# Patient Record
Sex: FEMALE | Race: WHITE | NOT HISPANIC OR LATINO | Employment: OTHER | URBAN - METROPOLITAN AREA
[De-identification: names, ages, dates, MRNs, and addresses within clinical notes are randomized per-mention and may not be internally consistent; named-entity substitution may affect disease eponyms.]

---

## 2017-01-18 ENCOUNTER — OFFICE VISIT (OUTPATIENT)
Dept: SURGERY | Facility: CLINIC | Age: 77
End: 2017-01-18

## 2017-01-18 VITALS
HEIGHT: 62 IN | SYSTOLIC BLOOD PRESSURE: 124 MMHG | DIASTOLIC BLOOD PRESSURE: 78 MMHG | WEIGHT: 226 LBS | BODY MASS INDEX: 41.59 KG/M2

## 2017-01-18 DIAGNOSIS — R10.12 LEFT UPPER QUADRANT PAIN: Primary | ICD-10-CM

## 2017-01-18 PROCEDURE — 99212 OFFICE O/P EST SF 10 MIN: CPT | Performed by: SURGERY

## 2017-01-18 NOTE — MR AVS SNAPSHOT
Katarzyna Ku   1/18/2017 3:45 PM   Office Visit    Dept Phone:  [phone]   Encounter #:  [id number]    Provider:  Afshin Schultz MD   Department:  [facility] GENERAL SURGERY                Your Full Care Plan              Today's Medication Changes          These changes are accurate as of: 1/18/17  4:05 PM.  If you have any questions, ask your nurse or doctor.               Medication(s)that have changed:     albuterol (2.5 MG/3ML) 0.083% nebulizer solution   Commonly known as:  PROVENTIL   What changed:  Another medication with the same name was removed. Continue taking this medication, and follow the directions you see here.   Changed by:  Afshin Schultz MD                  Your Updated Medication List          This list is accurate as of: 1/18/17  4:05 PM.  Always use your most recent med list.                albuterol (2.5 MG/3ML) 0.083% nebulizer solution   Commonly known as:  PROVENTIL       benazepril 40 MG tablet   Commonly known as:  LOTENSIN       benzonatate 200 MG capsule   Commonly known as:  TESSALON       celecoxib 100 MG capsule   Commonly known as:  CeleBREX       citalopram 20 MG tablet   Commonly known as:  CeleXA       fish oil 1000 MG capsule capsule       hydrALAZINE 50 MG tablet   Commonly known as:  APRESOLINE       levoFLOXacin 750 MG tablet   Commonly known as:  LEVAQUIN       losartan-hydrochlorothiazide 50-12.5 MG per tablet   Commonly known as:  HYZAAR       MULTIVITAMIN ADULT PO       omeprazole 20 MG capsule   Commonly known as:  priLOSEC       PredniSONE 5 MG (21) tablet therapy pack dosepak       SPIRIVA HANDIHALER 18 MCG per inhalation capsule   Generic drug:  tiotropium       SYMBICORT 160-4.5 MCG/ACT inhaler   Generic drug:  budesonide-formoterol       venlafaxine 50 MG tablet   Commonly known as:  EFFEXOR       VITAMIN B COMPLEX PO               Instructions     None    Patient Instructions History      Upcoming Appointments   "   Visit Type Date Time Department    OFFICE VISIT 1/18/2017  3:45 PM [facility]    CT LAG CHEST WO CONTRAST 2/1/2017 11:30 AM [facility] CT    FULL PFT W BRONCHODILATORS 4/19/2017  1:15 PM [facility] PULMONARY LAB      MyChart Signup     Our records indicate that your [facility] account has been deactivated. If you would like to reactivate your account, please email [email or web address] or call [phone] to talk to our [facility] staff.             Other Info from Your Visit           Your Appointments     Feb 01, 2017 11:30 AM EST   CT lag chest wo contrast with LAG CT 1   [facility] ([place])    [address] [place] [address] [phone]           No prep.  Arrive 15 minutes prior to exam time.  Bring prior films if applicable.            Apr 19, 2017  1:15 PM EDT   Full PFT W Bronchodilators with [facility] PFT/EEG ROOM   [facility]UL[facility] ([place])    [address] [place] [address] [phone]              Allergies     Azithromycin  Nausea And Vomiting    SEVERE GI UPSET    Codeine      Sulfa Antibiotics  Itching      Reason for Visit     Hernia           Vital Signs     Blood Pressure Height Weight Body Mass Index Smoking Status       124/78 62\" (157.5 cm) 226 lb (103 kg) 41.34 kg/m2 Former Smoker       Problems and Diagnoses Noted     Degenerative arthritis of knee        "

## 2017-01-18 NOTE — LETTER
2017     Michelle Beal MD  [address]  [address]  [place] [address]    Patient: Katarzyna Ku   YOB: 1940   Date of Visit: 2017       Dear Dr. Lian MD:    Thank you for referring Katarzyna Ku to me for evaluation. Below are the relevant portions of my assessment and plan of care.    If you have questions, please do not hesitate to call me. I look forward to following [place] along with you.         Sincerely,        Afshin Schultz MD        CC: MD Weston Patel MD Thomas K. Hart, MD  2017  4:08 PM  Sign at close encounter      PATIENT INFORMATION  Katarzyna Ku  Possible hernia, has seen Dr. Schultz in the past, pt states she has a lot of abd and back pain, no recent imaging     - 1940    CHIEF COMPLAINT  Chief Complaint   Patient presents with   • Hernia       HISTORY OF PRESENT ILLNESS  HPI she has a prior breast cancer and has COPD and sees Dr. Sharma for that.  She had a recent PET scan that showed some indeterminate nodules in her lung.  She states she's also had a recent urinary tract infection and has some left flank pain and some left upper quadrant pain.  She denies any specific GI complaints.  She does have a prior history of diverticulitis but says the pain is not similar to that.  She is to see Dr. Bhatt tomorrow.  She had a CT scan of her abdomen in March of last year for the diverticular disease.  Dr. Sue performs her colonoscopies and lasted a colonoscopy approximately 7 years ago according to the patient.        REVIEW OF SYSTEMS  Review of Systems   Constitutional: Negative.    HENT: Negative.    Eyes: Negative.    Respiratory: Negative.    Cardiovascular: Negative.    Gastrointestinal: Positive for abdominal pain.   Endocrine: Negative.    Genitourinary: Negative.    Musculoskeletal: Positive for back pain.   Skin: Negative.    Allergic/Immunologic: Negative.    Neurological: Negative.       Hematological: Negative.    Psychiatric/Behavioral: The patient is nervous/anxious.          ACTIVE PROBLEMS  Patient Active Problem List    Diagnosis   • Abdominal pain, right upper quadrant [R10.11]   • Osteoarthritis of knee [M17.9]   • Chronic obstructive pulmonary disease [J44.9]   • Hypertension [I10]   • Primary localized osteoarthrosis [M19.91]   • Midline cystocele [N81.11]   • Mixed incontinence [N39.46]   • Muscular atrophy [M62.50]   • Atrophic vaginitis [N95.2]   • Prolapse of vaginal vault after hysterectomy [N99.3]   • Enterocele [K46.9]         PAST MEDICAL HISTORY  Past Medical History   Diagnosis Date   • Anxiety    • Breast cancer 2009     left   • COPD (chronic obstructive pulmonary disease)    • Hypertension    • Skin cancer          SURGICAL HISTORY  Past Surgical History   Procedure Laterality Date   • Breast lumpectomy       LEFT   • Knee surgery        X 2   • Hysterectomy     • Bladder surgery     • Carpal tunnel release       BOTH HANDS         FAMILY HISTORY  Family History   Problem Relation Age of Onset   • Family history unknown: Yes         SOCIAL HISTORY  Social History     Occupational History   • Not on file.     Social History Main Topics   • Smoking status: Former Smoker   • Smokeless tobacco: Not on file   • Alcohol use No   • Drug use: Not on file   • Sexual activity: Not on file         CURRENT MEDICATIONS    Current Outpatient Prescriptions:   •  albuterol (PROVENTIL HFA) 108 (90 BASE) MCG/ACT inhaler, Proventil  (90 Base) MCG/ACT Inhalation Aerosol Solution; Patient Sig: Proventil  (90 Base) MCG/ACT Inhalation Aerosol Solution ; 0; 21-Oct-2014; Active, Disp: , Rfl:   •  albuterol (PROVENTIL) (2.5 MG/3ML) 0.083% nebulizer solution, , Disp: , Rfl:   •  B Complex Vitamins (VITAMIN B COMPLEX PO), Take  by mouth., Disp: , Rfl:   •  benazepril (LOTENSIN) 40 MG tablet, , Disp: , Rfl:   •  benzonatate (TESSALON) 200 MG capsule, , Disp: , Rfl:   •   budesonide-formoterol (SYMBICORT) 160-4.5 MCG/ACT inhaler, Symbicort 160-4.5 MCG/ACT Inhalation Aerosol; Patient Sig: Symbicort 160-4.5 MCG/ACT Inhalation Aerosol ; 0; 21-Oct-2014; Active, Disp: , Rfl:   •  celecoxib (CeleBREX) 100 MG capsule, , Disp: , Rfl:   •  citalopram (CeleXA) 20 MG tablet, , Disp: , Rfl:   •  hydrALAZINE (APRESOLINE) 50 MG tablet, , Disp: , Rfl:   •  levoFLOXacin (LEVAQUIN) 750 MG tablet, , Disp: , Rfl:   •  losartan-hydrochlorothiazide (HYZAAR) 50-12.5 MG per tablet, , Disp: , Rfl:   •  Multiple Vitamins-Minerals (MULTIVITAMIN ADULT PO), Take  by mouth., Disp: , Rfl:   •  Omega-3 Fatty Acids (FISH OIL) 1000 MG capsule capsule, Take  by mouth Daily With Breakfast., Disp: , Rfl:   •  omeprazole (PriLOSEC) 20 MG capsule, , Disp: , Rfl:   •  PredniSONE 5 MG (21) tablet therapy pack dosepak, , Disp: , Rfl:   •  SPIRIVA HANDIHALER 18 MCG per inhalation capsule, , Disp: , Rfl:   •  venlafaxine (EFFEXOR) 50 MG tablet, Take 50 mg by mouth 2 (Two) Times a Day., Disp: , Rfl:     ALLERGIES  Azithromycin; Codeine; and Sulfa antibiotics    VITALS  There were no vitals filed for this visit.    LAST RESULTS   No results found for any previous visit.  No results found.    PHYSICAL EXAM  Physical Exam's alert overweight white female in no active distress.  She is some subjective tenderness in her left upper quadrant and left flank.  There is no evidence of mass in this area and there is no evidence of an impulse in this area.  She does have a small reducible umbilical hernia.  I reviewed her CT scan from March of last year and it did show the diverticulitis and a small umbilical hernia.    ASSESSMENT  Abdominal pain      PLAN  The options were discussed with the patient in detail.  I recommended a CT scan of the abdomen and pelvis and I will see her back after that study is complete.  Since she is seeing Dr. Bhatt tomorrow we will defer on ordering a CAT scan until he sees her in case he wants to go on a  different direction.  She will call to schedule the CT if he agrees.

## 2017-01-18 NOTE — PROGRESS NOTES
PATIENT INFORMATION  Katarzyna Ku  Possible hernia, has seen Dr. Schultz in the past, pt states she has a lot of abd and back pain, no recent imaging     - 1940    CHIEF COMPLAINT  Chief Complaint   Patient presents with   • Hernia       HISTORY OF PRESENT ILLNESS  HPI she has a prior breast cancer and has COPD and sees Dr. Sharma for that.  She had a recent PET scan that showed some indeterminate nodules in her lung.  She states she's also had a recent urinary tract infection and has some left flank pain and some left upper quadrant pain.  She denies any specific GI complaints.  She does have a prior history of diverticulitis but says the pain is not similar to that.  She is to see Dr. Bhatt tomorrow.  She had a CT scan of her abdomen in March of last year for the diverticular disease.  Dr. Sue performs her colonoscopies and lasted a colonoscopy approximately 7 years ago according to the patient.        REVIEW OF SYSTEMS  Review of Systems   Constitutional: Negative.    HENT: Negative.    Eyes: Negative.    Respiratory: Negative.    Cardiovascular: Negative.    Gastrointestinal: Positive for abdominal pain.   Endocrine: Negative.    Genitourinary: Negative.    Musculoskeletal: Positive for back pain.   Skin: Negative.    Allergic/Immunologic: Negative.    Neurological: Negative.    Hematological: Negative.    Psychiatric/Behavioral: The patient is nervous/anxious.          ACTIVE PROBLEMS  Patient Active Problem List    Diagnosis   • Abdominal pain, right upper quadrant [R10.11]   • Osteoarthritis of knee [M17.9]   • Chronic obstructive pulmonary disease [J44.9]   • Hypertension [I10]   • Primary localized osteoarthrosis [M19.91]   • Midline cystocele [N81.11]   • Mixed incontinence [N39.46]   • Muscular atrophy [M62.50]   • Atrophic vaginitis [N95.2]   • Prolapse of vaginal vault after hysterectomy [N99.3]   • Enterocele [K46.9]         PAST MEDICAL HISTORY  Past Medical History   Diagnosis  Date   • Anxiety    • Breast cancer 2009     left   • COPD (chronic obstructive pulmonary disease)    • Hypertension    • Skin cancer          SURGICAL HISTORY  Past Surgical History   Procedure Laterality Date   • Breast lumpectomy       LEFT   • Knee surgery        X 2   • Hysterectomy     • Bladder surgery     • Carpal tunnel release       BOTH HANDS         FAMILY HISTORY  Family History   Problem Relation Age of Onset   • Family history unknown: Yes         SOCIAL HISTORY  Social History     Occupational History   • Not on file.     Social History Main Topics   • Smoking status: Former Smoker   • Smokeless tobacco: Not on file   • Alcohol use No   • Drug use: Not on file   • Sexual activity: Not on file         CURRENT MEDICATIONS    Current Outpatient Prescriptions:   •  albuterol (PROVENTIL HFA) 108 (90 BASE) MCG/ACT inhaler, Proventil  (90 Base) MCG/ACT Inhalation Aerosol Solution; Patient Sig: Proventil  (90 Base) MCG/ACT Inhalation Aerosol Solution ; 0; 21-Oct-2014; Active, Disp: , Rfl:   •  albuterol (PROVENTIL) (2.5 MG/3ML) 0.083% nebulizer solution, , Disp: , Rfl:   •  B Complex Vitamins (VITAMIN B COMPLEX PO), Take  by mouth., Disp: , Rfl:   •  benazepril (LOTENSIN) 40 MG tablet, , Disp: , Rfl:   •  benzonatate (TESSALON) 200 MG capsule, , Disp: , Rfl:   •  budesonide-formoterol (SYMBICORT) 160-4.5 MCG/ACT inhaler, Symbicort 160-4.5 MCG/ACT Inhalation Aerosol; Patient Sig: Symbicort 160-4.5 MCG/ACT Inhalation Aerosol ; 0; 21-Oct-2014; Active, Disp: , Rfl:   •  celecoxib (CeleBREX) 100 MG capsule, , Disp: , Rfl:   •  citalopram (CeleXA) 20 MG tablet, , Disp: , Rfl:   •  hydrALAZINE (APRESOLINE) 50 MG tablet, , Disp: , Rfl:   •  levoFLOXacin (LEVAQUIN) 750 MG tablet, , Disp: , Rfl:   •  losartan-hydrochlorothiazide (HYZAAR) 50-12.5 MG per tablet, , Disp: , Rfl:   •  Multiple Vitamins-Minerals (MULTIVITAMIN ADULT PO), Take  by mouth., Disp: , Rfl:   •  Omega-3 Fatty Acids (FISH OIL) 1000 MG  capsule capsule, Take  by mouth Daily With Breakfast., Disp: , Rfl:   •  omeprazole (PriLOSEC) 20 MG capsule, , Disp: , Rfl:   •  PredniSONE 5 MG (21) tablet therapy pack dosepak, , Disp: , Rfl:   •  SPIRIVA HANDIHALER 18 MCG per inhalation capsule, , Disp: , Rfl:   •  venlafaxine (EFFEXOR) 50 MG tablet, Take 50 mg by mouth 2 (Two) Times a Day., Disp: , Rfl:     ALLERGIES  Azithromycin; Codeine; and Sulfa antibiotics    VITALS  There were no vitals filed for this visit.    LAST RESULTS   No results found for any previous visit.  No results found.    PHYSICAL EXAM  Physical Exam's alert overweight white female in no active distress.  She is some subjective tenderness in her left upper quadrant and left flank.  There is no evidence of mass in this area and there is no evidence of an impulse in this area.  She does have a small reducible umbilical hernia.  I reviewed her CT scan from March of last year and it did show the diverticulitis and a small umbilical hernia.    ASSESSMENT  Abdominal pain      PLAN  The options were discussed with the patient in detail.  I recommended a CT scan of the abdomen and pelvis and I will see her back after that study is complete.  Since she is seeing Dr. Bhatt tomorrow we will defer on ordering a CAT scan until he sees her in case he wants to go on a different direction.  She will call to schedule the CT if he agrees.

## 2017-01-19 ENCOUNTER — TELEPHONE (OUTPATIENT)
Dept: SURGERY | Facility: CLINIC | Age: 77
End: 2017-01-19

## 2017-01-19 DIAGNOSIS — R10.12 LEFT UPPER QUADRANT PAIN: Primary | ICD-10-CM

## 2017-01-24 ENCOUNTER — HOSPITAL ENCOUNTER (OUTPATIENT)
Dept: CT IMAGING | Facility: HOSPITAL | Age: 77
Discharge: HOME | End: 2017-01-24
Attending: SURGERY | Admitting: SURGERY

## 2017-01-24 DIAGNOSIS — R10.12 LEFT UPPER QUADRANT PAIN: ICD-10-CM

## 2017-01-24 PROCEDURE — 0 IOPAMIDOL PER 1 ML: Performed by: SURGERY

## 2017-01-24 PROCEDURE — 74177 CT ABD & PELVIS W/CONTRAST: CPT

## 2017-01-24 RX ADMIN — IOPAMIDOL 100 ML: 755 INJECTION, SOLUTION INTRAVENOUS at 09:28

## 2017-01-30 ENCOUNTER — HOSPITAL ENCOUNTER (EMERGENCY)
Facility: HOSPITAL | Age: 77
Discharge: HOME | End: 2017-01-30
Attending: EMERGENCY MEDICINE | Admitting: EMERGENCY MEDICINE

## 2017-01-30 VITALS
HEART RATE: 73 BPM | BODY MASS INDEX: 41.59 KG/M2 | WEIGHT: 226 LBS | DIASTOLIC BLOOD PRESSURE: 71 MMHG | TEMPERATURE: 97.9 F | RESPIRATION RATE: 16 BRPM | OXYGEN SATURATION: 91 % | HEIGHT: 62 IN | SYSTOLIC BLOOD PRESSURE: 130 MMHG

## 2017-01-30 DIAGNOSIS — R55 SYNCOPE, VASOVAGAL: ICD-10-CM

## 2017-01-30 DIAGNOSIS — R10.32 LEFT LOWER QUADRANT PAIN: Primary | ICD-10-CM

## 2017-01-30 LAB
ALBUMIN SERPL-MCNC: 3.7 G/DL (ref 3.5–5.2)
ALBUMIN/GLOB SERPL: 1.8 G/DL
ALP SERPL-CCNC: 74 U/L (ref 40–129)
ALT SERPL W P-5'-P-CCNC: 12 U/L (ref 5–33)
AMYLASE SERPL-CCNC: 68 U/L (ref 28–100)
ANION GAP SERPL CALCULATED.3IONS-SCNC: 12.4 MMOL/L
AST SERPL-CCNC: 12 U/L (ref 5–32)
BASOPHILS # BLD AUTO: 0.03 10*3/MM3 (ref 0–0.2)
BASOPHILS NFR BLD AUTO: 0.3 % (ref 0–2)
BILIRUB SERPL-MCNC: 0.2 MG/DL (ref 0.2–1.2)
BUN BLD-MCNC: 22 MG/DL (ref 8–23)
BUN/CREAT SERPL: 22 (ref 7–25)
CALCIUM SPEC-SCNC: 9.3 MG/DL (ref 8.8–10.5)
CHLORIDE SERPL-SCNC: 100 MMOL/L (ref 98–107)
CO2 SERPL-SCNC: 28.6 MMOL/L (ref 22–29)
CREAT BLD-MCNC: 1 MG/DL (ref 0.57–1)
DEPRECATED RDW RBC AUTO: 49.7 FL (ref 37–54)
EOSINOPHIL # BLD AUTO: 0.21 10*3/MM3 (ref 0.1–0.3)
EOSINOPHIL NFR BLD AUTO: 2.2 % (ref 0–4)
ERYTHROCYTE [DISTWIDTH] IN BLOOD BY AUTOMATED COUNT: 14.3 % (ref 11.5–14.5)
GFR SERPL CREATININE-BSD FRML MDRD: 54 ML/MIN/1.73
GLOBULIN UR ELPH-MCNC: 2.1 GM/DL
GLUCOSE BLD-MCNC: 140 MG/DL (ref 65–99)
HCT VFR BLD AUTO: 35.4 % (ref 37–47)
HGB BLD-MCNC: 10.9 G/DL (ref 12–16)
IMM GRANULOCYTES # BLD: 0.03 10*3/MM3 (ref 0–0.03)
IMM GRANULOCYTES NFR BLD: 0.3 % (ref 0–0.5)
LIPASE SERPL-CCNC: 26 U/L (ref 13–60)
LYMPHOCYTES # BLD AUTO: 2.61 10*3/MM3 (ref 0.6–4.8)
LYMPHOCYTES NFR BLD AUTO: 27.9 % (ref 20–45)
MCH RBC QN AUTO: 28.8 PG (ref 27–31)
MCHC RBC AUTO-ENTMCNC: 30.8 G/DL (ref 31–37)
MCV RBC AUTO: 93.7 FL (ref 81–99)
MONOCYTES # BLD AUTO: 0.51 10*3/MM3 (ref 0–1)
MONOCYTES NFR BLD AUTO: 5.4 % (ref 3–8)
NEUTROPHILS # BLD AUTO: 5.98 10*3/MM3 (ref 1.5–8.3)
NEUTROPHILS NFR BLD AUTO: 63.9 % (ref 45–70)
NRBC BLD MANUAL-RTO: 0 /100 WBC (ref 0–0)
PLATELET # BLD AUTO: 244 10*3/MM3 (ref 140–500)
PMV BLD AUTO: 11.3 FL (ref 7.4–10.4)
POTASSIUM BLD-SCNC: 4.7 MMOL/L (ref 3.5–5.2)
PROT SERPL-MCNC: 5.8 G/DL (ref 6–8.5)
RBC # BLD AUTO: 3.78 10*6/MM3 (ref 4.2–5.4)
SODIUM BLD-SCNC: 141 MMOL/L (ref 136–145)
TROPONIN T SERPL-MCNC: <0.01 NG/ML (ref 0–0.03)
WBC NRBC COR # BLD: 9.37 10*3/MM3 (ref 4.8–10.8)

## 2017-01-30 PROCEDURE — 80053 COMPREHEN METABOLIC PANEL: CPT | Performed by: EMERGENCY MEDICINE

## 2017-01-30 PROCEDURE — 99284 EMERGENCY DEPT VISIT MOD MDM: CPT

## 2017-01-30 PROCEDURE — 93005 ELECTROCARDIOGRAM TRACING: CPT | Performed by: EMERGENCY MEDICINE

## 2017-01-30 PROCEDURE — 96374 THER/PROPH/DIAG INJ IV PUSH: CPT

## 2017-01-30 PROCEDURE — 85025 COMPLETE CBC W/AUTO DIFF WBC: CPT | Performed by: EMERGENCY MEDICINE

## 2017-01-30 PROCEDURE — 84484 ASSAY OF TROPONIN QUANT: CPT | Performed by: EMERGENCY MEDICINE

## 2017-01-30 PROCEDURE — 83690 ASSAY OF LIPASE: CPT | Performed by: EMERGENCY MEDICINE

## 2017-01-30 PROCEDURE — 99284 EMERGENCY DEPT VISIT MOD MDM: CPT | Performed by: EMERGENCY MEDICINE

## 2017-01-30 PROCEDURE — 93010 ELECTROCARDIOGRAM REPORT: CPT | Performed by: INTERNAL MEDICINE

## 2017-01-30 PROCEDURE — 82150 ASSAY OF AMYLASE: CPT | Performed by: EMERGENCY MEDICINE

## 2017-01-30 PROCEDURE — 25010000002 ONDANSETRON PER 1 MG: Performed by: EMERGENCY MEDICINE

## 2017-01-30 RX ORDER — ONDANSETRON 2 MG/ML
4 INJECTION INTRAMUSCULAR; INTRAVENOUS ONCE
Status: COMPLETED | OUTPATIENT
Start: 2017-01-30 | End: 2017-01-30

## 2017-01-30 RX ORDER — SODIUM CHLORIDE 0.9 % (FLUSH) 0.9 %
10 SYRINGE (ML) INJECTION AS NEEDED
Status: DISCONTINUED | OUTPATIENT
Start: 2017-01-30 | End: 2017-01-30 | Stop reason: HOSPADM

## 2017-01-30 RX ADMIN — ONDANSETRON 4 MG: 2 INJECTION, SOLUTION INTRAMUSCULAR; INTRAVENOUS at 04:53

## 2017-02-01 ENCOUNTER — HOSPITAL ENCOUNTER (OUTPATIENT)
Dept: CT IMAGING | Facility: HOSPITAL | Age: 77
Discharge: HOME | End: 2017-02-01
Attending: INTERNAL MEDICINE | Admitting: INTERNAL MEDICINE

## 2017-02-01 DIAGNOSIS — R91.1 LUNG NODULE: ICD-10-CM

## 2017-02-01 PROCEDURE — 71250 CT THORAX DX C-: CPT

## 2017-02-02 ENCOUNTER — OFFICE (OUTPATIENT)
Dept: URBAN - METROPOLITAN AREA OTHER 6 | Facility: OTHER | Age: 77
End: 2017-02-02

## 2017-02-02 VITALS
WEIGHT: 225 LBS | HEART RATE: 80 BPM | SYSTOLIC BLOOD PRESSURE: 122 MMHG | HEIGHT: 62 IN | DIASTOLIC BLOOD PRESSURE: 80 MMHG

## 2017-02-02 DIAGNOSIS — K30 FUNCTIONAL DYSPEPSIA: ICD-10-CM

## 2017-02-02 DIAGNOSIS — Z12.11 ENCOUNTER FOR SCREENING FOR MALIGNANT NEOPLASM OF COLON: ICD-10-CM

## 2017-02-02 DIAGNOSIS — R10.32 LEFT LOWER QUADRANT PAIN: ICD-10-CM

## 2017-02-02 PROCEDURE — 99202 OFFICE O/P NEW SF 15 MIN: CPT

## 2017-02-02 RX ORDER — SODIUM PHOSPHATE, MONOBASIC, MONOHYDRATE, SODIUM PHOSPHATE, DIBASIC ANHYDROUS 1.102; .398 G/1; G/1
TABLET ORAL
Qty: 28 | Refills: 0 | Status: COMPLETED
Start: 2017-02-02 | End: 2017-07-03

## 2017-02-09 ENCOUNTER — ANESTHESIA EVENT (OUTPATIENT)
Dept: PERIOP | Facility: HOSPITAL | Age: 77
End: 2017-02-09

## 2017-02-10 ENCOUNTER — ANESTHESIA (OUTPATIENT)
Dept: PERIOP | Facility: HOSPITAL | Age: 77
End: 2017-02-10

## 2017-02-10 ENCOUNTER — HOSPITAL ENCOUNTER (OUTPATIENT)
Facility: HOSPITAL | Age: 77
Setting detail: HOSPITAL OUTPATIENT SURGERY
Discharge: HOME | End: 2017-02-10
Attending: INTERNAL MEDICINE | Admitting: INTERNAL MEDICINE

## 2017-02-10 ENCOUNTER — PREP FOR SURGERY (OUTPATIENT)
Dept: GASTROENTEROLOGY | Facility: HOSPITAL | Age: 77
End: 2017-02-10

## 2017-02-10 ENCOUNTER — ON CAMPUS - OUTPATIENT (OUTPATIENT)
Dept: URBAN - METROPOLITAN AREA HOSPITAL 28 | Facility: HOSPITAL | Age: 77
End: 2017-02-10
Payer: MEDICARE

## 2017-02-10 VITALS
BODY MASS INDEX: 40.52 KG/M2 | OXYGEN SATURATION: 98 % | RESPIRATION RATE: 16 BRPM | HEIGHT: 62 IN | SYSTOLIC BLOOD PRESSURE: 102 MMHG | TEMPERATURE: 97.1 F | HEART RATE: 79 BPM | DIASTOLIC BLOOD PRESSURE: 56 MMHG | WEIGHT: 220.2 LBS

## 2017-02-10 DIAGNOSIS — K57.30 DIVERTICULOSIS OF LARGE INTESTINE WITHOUT PERFORATION OR ABS: ICD-10-CM

## 2017-02-10 DIAGNOSIS — K22.70 BARRETT'S ESOPHAGUS WITHOUT DYSPLASIA: ICD-10-CM

## 2017-02-10 DIAGNOSIS — D12.3 BENIGN NEOPLASM OF TRANSVERSE COLON: ICD-10-CM

## 2017-02-10 DIAGNOSIS — Z13.9 SCREENING: ICD-10-CM

## 2017-02-10 DIAGNOSIS — K22.70: ICD-10-CM

## 2017-02-10 DIAGNOSIS — D12.5 BENIGN NEOPLASM OF SIGMOID COLON: ICD-10-CM

## 2017-02-10 DIAGNOSIS — Z12.11 ENCOUNTER FOR SCREENING FOR MALIGNANT NEOPLASM OF COLON: ICD-10-CM

## 2017-02-10 PROCEDURE — 94640 AIRWAY INHALATION TREATMENT: CPT

## 2017-02-10 PROCEDURE — 45380 COLONOSCOPY AND BIOPSY: CPT | Mod: PT

## 2017-02-10 PROCEDURE — 25010000002 PROPOFOL 10 MG/ML EMULSION: Performed by: ANESTHESIOLOGY

## 2017-02-10 PROCEDURE — 43239 EGD BIOPSY SINGLE/MULTIPLE: CPT

## 2017-02-10 RX ORDER — SODIUM CHLORIDE, SODIUM LACTATE, POTASSIUM CHLORIDE, CALCIUM CHLORIDE 600; 310; 30; 20 MG/100ML; MG/100ML; MG/100ML; MG/100ML
9 INJECTION, SOLUTION INTRAVENOUS CONTINUOUS
Status: DISCONTINUED | OUTPATIENT
Start: 2017-02-10 | End: 2017-02-10 | Stop reason: HOSPADM

## 2017-02-10 RX ORDER — MAGNESIUM HYDROXIDE 1200 MG/15ML
LIQUID ORAL AS NEEDED
Status: DISCONTINUED | OUTPATIENT
Start: 2017-02-10 | End: 2017-02-10 | Stop reason: HOSPADM

## 2017-02-10 RX ORDER — SODIUM CHLORIDE 0.9 % (FLUSH) 0.9 %
1-10 SYRINGE (ML) INJECTION AS NEEDED
Status: DISCONTINUED | OUTPATIENT
Start: 2017-02-10 | End: 2017-02-10 | Stop reason: HOSPADM

## 2017-02-10 RX ORDER — SODIUM CHLORIDE 0.9 % (FLUSH) 0.9 %
1-10 SYRINGE (ML) INJECTION AS NEEDED
Status: CANCELLED | OUTPATIENT
Start: 2017-02-10

## 2017-02-10 RX ORDER — LIDOCAINE HYDROCHLORIDE 10 MG/ML
0.3 INJECTION, SOLUTION EPIDURAL; INFILTRATION; INTRACAUDAL; PERINEURAL ONCE
Status: COMPLETED | OUTPATIENT
Start: 2017-02-10 | End: 2017-02-10

## 2017-02-10 RX ORDER — GLYCOPYRROLATE 0.2 MG/ML
INJECTION INTRAMUSCULAR; INTRAVENOUS AS NEEDED
Status: DISCONTINUED | OUTPATIENT
Start: 2017-02-10 | End: 2017-02-10 | Stop reason: SURG

## 2017-02-10 RX ORDER — IPRATROPIUM BROMIDE AND ALBUTEROL SULFATE 2.5; .5 MG/3ML; MG/3ML
3 SOLUTION RESPIRATORY (INHALATION) ONCE
Status: COMPLETED | OUTPATIENT
Start: 2017-02-10 | End: 2017-02-10

## 2017-02-10 RX ORDER — LIDOCAINE HYDROCHLORIDE 20 MG/ML
INJECTION, SOLUTION INFILTRATION; PERINEURAL AS NEEDED
Status: DISCONTINUED | OUTPATIENT
Start: 2017-02-10 | End: 2017-02-10 | Stop reason: SURG

## 2017-02-10 RX ORDER — PROPOFOL 10 MG/ML
VIAL (ML) INTRAVENOUS AS NEEDED
Status: DISCONTINUED | OUTPATIENT
Start: 2017-02-10 | End: 2017-02-10 | Stop reason: SURG

## 2017-02-10 RX ORDER — LIDOCAINE HYDROCHLORIDE 10 MG/ML
INJECTION, SOLUTION EPIDURAL; INFILTRATION; INTRACAUDAL; PERINEURAL
Status: COMPLETED
Start: 2017-02-10 | End: 2017-02-10

## 2017-02-10 RX ADMIN — PROPOFOL 380 MG: 10 INJECTION, EMULSION INTRAVENOUS at 13:23

## 2017-02-10 RX ADMIN — LIDOCAINE HYDROCHLORIDE 0.3 ML: 10 INJECTION, SOLUTION EPIDURAL; INFILTRATION; INTRACAUDAL; PERINEURAL at 11:36

## 2017-02-10 RX ADMIN — IPRATROPIUM BROMIDE AND ALBUTEROL SULFATE 3 ML: .5; 3 SOLUTION RESPIRATORY (INHALATION) at 11:48

## 2017-02-10 RX ADMIN — SODIUM CHLORIDE, POTASSIUM CHLORIDE, SODIUM LACTATE AND CALCIUM CHLORIDE 9 ML/HR: 600; 310; 30; 20 INJECTION, SOLUTION INTRAVENOUS at 11:36

## 2017-02-10 RX ADMIN — GLYCOPYRROLATE 0.1 MG: 0.2 INJECTION INTRAMUSCULAR; INTRAVENOUS at 13:23

## 2017-02-10 RX ADMIN — SODIUM CHLORIDE, POTASSIUM CHLORIDE, SODIUM LACTATE AND CALCIUM CHLORIDE: 600; 310; 30; 20 INJECTION, SOLUTION INTRAVENOUS at 13:17

## 2017-02-10 RX ADMIN — LIDOCAINE HYDROCHLORIDE 50 MG: 20 INJECTION, SOLUTION INFILTRATION; PERINEURAL at 13:23

## 2017-02-10 NOTE — OP NOTE
Date of Operation:  02/10/2017     PROCEDURE PERFORMED: Colonoscopy with polypectomy.     SURGEON: Weston Sue MD    INDICATIONS: Screening for colorectal cancer in a patient with a greater than 10 years since her last exam.     MEDICATIONS: Monitored anesthesia care. Please see anesthesia record.     DESCRIPTION OF PROCEDURE: The risks and benefits of the procedure were explained to the patient. Informed consent was signed. She was placed in the left lateral decubitus position and given IV sedation. A rectal exam revealed no external lesions, normal anal tone, and no rectal mass. The scope was introduced in the rectum and advanced under direct visualization with ease through the rectum, sigmoid colon, descending colon, to around the splenic flexure and advanced into the transverse colon where there was a sessile 5 x 7 mm flat discolored area, appeared to be consistent with a sessile polyp/flat lesion. The scope was advanced into the remainder of the transverse colon to around the hepatic flexure, reduced in the ascending colon, and then advanced into the cecum. The cecum was fairly well prepped and identified by the appendiceal orifice and ileocecal valve. The ileocecal valve was opened but not significantly intubated. The scope was withdrawn. Cecal mucosa was examined and normal as was the ascending colon. The scope was withdrawn around the hepatic flexure to the transverse colon. The site of polypectomy showed excellent hemostasis and no residual polyp. The scope was withdrawn around the splenic flexure into the descending colon, which was normal appearing, and the sigmoid colon where the diverticula were limited to. In the midsigmoid colon, there was a polyp next to a diverticulum. This was 5 x 5 mm. It was biopsied and felt to be completely removed. There was excellent hemostasis. The scope was withdrawn through the remainder of the distal sigmoid and rectum, retroflexed revealing intact dentate line  and no further mucosal lesions. The scope was de-retroflexed and withdrawn. The patient tolerated the procedure very well.     IMPRESSION:   1.  Colonoscopy to cecum with good prep.   2.  Sigmoid diverticulosis.   3.  Polyps numbering 2, one from the transverse and one from the sigmoid.     RECOMMENDATION: Will call patient with results of polyps as soon as they are available along with the appropriate recommendations on screening.       Weston Sue M.D.  SO/so  D:  02/10/2017 14:15:40   T:  02/10/2017 15:56:09   Job ID:  [id number]   Document ID:  [id number]  cc:

## 2017-02-10 NOTE — PLAN OF CARE
Problem: Patient Care Overview (Adult)  Goal: Plan of Care Review  Outcome: Ongoing (interventions implemented as appropriate)    02/10/17 1105   Coping/Psychosocial Response Interventions   Plan Of Care Reviewed With patient   Patient Care Overview   Progress improving   Outcome Evaluation   Outcome Summary/Follow up Plan vss, ready for procedure

## 2017-02-10 NOTE — ANESTHESIA POSTPROCEDURE EVALUATION
Patient: Katarzyna Ku    Procedure Summary     Date Anesthesia Start Anesthesia Stop Room / Location    02/10/17 [id number] [facility] ENDOS[facility] 1 / [facility] OR       Procedure Diagnosis Surgeon Provider    ESOPHAGOGASTRODUODENOSCOPY with biopsies (N/A Esophagus); COLONOSCOPY with polypectomy (N/A ) Casper's esophagus; Colon polyps; Colonic diverticulum  (R10.32, K30, Z12.11) MD Yolanda Qureshi MD          Anesthesia Type: MAC  Last vitals  BP      Temp      Pulse     Resp      SpO2        Post Anesthesia Care and Evaluation    Patient location during evaluation: bedside  Patient participation: complete - patient participated  Level of consciousness: awake and alert  Pain score: 0  Pain management: adequate  Airway patency: patent  Anesthetic complications: No anesthetic complications    Cardiovascular status: acceptable  Respiratory status: acceptable  Hydration status: acceptable

## 2017-02-10 NOTE — OP NOTE
Date of Operation:  02/10/2017    PROCEDURE PERFORMED: Esophagogastroduodenoscopy with biopsy.     INDICATIONS: History of Casper esophagus.     MEDICATIONS: Monitored anesthesia care. Please see anesthesia record.     SURGEON: Weston Sue MD    DESCRIPTION OF PROCEDURE: The risks and benefits of the procedure were explained to the patient. Informed consent was signed. She was placed in the left lateral decubitus position and given IV sedation. A bite block was placed between her incisors. The scope was reduced in the oropharynx and advanced under direct visualization into the esophagus and through the distal esophagus. The Z line was mildly irregular. There were at least 3 short, less than 1 cm, gastric tongues noted. No active ulcer. No stricture. The scope was advanced in the stomach and retroflexed revealing normal cardia and fundus. The scope was de-retroflexed and advanced to the antrum. Antral mucosa was normal appearing. The scope was advanced through the pylorus to the duodenal bulb, which was examined and normal, and around the angle to the 2nd and 3rd portions of the duodenum, revealing normal ampulla and otherwise normal duodenal mucosa. The scope was withdrawn back into the antrum and withdrawn slowly, examining the posterior wall and greater curvature. No mucosal lesions were noted. The endoscope was withdrawn in the distal esophagus. Biopsies were taken. In a targeted fashion, 4 quadrants were assessed with the biopsies. The scope was taken from the patient. The remainder of the esophagus was normal-appearing. She tolerated the procedure very well.     IMPRESSION:   1.  Normal duodenum.   2.  Normal stomach.   3.  Short segment Casper esophagus, assessed with biopsies.     RECOMMENDATION: Keep the patient on the same medications for now and get her the biopsy results along with further recommendations as soon as they are available.       Weston Sue M.D.  SO/so  D:  02/10/2017  14:13:06   T:  02/10/2017 15:43:57   Job ID:  [id number]   Document ID:  [id number]  cc:

## 2017-02-10 NOTE — ANESTHESIA PREPROCEDURE EVALUATION
Anesthesia Evaluation     Patient summary reviewed and Nursing notes reviewed   no history of anesthetic complications:  NPO Status: > 8 hours   Airway   Mallampati: II  TM distance: >3 FB  Neck ROM: limited  possible difficult intubation  Dental    (+) upper dentures    Pulmonary     breath sounds clear to auscultation  (+) COPD (O2 3L at night. 2 breathing treatments per day) moderate, asthma, decreased breath sounds,   Smoker: quit 1990.    ROS comment: Dry cough    Cardiovascular - normal exam    ECG reviewed  Rhythm: regular  Rate: normal    (+) hypertension well controlled,       Neuro/Psych  (+) psychiatric history Anxiety,    GI/Hepatic/Renal/Endo    (+) obesity, morbid obesity, GERD well controlled,     Musculoskeletal     (+) back pain (DDD Lumbar spine. ),   Abdominal   (+) obese,    Substance History - negative use     OB/GYN negative ob/gyn ROS         Other      history of cancer (Breast 2009 - skin CA 2016) remission                                Anesthesia Plan    ASA 3     MAC     intravenous induction   Anesthetic plan and risks discussed with patient.  Use of blood products discussed with patient  Consented to blood products.

## 2017-02-10 NOTE — BRIEF OP NOTE
ESOPHAGOGASTRODUODENOSCOPY, COLONOSCOPY  Procedure Note    Katarzyna Ku  2/10/2017    Pre-op Diagnosis:   R10.32, K30, Z12.11    Post-op Diagnosis:     Post-Op Diagnosis Codes:     * Casper's esophagus [K22.7]     * Colon polyps [K63.5]     * Colonic diverticulum [K57.30]    Procedure/CPT® Codes:      Procedure(s):  ESOPHAGOGASTRODUODENOSCOPY with biopsies  COLONOSCOPY with polypectomy    Surgeon(s):  Weston Sue MD    Anesthesia: Monitor Anesthesia Care    Staff:   Circulator: Gabe Casas RN  Scrub Person: Angie Palmer    Estimated Blood Loss: * No values recorded between 2/10/2017  1:17 PM and 2/10/2017  2:00 PM *    Specimens:                  ID Type Source Tests Collected by Time Destination   A : distal esophagus bx Tissue Esophagus, Distal TISSUE EXAM Weston Sue MD 2/10/2017 1332    B : transverse polyp Polyp Large Intestine, Transverse Colon TISSUE EXAM Weston Sue MD 2/10/2017 1343    C : sigmoid polyp Polyp Large Intestine, Sigmoid Colon TISSUE EXAM Weston Sue MD 2/10/2017 1356          Drains:           Findings: normal Duodenum  Normal stomach  Short segment Barretts Esophagus-Biopsy  Colon to Cecum good prep  Diverticulosis  Polyps(2)    Complications: none   [id number]/[id number]      Weston Sue MD     Date: 2/10/2017  Time: 2:04 PM

## 2017-02-10 NOTE — PLAN OF CARE
Problem: GI Endoscopy (Adult)  Goal: Signs and Symptoms of Listed Potential Problems Will be Absent or Manageable (GI Endoscopy)  Outcome: Ongoing (interventions implemented as appropriate)    02/10/17 1105   GI Endoscopy   Problems Assessed (GI Endoscopy) all   Problems Present (GI Endoscopy) none

## 2017-02-10 NOTE — PLAN OF CARE
Problem: Patient Care Overview (Adult)  Goal: Plan of Care Review  Outcome: Ongoing (interventions implemented as appropriate)    02/10/17 1416   Coping/Psychosocial Response Interventions   Plan Of Care Reviewed With patient   Patient Care Overview   Progress improving   Outcome Evaluation   Outcome Summary/Follow up Plan vss, ready for dc       Goal: Adult Individualization and Mutuality  Outcome: Ongoing (interventions implemented as appropriate)    Problem: GI Endoscopy (Adult)  Goal: Signs and Symptoms of Listed Potential Problems Will be Absent or Manageable (GI Endoscopy)  Outcome: Ongoing (interventions implemented as appropriate)

## 2017-02-15 LAB
LAB AP CASE REPORT: NORMAL
Lab: NORMAL
PATH REPORT.FINAL DX SPEC: NORMAL

## 2017-04-19 ENCOUNTER — HOSPITAL ENCOUNTER (OUTPATIENT)
Dept: PULMONOLOGY | Facility: HOSPITAL | Age: 77
Discharge: HOME | End: 2017-04-19
Attending: INTERNAL MEDICINE | Admitting: INTERNAL MEDICINE

## 2017-04-19 VITALS — RESPIRATION RATE: 20 BRPM | OXYGEN SATURATION: 95 % | HEART RATE: 78 BPM

## 2017-04-19 DIAGNOSIS — J43.9 PULMONARY EMPHYSEMA, UNSPECIFIED EMPHYSEMA TYPE (HCC): ICD-10-CM

## 2017-04-19 PROCEDURE — 94060 EVALUATION OF WHEEZING: CPT

## 2017-04-19 PROCEDURE — 94729 DIFFUSING CAPACITY: CPT

## 2017-04-19 PROCEDURE — A9270 NON-COVERED ITEM OR SERVICE: HCPCS | Performed by: INTERNAL MEDICINE

## 2017-04-19 PROCEDURE — 63710000001 ALBUTEROL PER 1 MG: Performed by: INTERNAL MEDICINE

## 2017-04-19 PROCEDURE — 94726 PLETHYSMOGRAPHY LUNG VOLUMES: CPT

## 2017-04-19 RX ORDER — ALBUTEROL SULFATE 2.5 MG/3ML
2.5 SOLUTION RESPIRATORY (INHALATION) ONCE
Status: COMPLETED | OUTPATIENT
Start: 2017-04-19 | End: 2017-04-19

## 2017-04-19 RX ADMIN — ALBUTEROL SULFATE 2.5 MG: 2.5 SOLUTION RESPIRATORY (INHALATION) at 13:51

## 2017-04-25 ENCOUNTER — TRANSCRIBE ORDERS (OUTPATIENT)
Dept: PULMONOLOGY | Facility: HOSPITAL | Age: 77
End: 2017-04-25

## 2017-04-25 DIAGNOSIS — R91.8 LUNG NODULES: Primary | ICD-10-CM

## 2017-07-03 ENCOUNTER — OFFICE (OUTPATIENT)
Dept: URBAN - METROPOLITAN AREA CLINIC 71 | Facility: CLINIC | Age: 77
End: 2017-07-03

## 2017-07-03 VITALS
HEIGHT: 62 IN | SYSTOLIC BLOOD PRESSURE: 110 MMHG | WEIGHT: 218 LBS | HEART RATE: 72 BPM | DIASTOLIC BLOOD PRESSURE: 70 MMHG

## 2017-07-03 DIAGNOSIS — K57.90 DIVERTICULOSIS OF INTESTINE, PART UNSPECIFIED, WITHOUT PERFO: ICD-10-CM

## 2017-07-03 DIAGNOSIS — K22.70 BARRETT'S ESOPHAGUS WITHOUT DYSPLASIA: ICD-10-CM

## 2017-07-03 DIAGNOSIS — Z86.010 PERSONAL HISTORY OF COLONIC POLYPS: ICD-10-CM

## 2017-07-03 PROCEDURE — 99212 OFFICE O/P EST SF 10 MIN: CPT

## 2017-07-03 RX ORDER — OMEPRAZOLE 40 MG/1
40 CAPSULE, DELAYED RELEASE ORAL
Qty: 90 | Refills: 4 | Status: ACTIVE

## 2017-09-22 ENCOUNTER — TRANSCRIBE ORDERS (OUTPATIENT)
Dept: ADMINISTRATIVE | Facility: HOSPITAL | Age: 77
End: 2017-09-22

## 2017-09-22 ENCOUNTER — LAB (OUTPATIENT)
Dept: LAB | Facility: HOSPITAL | Age: 77
End: 2017-09-22
Attending: FAMILY MEDICINE

## 2017-09-22 DIAGNOSIS — R19.7 DIARRHEA, UNSPECIFIED TYPE: Primary | ICD-10-CM

## 2017-09-22 DIAGNOSIS — R19.7 DIARRHEA, UNSPECIFIED TYPE: ICD-10-CM

## 2017-09-22 LAB — C DIFF TOX GENS STL QL NAA+PROBE: NEGATIVE

## 2017-09-22 PROCEDURE — 83631 LACTOFERRIN FECAL (QUANT): CPT

## 2017-09-22 PROCEDURE — 87493 C DIFF AMPLIFIED PROBE: CPT

## 2017-09-23 LAB — LACTOFERRIN STL QL LA: NEGATIVE

## 2017-09-27 NOTE — TELEPHONE ENCOUNTER
Patient called to let us know she spoke with Dr. Bhatt and he agrees to get the CT scan done.  Please put order in.   Request for use of Dry Needling/Intramuscular Manual Therapy  Patient: Mich Cruz     Referral Source: Tyler Marina MD  Diagnosis: Low back pain [M54.5]      : 1981  Date of initial visit: 17   Attended visits: 1  Missed Visits: 0    Based on findings from the physical therapy examination and evaluation, the evaluating therapist believes the patient, Mich Cruz  would benefit from including Dry Needling as part of the plan of care. Dry needling is a treatment technique utilized in conjunction with other PT interventions to inactivate myofascial trigger points and the pain and dysfunction they cause. Dry Needling is an advanced procedure that requires additional training including greater than 54 hours of intensive course work. Physical Therapists at [address] [place] [address] are trained and/or certified through [employer] for their education. PROCEDURE:   Solid filament sterile needle (typically 0.3mm/30 gauge) inserted into a trigger point   Repeated movements inactivate the trigger points, taking 30-60 seconds per site   Typically consists of 1 dry needling session per week and a possible second treatment including muscle re-education, flexibility, strengthening and other manual techniques to facilitate the benefits of dry needling     BENEFITS:   Inactivation of trigger points   Decreased pain   Increased muscle length   Improved movement patterns   Restoration of function POTENTIAL RISKS:   Post-needling soreness   Infection   Bruising/bleeding   Penetration of a nerve   Pneumothorax   All treating PTs have been thoroughly educated in avoiding adverse reactions    If you agree with this recommendation, please sign this form and fax it to us at [phone]. If you have questions or concerns regarding dry needling or any other treatment we may be providing, please contact us at [phone].     Thank you for allowing us to assist in the care of your patient. Maria Del Carmen Sanders, PT    9/27/2017 2:04 PM     NOTE TO PHYSICIAN:  PLEASE COMPLETE THE ORDERS BELOW AND   FAX TO [facility]: [phone]  If you are unable to process this request in 24 hours please contact our office:   [phone]    I have read the above request and AGREE to the recommendation of including dry needling as part of the plan of care.       Physicians signature: _________________________Date: _________Time:________

## 2017-10-03 DIAGNOSIS — Z12.39 BREAST CANCER SCREENING, HIGH RISK PATIENT: Primary | ICD-10-CM

## 2017-10-03 DIAGNOSIS — Z85.3 HISTORY OF BREAST CANCER: ICD-10-CM

## 2017-10-18 ENCOUNTER — HOSPITAL ENCOUNTER (OUTPATIENT)
Dept: CT IMAGING | Facility: HOSPITAL | Age: 77
Discharge: HOME | End: 2017-10-18
Attending: INTERNAL MEDICINE | Admitting: INTERNAL MEDICINE

## 2017-10-18 DIAGNOSIS — R91.8 LUNG NODULES: ICD-10-CM

## 2017-10-18 PROCEDURE — 71250 CT THORAX DX C-: CPT

## 2017-10-19 ENCOUNTER — HOSPITAL ENCOUNTER (OUTPATIENT)
Dept: MAMMOGRAPHY | Facility: HOSPITAL | Age: 77
Discharge: HOME | End: 2017-10-19
Attending: SURGERY | Admitting: SURGERY

## 2017-10-19 DIAGNOSIS — Z12.39 BREAST CANCER SCREENING, HIGH RISK PATIENT: ICD-10-CM

## 2017-10-19 DIAGNOSIS — Z85.3 HISTORY OF BREAST CANCER: ICD-10-CM

## 2017-10-19 PROCEDURE — G0204 DX MAMMO INCL CAD BI: HCPCS

## 2017-10-19 PROCEDURE — G0279 TOMOSYNTHESIS, MAMMO: HCPCS

## 2017-10-31 ENCOUNTER — OFFICE VISIT (OUTPATIENT)
Dept: SURGERY | Facility: CLINIC | Age: 77
End: 2017-10-31

## 2017-10-31 VITALS
BODY MASS INDEX: 42.14 KG/M2 | SYSTOLIC BLOOD PRESSURE: 122 MMHG | HEIGHT: 62 IN | WEIGHT: 229 LBS | DIASTOLIC BLOOD PRESSURE: 74 MMHG

## 2017-10-31 DIAGNOSIS — Z85.3 HISTORY OF BREAST CANCER: Primary | ICD-10-CM

## 2017-10-31 PROCEDURE — 99212 OFFICE O/P EST SF 10 MIN: CPT | Performed by: SURGERY

## 2017-10-31 RX ORDER — MELOXICAM 15 MG/1
15 TABLET ORAL DAILY
COMMUNITY
Start: 2017-10-10

## 2017-10-31 NOTE — PROGRESS NOTES
1 yr f/u mammo, ~ 8 yrs s/p L lumpectomy, pt c/o L arm mass that is of concern to her, otherwise well  She is 8 years status post left breast lumpectomy and sentinel lymph node biopsy.  She complains a mass of her left upper lateral arm that is mildly tender but hasn't changed in size.  She has COPD so harsh cough and shortness of breath has been stable.  She denies any GI complaints or any blood in her stool.  She says her weight has been stable.  Physical exam this is a overweight white female in no active distress.  She is oriented ×3.  She is no spinal tenderness.  She had no supraclavicular nor axillary adenopathy.  She did have some mild tenderness of her left breast diffusely but this is been stable since she had radiation therapy.  She is no dominant left or right breast mass.  There were no skin changes there is no nipple discharge.  Her abdomen was soft and nontender without mass.  Her lungs are clear and equal or heart shows a regular rate and rhythm.  Her mammogram showed no evidence of malignant criteria.  I see no evidence of recurrent disease and I would recommend that she had a mammogram in a year and re-exam by me.  The left upper extremity mass is consistent with a 1 cm lipoma and I discussed risks benefits and options regarding that and she wishes to observe that.

## 2018-08-07 ENCOUNTER — HOSPITAL ENCOUNTER (OUTPATIENT)
Dept: GENERAL RADIOLOGY | Facility: HOSPITAL | Age: 78
Discharge: HOME | End: 2018-08-07
Attending: FAMILY MEDICINE | Admitting: FAMILY MEDICINE

## 2018-08-07 ENCOUNTER — TRANSCRIBE ORDERS (OUTPATIENT)
Dept: ADMINISTRATIVE | Facility: HOSPITAL | Age: 78
End: 2018-08-07

## 2018-08-07 DIAGNOSIS — M54.2 CERVICALGIA: Primary | ICD-10-CM

## 2018-08-07 DIAGNOSIS — M54.2 CERVICALGIA: ICD-10-CM

## 2018-08-07 PROCEDURE — 72050 X-RAY EXAM NECK SPINE 4/5VWS: CPT

## 2018-10-02 DIAGNOSIS — C50.412 MALIGNANT NEOPLASM OF UPPER-OUTER QUADRANT OF LEFT FEMALE BREAST, UNSPECIFIED ESTROGEN RECEPTOR STATUS (HCC): Primary | ICD-10-CM

## 2018-10-22 ENCOUNTER — HOSPITAL ENCOUNTER (OUTPATIENT)
Dept: MAMMOGRAPHY | Facility: HOSPITAL | Age: 78
Discharge: HOME | End: 2018-10-22
Attending: SURGERY | Admitting: SURGERY

## 2018-10-22 DIAGNOSIS — C50.412 MALIGNANT NEOPLASM OF UPPER-OUTER QUADRANT OF LEFT FEMALE BREAST, UNSPECIFIED ESTROGEN RECEPTOR STATUS (HCC): ICD-10-CM

## 2018-10-22 PROCEDURE — 77066 DX MAMMO INCL CAD BI: CPT

## 2018-10-22 PROCEDURE — G0279 TOMOSYNTHESIS, MAMMO: HCPCS

## 2018-10-30 ENCOUNTER — OFFICE VISIT (OUTPATIENT)
Dept: SURGERY | Facility: CLINIC | Age: 78
End: 2018-10-30

## 2018-10-30 VITALS
WEIGHT: 217.6 LBS | BODY MASS INDEX: 40.04 KG/M2 | HEART RATE: 83 BPM | SYSTOLIC BLOOD PRESSURE: 124 MMHG | OXYGEN SATURATION: 94 % | DIASTOLIC BLOOD PRESSURE: 78 MMHG | HEIGHT: 62 IN

## 2018-10-30 DIAGNOSIS — C50.412 MALIGNANT NEOPLASM OF UPPER-OUTER QUADRANT OF LEFT FEMALE BREAST, UNSPECIFIED ESTROGEN RECEPTOR STATUS (HCC): Primary | ICD-10-CM

## 2018-10-30 DIAGNOSIS — M79.602 PAIN IN LATERAL LEFT UPPER EXTREMITY: ICD-10-CM

## 2018-10-30 PROCEDURE — 99212 OFFICE O/P EST SF 10 MIN: CPT | Performed by: SURGERY

## 2018-10-30 RX ORDER — CYANOCOBALAMIN (VITAMIN B-12) 5000 MCG
1 TABLET,DISINTEGRATING ORAL DAILY
COMMUNITY

## 2018-10-30 NOTE — PROGRESS NOTES
9 year s/p excision left lumpectomy, 1 year follow up with mammogram.  Having pain in left arm x 3 months.  He has a 9 year status post left lumpectomy for breast cancer.  She complains of left upper extremity pain that is dull and aching and intermittent.  She denies any arm swelling.  She says this is not helped with her nonsteroidals.  She's been told this is due to a pinched nerve but has had no workup.  She denies any weight loss.  She has a chronic cough.  She had a recent mammogram.  Physical exam the cell alert elderly obese white female in no active distress.  She is oriented ×3.  Her gait is normal.  Her heart shows a regular rate and rhythm her lungs are clear but distant.  She has no supraclavicular nor axillary adenopathy.  Her breasts are symmetrical without any skin changes no dominant mass.  Her liver was nonpalpable.  Her mammogram was reviewed by me and is 9 area I have recommended seeing her again in 1 year with bilateral mammogram.  We will check an EMG because of her arm pain.  Her radial pulses +2 detected no evidence of lymphedema.

## 2018-11-27 ENCOUNTER — HOSPITAL ENCOUNTER (OUTPATIENT)
Dept: INFUSION THERAPY | Facility: HOSPITAL | Age: 78
Discharge: HOME | End: 2018-11-27
Attending: SURGERY | Admitting: PSYCHIATRY & NEUROLOGY

## 2018-11-27 DIAGNOSIS — M79.602 PAIN IN LATERAL LEFT UPPER EXTREMITY: ICD-10-CM

## 2018-11-27 PROCEDURE — 95908 NRV CNDJ TST 3-4 STUDIES: CPT | Performed by: PSYCHIATRY & NEUROLOGY

## 2018-11-27 PROCEDURE — 95886 MUSC TEST DONE W/N TEST COMP: CPT

## 2018-11-27 PROCEDURE — 95908 NRV CNDJ TST 3-4 STUDIES: CPT

## 2018-11-27 PROCEDURE — 95886 MUSC TEST DONE W/N TEST COMP: CPT | Performed by: PSYCHIATRY & NEUROLOGY

## 2019-02-04 ENCOUNTER — LAB (OUTPATIENT)
Dept: LAB | Facility: HOSPITAL | Age: 79
End: 2019-02-04
Attending: FAMILY MEDICINE

## 2019-02-04 ENCOUNTER — TRANSCRIBE ORDERS (OUTPATIENT)
Dept: ADMINISTRATIVE | Facility: HOSPITAL | Age: 79
End: 2019-02-04

## 2019-02-04 DIAGNOSIS — R19.7 DIARRHEA, UNSPECIFIED TYPE: Primary | ICD-10-CM

## 2019-02-04 DIAGNOSIS — R19.7 DIARRHEA, UNSPECIFIED TYPE: ICD-10-CM

## 2019-02-04 LAB
ADV 40+41 DNA STL QL NAA+NON-PROBE: NOT DETECTED
ASTRO TYP 1-8 RNA STL QL NAA+NON-PROBE: DETECTED
C CAYETANENSIS DNA STL QL NAA+NON-PROBE: NOT DETECTED
C DIFF GDH STL QL: NEGATIVE
CAMPY SP DNA.DIARRHEA STL QL NAA+PROBE: NOT DETECTED
CRYPTOSP STL CULT: NOT DETECTED
E COLI DNA SPEC QL NAA+PROBE: NOT DETECTED
E HISTOLYT AG STL-ACNC: NOT DETECTED
EAEC PAA PLAS AGGR+AATA ST NAA+NON-PRB: NOT DETECTED
EC STX1 + STX2 GENES STL NAA+PROBE: NOT DETECTED
EPEC EAE GENE STL QL NAA+NON-PROBE: NOT DETECTED
ETEC LTA+ST1A+ST1B TOX ST NAA+NON-PROBE: NOT DETECTED
G LAMBLIA DNA SPEC QL NAA+PROBE: NOT DETECTED
NOROVIRUS GI+II RNA STL QL NAA+NON-PROBE: NOT DETECTED
P SHIGELLOIDES DNA STL QL NAA+NON-PROBE: NOT DETECTED
RV RNA STL NAA+PROBE: NOT DETECTED
SALMONELLA DNA SPEC QL NAA+PROBE: NOT DETECTED
SAPO I+II+IV+V RNA STL QL NAA+NON-PROBE: NOT DETECTED
SHIGELLA SP+EIEC IPAH STL QL NAA+PROBE: NOT DETECTED
V CHOLERAE DNA SPEC QL NAA+PROBE: NOT DETECTED
VIBRIO DNA SPEC NAA+PROBE: NOT DETECTED
YERSINIA STL CULT: NOT DETECTED

## 2019-02-04 PROCEDURE — 87324 CLOSTRIDIUM AG IA: CPT

## 2019-02-04 PROCEDURE — 87449 NOS EACH ORGANISM AG IA: CPT

## 2019-02-04 PROCEDURE — 87507 IADNA-DNA/RNA PROBE TQ 12-25: CPT

## 2019-04-17 ENCOUNTER — TRANSCRIBE ORDERS (OUTPATIENT)
Dept: ADMINISTRATIVE | Facility: HOSPITAL | Age: 79
End: 2019-04-17

## 2019-04-17 DIAGNOSIS — Z78.0 MENOPAUSE: Primary | ICD-10-CM

## 2019-04-26 ENCOUNTER — APPOINTMENT (OUTPATIENT)
Dept: BONE DENSITY | Facility: HOSPITAL | Age: 79
End: 2019-04-26

## 2019-04-26 DIAGNOSIS — Z78.0 MENOPAUSE: ICD-10-CM

## 2019-04-26 PROCEDURE — 77080 DXA BONE DENSITY AXIAL: CPT

## 2019-06-19 ENCOUNTER — HOSPITAL ENCOUNTER (OUTPATIENT)
Dept: CT IMAGING | Facility: HOSPITAL | Age: 79
Discharge: HOME | End: 2019-06-19
Admitting: INTERNAL MEDICINE

## 2019-06-19 ENCOUNTER — TRANSCRIBE ORDERS (OUTPATIENT)
Dept: ADMINISTRATIVE | Facility: HOSPITAL | Age: 79
End: 2019-06-19

## 2019-06-19 DIAGNOSIS — R06.02 SOB (SHORTNESS OF BREATH): Primary | ICD-10-CM

## 2019-06-19 DIAGNOSIS — R07.89 CHEST TIGHTNESS: ICD-10-CM

## 2019-06-19 DIAGNOSIS — R06.02 SOB (SHORTNESS OF BREATH): ICD-10-CM

## 2019-06-19 LAB — CREAT BLDA-MCNC: 0.8 MG/DL (ref 0.6–1.3)

## 2019-06-19 PROCEDURE — 82565 ASSAY OF CREATININE: CPT

## 2019-06-19 PROCEDURE — 0 IOPAMIDOL PER 1 ML: Performed by: INTERNAL MEDICINE

## 2019-06-19 PROCEDURE — 71275 CT ANGIOGRAPHY CHEST: CPT

## 2019-06-19 RX ADMIN — IOPAMIDOL 100 ML: 755 INJECTION, SOLUTION INTRAVENOUS at 18:45

## 2019-06-20 ENCOUNTER — TRANSCRIBE ORDERS (OUTPATIENT)
Dept: PULMONOLOGY | Facility: HOSPITAL | Age: 79
End: 2019-06-20

## 2019-06-20 ENCOUNTER — TRANSCRIBE ORDERS (OUTPATIENT)
Dept: ADMINISTRATIVE | Facility: HOSPITAL | Age: 79
End: 2019-06-20

## 2019-06-20 DIAGNOSIS — R06.02 SHORTNESS OF BREATH: Primary | ICD-10-CM

## 2019-06-21 ENCOUNTER — APPOINTMENT (OUTPATIENT)
Dept: PULMONOLOGY | Facility: HOSPITAL | Age: 79
End: 2019-06-21

## 2019-06-21 ENCOUNTER — HOSPITAL ENCOUNTER (OUTPATIENT)
Dept: GENERAL RADIOLOGY | Facility: HOSPITAL | Age: 79
Discharge: HOME | End: 2019-06-21

## 2019-06-21 ENCOUNTER — HOSPITAL ENCOUNTER (OUTPATIENT)
Dept: NUCLEAR MEDICINE | Facility: HOSPITAL | Age: 79
Discharge: HOME | End: 2019-06-21

## 2019-06-21 ENCOUNTER — HOSPITAL ENCOUNTER (OUTPATIENT)
Dept: RESPIRATORY THERAPY | Facility: HOSPITAL | Age: 79
Discharge: HOME | End: 2019-06-21
Admitting: INTERNAL MEDICINE

## 2019-06-21 DIAGNOSIS — R06.02 SHORTNESS OF BREATH: ICD-10-CM

## 2019-06-21 DIAGNOSIS — R06.02 SOB (SHORTNESS OF BREATH): ICD-10-CM

## 2019-06-21 LAB
BDY SITE: NORMAL
HGB BLDA-MCNC: 11.6 G/DL

## 2019-06-21 PROCEDURE — 94618 PULMONARY STRESS TESTING: CPT

## 2019-06-21 PROCEDURE — 94060 EVALUATION OF WHEEZING: CPT

## 2019-06-21 PROCEDURE — 0 TECHNETIUM ALBUMIN AGGREGATED: Performed by: INTERNAL MEDICINE

## 2019-06-21 PROCEDURE — A9540 TC99M MAA: HCPCS | Performed by: INTERNAL MEDICINE

## 2019-06-21 PROCEDURE — A9558 XE133 XENON 10MCI: HCPCS | Performed by: INTERNAL MEDICINE

## 2019-06-21 PROCEDURE — 94726 PLETHYSMOGRAPHY LUNG VOLUMES: CPT

## 2019-06-21 PROCEDURE — 94729 DIFFUSING CAPACITY: CPT

## 2019-06-21 PROCEDURE — 78582 LUNG VENTILAT&PERFUS IMAGING: CPT

## 2019-06-21 PROCEDURE — 82820 HEMOGLOBIN-OXYGEN AFFINITY: CPT | Performed by: INTERNAL MEDICINE

## 2019-06-21 PROCEDURE — 0 XENON XE 133: Performed by: INTERNAL MEDICINE

## 2019-06-21 PROCEDURE — 71046 X-RAY EXAM CHEST 2 VIEWS: CPT

## 2019-06-21 RX ORDER — ALBUTEROL SULFATE 2.5 MG/3ML
2.5 SOLUTION RESPIRATORY (INHALATION) ONCE AS NEEDED
Status: COMPLETED | OUTPATIENT
Start: 2019-06-21 | End: 2019-06-21

## 2019-06-21 RX ADMIN — ALBUTEROL SULFATE 2.5 MG: 2.5 SOLUTION RESPIRATORY (INHALATION) at 13:08

## 2019-06-21 RX ADMIN — XENON XE-133 18.9 MILLICURIE: 10 GAS RESPIRATORY (INHALATION) at 11:31

## 2019-06-21 RX ADMIN — Medication 1 DOSE: at 11:31

## 2019-06-21 NOTE — PROGRESS NOTES
Exercise Oximetry    Patient Name:Katarzyna Ku   MRN: [id number]   Date: 06/21/19             ROOM AIR BASELINE   SpO2% 92   Heart Rate 101        EXERCISE ON ROOM AIR SpO2%     1 MINUTE 92     2 MINUTES 91     3 MINUTES 90     4 MINUTES 89     5 MINUTES 89     6 MINUTES 89                Distance Walked 6 min walk    Dyspnea (Ky Scale)      Fatigue (Ky Scale)      SpO2% Post Exercise  96%    HR Post Exercise  114    Time to Recovery  3 min      Comments: Patient c/o shortness of breath during walk but was able to keep her pace up without taking a break.

## 2019-06-21 NOTE — PROGRESS NOTES
Exercise Oximetry    Patient Name:Katarzyna Ku   MRN: [id number]   Date: 06/21/19             ROOM AIR BASELINE   SpO2% ***   Heart Rate ***   Blood Pressure ***     EXERCISE ON ROOM AIR SpO2% EXERCISE ON O2 @ *** LPM SpO2%   1 MINUTE *** 1 MINUTE    2 MINUTES *** 2 MINUTES    3 MINUTES *** 3 MINUTES    4 MINUTES *** 4 MINUTES    5 MINUTES *** 5 MINUTES    6 MINUTES *** 6 MINUTES               Distance Walked  *** Distance Walked   Dyspnea (Ky Scale)  *** Dyspnea (Ky Scale)   Fatigue (Ky Scale)  *** Fatigue (Ky Scale)   SpO2% Post Exercise  *** SpO2% Post Exercise   HR Post Exercise  *** HR Post Exercise   Time to Recovery  *** Time to Recovery     Comments: ***

## 2019-09-12 ENCOUNTER — TRANSCRIBE ORDERS (OUTPATIENT)
Dept: ADMINISTRATIVE | Facility: HOSPITAL | Age: 79
End: 2019-09-12

## 2019-09-12 ENCOUNTER — HOSPITAL ENCOUNTER (OUTPATIENT)
Dept: CT IMAGING | Facility: HOSPITAL | Age: 79
Discharge: HOME | End: 2019-09-12
Admitting: FAMILY MEDICINE

## 2019-09-12 DIAGNOSIS — K57.92 ACUTE DIVERTICULITIS: Primary | ICD-10-CM

## 2019-09-12 PROCEDURE — 0 IOPAMIDOL PER 1 ML: Performed by: FAMILY MEDICINE

## 2019-09-12 PROCEDURE — 74177 CT ABD & PELVIS W/CONTRAST: CPT

## 2019-09-12 PROCEDURE — 0 DIATRIZOATE MEGLUMINE & SODIUM PER 1 ML: Performed by: FAMILY MEDICINE

## 2019-09-12 RX ADMIN — DIATRIZOATE MEGLUMINE AND DIATRIZOATE SODIUM 30 ML: 600; 100 SOLUTION ORAL; RECTAL at 11:53

## 2019-09-12 RX ADMIN — IOPAMIDOL 100 ML: 755 INJECTION, SOLUTION INTRAVENOUS at 13:09

## 2019-09-23 DIAGNOSIS — Z85.3 HISTORY OF BREAST CANCER: Primary | ICD-10-CM

## 2019-10-04 ENCOUNTER — TRANSCRIBE ORDERS (OUTPATIENT)
Dept: ADMINISTRATIVE | Facility: HOSPITAL | Age: 79
End: 2019-10-04

## 2019-10-04 ENCOUNTER — HOSPITAL ENCOUNTER (OUTPATIENT)
Dept: GENERAL RADIOLOGY | Facility: HOSPITAL | Age: 79
Discharge: HOME | End: 2019-10-04
Admitting: FAMILY MEDICINE

## 2019-10-04 ENCOUNTER — LAB (OUTPATIENT)
Dept: LAB | Facility: HOSPITAL | Age: 79
End: 2019-10-04

## 2019-10-04 DIAGNOSIS — J40 BRONCHITIS: Primary | ICD-10-CM

## 2019-10-04 DIAGNOSIS — J40 BRONCHITIS: ICD-10-CM

## 2019-10-04 LAB
B PARAPERT DNA SPEC QL NAA+PROBE: NOT DETECTED
B PERT DNA SPEC QL NAA+PROBE: NOT DETECTED
C PNEUM DNA NPH QL NAA+NON-PROBE: NOT DETECTED
FLUAV H1 2009 PAND RNA NPH QL NAA+PROBE: NOT DETECTED
FLUAV H1 HA GENE NPH QL NAA+PROBE: NOT DETECTED
FLUAV H3 RNA NPH QL NAA+PROBE: NOT DETECTED
FLUAV SUBTYP SPEC NAA+PROBE: NOT DETECTED
FLUBV RNA ISLT QL NAA+PROBE: NOT DETECTED
HADV DNA SPEC NAA+PROBE: NOT DETECTED
HCOV 229E RNA SPEC QL NAA+PROBE: NOT DETECTED
HCOV HKU1 RNA SPEC QL NAA+PROBE: NOT DETECTED
HCOV NL63 RNA SPEC QL NAA+PROBE: NOT DETECTED
HCOV OC43 RNA SPEC QL NAA+PROBE: NOT DETECTED
HMPV RNA NPH QL NAA+NON-PROBE: NOT DETECTED
HPIV1 RNA SPEC QL NAA+PROBE: DETECTED
HPIV2 RNA SPEC QL NAA+PROBE: NOT DETECTED
HPIV3 RNA NPH QL NAA+PROBE: NOT DETECTED
HPIV4 P GENE NPH QL NAA+PROBE: NOT DETECTED
M PNEUMO IGG SER IA-ACNC: NOT DETECTED
RHINOVIRUS RNA SPEC NAA+PROBE: NOT DETECTED
RSV RNA NPH QL NAA+NON-PROBE: NOT DETECTED

## 2019-10-04 PROCEDURE — 0100U HC BIOFIRE FILMARRAY RESP PANEL 2: CPT

## 2019-10-04 PROCEDURE — 71046 X-RAY EXAM CHEST 2 VIEWS: CPT

## 2019-10-24 ENCOUNTER — APPOINTMENT (OUTPATIENT)
Dept: MAMMOGRAPHY | Facility: HOSPITAL | Age: 79
End: 2019-10-24

## 2019-10-28 ENCOUNTER — HOSPITAL ENCOUNTER (OUTPATIENT)
Dept: MAMMOGRAPHY | Facility: HOSPITAL | Age: 79
Discharge: HOME | End: 2019-10-28
Admitting: SURGERY

## 2019-10-28 DIAGNOSIS — Z85.3 HISTORY OF BREAST CANCER: ICD-10-CM

## 2019-10-28 PROCEDURE — G0279 TOMOSYNTHESIS, MAMMO: HCPCS

## 2019-10-28 PROCEDURE — 77066 DX MAMMO INCL CAD BI: CPT

## 2019-10-29 ENCOUNTER — OFFICE VISIT (OUTPATIENT)
Dept: SURGERY | Facility: CLINIC | Age: 79
End: 2019-10-29

## 2019-10-29 VITALS
BODY MASS INDEX: 40.12 KG/M2 | WEIGHT: 218 LBS | RESPIRATION RATE: 18 BRPM | SYSTOLIC BLOOD PRESSURE: 140 MMHG | HEIGHT: 62 IN | DIASTOLIC BLOOD PRESSURE: 74 MMHG

## 2019-10-29 DIAGNOSIS — C50.412 MALIGNANT NEOPLASM OF UPPER-OUTER QUADRANT OF LEFT FEMALE BREAST, UNSPECIFIED ESTROGEN RECEPTOR STATUS (HCC): Primary | ICD-10-CM

## 2019-10-29 PROCEDURE — 99212 OFFICE O/P EST SF 10 MIN: CPT | Performed by: SURGERY

## 2019-10-29 NOTE — PROGRESS NOTES
Subjective   Katarzyna Ku is a 79 y.o. female here for   Chief Complaint   Patient presents with   • Follow-up   10 year s/p excision left lumpectomy - mammo comp 10/28/19    History of Present Illness she is 10 years status post wide excision of the left breast cancer.  She complains of ongoing left breast tenderness that has not changed.  She does complain of some cough with her emphysema but that is unchanged.  She does complain of some left shoulder pain for which she is seeing her primary care doctor.    The following portions of the patient's history were reviewed and updated as appropriate: allergies, current medications, past family history, past medical history, past social history, past surgical history and problem list.    Past Medical History:   Diagnosis Date   • Anxiety    • Breast cancer (CMS/HCC)     left   • COPD (chronic obstructive pulmonary disease) (CMS/HCC)    • Hypertension    • Skin cancer        Past Surgical History:   Procedure Laterality Date   • BLADDER SURGERY     • BREAST LUMPECTOMY      LEFT   • CARPAL TUNNEL RELEASE      BOTH HANDS   • COLONOSCOPY N/A 2/10/2017    Procedure: COLONOSCOPY with polypectomy;  Surgeon: Weston Sue MD;  Location: [facility] OR;  Service:    • ENDOSCOPY N/A 2/10/2017    Procedure: ESOPHAGOGASTRODUODENOSCOPY with biopsies;  Surgeon: Weston Sue MD;  Location: [facility] OR;  Service:    • HYSTERECTOMY     • KNEE SURGERY       X 2       Family History   Problem Relation Age of Onset   • Breast cancer Neg Hx        Social History     Socioeconomic History   • Marital status:      Spouse name: Not on file   • Number of children: Not on file   • Years of education: Not on file   • Highest education level: Not on file   Tobacco Use   • Smoking status: Former Smoker     Last attempt to quit: 1990     Years since quittin.7   • Smokeless tobacco: Never Used   Substance and Sexual Activity   • Alcohol use: No   • Drug  use: No   • Sexual activity: Defer       Current Outpatient Medications on File Prior to Visit   Medication Sig Dispense Refill   • albuterol (PROVENTIL) (2.5 MG/3ML) 0.083% nebulizer solution      • ANORO ELLIPTA 62.5-25 MCG/INH aerosol powder       • B Complex Vitamins (VITAMIN B COMPLEX PO) Take  by mouth.     • benazepril (LOTENSIN) 40 MG tablet      • budesonide-formoterol (SYMBICORT) 160-4.5 MCG/ACT inhaler Symbicort 160-4.5 MCG/ACT Inhalation Aerosol; Patient Sig: Symbicort 160-4.5 MCG/ACT Inhalation Aerosol ; 0; 21-Oct-2014; Active     • Calcium Acetate-Magnesium Carb 450-200 MG tablet Take  by mouth.     • celecoxib (CeleBREX) 100 MG capsule      • citalopram (CeleXA) 20 MG tablet      • Cyanocobalamin (VITAMIN B-12) 5000 MCG tablet dispersible Take  by mouth.     • hydrALAZINE (APRESOLINE) 50 MG tablet      • losartan-hydrochlorothiazide (HYZAAR) 50-12.5 MG per tablet      • meloxicam (MOBIC) 15 MG tablet      • Mirabegron ER (MYRBETRIQ) 50 MG tablet sustained-release 24 hour Take 50 mg by mouth Daily.     • Multiple Vitamins-Minerals (MULTIVITAMIN ADULT PO) Take  by mouth.     • Omega-3 Fatty Acids (FISH OIL) 1000 MG capsule capsule Take  by mouth Daily With Breakfast.     • omeprazole (PriLOSEC) 20 MG capsule      • SPIRIVA HANDIHALER 18 MCG per inhalation capsule        No current facility-administered medications on file prior to visit.          Review of Systems other than above all other systems were reviewed and negative      Objective   Physical Exam alert overweight white female is oriented x3 in no active distress.  Her gait is normal.  She has no supraclavicular nor axillary adenopathy.  Right breast is slightly larger than her left there are no skin changes other than her left breast scar there is no nipple discharge there is no dominant mass in either breast.  Her liver was nonpalpable her lungs are distant but clear her bilateral mammogram was reviewed by me and showed no malignant criteria.   Her recent chest x-ray showed emphysematous change only.              Assessment/Plan   Breast cancer  I would recommend a bilateral mammogram in a year and I will see her back after that study has been completed

## 2019-11-26 ENCOUNTER — TELEPHONE (OUTPATIENT)
Dept: GASTROENTEROLOGY | Facility: CLINIC | Age: 79
End: 2019-11-26

## 2019-11-26 ENCOUNTER — PREP FOR SURGERY (OUTPATIENT)
Dept: OTHER | Facility: HOSPITAL | Age: 79
End: 2019-11-26

## 2019-11-26 DIAGNOSIS — K22.70 BARRETT'S ESOPHAGUS WITHOUT DYSPLASIA: Primary | ICD-10-CM

## 2019-12-03 ENCOUNTER — HOSPITAL ENCOUNTER (OUTPATIENT)
Facility: HOSPITAL | Age: 79
Setting detail: HOSPITAL OUTPATIENT SURGERY
End: 2019-12-03
Attending: INTERNAL MEDICINE | Admitting: INTERNAL MEDICINE

## 2019-12-03 PROBLEM — K22.70 BARRETT'S ESOPHAGUS WITHOUT DYSPLASIA: Status: ACTIVE | Noted: 2019-12-03

## 2019-12-03 NOTE — TELEPHONE ENCOUNTER
CALLED AND SPOKE WITH PATIENT.  SCHEDULED aGRANGE 02/14/2020 AT 11:45AM - ARRIVE 10:45AM.  WILL MAIL INSTRUCTIONS.

## 2019-12-16 ENCOUNTER — TRANSCRIBE ORDERS (OUTPATIENT)
Dept: ADMINISTRATIVE | Facility: HOSPITAL | Age: 79
End: 2019-12-16

## 2019-12-16 ENCOUNTER — HOSPITAL ENCOUNTER (OUTPATIENT)
Dept: CT IMAGING | Facility: HOSPITAL | Age: 79
Discharge: HOME | End: 2019-12-16
Admitting: FAMILY MEDICINE

## 2019-12-16 ENCOUNTER — LAB (OUTPATIENT)
Dept: LAB | Facility: HOSPITAL | Age: 79
End: 2019-12-16

## 2019-12-16 DIAGNOSIS — R19.7 DIARRHEA, UNSPECIFIED TYPE: Primary | ICD-10-CM

## 2019-12-16 DIAGNOSIS — R19.7 DIARRHEA, UNSPECIFIED TYPE: ICD-10-CM

## 2019-12-16 LAB
ADV 40+41 DNA STL QL NAA+NON-PROBE: NOT DETECTED
ASTRO TYP 1-8 RNA STL QL NAA+NON-PROBE: NOT DETECTED
C CAYETANENSIS DNA STL QL NAA+NON-PROBE: NOT DETECTED
C DIFF TOX GENS STL QL NAA+PROBE: NEGATIVE
CAMPY SP DNA.DIARRHEA STL QL NAA+PROBE: NOT DETECTED
CREAT BLDA-MCNC: 0.9 MG/DL (ref 0.6–1.3)
CRYPTOSP STL CULT: NOT DETECTED
E COLI DNA SPEC QL NAA+PROBE: NOT DETECTED
E HISTOLYT AG STL-ACNC: NOT DETECTED
EAEC PAA PLAS AGGR+AATA ST NAA+NON-PRB: NOT DETECTED
EC STX1 + STX2 GENES STL NAA+PROBE: NOT DETECTED
EPEC EAE GENE STL QL NAA+NON-PROBE: NOT DETECTED
ETEC LTA+ST1A+ST1B TOX ST NAA+NON-PROBE: NOT DETECTED
G LAMBLIA DNA SPEC QL NAA+PROBE: NOT DETECTED
LACTOFERRIN STL QL LA: POSITIVE
NOROVIRUS GI+II RNA STL QL NAA+NON-PROBE: NOT DETECTED
P SHIGELLOIDES DNA STL QL NAA+PROBE: NOT DETECTED
RV RNA STL NAA+PROBE: NOT DETECTED
SALMONELLA DNA SPEC QL NAA+PROBE: NOT DETECTED
SAPO I+II+IV+V RNA STL QL NAA+NON-PROBE: NOT DETECTED
SHIGELLA SP+EIEC IPAH STL QL NAA+PROBE: NOT DETECTED
V CHOLERAE DNA SPEC QL NAA+PROBE: NOT DETECTED
VIBRIO DNA SPEC NAA+PROBE: NOT DETECTED
YERSINIA STL CULT: NOT DETECTED

## 2019-12-16 PROCEDURE — 83631 LACTOFERRIN FECAL (QUANT): CPT

## 2019-12-16 PROCEDURE — 74177 CT ABD & PELVIS W/CONTRAST: CPT

## 2019-12-16 PROCEDURE — 87493 C DIFF AMPLIFIED PROBE: CPT

## 2019-12-16 PROCEDURE — 82565 ASSAY OF CREATININE: CPT

## 2019-12-16 PROCEDURE — 0 IOPAMIDOL PER 1 ML: Performed by: FAMILY MEDICINE

## 2019-12-16 PROCEDURE — 0097U HC BIOFIRE FILMARRAY GI PANEL: CPT

## 2019-12-16 RX ADMIN — IOPAMIDOL 100 ML: 755 INJECTION, SOLUTION INTRAVENOUS at 14:21

## 2020-01-30 ENCOUNTER — OFFICE VISIT (OUTPATIENT)
Dept: GASTROENTEROLOGY | Facility: CLINIC | Age: 80
End: 2020-01-30

## 2020-01-30 VITALS
SYSTOLIC BLOOD PRESSURE: 136 MMHG | BODY MASS INDEX: 41.51 KG/M2 | HEIGHT: 62 IN | DIASTOLIC BLOOD PRESSURE: 76 MMHG | WEIGHT: 225.6 LBS

## 2020-01-30 DIAGNOSIS — K52.9 COLITIS: Primary | ICD-10-CM

## 2020-01-30 DIAGNOSIS — K22.70 BARRETT'S ESOPHAGUS WITHOUT DYSPLASIA: ICD-10-CM

## 2020-01-30 PROCEDURE — 99213 OFFICE O/P EST LOW 20 MIN: CPT | Performed by: INTERNAL MEDICINE

## 2020-01-30 RX ORDER — HYOSCYAMINE SULFATE EXTENDED-RELEASE 0.38 MG/1
0.38 TABLET ORAL EVERY 12 HOURS PRN
Qty: 60 TABLET | Refills: 12 | Status: SHIPPED | OUTPATIENT
Start: 2020-01-30 | End: 2021-10-19 | Stop reason: SDUPTHER

## 2020-01-30 NOTE — PROGRESS NOTES
PATIENT INFORMATION  Katarzyna Ku       - 1940    CHIEF COMPLAINT  Chief Complaint   Patient presents with   • Abdominal Pain   • Diarrhea   • Heartburn       HISTORY OF PRESENT ILLNESS  [facility] and [facility] Dec and went for CT and had colitis was treated with 2 abx which gave her diarrhea so stopped and started probiotics     Is doing well wrt BMs and still has cramps worse afte meals but it can wake her up at night but no BM               REVIEW OF SYSTEMS  Review of Systems   HENT: Positive for hearing loss and voice change.    Respiratory: Positive for apnea, cough, shortness of breath, wheezing and stridor.    Gastrointestinal: Positive for abdominal distention, abdominal pain, diarrhea and nausea.        Reflux   Endocrine: Positive for polydipsia and polyuria.   Genitourinary: Positive for frequency and urgency.   Musculoskeletal: Positive for arthralgias, back pain and myalgias.   Allergic/Immunologic: Positive for environmental allergies.   Psychiatric/Behavioral: The patient is nervous/anxious.    All other systems reviewed and are negative.        ACTIVE PROBLEMS  Patient Active Problem List    Diagnosis   • Casper's esophagus without dysplasia [K22.70]   • Abdominal pain, right upper quadrant [R10.11]   • Osteoarthritis of knee [M17.10]   • Chronic obstructive pulmonary disease (CMS/HCC) [J44.9]   • Hypertension [I10]   • Primary localized osteoarthrosis [M19.91]   • Midline cystocele [N81.11]   • Mixed incontinence [N39.46]   • Muscular atrophy [M62.50]   • Atrophic vaginitis [N95.2]   • Prolapse of vaginal vault after hysterectomy [N99.3]   • Enterocele [K46.9]         PAST MEDICAL HISTORY  Past Medical History:   Diagnosis Date   • Anxiety    • Breast cancer (CMS/HCC) 2009    left   • Colon polyp    • COPD (chronic obstructive pulmonary disease) (CMS/HCC)    • Diverticulitis of colon    • Hypertension    • Skin cancer          SURGICAL HISTORY  Past  Surgical History:   Procedure Laterality Date   • BLADDER SURGERY     • BREAST LUMPECTOMY      LEFT   • CARPAL TUNNEL RELEASE      BOTH HANDS   • COLONOSCOPY N/A 2/10/2017    Procedure: COLONOSCOPY with polypectomy;  Surgeon: Weston Sue MD;  Location: [facility] OR;  Service:    • ENDOSCOPY N/A 2/10/2017    Procedure: ESOPHAGOGASTRODUODENOSCOPY with biopsies;  Surgeon: Weston Sue MD;  Location: [facility] OR;  Service:    • HYSTERECTOMY     • KNEE SURGERY       X 2         FAMILY HISTORY  Family History   Problem Relation Age of Onset   • Breast cancer Neg Hx          SOCIAL HISTORY  Social History     Occupational History   • Not on file   Tobacco Use   • Smoking status: Former Smoker     Last attempt to quit: 1990     Years since quittin.0   • Smokeless tobacco: Never Used   Substance and Sexual Activity   • Alcohol use: No   • Drug use: No   • Sexual activity: Defer       Debilities/Disabilities Identified: None    Emotional Behavior: Appropriate    CURRENT MEDICATIONS    Current Outpatient Medications:   •  albuterol (PROVENTIL) (2.5 MG/3ML) 0.083% nebulizer solution, , Disp: , Rfl:   •  ANORO ELLIPTA 62.5-25 MCG/INH aerosol powder , , Disp: , Rfl:   •  B Complex Vitamins (VITAMIN B COMPLEX PO), Take  by mouth., Disp: , Rfl:   •  benazepril (LOTENSIN) 40 MG tablet, , Disp: , Rfl:   •  budesonide-formoterol (SYMBICORT) 160-4.5 MCG/ACT inhaler, Symbicort 160-4.5 MCG/ACT Inhalation Aerosol; Patient Sig: Symbicort 160-4.5 MCG/ACT Inhalation Aerosol ; 0; -Oct-2014; Active, Disp: , Rfl:   •  Calcium Acetate-Magnesium Carb 450-200 MG tablet, Take  by mouth., Disp: , Rfl:   •  celecoxib (CeleBREX) 100 MG capsule, , Disp: , Rfl:   •  citalopram (CeleXA) 20 MG tablet, , Disp: , Rfl:   •  Cyanocobalamin (VITAMIN B-12) 5000 MCG tablet dispersible, Take  by mouth., Disp: , Rfl:   •  hydrALAZINE (APRESOLINE) 50 MG tablet, , Disp: , Rfl:   •  hyoscyamine (LEVBID) 0.375 MG 12 hr tablet, Take  "1 tablet by mouth Every 12 (Twelve) Hours As Needed for Cramping., Disp: 60 tablet, Rfl: 12  •  losartan-hydrochlorothiazide (HYZAAR) 50-12.5 MG per tablet, , Disp: , Rfl:   •  meloxicam (MOBIC) 15 MG tablet, , Disp: , Rfl:   •  Mirabegron ER (MYRBETRIQ) 50 MG tablet sustained-release 24 hour, Take 50 mg by mouth Daily., Disp: , Rfl:   •  Multiple Vitamins-Minerals (MULTIVITAMIN ADULT PO), Take  by mouth., Disp: , Rfl:   •  Omega-3 Fatty Acids (FISH OIL) 1000 MG capsule capsule, Take  by mouth Daily With Breakfast., Disp: , Rfl:   •  omeprazole (PriLOSEC) 20 MG capsule, , Disp: , Rfl:   •  SPIRIVA HANDIHALER 18 MCG per inhalation capsule, , Disp: , Rfl:     ALLERGIES  Azithromycin; Sulfa antibiotics; and Codeine    VITALS  Vitals:    01/30/20 1102   BP: 136/76   Weight: 102 kg (225 lb 9.6 oz)   Height: 157.5 cm (62.01\")       LAST RESULTS   Lab on 12/16/2019   Component Date Value Ref Range Status   • Campylobacter 12/16/2019 Not Detected  Not Detected, Invalid Final   • Plesiomonas shigelloides 12/16/2019 Not Detected  Not Detected Final   • Salmonella 12/16/2019 Not Detected  Not Detected Final   • Vibrio 12/16/2019 Not Detected  Not Detected Final   • Vibrio cholerae 12/16/2019 Not Detected  Not Detected Final   • Yersinia enterocolitica 12/16/2019 Not Detected  Not Detected Final   • Enteroaggregative E. coli (EAEC) 12/16/2019 Not Detected  Not Detected Final   • Enteropathogenic E. coli (EPEC) 12/16/2019 Not Detected  Not Detected Final   • Enterotoxigenic E. coli (ETEC) lt/* 12/16/2019 Not Detected  Not Detected Final   • Shiga-like toxin-producing E. coli* 12/16/2019 Not Detected  Not Detected Final   • E. coli O157 12/16/2019 Not Detected  Not Detected Final   • Shigella/Enteroinvasive E. coli (E* 12/16/2019 Not Detected  Not Detected Final   • Cryptosporidium 12/16/2019 Not Detected  Not Detected, Invalid Final   • Cyclospora cayetanensis 12/16/2019 Not Detected  Not Detected Final   • Entamoeba histolytica " 12/16/2019 Not Detected  Not Detected Final   • Giardia lamblia 12/16/2019 Not Detected  Not Detected Final   • Adenovirus F40/41 12/16/2019 Not Detected  Not Detected Final   • Astrovirus 12/16/2019 Not Detected  Not Detected Final   • Norovirus GI/GII 12/16/2019 Not Detected  Not Detected Final   • Rotavirus A 12/16/2019 Not Detected  Not Detected Final   • Sapovirus (I, II, IV or V) 12/16/2019 Not Detected  Not Detected Final   • Creatinine 12/16/2019 0.90  0.60 - 1.30 mg/dL Final    Serial Number: [id number]:  [id number]   • C. Difficile Toxins by PCR 12/16/2019 Negative  Negative Final   • Lactoferrin, Qual 12/16/2019 Positive* Negative Final     No results found.    PHYSICAL EXAM  Physical Exam   Constitutional: She is oriented to person, place, and time. She appears well-developed and well-nourished.   HENT:   Head: Normocephalic and atraumatic.   Eyes: Pupils are equal, round, and reactive to light. Conjunctivae and EOM are normal. No scleral icterus.   Neck: Normal range of motion. Neck supple. No thyromegaly present.   Cardiovascular: Normal rate, regular rhythm, normal heart sounds and intact distal pulses. Exam reveals no gallop.   No murmur heard.  Pulmonary/Chest: Effort normal and breath sounds normal. She has no wheezes. She has no rales.   Abdominal: Soft. Bowel sounds are normal. She exhibits no shifting dullness, no distension, no fluid wave, no abdominal bruit, no ascites and no mass. There is no hepatosplenomegaly. There is no tenderness. There is no guarding and negative Madrigal's sign. Hernia confirmed negative in the ventral area.   Musculoskeletal: Normal range of motion. She exhibits no edema.   Lymphadenopathy:     She has no cervical adenopathy.   Neurological: She is alert and oriented to person, place, and time.   Skin: Skin is warm and dry. No rash noted. She is not diaphoretic. No erythema.   Psychiatric: She has a normal mood and affect. Her behavior is normal.        ASSESSMENT  Diagnoses and all orders for this visit:    Colitis    Casper's esophagus without dysplasia    Other orders  -     hyoscyamine (LEVBID) 0.375 MG 12 hr tablet; Take 1 tablet by mouth Every 12 (Twelve) Hours As Needed for Cramping.          PLAN  Treat as post inflamatory IBS for now,hold off on her EGD until this is worked out    Return in about 2 months (around 3/30/2020).

## 2020-02-11 ENCOUNTER — ANESTHESIA EVENT (OUTPATIENT)
Dept: PERIOP | Facility: HOSPITAL | Age: 80
End: 2020-02-11

## 2020-02-11 RX ORDER — SODIUM CHLORIDE 0.9 % (FLUSH) 0.9 %
10 SYRINGE (ML) INJECTION EVERY 12 HOURS SCHEDULED
Status: CANCELLED | OUTPATIENT
Start: 2020-02-11

## 2020-02-11 RX ORDER — SODIUM CHLORIDE 9 MG/ML
40 INJECTION, SOLUTION INTRAVENOUS AS NEEDED
Status: CANCELLED | OUTPATIENT
Start: 2020-02-11

## 2020-02-11 RX ORDER — SODIUM CHLORIDE, SODIUM LACTATE, POTASSIUM CHLORIDE, CALCIUM CHLORIDE 600; 310; 30; 20 MG/100ML; MG/100ML; MG/100ML; MG/100ML
9 INJECTION, SOLUTION INTRAVENOUS CONTINUOUS
Status: CANCELLED | OUTPATIENT
Start: 2020-02-11

## 2020-02-11 RX ORDER — SODIUM CHLORIDE 0.9 % (FLUSH) 0.9 %
10 SYRINGE (ML) INJECTION AS NEEDED
Status: CANCELLED | OUTPATIENT
Start: 2020-02-11

## 2020-02-11 RX ORDER — LIDOCAINE HYDROCHLORIDE 10 MG/ML
0.5 INJECTION, SOLUTION EPIDURAL; INFILTRATION; INTRACAUDAL; PERINEURAL ONCE AS NEEDED
Status: CANCELLED | OUTPATIENT
Start: 2020-02-11

## 2020-02-12 ENCOUNTER — ANESTHESIA (OUTPATIENT)
Dept: PERIOP | Facility: HOSPITAL | Age: 80
End: 2020-02-12

## 2020-05-04 ENCOUNTER — TELEMEDICINE (OUTPATIENT)
Dept: GASTROENTEROLOGY | Facility: CLINIC | Age: 80
End: 2020-05-04

## 2020-05-04 DIAGNOSIS — Z86.010 PERSONAL HISTORY OF COLONIC POLYPS: ICD-10-CM

## 2020-05-04 DIAGNOSIS — K22.70 BARRETT'S ESOPHAGUS WITHOUT DYSPLASIA: Primary | ICD-10-CM

## 2020-05-04 DIAGNOSIS — K58.0 IRRITABLE BOWEL SYNDROME WITH DIARRHEA: ICD-10-CM

## 2020-05-04 PROCEDURE — 99212 OFFICE O/P EST SF 10 MIN: CPT | Performed by: INTERNAL MEDICINE

## 2020-05-04 NOTE — PROGRESS NOTES
Video visit:  You have chosen to receive care through a telehealth visit.  Do you consent to use a video/audio connection for your medical care today? Yes    PATIENT INFORMATION  Katarzyna Ku       - 1940    CHIEF COMPLAINT  Chief Complaint   Patient presents with   • Follow-up     2 MO FOLLOW UP ON COLITIS       HISTORY OF PRESENT ILLNESS  Surviving quarantine but  Does get cramps but is moving her bowels 2-3 every AM and her cramps are usually after she eats And the LevBid works well and she wanted to know about Probiotics and they were encouraged.    Her last scopes were both in  so her EGd was due this year and her colon in .     She feels well overall and wants to wait some for her EGD and I agree will arrange for the back half of the year          REVIEW OF SYSTEMS  Review of Systems      ACTIVE PROBLEMS  Patient Active Problem List    Diagnosis   • Casper's esophagus without dysplasia [K22.70]   • Abdominal pain, right upper quadrant [R10.11]   • Osteoarthritis of knee [M17.10]   • Chronic obstructive pulmonary disease (CMS/HCC) [J44.9]   • Hypertension [I10]   • Primary localized osteoarthrosis [M19.91]   • Midline cystocele [N81.11]   • Mixed incontinence [N39.46]   • Muscular atrophy [M62.50]   • Atrophic vaginitis [N95.2]   • Prolapse of vaginal vault after hysterectomy [N99.3]   • Enterocele [K46.9]         PAST MEDICAL HISTORY  Past Medical History:   Diagnosis Date   • Anxiety    • Breast cancer (CMS/HCC) 2009    left   • Colon polyp    • COPD (chronic obstructive pulmonary disease) (CMS/HCC)    • Diverticulitis of colon    • Hypertension    • Skin cancer          SURGICAL HISTORY  Past Surgical History:   Procedure Laterality Date   • BLADDER SURGERY     • BREAST LUMPECTOMY      LEFT   • CARPAL TUNNEL RELEASE      BOTH HANDS   • COLONOSCOPY N/A 2/10/2017    Procedure: COLONOSCOPY with polypectomy;  Surgeon: Weston Sue MD;  Location: [facility];  Service:    •  ENDOSCOPY N/A 2/10/2017    Procedure: ESOPHAGOGASTRODUODENOSCOPY with biopsies;  Surgeon: Weston Sue MD;  Location: [facility];  Service:    • HYSTERECTOMY     • KNEE SURGERY       X 2         FAMILY HISTORY  Family History   Problem Relation Age of Onset   • Breast cancer Neg Hx          SOCIAL HISTORY  Social History     Occupational History   • Not on file   Tobacco Use   • Smoking status: Former Smoker     Last attempt to quit: 1990     Years since quittin.2   • Smokeless tobacco: Never Used   Substance and Sexual Activity   • Alcohol use: No   • Drug use: No   • Sexual activity: Defer         CURRENT MEDICATIONS    Current Outpatient Medications:   •  albuterol (PROVENTIL) (2.5 MG/3ML) 0.083% nebulizer solution, , Disp: , Rfl:   •  ANORO ELLIPTA 62.5-25 MCG/INH aerosol powder , , Disp: , Rfl:   •  B Complex Vitamins (VITAMIN B COMPLEX PO), Take  by mouth., Disp: , Rfl:   •  benazepril (LOTENSIN) 40 MG tablet, , Disp: , Rfl:   •  budesonide-formoterol (SYMBICORT) 160-4.5 MCG/ACT inhaler, Symbicort 160-4.5 MCG/ACT Inhalation Aerosol; Patient Sig: Symbicort 160-4.5 MCG/ACT Inhalation Aerosol ; 0; -Oct-2014; Active, Disp: , Rfl:   •  Calcium Acetate-Magnesium Carb 450-200 MG tablet, Take  by mouth., Disp: , Rfl:   •  celecoxib (CeleBREX) 100 MG capsule, , Disp: , Rfl:   •  citalopram (CeleXA) 20 MG tablet, , Disp: , Rfl:   •  Cyanocobalamin (VITAMIN B-12) 5000 MCG tablet dispersible, Take  by mouth., Disp: , Rfl:   •  hydrALAZINE (APRESOLINE) 50 MG tablet, , Disp: , Rfl:   •  hyoscyamine (LEVBID) 0.375 MG 12 hr tablet, Take 1 tablet by mouth Every 12 (Twelve) Hours As Needed for Cramping., Disp: 60 tablet, Rfl: 12  •  losartan-hydrochlorothiazide (HYZAAR) 50-12.5 MG per tablet, , Disp: , Rfl:   •  meloxicam (MOBIC) 15 MG tablet, , Disp: , Rfl:   •  Mirabegron ER (MYRBETRIQ) 50 MG tablet sustained-release 24 hour, Take 50 mg by mouth Daily., Disp: , Rfl:   •  Multiple Vitamins-Minerals  (MULTIVITAMIN ADULT PO), Take  by mouth., Disp: , Rfl:   •  Omega-3 Fatty Acids (FISH OIL) 1000 MG capsule capsule, Take  by mouth Daily With Breakfast., Disp: , Rfl:   •  omeprazole (PriLOSEC) 20 MG capsule, , Disp: , Rfl:   •  SPIRIVA HANDIHALER 18 MCG per inhalation capsule, , Disp: , Rfl:     ALLERGIES  Azithromycin; Sulfa antibiotics; and Codeine    VITALS  There were no vitals filed for this visit.    LAST RESULTS   Lab on 12/16/2019   Component Date Value Ref Range Status   • Campylobacter 12/16/2019 Not Detected  Not Detected, Invalid Final   • Plesiomonas shigelloides 12/16/2019 Not Detected  Not Detected Final   • Salmonella 12/16/2019 Not Detected  Not Detected Final   • Vibrio 12/16/2019 Not Detected  Not Detected Final   • Vibrio cholerae 12/16/2019 Not Detected  Not Detected Final   • Yersinia enterocolitica 12/16/2019 Not Detected  Not Detected Final   • Enteroaggregative E. coli (EAEC) 12/16/2019 Not Detected  Not Detected Final   • Enteropathogenic E. coli (EPEC) 12/16/2019 Not Detected  Not Detected Final   • Enterotoxigenic E. coli (ETEC) lt/* 12/16/2019 Not Detected  Not Detected Final   • Shiga-like toxin-producing E. coli* 12/16/2019 Not Detected  Not Detected Final   • E. coli O157 12/16/2019 Not Detected  Not Detected Final   • Shigella/Enteroinvasive E. coli (E* 12/16/2019 Not Detected  Not Detected Final   • Cryptosporidium 12/16/2019 Not Detected  Not Detected, Invalid Final   • Cyclospora cayetanensis 12/16/2019 Not Detected  Not Detected Final   • Entamoeba histolytica 12/16/2019 Not Detected  Not Detected Final   • Giardia lamblia 12/16/2019 Not Detected  Not Detected Final   • Adenovirus F40/41 12/16/2019 Not Detected  Not Detected Final   • Astrovirus 12/16/2019 Not Detected  Not Detected Final   • Norovirus GI/GII 12/16/2019 Not Detected  Not Detected Final   • Rotavirus A 12/16/2019 Not Detected  Not Detected Final   • Sapovirus (I, II, IV or V) 12/16/2019 Not Detected  Not Detected  Final   • Creatinine 12/16/2019 0.90  0.60 - 1.30 mg/dL Final    Serial Number: [id number]:  [id number]   • C. Difficile Toxins by PCR 12/16/2019 Negative  Negative Final   • Lactoferrin, Qual 12/16/2019 Positive* Negative Final     No results found.    PHYSICAL EXAM  Debilities/Disabilities Identified: None  Emotional Behavior: Appropriate  Physical Exam    ASSESSMENT  Diagnoses and all orders for this visit:    Casper's esophagus without dysplasia    Irritable bowel syndrome with diarrhea    Personal history of colonic polyps          PLAN  Recall fro EGD this Year and Colon in 2022    Return if symptoms worsen or fail to improve.    I have discussed the above plan with the patient.  They verbalize understanding and are in agreement with the plan.  They have been advised to contact the office for any questions, concerns, or changes related to their health.

## 2020-06-10 ENCOUNTER — TELEPHONE (OUTPATIENT)
Dept: GASTROENTEROLOGY | Facility: CLINIC | Age: 80
End: 2020-06-10

## 2020-07-10 ENCOUNTER — TRANSCRIBE ORDERS (OUTPATIENT)
Dept: ADMINISTRATIVE | Facility: HOSPITAL | Age: 80
End: 2020-07-10

## 2020-07-10 DIAGNOSIS — Z71.3 WEIGHT LOSS COUNSELING, ENCOUNTER FOR: Primary | ICD-10-CM

## 2020-07-22 ENCOUNTER — OFFICE VISIT (OUTPATIENT)
Dept: GASTROENTEROLOGY | Facility: CLINIC | Age: 80
End: 2020-07-22

## 2020-07-22 VITALS — BODY MASS INDEX: 41.48 KG/M2 | TEMPERATURE: 98.5 F | WEIGHT: 225.4 LBS | HEIGHT: 62 IN

## 2020-07-22 DIAGNOSIS — Z12.11 ENCOUNTER FOR SCREENING FOR MALIGNANT NEOPLASM OF COLON: Primary | ICD-10-CM

## 2020-07-22 DIAGNOSIS — Z86.010 PERSONAL HISTORY OF COLONIC POLYPS: ICD-10-CM

## 2020-07-22 DIAGNOSIS — K58.0 IRRITABLE BOWEL SYNDROME WITH DIARRHEA: ICD-10-CM

## 2020-07-22 DIAGNOSIS — K22.70 BARRETT'S ESOPHAGUS WITHOUT DYSPLASIA: ICD-10-CM

## 2020-07-22 PROBLEM — Z86.0100 PERSONAL HISTORY OF COLONIC POLYPS: Status: ACTIVE | Noted: 2020-07-22

## 2020-07-22 PROCEDURE — 99214 OFFICE O/P EST MOD 30 MIN: CPT | Performed by: NURSE PRACTITIONER

## 2020-07-22 RX ORDER — MONTELUKAST SODIUM 4 MG/1
2 TABLET, CHEWABLE ORAL DAILY
Qty: 60 TABLET | Refills: 11 | Status: SHIPPED | OUTPATIENT
Start: 2020-07-22 | End: 2020-11-30 | Stop reason: SDUPTHER

## 2020-07-22 RX ORDER — OMEPRAZOLE 40 MG/1
40 CAPSULE, DELAYED RELEASE ORAL DAILY
Qty: 90 CAPSULE | Refills: 3 | Status: SHIPPED | OUTPATIENT
Start: 2020-07-22 | End: 2023-01-23 | Stop reason: SDUPTHER

## 2020-07-22 NOTE — PATIENT INSTRUCTIONS
EGD and colonoscopy schedule now.     Start daily probiotic, start colestid take 2 pills before bedtime for diarrhea every night (in addition you may take your hyosciamine and immodium as needed for cramping and diarrhea as needed),  Will try the extended release tylenol take 2 pills three times a day every day instead of meloxicam, increase omeprazole to once a day 40mg for acid reflux.

## 2020-07-22 NOTE — PROGRESS NOTES
PATIENT INFORMATION  Katarzyna Ku       - 1940    CHIEF COMPLAINT  Chief Complaint   Patient presents with   • Follow-up     2 mo follow up on Diarrhea        HISTORY OF PRESENT ILLNESS  Usually starts every morning and continues to go.  Sometimes taking immodium right away can help.  Dr. Sue gave her hyosciamine and that helped at first but not so much any more now.  She recalls first having issues with the diarrhea around [place] this has not been going on for life.  Does not recall having antibiotics before then and no abx listed from the pharmacy in the last year but does have COPD and is on steroid inhalers and takes Meloxicam 15mg po daily and says she needs this for arthritis and does not want to d/c d/t pain.  Explained risks/vs benefits on this r/t [place].            REVIEW OF SYSTEMS  Review of Systems   Gastrointestinal: Positive for abdominal pain and diarrhea.   All other systems reviewed and are negative.        ACTIVE PROBLEMS  Patient Active Problem List    Diagnosis   • Casper's esophagus without dysplasia [K22.70]   • Abdominal pain, right upper quadrant [R10.11]   • Osteoarthritis of knee [M17.10]   • Chronic obstructive pulmonary disease (CMS/HCC) [J44.9]   • Hypertension [I10]   • Primary localized osteoarthrosis [M19.91]   • Midline cystocele [N81.11]   • Mixed incontinence [N39.46]   • Muscular atrophy [M62.50]   • Atrophic vaginitis [N95.2]   • Prolapse of vaginal vault after hysterectomy [N99.3]   • Enterocele [K46.9]         PAST MEDICAL HISTORY  Past Medical History:   Diagnosis Date   • Anxiety    • Breast cancer (CMS/HCC) 2009    left   • Colon polyp    • COPD (chronic obstructive pulmonary disease) (CMS/HCC)    • Diverticulitis of colon    • Hypertension    • Skin cancer          SURGICAL HISTORY  Past Surgical History:   Procedure Laterality Date   • BLADDER SURGERY     • BREAST LUMPECTOMY      LEFT   • CARPAL TUNNEL RELEASE      BOTH HANDS   • COLONOSCOPY  N/A 2/10/2017    Procedure: COLONOSCOPY with polypectomy;  Surgeon: Weston Sue MD;  Location: [facility] OR;  Service:    • ENDOSCOPY N/A 2/10/2017    Procedure: ESOPHAGOGASTRODUODENOSCOPY with biopsies;  Surgeon: Weston Sue MD;  Location: [facility] OR;  Service:    • HYSTERECTOMY     • KNEE SURGERY       X 2         FAMILY HISTORY  Family History   Problem Relation Age of Onset   • Breast cancer Neg Hx          SOCIAL HISTORY  Social History     Occupational History   • Not on file   Tobacco Use   • Smoking status: Former Smoker     Last attempt to quit: 1990     Years since quittin.4   • Smokeless tobacco: Never Used   Substance and Sexual Activity   • Alcohol use: No   • Drug use: No   • Sexual activity: Defer         CURRENT MEDICATIONS    Current Outpatient Medications:   •  albuterol (PROVENTIL) (2.5 MG/3ML) 0.083% nebulizer solution, , Disp: , Rfl:   •  ANORO ELLIPTA 62.5-25 MCG/INH aerosol powder , , Disp: , Rfl:   •  B Complex Vitamins (VITAMIN B COMPLEX PO), Take  by mouth., Disp: , Rfl:   •  benazepril (LOTENSIN) 40 MG tablet, , Disp: , Rfl:   •  budesonide-formoterol (SYMBICORT) 160-4.5 MCG/ACT inhaler, Symbicort 160-4.5 MCG/ACT Inhalation Aerosol; Patient Sig: Symbicort 160-4.5 MCG/ACT Inhalation Aerosol ; 0; -Oct-2014; Active, Disp: , Rfl:   •  Calcium Acetate-Magnesium Carb 450-200 MG tablet, Take  by mouth., Disp: , Rfl:   •  celecoxib (CeleBREX) 100 MG capsule, , Disp: , Rfl:   •  citalopram (CeleXA) 20 MG tablet, , Disp: , Rfl:   •  colestipol (COLESTID) 1 g tablet, Take 2 tablets by mouth Daily., Disp: 60 tablet, Rfl: 11  •  Cyanocobalamin (VITAMIN B-12) 5000 MCG tablet dispersible, Take  by mouth., Disp: , Rfl:   •  hydrALAZINE (APRESOLINE) 50 MG tablet, , Disp: , Rfl:   •  hyoscyamine (LEVBID) 0.375 MG 12 hr tablet, Take 1 tablet by mouth Every 12 (Twelve) Hours As Needed for Cramping., Disp: 60 tablet, Rfl: 12  •  losartan-hydrochlorothiazide (HYZAAR)  "50-12.5 MG per tablet, , Disp: , Rfl:   •  meloxicam (MOBIC) 15 MG tablet, , Disp: , Rfl:   •  Mirabegron ER (MYRBETRIQ) 50 MG tablet sustained-release 24 hour, Take 50 mg by mouth Daily., Disp: , Rfl:   •  Multiple Vitamins-Minerals (MULTIVITAMIN ADULT PO), Take  by mouth., Disp: , Rfl:   •  Omega-3 Fatty Acids (FISH OIL) 1000 MG capsule capsule, Take  by mouth Daily With Breakfast., Disp: , Rfl:   •  omeprazole (priLOSEC) 40 MG capsule, Take 1 capsule by mouth Daily., Disp: 90 capsule, Rfl: 3  •  SPIRIVA HANDIHALER 18 MCG per inhalation capsule, , Disp: , Rfl:     ALLERGIES  Azithromycin; Sulfa antibiotics; and Codeine    VITALS  Vitals:    07/22/20 0928   Temp: 98.5 °F (36.9 °C)   TempSrc: Temporal   Weight: 102 kg (225 lb 6.4 oz)   Height: 157.5 cm (62.01\")       LAST RESULTS   Lab on 12/16/2019   Component Date Value Ref Range Status   • Campylobacter 12/16/2019 Not Detected  Not Detected, Invalid Final   • Plesiomonas shigelloides 12/16/2019 Not Detected  Not Detected Final   • Salmonella 12/16/2019 Not Detected  Not Detected Final   • Vibrio 12/16/2019 Not Detected  Not Detected Final   • Vibrio cholerae 12/16/2019 Not Detected  Not Detected Final   • Yersinia enterocolitica 12/16/2019 Not Detected  Not Detected Final   • Enteroaggregative E. coli (EAEC) 12/16/2019 Not Detected  Not Detected Final   • Enteropathogenic E. coli (EPEC) 12/16/2019 Not Detected  Not Detected Final   • Enterotoxigenic E. coli (ETEC) lt/* 12/16/2019 Not Detected  Not Detected Final   • Shiga-like toxin-producing E. coli* 12/16/2019 Not Detected  Not Detected Final   • E. coli O157 12/16/2019 Not Detected  Not Detected Final   • Shigella/Enteroinvasive E. coli (E* 12/16/2019 Not Detected  Not Detected Final   • Cryptosporidium 12/16/2019 Not Detected  Not Detected, Invalid Final   • Cyclospora cayetanensis 12/16/2019 Not Detected  Not Detected Final   • Entamoeba histolytica 12/16/2019 Not Detected  Not Detected Final   • Giardia " "lamblia 12/16/2019 Not Detected  Not Detected Final   • Adenovirus F40/41 12/16/2019 Not Detected  Not Detected Final   • Astrovirus 12/16/2019 Not Detected  Not Detected Final   • Norovirus GI/GII 12/16/2019 Not Detected  Not Detected Final   • Rotavirus A 12/16/2019 Not Detected  Not Detected Final   • Sapovirus (I, II, IV or V) 12/16/2019 Not Detected  Not Detected Final   • Creatinine 12/16/2019 0.90  0.60 - 1.30 mg/dL Final    Serial Number: [id number]:  [id number]   • C. Difficile Toxins by PCR 12/16/2019 Negative  Negative Final   • Lactoferrin, Qual 12/16/2019 Positive* Negative Final     No results found.    PHYSICAL EXAM  Debilities/Disabilities Identified: None  Emotional Behavior: Appropriate  Wt Readings from Last 3 Encounters:   07/22/20 102 kg (225 lb 6.4 oz)   01/30/20 102 kg (225 lb 9.6 oz)   10/29/19 98.9 kg (218 lb)     Ht Readings from Last 1 Encounters:   07/22/20 157.5 cm (62.01\")     Body mass index is 41.22 kg/m².  Physical Exam   Constitutional: She is oriented to person, place, and time. She appears well-developed and well-nourished.   HENT:   Head: Normocephalic and atraumatic.   Eyes: Pupils are equal, round, and reactive to light. Conjunctivae are normal.   Neck: Normal range of motion. Neck supple.   Cardiovascular: Normal rate and regular rhythm.   Pulmonary/Chest: Effort normal and breath sounds normal.   Abdominal: Soft. Bowel sounds are normal. She exhibits no distension. There is tenderness in the epigastric area, left upper quadrant and left lower quadrant.   GE and fundal tenderness is pronounced.    Musculoskeletal: Normal range of motion.   Lymphadenopathy:     She has no cervical adenopathy.   Neurological: She is alert and oriented to person, place, and time.   Skin: Skin is warm and dry.   Psychiatric: She has a normal mood and affect. Her behavior is normal.   Nursing note and vitals reviewed.      CLINICAL DATA REVIEWED   reviewed previous lab results and integrated with " today's visit, reviewed notes from other physicians and/or last GI encounter, reviewed previous endoscopy results and available photos    ASSESSMENT  Diagnoses and all orders for this visit:    Encounter for screening for malignant neoplasm of colon  -     Case Request; Standing  -     Follow Anesthesia Guidelines / Protocol; Future  -     Obtain Informed Consent; Standing  -     Case Request    Casper's esophagus without dysplasia  -     omeprazole (priLOSEC) 40 MG capsule; Take 1 capsule by mouth Daily.  -     Case Request; Standing  -     Follow Anesthesia Guidelines / Protocol; Future  -     Obtain Informed Consent; Standing  -     Case Request    Personal history of colonic polyps  -     Case Request; Standing  -     Follow Anesthesia Guidelines / Protocol; Future  -     Obtain Informed Consent; Standing  -     Case Request    Irritable bowel syndrome with diarrhea  -     Case Request; Standing  -     Follow Anesthesia Guidelines / Protocol; Future  -     Obtain Informed Consent; Standing  -     Case Request  -     colestipol (COLESTID) 1 g tablet; Take 2 tablets by mouth Daily.          PLAN  Return in about 2 months (around 9/22/2020).     EGD and colonoscopy (prefers to do together even though is 3 yrs but needs 5 yrs recall anyway)    Start daily probiotic, start colestid take 2 pills before bedtime for diarrhea every night (in addition you may take your hyosciamine and immodium as needed for cramping and diarrhea as needed),  Will try the extended release tylenol take 2 pills three times a day every day instead of meloxicam, increase omeprazole to once a day 40mg for acid reflux.    I have discussed the above plan with the patient.  They verbalize understanding and are in agreement with the plan.  They have been advised to contact the office for any questions, concerns, or changes related to their health.

## 2020-08-05 ENCOUNTER — TELEPHONE (OUTPATIENT)
Dept: GASTROENTEROLOGY | Facility: CLINIC | Age: 80
End: 2020-08-05

## 2020-08-05 NOTE — TELEPHONE ENCOUNTER
Continue taking the tylenol three times a day and try using a half dose on the meloxicam 7.5mg until your endoscopy and we'll see how this is doing for her.   Thanks

## 2020-08-07 ENCOUNTER — TRANSCRIBE ORDERS (OUTPATIENT)
Dept: ADMINISTRATIVE | Facility: HOSPITAL | Age: 80
End: 2020-08-07

## 2020-08-07 DIAGNOSIS — Z01.818 PREOP EXAMINATION: Primary | ICD-10-CM

## 2020-08-10 ENCOUNTER — LAB (OUTPATIENT)
Dept: LAB | Facility: HOSPITAL | Age: 80
End: 2020-08-10

## 2020-08-10 DIAGNOSIS — Z01.818 PREOP EXAMINATION: ICD-10-CM

## 2020-08-10 PROCEDURE — U0002 COVID-19 LAB TEST NON-CDC: HCPCS

## 2020-08-10 PROCEDURE — U0004 COV-19 TEST NON-CDC HGH THRU: HCPCS

## 2020-08-10 PROCEDURE — C9803 HOPD COVID-19 SPEC COLLECT: HCPCS

## 2020-08-11 ENCOUNTER — ANESTHESIA EVENT (OUTPATIENT)
Dept: PERIOP | Facility: HOSPITAL | Age: 80
End: 2020-08-11

## 2020-08-11 LAB
REF LAB TEST METHOD: NORMAL
SARS-COV-2 RNA RESP QL NAA+PROBE: NOT DETECTED

## 2020-08-12 ENCOUNTER — ANESTHESIA (OUTPATIENT)
Dept: PERIOP | Facility: HOSPITAL | Age: 80
End: 2020-08-12

## 2020-08-12 ENCOUNTER — HOSPITAL ENCOUNTER (OUTPATIENT)
Facility: HOSPITAL | Age: 80
Setting detail: HOSPITAL OUTPATIENT SURGERY
Discharge: HOME | End: 2020-08-12
Attending: INTERNAL MEDICINE | Admitting: INTERNAL MEDICINE

## 2020-08-12 VITALS
HEART RATE: 74 BPM | SYSTOLIC BLOOD PRESSURE: 134 MMHG | DIASTOLIC BLOOD PRESSURE: 69 MMHG | WEIGHT: 222.4 LBS | BODY MASS INDEX: 40.67 KG/M2 | TEMPERATURE: 98 F | RESPIRATION RATE: 18 BRPM | OXYGEN SATURATION: 94 %

## 2020-08-12 DIAGNOSIS — K22.70 BARRETT'S ESOPHAGUS WITHOUT DYSPLASIA: ICD-10-CM

## 2020-08-12 DIAGNOSIS — Z12.11 ENCOUNTER FOR SCREENING FOR MALIGNANT NEOPLASM OF COLON: ICD-10-CM

## 2020-08-12 DIAGNOSIS — K58.0 IRRITABLE BOWEL SYNDROME WITH DIARRHEA: ICD-10-CM

## 2020-08-12 DIAGNOSIS — Z86.010 PERSONAL HISTORY OF COLONIC POLYPS: ICD-10-CM

## 2020-08-12 PROCEDURE — 93005 ELECTROCARDIOGRAM TRACING: CPT | Performed by: NURSE ANESTHETIST, CERTIFIED REGISTERED

## 2020-08-12 PROCEDURE — 43239 EGD BIOPSY SINGLE/MULTIPLE: CPT | Performed by: INTERNAL MEDICINE

## 2020-08-12 PROCEDURE — 88305 TISSUE EXAM BY PATHOLOGIST: CPT | Performed by: INTERNAL MEDICINE

## 2020-08-12 PROCEDURE — 93010 ELECTROCARDIOGRAM REPORT: CPT | Performed by: INTERNAL MEDICINE

## 2020-08-12 PROCEDURE — 25010000002 PROPOFOL 10 MG/ML EMULSION: Performed by: NURSE ANESTHETIST, CERTIFIED REGISTERED

## 2020-08-12 PROCEDURE — 45380 COLONOSCOPY AND BIOPSY: CPT | Performed by: INTERNAL MEDICINE

## 2020-08-12 RX ORDER — SODIUM CHLORIDE, SODIUM LACTATE, POTASSIUM CHLORIDE, CALCIUM CHLORIDE 600; 310; 30; 20 MG/100ML; MG/100ML; MG/100ML; MG/100ML
100 INJECTION, SOLUTION INTRAVENOUS CONTINUOUS
Status: DISCONTINUED | OUTPATIENT
Start: 2020-08-12 | End: 2020-08-12 | Stop reason: HOSPADM

## 2020-08-12 RX ORDER — LIDOCAINE HYDROCHLORIDE 10 MG/ML
0.5 INJECTION, SOLUTION EPIDURAL; INFILTRATION; INTRACAUDAL; PERINEURAL ONCE AS NEEDED
Status: DISCONTINUED | OUTPATIENT
Start: 2020-08-12 | End: 2020-08-12 | Stop reason: HOSPADM

## 2020-08-12 RX ORDER — PROPOFOL 10 MG/ML
VIAL (ML) INTRAVENOUS AS NEEDED
Status: DISCONTINUED | OUTPATIENT
Start: 2020-08-12 | End: 2020-08-12 | Stop reason: SURG

## 2020-08-12 RX ORDER — MEPERIDINE HYDROCHLORIDE 25 MG/ML
12.5 INJECTION INTRAMUSCULAR; INTRAVENOUS; SUBCUTANEOUS
Status: DISCONTINUED | OUTPATIENT
Start: 2020-08-12 | End: 2020-08-12 | Stop reason: HOSPADM

## 2020-08-12 RX ORDER — ONDANSETRON 2 MG/ML
4 INJECTION INTRAMUSCULAR; INTRAVENOUS ONCE AS NEEDED
Status: DISCONTINUED | OUTPATIENT
Start: 2020-08-12 | End: 2020-08-12 | Stop reason: HOSPADM

## 2020-08-12 RX ORDER — SODIUM CHLORIDE 0.9 % (FLUSH) 0.9 %
10 SYRINGE (ML) INJECTION EVERY 12 HOURS SCHEDULED
Status: DISCONTINUED | OUTPATIENT
Start: 2020-08-12 | End: 2020-08-12 | Stop reason: HOSPADM

## 2020-08-12 RX ORDER — SODIUM CHLORIDE 9 MG/ML
40 INJECTION, SOLUTION INTRAVENOUS AS NEEDED
Status: DISCONTINUED | OUTPATIENT
Start: 2020-08-12 | End: 2020-08-12 | Stop reason: HOSPADM

## 2020-08-12 RX ORDER — SODIUM CHLORIDE 0.9 % (FLUSH) 0.9 %
10 SYRINGE (ML) INJECTION AS NEEDED
Status: DISCONTINUED | OUTPATIENT
Start: 2020-08-12 | End: 2020-08-12 | Stop reason: HOSPADM

## 2020-08-12 RX ORDER — SODIUM CHLORIDE, SODIUM LACTATE, POTASSIUM CHLORIDE, CALCIUM CHLORIDE 600; 310; 30; 20 MG/100ML; MG/100ML; MG/100ML; MG/100ML
9 INJECTION, SOLUTION INTRAVENOUS CONTINUOUS
Status: DISCONTINUED | OUTPATIENT
Start: 2020-08-12 | End: 2020-08-12 | Stop reason: HOSPADM

## 2020-08-12 RX ORDER — LIDOCAINE HYDROCHLORIDE 20 MG/ML
INJECTION, SOLUTION INFILTRATION; PERINEURAL AS NEEDED
Status: DISCONTINUED | OUTPATIENT
Start: 2020-08-12 | End: 2020-08-12 | Stop reason: SURG

## 2020-08-12 RX ADMIN — PROPOFOL 50 MG: 10 INJECTION, EMULSION INTRAVENOUS at 13:40

## 2020-08-12 RX ADMIN — PROPOFOL 50 MG: 10 INJECTION, EMULSION INTRAVENOUS at 13:49

## 2020-08-12 RX ADMIN — SODIUM CHLORIDE, POTASSIUM CHLORIDE, SODIUM LACTATE AND CALCIUM CHLORIDE 9 ML/HR: 600; 310; 30; 20 INJECTION, SOLUTION INTRAVENOUS at 12:52

## 2020-08-12 RX ADMIN — PROPOFOL 50 MG: 10 INJECTION, EMULSION INTRAVENOUS at 13:35

## 2020-08-12 RX ADMIN — PROPOFOL 50 MG: 10 INJECTION, EMULSION INTRAVENOUS at 13:46

## 2020-08-12 RX ADMIN — LIDOCAINE HYDROCHLORIDE 100 MG: 20 INJECTION, SOLUTION INFILTRATION; PERINEURAL at 13:35

## 2020-08-12 RX ADMIN — PROPOFOL 50 MG: 10 INJECTION, EMULSION INTRAVENOUS at 13:51

## 2020-08-12 RX ADMIN — PROPOFOL 50 MG: 10 INJECTION, EMULSION INTRAVENOUS at 13:43

## 2020-08-12 NOTE — ANESTHESIA POSTPROCEDURE EVALUATION
Patient: Katarzyna Ku    Procedure Summary     Date:  08/12/20 Room / Location:  [facility] ENDOSCOPY 1 /  [facility] OR    Anesthesia Start:  1330 Anesthesia Stop:  1408    Procedures:       ESOPHAGOGASTRODUODENOSCOPY (N/A Esophagus)      COLONOSCOPY (N/A ) Diagnosis:       Casper's esophagus without dysplasia      Encounter for screening for malignant neoplasm of colon      Personal history of colonic polyps      Irritable bowel syndrome with diarrhea      Colon polyp      Diverticulosis large intestine w/o perforation or abscess w/o bleeding      (Casper's esophagus without dysplasia [K22.70])      (Encounter for screening for malignant neoplasm of colon [Z12.11])      (Personal history of colonic polyps [Z86.010])      (Irritable bowel syndrome with diarrhea [K58.0])    Surgeon:  Weston Sue MD Provider:  Edward Parker CRNA    Anesthesia Type:  MAC ASA Status:  3          Anesthesia Type: MAC    Vitals  Vitals Value Taken Time   /70 8/12/2020  2:21 PM   Temp     Pulse 81 8/12/2020  2:21 PM   Resp 15 8/12/2020  2:21 PM   SpO2 94 % 8/12/2020  2:21 PM           Post Anesthesia Care and Evaluation    Patient location during evaluation: PHASE II  Patient participation: complete - patient participated  Level of consciousness: awake and alert  Pain score: 0  Pain management: satisfactory to patient  Airway patency: patent  Anesthetic complications: No anesthetic complications  PONV Status: none  Cardiovascular status: acceptable  Respiratory status: acceptable  Hydration status: acceptable

## 2020-08-12 NOTE — OP NOTE
ESOPHAGOGASTRODUODENOSCOPY, COLONOSCOPY  Procedure Report    Patient Name:  Katarzyna Ku  YOB: 1940    Date of Surgery:  8/12/2020     Indications:  Casper's esophagus without dysplasia [K22.70]  Encounter for screening for malignant neoplasm of colon [Z12.11]  Personal history of colonic polyps [Z86.010]  Irritable bowel syndrome with diarrhea [K58.0]      Pre-op Diagnosis:   Casper's esophagus without dysplasia [K22.70]  Encounter for screening for malignant neoplasm of colon [Z12.11]  Personal history of colonic polyps [Z86.010]  Irritable bowel syndrome with diarrhea [K58.0]    Post-Op Diagnosis Codes:     * Casper's esophagus without dysplasia [K22.70]     * Encounter for screening for malignant neoplasm of colon [Z12.11]     * Personal history of colonic polyps [Z86.010]     * Irritable bowel syndrome with diarrhea [K58.0]     * Colon polyp [K63.5]     * Diverticulosis large intestine w/o perforation or abscess w/o bleeding [K57.30]         Procedure/CPT® Codes:      Procedure(s):  ESOPHAGOGASTRODUODENOSCOPY  COLONOSCOPY    Staff:  Surgeon(s):  Weston Sue MD         Anesthesia: Monitored Anesthesia Care    Estimated Blood Loss: none    Specimens:   ID Type Source Tests Collected by Time   A (Not marked as sent) : DISTAL ESOPHAGUS BIOPSY Tissue Esophagus, Distal TISSUE PATHOLOGY EXAM Weston Sue MD 8/12/2020 1343   B (Not marked as sent) : TRANSVERSE COLON POLYP Polyp Large Intestine, Transverse Colon TISSUE PATHOLOGY EXAM Weston Sue MD 8/12/2020 1352   C (Not marked as sent) : SIGMOID COLON POLYP Polyp Large Intestine, Sigmoid Colon TISSUE PATHOLOGY EXAM Weston Sue MD 8/12/2020 1404       Implants:    Nothing was implanted during the procedure      Description of Procedure: After having signed informed consent, she was brought to the endoscopy suite and placed in left lateral decubitus position and given her IV sedation.   A bite block was placed between her incisors.  The scope was introduced into the oropharynx and advanced under direct visualization into the esophagus through the distal esophagus.  The Z-line was mildly irregular.  There was no active ulcer or stricture.  The scope was advanced into the stomach and retroflexed showing normal cardia and fundus.  The scope was de-retroflexed and advanced to the antrum which was normal-appearing, through the pylorus and through the duodenal bulb which was examined and normal and run to the 2nd and 3rd portions of the duodenum which were normal as well.  The scope was withdrawn back into the antrum and retroflexed revealing normal incisura and lesser curvature.  The scope was de-retroflexed and withdrawn examining the Posterior wall and greater curvature.   No mucosal lesions were noted there.  The scope was withdrawn to the distal esophagus and biopsies were taken in a targeted fashion and sent as distal esophagus. The scope was withdrawn.   The remainder of the esophagus was normal-appearing.  The scope was taken from the patient.  She tolerated that procedure well.    She was kept in left lateral decubitus position and given her IV sedation.  Rectal exam revealed no external lesions, normal anal tone, and no rectal mass.  The scope was introduced into the rectum and advanced under direct visualization from the rectum to the sigmoid colon past multiple medium size diverticulum, through the descending colon to and around the splenic flexure, reduced and advanced through to the transverse colon. In the mid to distal transverse colon there was a sessile 5 x 7 mm polyp that was biopsied and felt to be completely removed.  The scope was advanced to and around the hepatic flexure, reduced in the ascending colon and advanced then into the cecum.  The cecum was identified by the appendiceal orifice and ileocecal valve.  The ileocecal valve was well opened but significantly dilated.  The  cecum was very well prepped and normal appearing.  The scope was withdrawn back into the ascending colon and withdrawn slowly examining the colon in circumferential fashion.  The scope was withdrawn around the hepatic flexure and into the transverse colon.  There were no mucosal lesions noted there.  The site of the polypectomy showed excellent hemostasis.  No residual polyp.  The scope was withdrawn to the descending colon into the sigmoid colon.  There was a sessile 5 mm polyp noted in the sigmoid colon that was biopsied and sent separately as a sigmoid colon polyp.  The scope was withdrawn through the remainder of the sigmoid colon and rectum.  No further mucosal lesions were noted throughout the remainder of the exam.  Diverticulum did include the entire colon.  The scope was retroflexed revealing intact dentate line and no mucosal lesions.  The scope was de-retroflexed and withdrawn.  She tolerated both procedures very well.               Findings: Normal Duodenum  Normal Stomach  SSBE-Biopsy    Colon to Cecum  Pan-Colonic Diverticulosis  Polyps (2) Biopsy     Complications: None    Recommendations: Results to be called., Repeat in Colon 5 years and Repeat in EGD 3 years      Weston Sue MD     Date: 8/12/2020  Time: 14:12

## 2020-08-12 NOTE — BRIEF OP NOTE
ESOPHAGOGASTRODUODENOSCOPY, COLONOSCOPY  Progress Note    Katarzyna Ku  8/12/2020    Pre-op Diagnosis:   Casper's esophagus without dysplasia [K22.70]  Encounter for screening for malignant neoplasm of colon [Z12.11]  Personal history of colonic polyps [Z86.010]  Irritable bowel syndrome with diarrhea [K58.0]       Post-Op Diagnosis Codes:     * Casper's esophagus without dysplasia [K22.70]     * Encounter for screening for malignant neoplasm of colon [Z12.11]     * Personal history of colonic polyps [Z86.010]     * Irritable bowel syndrome with diarrhea [K58.0]     * Colon polyp [K63.5]     * Diverticulosis large intestine w/o perforation or abscess w/o bleeding [K57.30]    Procedure/CPT® Codes:        Procedure(s):  ESOPHAGOGASTRODUODENOSCOPY  COLONOSCOPY    Surgeon(s):  Weston Sue MD    Anesthesia: Monitored Anesthesia Care    Staff:   Circulator: Laurel Palacios RN  Scrub Person: Janeth Gastelum         Estimated Blood Loss: none    Urine Voided: * No values recorded between 8/12/2020  1:32 PM and 8/12/2020  2:10 PM *    Specimens:                Specimens     ID Source Type Tests Collected By Collected At Frozen?      A Esophagus, Distal Tissue · TISSUE PATHOLOGY EXAM   Weston Sue MD 8/12/20 1343      Description: DISTAL ESOPHAGUS BIOPSY    This specimen was not marked as sent.    B Large Intestine, Transverse Colon Polyp · TISSUE PATHOLOGY EXAM   Weston Sue MD 8/12/20 1352      Description: TRANSVERSE COLON POLYP    This specimen was not marked as sent.    C Large Intestine, Sigmoid Colon Polyp · TISSUE PATHOLOGY EXAM   Weston Sue MD 8/12/20 1404      Description: SIGMOID COLON POLYP    This specimen was not marked as sent.                Drains: * No LDAs found *    Findings: Normal Duodenum  Normal Stomach  SSBE-Biopsy    Colon to Cecum  Pan-Colonic Diverticulosis  Polyps (2) Biopsy      Complications: None          Weston  Tracy Sue MD     Date: 8/12/2020  Time: 14:10

## 2020-08-12 NOTE — ANESTHESIA PREPROCEDURE EVALUATION
Anesthesia Evaluation     Patient summary reviewed and Nursing notes reviewed   no history of anesthetic complications:  NPO Solid Status: > 8 hours  NPO Liquid Status: > 6 hours           Airway   Mallampati: II  TM distance: >3 FB  Neck ROM: full  No difficulty expected  Dental    (+) upper dentures    Pulmonary - normal exam    breath sounds clear to auscultation  (+) a smoker Former, COPD (Inhalers daily and PRN), sleep apnea on CPAP,   Cardiovascular - normal exam  Exercise tolerance: poor (<4 METS)    ECG reviewed  Rhythm: regular  Rate: normal    (+) hypertension,     ROS comment: EKG 8/12/20:  - NORMAL ECG -  Sinus rhythm    Neuro/Psych  (-) psychiatric history (Denies)  GI/Hepatic/Renal/Endo    (+) morbid obesity, GERD,      Musculoskeletal     Abdominal   (+) obese,    Substance History - negative use     OB/GYN          Other   arthritis,    history of cancer remission                  Anesthesia Plan    ASA 3     MAC     intravenous induction     Anesthetic plan, all risks, benefits, and alternatives have been provided, discussed and informed consent has been obtained with: patient.  Use of blood products discussed with patient  Consented to blood products.

## 2020-08-12 NOTE — INTERVAL H&P NOTE
Vital Signs  /94   Pulse 84   Temp 98 °F (36.7 °C) (Oral)   Resp 16   Wt 101 kg (222 lb 6.4 oz)   SpO2 95%   BMI 40.67 kg/m²     H&P reviewed. The patient was examined and there are no changes to the H&P.

## 2020-08-14 LAB
CYTO UR: NORMAL
LAB AP CASE REPORT: NORMAL
PATH REPORT.FINAL DX SPEC: NORMAL
PATH REPORT.GROSS SPEC: NORMAL

## 2020-10-16 DIAGNOSIS — Z85.3 HISTORY OF BREAST CANCER: ICD-10-CM

## 2020-10-16 DIAGNOSIS — Z12.11 ENCOUNTER FOR SCREENING FOR MALIGNANT NEOPLASM OF COLON: Primary | ICD-10-CM

## 2020-10-30 ENCOUNTER — HOSPITAL ENCOUNTER (OUTPATIENT)
Dept: MAMMOGRAPHY | Facility: HOSPITAL | Age: 80
Discharge: HOME | End: 2020-10-30
Admitting: SURGERY

## 2020-10-30 DIAGNOSIS — Z85.3 HISTORY OF BREAST CANCER: ICD-10-CM

## 2020-10-30 DIAGNOSIS — Z12.11 ENCOUNTER FOR SCREENING FOR MALIGNANT NEOPLASM OF COLON: ICD-10-CM

## 2020-10-30 PROCEDURE — 77066 DX MAMMO INCL CAD BI: CPT

## 2020-10-30 PROCEDURE — G0279 TOMOSYNTHESIS, MAMMO: HCPCS

## 2020-11-10 ENCOUNTER — OFFICE VISIT (OUTPATIENT)
Dept: SURGERY | Facility: CLINIC | Age: 80
End: 2020-11-10

## 2020-11-10 VITALS
WEIGHT: 231 LBS | SYSTOLIC BLOOD PRESSURE: 188 MMHG | BODY MASS INDEX: 42.51 KG/M2 | HEIGHT: 62 IN | DIASTOLIC BLOOD PRESSURE: 86 MMHG

## 2020-11-10 DIAGNOSIS — C50.912 MALIGNANT NEOPLASM OF LEFT FEMALE BREAST, UNSPECIFIED ESTROGEN RECEPTOR STATUS, UNSPECIFIED SITE OF BREAST (HCC): Primary | ICD-10-CM

## 2020-11-10 PROCEDURE — 99212 OFFICE O/P EST SF 10 MIN: CPT | Performed by: SURGERY

## 2020-11-10 RX ORDER — ALPRAZOLAM 0.5 MG/1
0.5 TABLET ORAL DAILY PRN
COMMUNITY
Start: 2020-08-13

## 2020-11-10 NOTE — PROGRESS NOTES
PATIENT INFORMATION  Katarzyna Ku       - 1940    CHIEF COMPLAINT  Chief Complaint   Patient presents with   • Follow-up     hx breast cancer   no complaints.    HISTORY OF PRESENT ILLNESS  HPI  She is 11 years out from her breast cancer.  She still complains of some left breast tenderness after her radiation therapy that has not resolved.  She says that her breathing is worse and she sees pulmonology for that.  She has gained some weight.  She denies any blood in her stool.      REVIEW OF SYSTEMS  Review of Systems all other organ systems are negative other than above      ACTIVE PROBLEMS  Patient Active Problem List    Diagnosis   • Irritable bowel syndrome with diarrhea [K58.0]   • Encounter for screening for malignant neoplasm of colon [Z12.11]   • Personal history of colonic polyps [Z86.010]   • Casper's esophagus without dysplasia [K22.70]   • Abdominal pain, right upper quadrant [R10.11]   • Osteoarthritis of knee [M17.10]   • Chronic obstructive pulmonary disease (CMS/HCC) [J44.9]   • Hypertension [I10]   • Primary localized osteoarthrosis [M19.91]   • Midline cystocele [N81.11]   • Mixed incontinence [N39.46]   • Muscular atrophy [M62.50]   • Atrophic vaginitis [N95.2]   • Prolapse of vaginal vault after hysterectomy [N99.3]   • Enterocele [K46.9]         PAST MEDICAL HISTORY  Past Medical History:   Diagnosis Date   • Anxiety    • Arthritis    • Casper esophagus    • Breast cancer (CMS/HCC)     left   • Colon polyp    • COPD (chronic obstructive pulmonary disease) (CMS/HCC)    • Diverticulitis of colon    • GERD (gastroesophageal reflux disease)    • Hypertension    • Skin cancer          SURGICAL HISTORY  Past Surgical History:   Procedure Laterality Date   • BLADDER SURGERY     • BREAST LUMPECTOMY      LEFT   • CARPAL TUNNEL RELEASE      BOTH HANDS   • COLONOSCOPY N/A 2/10/2017    Procedure: COLONOSCOPY with polypectomy;  Surgeon: Weston Sue MD;  [facility]  OR;  Service:    • COLONOSCOPY N/A 2020    Procedure: COLONOSCOPY;  Surgeon: Weston Sue MD;  Location:  [facility] OR;  Service: Gastroenterology;  Laterality: N/A;  TRANSVERSE COLON POLYP  DIVERTICULOSIS  CECAL TIME at 1356  SIGMOID COLON POLYP   • ENDOSCOPY N/A 2/10/2017    Procedure: ESOPHAGOGASTRODUODENOSCOPY with biopsies;  Surgeon: Weston Sue MD;  Location:  [facility] OR;  Service:    • ENDOSCOPY N/A 2020    Procedure: ESOPHAGOGASTRODUODENOSCOPY;  Surgeon: Weston Sue MD;  Location:  [facility] OR;  Service: Gastroenterology;  Laterality: N/A;  DISTAL ESOPHAGUS BIOPSY  STOCKTON'S ESOPHAGUS   • HYSTERECTOMY     • KNEE SURGERY       X 2         FAMILY HISTORY  Family History   Problem Relation Age of Onset   • Breast cancer Neg Hx          SOCIAL HISTORY  Social History     Occupational History   • Not on file   Tobacco Use   • Smoking status: Former Smoker     Quit date: 1990     Years since quittin.8   • Smokeless tobacco: Never Used   Substance and Sexual Activity   • Alcohol use: No   • Drug use: No   • Sexual activity: Defer         CURRENT MEDICATIONS    Current Outpatient Medications:   •  albuterol (PROVENTIL) (2.5 MG/3ML) 0.083% nebulizer solution, , Disp: , Rfl:   •  benazepril (LOTENSIN) 40 MG tablet, , Disp: , Rfl:   •  Calcium Acetate-Magnesium Carb 450-200 MG tablet, Take  by mouth., Disp: , Rfl:   •  citalopram (CeleXA) 20 MG tablet, , Disp: , Rfl:   •  colestipol (COLESTID) 1 g tablet, Take 2 tablets by mouth Daily., Disp: 60 tablet, Rfl: 11  •  Cyanocobalamin (VITAMIN B-12) 5000 MCG tablet dispersible, Take  by mouth., Disp: , Rfl:   •  Fluticasone-Umeclidin-Vilant (Trelegy Ellipta) 100-62.5-25 MCG/INH aerosol powder , Inhale 1 puff Daily., Disp: , Rfl:   •  hydrALAZINE (APRESOLINE) 50 MG tablet, , Disp: , Rfl:   •  hyoscyamine (LEVBID) 0.375 MG 12 hr tablet, Take 1 tablet by mouth Every 12 (Twelve) Hours As Needed for Cramping., Disp: 60  "tablet, Rfl: 12  •  meloxicam (MOBIC) 15 MG tablet, , Disp: , Rfl:   •  Mirabegron ER (MYRBETRIQ) 50 MG tablet sustained-release 24 hour, Take 50 mg by mouth Daily., Disp: , Rfl:   •  Multiple Vitamins-Minerals (MULTIVITAMIN ADULT PO), Take  by mouth., Disp: , Rfl:   •  Omega-3 Fatty Acids (FISH OIL) 1000 MG capsule capsule, Take  by mouth Daily With Breakfast., Disp: , Rfl:   •  omeprazole (priLOSEC) 40 MG capsule, Take 1 capsule by mouth Daily., Disp: 90 capsule, Rfl: 3  •  ALPRAZolam (XANAX) 0.5 MG tablet, , Disp: , Rfl:   •  budesonide-formoterol (SYMBICORT) 160-4.5 MCG/ACT inhaler, Symbicort 160-4.5 MCG/ACT Inhalation Aerosol; Patient Sig: Symbicort 160-4.5 MCG/ACT Inhalation Aerosol ; 0; 21-Oct-2014; Active, Disp: , Rfl:   •  celecoxib (CeleBREX) 100 MG capsule, , Disp: , Rfl:     ALLERGIES  Azithromycin, Sulfa antibiotics, and Codeine    VITALS  Vitals:    11/10/20 1004   BP: (!) 188/86   Weight: 105 kg (231 lb)   Height: 157.5 cm (62.01\")       LAST RESULTS   Admission on 08/12/2020, Discharged on 08/12/2020   Component Date Value Ref Range Status   • Case Report 08/12/2020    Final                    Value:Surgical Pathology Report                         Case: [id number]                                  Authorizing Provider:  Weston Sue        Collected:           08/12/2020 01:43 PM                                 MD Tracy                                                                   Ordering Location:     [facility]   Received:            08/12/2020 04:18 PM                                 OR                                                                           Pathologist:           Jose Luis Coleman MD                                                          Specimens:   1) - Esophagus, Distal, DISTAL ESOPHAGUS BIOPSY                                                     2) - Large Intestine, Transverse Colon, TRANSVERSE COLON POLYP                                      3) " - Large Intestine, Sigmoid Colon, SIGMOID COLON POLYP                                  • Final Diagnosis 08/12/2020    Final                    Value:This result contains rich text formatting which cannot be displayed here.   • Gross Description 08/12/2020    Final                    Value:This result contains rich text formatting which cannot be displayed here.   • Microscopic Description 08/12/2020    Final                    Value:This result contains rich text formatting which cannot be displayed here.     Mammo Diagnostic Digital Tomosynthesis Bilateral With Cad    Result Date: 10/30/2020  Narrative: EXAM, 10/30/2020: 1. Bilateral digital diagnostic mammogram with CAD. 2. Bilateral digital breast tomosynthesis.  INDICATION: Left lumpectomy for breast cancer in 2009. Annual exam.  TECHNIQUE: Bilateral digital diagnostic mammogram images were obtained including digital breast tomosynthesis and CAD review. The patient is very short of air today and had difficulty remaining motionless during imaging.  COMPARISON: *  Mammograms, 10/28/2019 and 10/22/2018.  FINDINGS: There are scattered areas of fibroglandular density. Stable post lumpectomy scarring and chronic dystrophic calcification at the lumpectomy site in the upper outer left breast. No significant change when compared with prior images. No mammographic evidence of malignancy. Recommend repeat screening mammogram in one year.      Impression: Benign annual mammogram.  BI-RADS CATEGORY 2: Benign Findings.   Women over the age of 40 undergoing screening mammography are entered into a reminder system with target due date for the next mammogram.  This report was finalized on 10/30/2020 1:03 PM by Dr. Shimon Mcmahon MD.        PHYSICAL EXAM  Debilities/Disabilities Identified: None  Emotional Behavior: Appropriate  Physical Exam overweight elderly white female in no active distress she is oriented x3.  She has no spinal tenderness.  She had no supraclavicular  nor axillary adenopathy.  Her lungs were distant but equal.  Her abdomen was soft and nontender without mass.  Her breasts were symmetrical and were subjectively tender there were no skin changes there is no nipple discharge there is no dominant mass.  Her mammogram showed no malignant criteria according to the reading unfortunately it would not allow me to open up her films.    ASSESSMENT  History of breast cancer without any evidence of recurrent disease      PLAN  Follow her up in 1 year with a bilateral mammogram.

## 2020-11-30 DIAGNOSIS — K58.0 IRRITABLE BOWEL SYNDROME WITH DIARRHEA: ICD-10-CM

## 2020-11-30 RX ORDER — MONTELUKAST SODIUM 4 MG/1
3 TABLET, CHEWABLE ORAL DAILY
Qty: 90 TABLET | Refills: 11 | Status: SHIPPED | OUTPATIENT
Start: 2020-11-30 | End: 2022-02-17

## 2020-12-11 DIAGNOSIS — K59.1 FUNCTIONAL DIARRHEA: Primary | ICD-10-CM

## 2020-12-11 DIAGNOSIS — K58.0 IRRITABLE BOWEL SYNDROME WITH DIARRHEA: ICD-10-CM

## 2020-12-11 RX ORDER — MONTELUKAST SODIUM 4 MG/1
3 TABLET, CHEWABLE ORAL
Qty: 90 TABLET | Refills: 11 | OUTPATIENT
Start: 2020-12-11

## 2020-12-11 NOTE — TELEPHONE ENCOUNTER
I sent in the granuals. One scoop or pack equals four tabs and she is on 3 tabs a day so may need to take a little less than whole scoop.

## 2021-05-17 ENCOUNTER — TRANSCRIBE ORDERS (OUTPATIENT)
Dept: ADMINISTRATIVE | Facility: HOSPITAL | Age: 81
End: 2021-05-17

## 2021-05-17 ENCOUNTER — HOSPITAL ENCOUNTER (OUTPATIENT)
Dept: GENERAL RADIOLOGY | Facility: HOSPITAL | Age: 81
Discharge: HOME | End: 2021-05-17
Admitting: FAMILY MEDICINE

## 2021-05-17 DIAGNOSIS — J44.9 CHRONIC OBSTRUCTIVE PULMONARY DISEASE, UNSPECIFIED COPD TYPE (HCC): Primary | ICD-10-CM

## 2021-05-17 DIAGNOSIS — J44.9 CHRONIC OBSTRUCTIVE PULMONARY DISEASE, UNSPECIFIED COPD TYPE (HCC): ICD-10-CM

## 2021-05-17 PROCEDURE — 71046 X-RAY EXAM CHEST 2 VIEWS: CPT

## 2021-07-01 ENCOUNTER — TRANSCRIBE ORDERS (OUTPATIENT)
Dept: ADMINISTRATIVE | Facility: HOSPITAL | Age: 81
End: 2021-07-01

## 2021-07-01 DIAGNOSIS — J44.9 CHRONIC OBSTRUCTIVE PULMONARY DISEASE, UNSPECIFIED COPD TYPE (HCC): Primary | ICD-10-CM

## 2021-07-06 ENCOUNTER — TRANSCRIBE ORDERS (OUTPATIENT)
Dept: ADMINISTRATIVE | Facility: HOSPITAL | Age: 81
End: 2021-07-06

## 2021-07-06 DIAGNOSIS — J44.9 CHRONIC OBSTRUCTIVE PULMONARY DISEASE, UNSPECIFIED COPD TYPE (HCC): Primary | ICD-10-CM

## 2021-08-31 ENCOUNTER — TRANSCRIBE ORDERS (OUTPATIENT)
Dept: ADMINISTRATIVE | Facility: HOSPITAL | Age: 81
End: 2021-08-31

## 2021-08-31 DIAGNOSIS — Z78.0 MENOPAUSE: Primary | ICD-10-CM

## 2021-09-03 ENCOUNTER — APPOINTMENT (OUTPATIENT)
Dept: BONE DENSITY | Facility: HOSPITAL | Age: 81
End: 2021-09-03

## 2021-09-03 DIAGNOSIS — Z78.0 MENOPAUSE: ICD-10-CM

## 2021-09-03 PROCEDURE — 77080 DXA BONE DENSITY AXIAL: CPT

## 2021-10-11 ENCOUNTER — TRANSCRIBE ORDERS (OUTPATIENT)
Dept: ADMINISTRATIVE | Facility: HOSPITAL | Age: 81
End: 2021-10-11

## 2021-10-11 DIAGNOSIS — Z12.31 VISIT FOR SCREENING MAMMOGRAM: Primary | ICD-10-CM

## 2021-10-14 RX ORDER — HYOSCYAMINE SULFATE EXTENDED-RELEASE 0.38 MG/1
TABLET ORAL
Qty: 60 TABLET | Refills: 12 | OUTPATIENT
Start: 2021-10-14

## 2021-10-19 RX ORDER — HYOSCYAMINE SULFATE EXTENDED-RELEASE 0.38 MG/1
0.38 TABLET ORAL EVERY 12 HOURS PRN
Qty: 60 TABLET | Refills: 3 | Status: SHIPPED | OUTPATIENT
Start: 2021-10-19 | End: 2022-10-24

## 2021-11-02 ENCOUNTER — HOSPITAL ENCOUNTER (OUTPATIENT)
Dept: MAMMOGRAPHY | Facility: HOSPITAL | Age: 81
Discharge: HOME | End: 2021-11-02
Admitting: SURGERY

## 2021-11-02 DIAGNOSIS — Z12.31 VISIT FOR SCREENING MAMMOGRAM: ICD-10-CM

## 2021-11-02 PROCEDURE — 77063 BREAST TOMOSYNTHESIS BI: CPT

## 2021-11-02 PROCEDURE — 77067 SCR MAMMO BI INCL CAD: CPT

## 2021-11-03 ENCOUNTER — TELEPHONE (OUTPATIENT)
Dept: SURGERY | Facility: CLINIC | Age: 81
End: 2021-11-03

## 2021-11-03 DIAGNOSIS — C50.912 MALIGNANT NEOPLASM OF LEFT FEMALE BREAST, UNSPECIFIED ESTROGEN RECEPTOR STATUS, UNSPECIFIED SITE OF BREAST (HCC): ICD-10-CM

## 2021-11-03 DIAGNOSIS — N63.20 LEFT BREAST MASS: Primary | ICD-10-CM

## 2021-11-03 DIAGNOSIS — R92.2 INCONCLUSIVE MAMMOGRAM: ICD-10-CM

## 2021-11-03 NOTE — TELEPHONE ENCOUNTER
Patient aware to call us back to schedule her follow up appointment after added testing is complete.

## 2021-11-11 ENCOUNTER — HOSPITAL ENCOUNTER (OUTPATIENT)
Dept: GENERAL RADIOLOGY | Facility: HOSPITAL | Age: 81
Discharge: HOME | End: 2021-11-11

## 2021-11-11 ENCOUNTER — LAB (OUTPATIENT)
Dept: LAB | Facility: HOSPITAL | Age: 81
End: 2021-11-11

## 2021-11-11 ENCOUNTER — TRANSCRIBE ORDERS (OUTPATIENT)
Dept: ADMINISTRATIVE | Facility: HOSPITAL | Age: 81
End: 2021-11-11

## 2021-11-11 ENCOUNTER — TELEPHONE (OUTPATIENT)
Dept: GASTROENTEROLOGY | Facility: CLINIC | Age: 81
End: 2021-11-11

## 2021-11-11 DIAGNOSIS — R10.9 ABDOMINAL PAIN, UNSPECIFIED ABDOMINAL LOCATION: Primary | ICD-10-CM

## 2021-11-11 LAB
ALBUMIN SERPL-MCNC: 4.4 G/DL (ref 3.5–5.2)
ALBUMIN/GLOB SERPL: 1.9 G/DL
ALP SERPL-CCNC: 76 U/L (ref 39–117)
ALT SERPL W P-5'-P-CCNC: 13 U/L (ref 1–33)
AMYLASE SERPL-CCNC: 47 U/L (ref 28–100)
ANION GAP SERPL CALCULATED.3IONS-SCNC: 9.1 MMOL/L (ref 5–15)
AST SERPL-CCNC: 14 U/L (ref 1–32)
BASOPHILS # BLD AUTO: 0.05 10*3/MM3 (ref 0–0.2)
BASOPHILS NFR BLD AUTO: 0.6 % (ref 0–1.5)
BILIRUB SERPL-MCNC: 0.2 MG/DL (ref 0–1.2)
BUN SERPL-MCNC: 26 MG/DL (ref 8–23)
BUN/CREAT SERPL: 23.2 (ref 7–25)
CALCIUM SPEC-SCNC: 9.9 MG/DL (ref 8.6–10.5)
CHLORIDE SERPL-SCNC: 101 MMOL/L (ref 98–107)
CO2 SERPL-SCNC: 27.9 MMOL/L (ref 22–29)
CREAT SERPL-MCNC: 1.12 MG/DL (ref 0.57–1)
DEPRECATED RDW RBC AUTO: 46.8 FL (ref 37–54)
EOSINOPHIL # BLD AUTO: 0.17 10*3/MM3 (ref 0–0.4)
EOSINOPHIL NFR BLD AUTO: 2.1 % (ref 0.3–6.2)
ERYTHROCYTE [DISTWIDTH] IN BLOOD BY AUTOMATED COUNT: 13.4 % (ref 12.3–15.4)
GFR SERPL CREATININE-BSD FRML MDRD: 47 ML/MIN/1.73
GLOBULIN UR ELPH-MCNC: 2.3 GM/DL
GLUCOSE SERPL-MCNC: 105 MG/DL (ref 65–99)
HCT VFR BLD AUTO: 37.3 % (ref 34–46.6)
HGB BLD-MCNC: 11.7 G/DL (ref 12–15.9)
IMM GRANULOCYTES # BLD AUTO: 0.02 10*3/MM3 (ref 0–0.05)
IMM GRANULOCYTES NFR BLD AUTO: 0.2 % (ref 0–0.5)
LIPASE SERPL-CCNC: 26 U/L (ref 13–60)
LYMPHOCYTES # BLD AUTO: 2.7 10*3/MM3 (ref 0.7–3.1)
LYMPHOCYTES NFR BLD AUTO: 32.6 % (ref 19.6–45.3)
MCH RBC QN AUTO: 29.8 PG (ref 26.6–33)
MCHC RBC AUTO-ENTMCNC: 31.4 G/DL (ref 31.5–35.7)
MCV RBC AUTO: 94.9 FL (ref 79–97)
MONOCYTES # BLD AUTO: 0.57 10*3/MM3 (ref 0.1–0.9)
MONOCYTES NFR BLD AUTO: 6.9 % (ref 5–12)
NEUTROPHILS NFR BLD AUTO: 4.78 10*3/MM3 (ref 1.7–7)
NEUTROPHILS NFR BLD AUTO: 57.6 % (ref 42.7–76)
NRBC BLD AUTO-RTO: 0 /100 WBC (ref 0–0.2)
PLATELET # BLD AUTO: 241 10*3/MM3 (ref 140–450)
PMV BLD AUTO: 11 FL (ref 6–12)
POTASSIUM SERPL-SCNC: 4.1 MMOL/L (ref 3.5–5.2)
PROT SERPL-MCNC: 6.7 G/DL (ref 6–8.5)
RBC # BLD AUTO: 3.93 10*6/MM3 (ref 3.77–5.28)
SODIUM SERPL-SCNC: 138 MMOL/L (ref 136–145)
WBC # BLD AUTO: 8.29 10*3/MM3 (ref 3.4–10.8)

## 2021-11-11 PROCEDURE — 80053 COMPREHEN METABOLIC PANEL: CPT

## 2021-11-11 PROCEDURE — 74019 RADEX ABDOMEN 2 VIEWS: CPT

## 2021-11-11 PROCEDURE — 83690 ASSAY OF LIPASE: CPT

## 2021-11-11 PROCEDURE — 36415 COLL VENOUS BLD VENIPUNCTURE: CPT

## 2021-11-11 PROCEDURE — 82150 ASSAY OF AMYLASE: CPT

## 2021-11-11 PROCEDURE — 85025 COMPLETE CBC W/AUTO DIFF WBC: CPT | Performed by: FAMILY MEDICINE

## 2021-11-11 NOTE — TELEPHONE ENCOUNTER
Bloating, abdominal pain and swelling for 5 days. She is going to call her PCP to see if he can rule out anything.

## 2021-11-24 ENCOUNTER — HOSPITAL ENCOUNTER (OUTPATIENT)
Dept: MAMMOGRAPHY | Facility: HOSPITAL | Age: 81
Discharge: HOME | End: 2021-11-24

## 2021-11-24 ENCOUNTER — HOSPITAL ENCOUNTER (OUTPATIENT)
Dept: ULTRASOUND IMAGING | Facility: HOSPITAL | Age: 81
Discharge: HOME | End: 2021-11-24

## 2021-11-24 DIAGNOSIS — R92.8 OTHER ABNORMAL AND INCONCLUSIVE FINDINGS ON DIAGNOSTIC IMAGING OF BREAST: ICD-10-CM

## 2021-11-24 DIAGNOSIS — R92.2 INCONCLUSIVE MAMMOGRAM: ICD-10-CM

## 2021-11-24 DIAGNOSIS — N63.20 LEFT BREAST MASS: ICD-10-CM

## 2021-11-24 DIAGNOSIS — C50.912 MALIGNANT NEOPLASM OF LEFT FEMALE BREAST, UNSPECIFIED ESTROGEN RECEPTOR STATUS, UNSPECIFIED SITE OF BREAST (HCC): ICD-10-CM

## 2021-11-24 DIAGNOSIS — N63.20 LEFT BREAST MASS: Primary | ICD-10-CM

## 2021-11-24 DIAGNOSIS — Z85.3 HISTORY OF BREAST CANCER: ICD-10-CM

## 2021-11-24 PROCEDURE — G0279 TOMOSYNTHESIS, MAMMO: HCPCS

## 2021-11-24 PROCEDURE — 76642 ULTRASOUND BREAST LIMITED: CPT

## 2021-11-24 PROCEDURE — 77065 DX MAMMO INCL CAD UNI: CPT

## 2021-12-15 ENCOUNTER — HOSPITAL ENCOUNTER (OUTPATIENT)
Dept: ULTRASOUND IMAGING | Facility: HOSPITAL | Age: 81
Discharge: HOME | End: 2021-12-15

## 2021-12-15 ENCOUNTER — HOSPITAL ENCOUNTER (OUTPATIENT)
Dept: MAMMOGRAPHY | Facility: HOSPITAL | Age: 81
Discharge: HOME | End: 2021-12-15

## 2021-12-15 DIAGNOSIS — N63.20 LEFT BREAST MASS: ICD-10-CM

## 2021-12-15 DIAGNOSIS — R92.8 OTHER ABNORMAL AND INCONCLUSIVE FINDINGS ON DIAGNOSTIC IMAGING OF BREAST: ICD-10-CM

## 2021-12-15 DIAGNOSIS — Z85.3 HISTORY OF BREAST CANCER: ICD-10-CM

## 2021-12-15 PROCEDURE — 0 LIDOCAINE 1 % SOLUTION: Performed by: SURGERY

## 2021-12-15 PROCEDURE — 88305 TISSUE EXAM BY PATHOLOGIST: CPT | Performed by: SURGERY

## 2021-12-15 PROCEDURE — 88360 TUMOR IMMUNOHISTOCHEM/MANUAL: CPT | Performed by: SURGERY

## 2021-12-15 PROCEDURE — 88377 M/PHMTRC ALYS ISHQUANT/SEMIQ: CPT

## 2021-12-15 PROCEDURE — 88342 IMHCHEM/IMCYTCHM 1ST ANTB: CPT | Performed by: SURGERY

## 2021-12-15 PROCEDURE — 88341 IMHCHEM/IMCYTCHM EA ADD ANTB: CPT | Performed by: SURGERY

## 2021-12-15 RX ORDER — LIDOCAINE HYDROCHLORIDE 10 MG/ML
5 INJECTION, SOLUTION INFILTRATION; PERINEURAL ONCE
Status: COMPLETED | OUTPATIENT
Start: 2021-12-15 | End: 2021-12-15

## 2021-12-15 RX ADMIN — LIDOCAINE HYDROCHLORIDE 5 ML: 10 INJECTION, SOLUTION INFILTRATION; PERINEURAL at 12:22

## 2021-12-15 RX ADMIN — SODIUM BICARBONATE 0.5 MEQ: 0.2 INJECTION, SOLUTION INTRAVENOUS at 12:23

## 2021-12-21 ENCOUNTER — APPOINTMENT (OUTPATIENT)
Dept: CT IMAGING | Facility: HOSPITAL | Age: 81
End: 2021-12-21

## 2021-12-21 ENCOUNTER — APPOINTMENT (OUTPATIENT)
Dept: GENERAL RADIOLOGY | Facility: HOSPITAL | Age: 81
End: 2021-12-21

## 2021-12-21 ENCOUNTER — HOSPITAL ENCOUNTER (INPATIENT)
Facility: HOSPITAL | Age: 81
LOS: 1 days | Discharge: HOME | End: 2021-12-23
Attending: EMERGENCY MEDICINE | Admitting: FAMILY MEDICINE

## 2021-12-21 ENCOUNTER — OFFICE VISIT (OUTPATIENT)
Dept: SURGERY | Facility: CLINIC | Age: 81
End: 2021-12-21

## 2021-12-21 DIAGNOSIS — J10.1 INFLUENZA A: Primary | ICD-10-CM

## 2021-12-21 DIAGNOSIS — J44.1 COPD WITH EXACERBATION: ICD-10-CM

## 2021-12-21 DIAGNOSIS — R09.02 HYPOXIA: ICD-10-CM

## 2021-12-21 DIAGNOSIS — J42 CHRONIC BRONCHITIS, UNSPECIFIED CHRONIC BRONCHITIS TYPE (HCC): ICD-10-CM

## 2021-12-21 DIAGNOSIS — Z17.0 MALIGNANT NEOPLASM OF CENTRAL PORTION OF LEFT BREAST IN FEMALE, ESTROGEN RECEPTOR POSITIVE (HCC): Primary | ICD-10-CM

## 2021-12-21 DIAGNOSIS — C50.112 MALIGNANT NEOPLASM OF CENTRAL PORTION OF LEFT BREAST IN FEMALE, ESTROGEN RECEPTOR POSITIVE (HCC): Primary | ICD-10-CM

## 2021-12-21 LAB
ALBUMIN SERPL-MCNC: 4.1 G/DL (ref 3.5–5.2)
ALBUMIN/GLOB SERPL: 1.5 G/DL
ALP SERPL-CCNC: 68 U/L (ref 39–117)
ALT SERPL W P-5'-P-CCNC: 21 U/L (ref 1–33)
ANION GAP SERPL CALCULATED.3IONS-SCNC: 10.5 MMOL/L (ref 5–15)
ARTERIAL PATENCY WRIST A: ABNORMAL
AST SERPL-CCNC: 20 U/L (ref 1–32)
ATMOSPHERIC PRESS: 736 MMHG
BASE EXCESS BLDA CALC-SCNC: 5.3 MMOL/L (ref 0–2)
BASOPHILS # BLD AUTO: 0.03 10*3/MM3 (ref 0–0.2)
BASOPHILS NFR BLD AUTO: 0.2 % (ref 0–1.5)
BDY SITE: ABNORMAL
BILIRUB SERPL-MCNC: 0.6 MG/DL (ref 0–1.2)
BODY TEMPERATURE: 37 C
BUN SERPL-MCNC: 24 MG/DL (ref 8–23)
BUN/CREAT SERPL: 16.1 (ref 7–25)
CALCIUM SPEC-SCNC: 9.5 MG/DL (ref 8.6–10.5)
CHLORIDE SERPL-SCNC: 94 MMOL/L (ref 98–107)
CO2 SERPL-SCNC: 28.5 MMOL/L (ref 22–29)
CREAT SERPL-MCNC: 1.49 MG/DL (ref 0.57–1)
D DIMER PPP FEU-MCNC: 0.54 MCGFEU/ML (ref 0–0.46)
D-LACTATE SERPL-SCNC: 1.2 MMOL/L (ref 0.5–2)
D-LACTATE SERPL-SCNC: 2.5 MMOL/L (ref 0.5–2)
DEPRECATED RDW RBC AUTO: 48.6 FL (ref 37–54)
EOSINOPHIL # BLD AUTO: 0.01 10*3/MM3 (ref 0–0.4)
EOSINOPHIL NFR BLD AUTO: 0.1 % (ref 0.3–6.2)
ERYTHROCYTE [DISTWIDTH] IN BLOOD BY AUTOMATED COUNT: 14 % (ref 12.3–15.4)
FLUAV RNA RESP QL NAA+PROBE: DETECTED
FLUBV RNA RESP QL NAA+PROBE: NOT DETECTED
GAS FLOW AIRWAY: 4 LPM
GFR SERPL CREATININE-BSD FRML MDRD: 34 ML/MIN/1.73
GLOBULIN UR ELPH-MCNC: 2.7 GM/DL
GLUCOSE BLDC GLUCOMTR-MCNC: 202 MG/DL (ref 70–130)
GLUCOSE BLDC GLUCOMTR-MCNC: 301 MG/DL (ref 70–130)
GLUCOSE SERPL-MCNC: 198 MG/DL (ref 65–99)
HCO3 BLDA-SCNC: 30 MMOL/L (ref 20–26)
HCT VFR BLD AUTO: 38 % (ref 34–46.6)
HGB BLD-MCNC: 12.1 G/DL (ref 12–15.9)
HGB BLDA-MCNC: 11.6 G/DL (ref 13.5–17.5)
IMM GRANULOCYTES # BLD AUTO: 0.06 10*3/MM3 (ref 0–0.05)
IMM GRANULOCYTES NFR BLD AUTO: 0.4 % (ref 0–0.5)
LYMPHOCYTES # BLD AUTO: 2.07 10*3/MM3 (ref 0.7–3.1)
LYMPHOCYTES NFR BLD AUTO: 12.5 % (ref 19.6–45.3)
Lab: ABNORMAL
MCH RBC QN AUTO: 30.3 PG (ref 26.6–33)
MCHC RBC AUTO-ENTMCNC: 31.8 G/DL (ref 31.5–35.7)
MCV RBC AUTO: 95 FL (ref 79–97)
MODALITY: ABNORMAL
MONOCYTES # BLD AUTO: 1.1 10*3/MM3 (ref 0.1–0.9)
MONOCYTES NFR BLD AUTO: 6.6 % (ref 5–12)
NEUTROPHILS NFR BLD AUTO: 13.32 10*3/MM3 (ref 1.7–7)
NEUTROPHILS NFR BLD AUTO: 80.2 % (ref 42.7–76)
NRBC BLD AUTO-RTO: 0 /100 WBC (ref 0–0.2)
NT-PROBNP SERPL-MCNC: 318.1 PG/ML (ref 0–1800)
PCO2 BLDA: 43.5 MM HG (ref 35–45)
PCO2 TEMP ADJ BLD: 43.5 MM HG (ref 35–45)
PH BLDA: 7.45 PH UNITS (ref 7.35–7.45)
PH, TEMP CORRECTED: 7.45 PH UNITS (ref 7.35–7.45)
PLATELET # BLD AUTO: 274 10*3/MM3 (ref 140–450)
PMV BLD AUTO: 10.5 FL (ref 6–12)
PO2 BLDA: 83 MM HG (ref 83–108)
PO2 TEMP ADJ BLD: 83 MM HG (ref 83–108)
POTASSIUM SERPL-SCNC: 4.5 MMOL/L (ref 3.5–5.2)
PROCALCITONIN SERPL-MCNC: 0.14 NG/ML (ref 0–0.25)
PROT SERPL-MCNC: 6.8 G/DL (ref 6–8.5)
RBC # BLD AUTO: 4 10*6/MM3 (ref 3.77–5.28)
SAO2 % BLDCOA: 94.7 % (ref 94–99)
SARS-COV-2 RNA RESP QL NAA+PROBE: NOT DETECTED
SODIUM SERPL-SCNC: 133 MMOL/L (ref 136–145)
TROPONIN T SERPL-MCNC: <0.01 NG/ML (ref 0–0.03)
VENTILATOR MODE: ABNORMAL
WBC NRBC COR # BLD: 16.59 10*3/MM3 (ref 3.4–10.8)

## 2021-12-21 PROCEDURE — 25010000002 METHYLPREDNISOLONE PER 125 MG: Performed by: FAMILY MEDICINE

## 2021-12-21 PROCEDURE — 99284 EMERGENCY DEPT VISIT MOD MDM: CPT

## 2021-12-21 PROCEDURE — 71045 X-RAY EXAM CHEST 1 VIEW: CPT

## 2021-12-21 PROCEDURE — 87633 RESP VIRUS 12-25 TARGETS: CPT | Performed by: FAMILY MEDICINE

## 2021-12-21 PROCEDURE — 36600 WITHDRAWAL OF ARTERIAL BLOOD: CPT

## 2021-12-21 PROCEDURE — 85379 FIBRIN DEGRADATION QUANT: CPT | Performed by: EMERGENCY MEDICINE

## 2021-12-21 PROCEDURE — 94799 UNLISTED PULMONARY SVC/PX: CPT

## 2021-12-21 PROCEDURE — 83605 ASSAY OF LACTIC ACID: CPT | Performed by: EMERGENCY MEDICINE

## 2021-12-21 PROCEDURE — 87040 BLOOD CULTURE FOR BACTERIA: CPT | Performed by: EMERGENCY MEDICINE

## 2021-12-21 PROCEDURE — 83880 ASSAY OF NATRIURETIC PEPTIDE: CPT | Performed by: EMERGENCY MEDICINE

## 2021-12-21 PROCEDURE — 99285 EMERGENCY DEPT VISIT HI MDM: CPT | Performed by: EMERGENCY MEDICINE

## 2021-12-21 PROCEDURE — 80053 COMPREHEN METABOLIC PANEL: CPT | Performed by: EMERGENCY MEDICINE

## 2021-12-21 PROCEDURE — 99214 OFFICE O/P EST MOD 30 MIN: CPT | Performed by: SURGERY

## 2021-12-21 PROCEDURE — 82962 GLUCOSE BLOOD TEST: CPT

## 2021-12-21 PROCEDURE — 82803 BLOOD GASES ANY COMBINATION: CPT

## 2021-12-21 PROCEDURE — G0378 HOSPITAL OBSERVATION PER HR: HCPCS

## 2021-12-21 PROCEDURE — 94761 N-INVAS EAR/PLS OXIMETRY MLT: CPT

## 2021-12-21 PROCEDURE — 84145 PROCALCITONIN (PCT): CPT | Performed by: EMERGENCY MEDICINE

## 2021-12-21 PROCEDURE — 63710000001 INSULIN ASPART PER 5 UNITS: Performed by: FAMILY MEDICINE

## 2021-12-21 PROCEDURE — 93010 ELECTROCARDIOGRAM REPORT: CPT | Performed by: INTERNAL MEDICINE

## 2021-12-21 PROCEDURE — 87636 SARSCOV2 & INF A&B AMP PRB: CPT | Performed by: EMERGENCY MEDICINE

## 2021-12-21 PROCEDURE — 71250 CT THORAX DX C-: CPT

## 2021-12-21 PROCEDURE — 25010000002 ENOXAPARIN PER 10 MG: Performed by: FAMILY MEDICINE

## 2021-12-21 PROCEDURE — 94640 AIRWAY INHALATION TREATMENT: CPT

## 2021-12-21 PROCEDURE — 85025 COMPLETE CBC W/AUTO DIFF WBC: CPT | Performed by: EMERGENCY MEDICINE

## 2021-12-21 PROCEDURE — 93005 ELECTROCARDIOGRAM TRACING: CPT | Performed by: EMERGENCY MEDICINE

## 2021-12-21 PROCEDURE — 25010000002 METHYLPREDNISOLONE PER 125 MG: Performed by: EMERGENCY MEDICINE

## 2021-12-21 PROCEDURE — 84484 ASSAY OF TROPONIN QUANT: CPT | Performed by: EMERGENCY MEDICINE

## 2021-12-21 RX ORDER — AMOXICILLIN AND CLAVULANATE POTASSIUM 875; 125 MG/1; MG/1
TABLET, FILM COATED ORAL
COMMUNITY
Start: 2021-12-09 | End: 2021-12-21

## 2021-12-21 RX ORDER — IPRATROPIUM BROMIDE AND ALBUTEROL SULFATE 2.5; .5 MG/3ML; MG/3ML
3 SOLUTION RESPIRATORY (INHALATION)
Status: DISCONTINUED | OUTPATIENT
Start: 2021-12-21 | End: 2021-12-23 | Stop reason: HOSPADM

## 2021-12-21 RX ORDER — OSELTAMIVIR PHOSPHATE 30 MG/1
30 CAPSULE ORAL EVERY 12 HOURS SCHEDULED
Status: DISCONTINUED | OUTPATIENT
Start: 2021-12-21 | End: 2021-12-22

## 2021-12-21 RX ORDER — GUAIFENESIN 600 MG/1
600 TABLET, EXTENDED RELEASE ORAL EVERY 12 HOURS SCHEDULED
Status: DISCONTINUED | OUTPATIENT
Start: 2021-12-21 | End: 2021-12-23 | Stop reason: HOSPADM

## 2021-12-21 RX ORDER — OSELTAMIVIR PHOSPHATE 75 MG/1
75 CAPSULE ORAL EVERY 12 HOURS SCHEDULED
Status: DISCONTINUED | OUTPATIENT
Start: 2021-12-21 | End: 2021-12-21

## 2021-12-21 RX ORDER — MONTELUKAST SODIUM 4 MG/1
1 TABLET, CHEWABLE ORAL 3 TIMES DAILY
Status: DISCONTINUED | OUTPATIENT
Start: 2021-12-21 | End: 2021-12-22

## 2021-12-21 RX ORDER — BENZONATATE 100 MG/1
200 CAPSULE ORAL 3 TIMES DAILY PRN
Status: DISCONTINUED | OUTPATIENT
Start: 2021-12-21 | End: 2021-12-23 | Stop reason: HOSPADM

## 2021-12-21 RX ORDER — NICOTINE POLACRILEX 4 MG
15 LOZENGE BUCCAL
Status: DISCONTINUED | OUTPATIENT
Start: 2021-12-21 | End: 2021-12-23 | Stop reason: HOSPADM

## 2021-12-21 RX ORDER — ACETAMINOPHEN 160 MG/5ML
650 SOLUTION ORAL EVERY 4 HOURS PRN
Status: DISCONTINUED | OUTPATIENT
Start: 2021-12-21 | End: 2021-12-23 | Stop reason: HOSPADM

## 2021-12-21 RX ORDER — SODIUM CHLORIDE 9 MG/ML
40 INJECTION, SOLUTION INTRAVENOUS AS NEEDED
Status: DISCONTINUED | OUTPATIENT
Start: 2021-12-21 | End: 2021-12-23 | Stop reason: HOSPADM

## 2021-12-21 RX ORDER — HYDRALAZINE HYDROCHLORIDE 50 MG/1
50 TABLET, FILM COATED ORAL 2 TIMES DAILY
Status: DISCONTINUED | OUTPATIENT
Start: 2021-12-21 | End: 2021-12-23 | Stop reason: HOSPADM

## 2021-12-21 RX ORDER — NITROGLYCERIN 0.4 MG/1
0.4 TABLET SUBLINGUAL
Status: DISCONTINUED | OUTPATIENT
Start: 2021-12-21 | End: 2021-12-23 | Stop reason: HOSPADM

## 2021-12-21 RX ORDER — ACETAMINOPHEN 650 MG/1
650 SUPPOSITORY RECTAL EVERY 4 HOURS PRN
Status: DISCONTINUED | OUTPATIENT
Start: 2021-12-21 | End: 2021-12-23 | Stop reason: HOSPADM

## 2021-12-21 RX ORDER — SODIUM CHLORIDE 0.9 % (FLUSH) 0.9 %
10 SYRINGE (ML) INJECTION AS NEEDED
Status: DISCONTINUED | OUTPATIENT
Start: 2021-12-21 | End: 2021-12-23 | Stop reason: HOSPADM

## 2021-12-21 RX ORDER — PREDNISONE 5 MG/1
TABLET ORAL
COMMUNITY
Start: 2021-12-17 | End: 2021-12-21

## 2021-12-21 RX ORDER — SODIUM CHLORIDE 9 MG/ML
75 INJECTION, SOLUTION INTRAVENOUS CONTINUOUS
Status: DISCONTINUED | OUTPATIENT
Start: 2021-12-21 | End: 2021-12-22

## 2021-12-21 RX ORDER — PANTOPRAZOLE SODIUM 40 MG/1
40 TABLET, DELAYED RELEASE ORAL EVERY MORNING
Refills: 3 | Status: DISCONTINUED | OUTPATIENT
Start: 2021-12-22 | End: 2021-12-23 | Stop reason: HOSPADM

## 2021-12-21 RX ORDER — ONDANSETRON 2 MG/ML
4 INJECTION INTRAMUSCULAR; INTRAVENOUS EVERY 6 HOURS PRN
Status: DISCONTINUED | OUTPATIENT
Start: 2021-12-21 | End: 2021-12-23 | Stop reason: HOSPADM

## 2021-12-21 RX ORDER — DEXTROSE MONOHYDRATE 25 G/50ML
25 INJECTION, SOLUTION INTRAVENOUS
Status: DISCONTINUED | OUTPATIENT
Start: 2021-12-21 | End: 2021-12-23 | Stop reason: HOSPADM

## 2021-12-21 RX ORDER — SODIUM CHLORIDE 9 MG/ML
INJECTION, SOLUTION INTRAVENOUS
Status: DISPENSED
Start: 2021-12-21 | End: 2021-12-22

## 2021-12-21 RX ORDER — SODIUM CHLORIDE 0.9 % (FLUSH) 0.9 %
10 SYRINGE (ML) INJECTION EVERY 12 HOURS SCHEDULED
Status: DISCONTINUED | OUTPATIENT
Start: 2021-12-21 | End: 2021-12-23 | Stop reason: HOSPADM

## 2021-12-21 RX ORDER — ACETAMINOPHEN 325 MG/1
650 TABLET ORAL EVERY 4 HOURS PRN
Status: DISCONTINUED | OUTPATIENT
Start: 2021-12-21 | End: 2021-12-23 | Stop reason: HOSPADM

## 2021-12-21 RX ORDER — ALPRAZOLAM 0.25 MG/1
0.25 TABLET ORAL 2 TIMES DAILY PRN
Status: DISCONTINUED | OUTPATIENT
Start: 2021-12-21 | End: 2021-12-23 | Stop reason: HOSPADM

## 2021-12-21 RX ORDER — FLUCONAZOLE 100 MG/1
100 TABLET ORAL WEEKLY
COMMUNITY
End: 2021-12-23 | Stop reason: HOSPADM

## 2021-12-21 RX ORDER — CHOLECALCIFEROL (VITAMIN D3) 125 MCG
5 CAPSULE ORAL NIGHTLY PRN
Status: DISCONTINUED | OUTPATIENT
Start: 2021-12-21 | End: 2021-12-23 | Stop reason: HOSPADM

## 2021-12-21 RX ORDER — ONDANSETRON 4 MG/1
4 TABLET, FILM COATED ORAL EVERY 6 HOURS PRN
Status: DISCONTINUED | OUTPATIENT
Start: 2021-12-21 | End: 2021-12-23 | Stop reason: HOSPADM

## 2021-12-21 RX ORDER — HYDRALAZINE HYDROCHLORIDE 50 MG/1
50 TABLET, FILM COATED ORAL 3 TIMES DAILY
COMMUNITY

## 2021-12-21 RX ORDER — METHYLPREDNISOLONE SODIUM SUCCINATE 125 MG/2ML
60 INJECTION, POWDER, LYOPHILIZED, FOR SOLUTION INTRAMUSCULAR; INTRAVENOUS EVERY 6 HOURS
Status: DISCONTINUED | OUTPATIENT
Start: 2021-12-21 | End: 2021-12-23 | Stop reason: HOSPADM

## 2021-12-21 RX ORDER — OSELTAMIVIR PHOSPHATE 30 MG/1
30 CAPSULE ORAL ONCE
Status: COMPLETED | OUTPATIENT
Start: 2021-12-21 | End: 2021-12-21

## 2021-12-21 RX ORDER — SODIUM PHOSPHATE,MONO-DIBASIC 19G-7G/118
1 ENEMA (ML) RECTAL 2 TIMES DAILY WITH MEALS
COMMUNITY
End: 2023-01-23

## 2021-12-21 RX ORDER — METHYLPREDNISOLONE SODIUM SUCCINATE 125 MG/2ML
125 INJECTION, POWDER, LYOPHILIZED, FOR SOLUTION INTRAMUSCULAR; INTRAVENOUS ONCE
Status: COMPLETED | OUTPATIENT
Start: 2021-12-21 | End: 2021-12-21

## 2021-12-21 RX ORDER — SODIUM CHLORIDE 0.9 % (FLUSH) 0.9 %
1-10 SYRINGE (ML) INJECTION AS NEEDED
Status: DISCONTINUED | OUTPATIENT
Start: 2021-12-21 | End: 2021-12-23 | Stop reason: HOSPADM

## 2021-12-21 RX ORDER — MAGNESIUM OXIDE 400 MG/1
400 TABLET ORAL DAILY
COMMUNITY

## 2021-12-21 RX ORDER — IPRATROPIUM BROMIDE AND ALBUTEROL SULFATE 2.5; .5 MG/3ML; MG/3ML
3 SOLUTION RESPIRATORY (INHALATION) ONCE
Status: COMPLETED | OUTPATIENT
Start: 2021-12-21 | End: 2021-12-21

## 2021-12-21 RX ADMIN — HYDRALAZINE HYDROCHLORIDE 50 MG: 50 TABLET, FILM COATED ORAL at 20:00

## 2021-12-21 RX ADMIN — CITALOPRAM HYDROBROMIDE 30 MG: 20 TABLET ORAL at 18:52

## 2021-12-21 RX ADMIN — ACETAMINOPHEN 650 MG: 325 TABLET ORAL at 20:07

## 2021-12-21 RX ADMIN — OSELTAMIVIR PHOSPHATE 30 MG: 30 CAPSULE ORAL at 15:57

## 2021-12-21 RX ADMIN — INSULIN ASPART 4 UNITS: 100 INJECTION, SOLUTION INTRAVENOUS; SUBCUTANEOUS at 17:45

## 2021-12-21 RX ADMIN — METHYLPREDNISOLONE SODIUM SUCCINATE 125 MG: 125 INJECTION, POWDER, FOR SOLUTION INTRAMUSCULAR; INTRAVENOUS at 14:32

## 2021-12-21 RX ADMIN — SODIUM CHLORIDE 500 ML: 9 INJECTION, SOLUTION INTRAVENOUS at 14:32

## 2021-12-21 RX ADMIN — IPRATROPIUM BROMIDE AND ALBUTEROL SULFATE 3 ML: .5; 2.5 SOLUTION RESPIRATORY (INHALATION) at 14:56

## 2021-12-21 RX ADMIN — Medication 400 MG: at 18:53

## 2021-12-21 RX ADMIN — OSELTAMIVIR PHOSPHATE 30 MG: 30 CAPSULE ORAL at 20:00

## 2021-12-21 RX ADMIN — GUAIFENESIN 600 MG: 600 TABLET, EXTENDED RELEASE ORAL at 20:00

## 2021-12-21 RX ADMIN — SODIUM CHLORIDE, PRESERVATIVE FREE 10 ML: 5 INJECTION INTRAVENOUS at 20:01

## 2021-12-21 RX ADMIN — ENOXAPARIN SODIUM 40 MG: 40 INJECTION SUBCUTANEOUS at 17:42

## 2021-12-21 RX ADMIN — IPRATROPIUM BROMIDE AND ALBUTEROL SULFATE 3 ML: .5; 2.5 SOLUTION RESPIRATORY (INHALATION) at 20:18

## 2021-12-21 RX ADMIN — METHYLPREDNISOLONE SODIUM SUCCINATE 60 MG: 125 INJECTION, POWDER, FOR SOLUTION INTRAMUSCULAR; INTRAVENOUS at 19:59

## 2021-12-21 RX ADMIN — SODIUM CHLORIDE 75 ML/HR: 9 INJECTION, SOLUTION INTRAVENOUS at 17:38

## 2021-12-21 NOTE — PROGRESS NOTES
Patient presents for fu on US Guided Breast Biopsy,  mammo and US of left breast. She has no complaints.

## 2021-12-21 NOTE — PROGRESS NOTES
PATIENT INFORMATION  Katarzyna Ku       - 1940    CHIEF COMPLAINT  Chief Complaint   Patient presents with   • Follow-up     Malignant neoplasm of upper-outer quadrant of left female breast, unspecified estrogen receptor status      Patient presents for fu on US Guided Breast Biopsy,  mammo and US of left breast. She has no complaints.    HISTORY OF PRESENT ILLNESS  HPI she is here for follow-up on an ultrasound-guided left breast biopsy.  She had a prior lumpectomy for breast cancer in .  New area was seen on imaging on her routine follow-up.  She is without breast complaints.  Of note she is on home O2 she was on it intermittently now she is using it all the time she says she has had a recent bronchitis.  She has seen Dr. Sharma in the past but has not seen him recently she has been seeing Dr. Beal for this        REVIEW OF SYSTEMS  Review of Systems   Constitutional: Negative for activity change, chills, fever and unexpected weight change.   HENT: Negative for congestion.    Eyes: Negative for visual disturbance.   Respiratory: Negative for shortness of breath.    Cardiovascular: Negative for chest pain and palpitations.   Gastrointestinal: Negative for abdominal pain and blood in stool.   Endocrine: Negative for cold intolerance and heat intolerance.   Genitourinary: Negative for hematuria.   Musculoskeletal: Negative for gait problem.   Skin: Negative for color change.   Allergic/Immunologic: Negative for immunocompromised state.   Neurological: Negative for weakness and light-headedness.   Hematological: Negative for adenopathy.   Psychiatric/Behavioral: Negative for sleep disturbance. The patient is not nervous/anxious.          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]    • Abdominal pain, right upper quadrant [R10.11]    • Osteoarthritis of knee [M17.10]    • Chronic obstructive pulmonary disease (HCC) [J44.9]    • Hypertension [I10]    • Primary localized osteoarthrosis [M19.91]    • Midline cystocele [N81.11]    • Mixed incontinence [N39.46]    • Muscular atrophy [M62.50]    • Atrophic vaginitis [N95.2]    • Prolapse of vaginal vault after hysterectomy [N99.3]    • Enterocele [K46.9]          PAST MEDICAL HISTORY  Past Medical History:   Diagnosis Date   • Anxiety    • Arthritis    • [place] esophagus    • Breast cancer (HCC) 2009    left   • Colon polyp    • COPD (chronic obstructive pulmonary disease) (HCC)    • Diverticulitis of colon    • GERD (gastroesophageal reflux disease)    • Hypertension    • Skin cancer          SURGICAL HISTORY  Past Surgical History:   Procedure Laterality Date   • BLADDER SURGERY     • BREAST LUMPECTOMY Left     breast ca   • CARPAL TUNNEL RELEASE      BOTH HANDS   • COLONOSCOPY N/A 2/10/2017    Procedure: COLONOSCOPY with polypectomy;  Surgeon: Weston Sue MD;  Location: [facility] OR;  Service:    • COLONOSCOPY N/A 8/12/2020    Procedure: COLONOSCOPY;  Surgeon: Weston Sue MD;  Location: [facility] OR;  Service: Gastroenterology;  Laterality: N/A;  TRANSVERSE COLON POLYP  DIVERTICULOSIS  CECAL TIME at 1356  SIGMOID COLON POLYP   • ENDOSCOPY N/A 2/10/2017    Procedure: ESOPHAGOGASTRODUODENOSCOPY with biopsies;  Surgeon: Weston Sue MD;  Location: [facility] OR;  Service:    • ENDOSCOPY N/A 8/12/2020    Procedure: ESOPHAGOGASTRODUODENOSCOPY;  Surgeon: Weston Sue MD;  Location: [facility] OR;  Service: Gastroenterology;  Laterality: N/A;  DISTAL ESOPHAGUS BIOPSY  STOCKTON'S ESOPHAGUS   • HYSTERECTOMY     • KNEE SURGERY       X 2         FAMILY HISTORY  Family History   Problem Relation Age of Onset   • Breast cancer Neg Hx          SOCIAL HISTORY  Social History      Occupational History   • Not on file   Tobacco Use   • Smoking status: Former Smoker     Quit date: 1990     Years since quittin.9   • Smokeless tobacco: Never Used   Vaping Use   • Vaping Use: Never used   Substance and Sexual Activity   • Alcohol use: No   • Drug use: No   • Sexual activity: Defer         CURRENT MEDICATIONS    Current Outpatient Medications:   •  albuterol (PROVENTIL) (2.5 MG/3ML) 0.083% nebulizer solution, , Disp: , Rfl:   •  ALPRAZolam (XANAX) 0.5 MG tablet, , Disp: , Rfl:   •  benazepril (LOTENSIN) 40 MG tablet, , Disp: , Rfl:   •  benzonatate (TESSALON) 200 MG capsule, Pt hasn't started yet, Disp: , Rfl:   •  Calcium Acetate-Magnesium Carb 450-200 MG tablet, Take  by mouth., Disp: , Rfl:   •  cholestyramine light 4 g powder, Take 1 packet by mouth Daily. mixed with a liquid, Disp: 378 g, Rfl: 11  •  citalopram (CeleXA) 40 MG tablet, , Disp: , Rfl:   •  colestipol (COLESTID) 1 g tablet, Take 3 tablets by mouth Daily., Disp: 90 tablet, Rfl: 11  •  Cyanocobalamin (VITAMIN B-12) 5000 MCG tablet dispersible, Take  by mouth., Disp: , Rfl:   •  Fluticasone-Umeclidin-Vilant (Trelegy Ellipta) 100-62.5-25 MCG/INH aerosol powder , Inhale 1 puff Daily., Disp: , Rfl:   •  hydroCHLOROthiazide (MICROZIDE) 12.5 MG capsule, , Disp: , Rfl:   •  hyoscyamine (LEVBID) 0.375 MG 12 hr tablet, Take 1 tablet by mouth Every 12 (Twelve) Hours As Needed for Cramping. Pt must schedule appt with provider prior next refills request, Disp: 60 tablet, Rfl: 3  •  meloxicam (MOBIC) 15 MG tablet, , Disp: , Rfl:   •  Mirabegron ER (MYRBETRIQ) 50 MG tablet sustained-release 24 hour, Take 50 mg by mouth Daily., Disp: , Rfl:   •  Multiple Vitamins-Minerals (MULTIVITAMIN ADULT PO), Take  by mouth., Disp: , Rfl:   •  O2 (OXYGEN), Inhale 3 L/min 1 (One) Time., Disp: , Rfl:   •  Omega-3 Fatty Acids (FISH OIL) 1000 MG capsule capsule, Take  by mouth Daily With Breakfast., Disp: , Rfl:   •  omeprazole (priLOSEC) 40 MG  capsule, Take 1 capsule by mouth Daily., Disp: 90 capsule, Rfl: 3  •  risedronate (ACTONEL) 35 MG tablet, , Disp: , Rfl:     ALLERGIES  Azithromycin, Sulfa antibiotics, and Codeine    VITALS  There were no vitals filed for this visit.    LAST RESULTS   Hospital Outpatient Visit on 12/15/2021   Component Date Value Ref Range Status   • Case Report 12/15/2021    Final                    Value:Surgical Pathology Report                         Case: [id number]                                  Authorizing Provider:  Afshin Schultz MD        Collected:           12/15/2021 11:40 AM          Ordering Location:     [facility]   Received:            12/15/2021 12:48 PM                                 ULTRASOUND                                                                   Pathologist:           Felicia Murdock MD                                                    Specimens:   1) - Breast, Left, left breast mass, 0300, 5cm FN, more lateral, removed at 1140,                   time in formalin 11:53, not for calcs                                                               2) - Breast, Left, left breast mass, 0300, 5cm FN, more medial, removed at 1143,                    formalin at 1153, not for calcs                                                           • Clinical Information 12/15/2021    Final                    Value:This result contains rich text formatting which cannot be displayed here.   • Final Diagnosis 12/15/2021    Final                    Value:This result contains rich text formatting which cannot be displayed here.   • Synoptic Checklist 12/15/2021    Final                    Value:Breast Biomarker Reporting Template                            BREAST: BIOMARKER REPORTING TEMPLATE - 1                            Protocol posted: 6/30/2021                                                           Test(s) Performed:                                     Estrogen Receptor (ER) Status:     Positive (greater than 10% of cells demonstrate nuclear positivity)                                    Percentage of Cells with Nuclear Positivity:    31-40%                                    Average Intensity of Staining:    Moderate                                  Test Type:    Food and Drug Administration (FDA) cleared (test / vendor): [employer]                                  Primary Antibody:    SP1                                  Scoring System:    Brittany                                    Proportion Score:    4                                    Intensity Score:    2                                    Total Brittany Score:    6                                Test(s) Performed:                                     Progesterone Receptor (PgR) Status:    Negative (less than 1%)                                    :    Internal control cells absent                                  Test Type:    Food and Drug Administration (FDA) cleared (test / vendor): [employer]                                  Primary Antibody:    1E2                                  Scoring System:    Brittany                                    Proportion Score:    1                                    Intensity Score:    1                                    Total Brittany Score:    2                                Test(s) Performed:                                     HER2 by Immunohistochemistry:    Equivocal (Score 2+)                                    Percentage of Cells with Uniform Intense Complete Membrane Stainin %                                 Test Type:    Food and Drug Administration (FDA) cleared (test / vendor): [employer]                                  Primary Antibody:    4B5                                Test(s) Performed:    Ki-67                                  Percentage of Cells with Nuclear Positivity:    60 %                                 Primary Antibody:    30-9                                Cold Ischemia and  Fixation Times:    Meet requirements specified in latest version of the ASCO / CAP Guidelines                                Cold Ischemia Time (minutes):    13 min                               Fixation Time (hours):    9 hours                               Testing Performed on Block Number(s):    1A                                                         METHODS                               Fixative:    Formalin                                Image Analysis:    Not performed                             Breast Biomarker Reporting Template                            BREAST: BIOMARKER REPORTING TEMPLATE - 2                            Protocol posted: 6/30/2021                                                           Test(s) Performed:                                     Estrogen Receptor (ER) Status:    Low Positive (1-10% of cells with nuclear positivity)                                    Average Intensity of Staining:    Weak                                    Status of Internal Controls:    Internal control cells absent                                  Test Type:    Food and Drug Administration (FDA) cleared (test / vendor): [employer]                                  Primary Antibody:    SP1                                  Scoring System:    Brittany                                    Proportion Score:    2                                    Intensity Score:    1                                    Total Brittany Score:    3                                Test(s) Performed:                                     Progesterone Receptor (PgR) Status:    Negative (less than 1%)                                    :    Internal control cells absent                                  Test Type:    Food and Drug Administration (FDA) cleared (test / vendor): [employer]                                  Primary Antibody:    1E2                                  Scoring System:    Brittany                                    Proportion  Score:    1                                    Intensity Score:    1                                    Total Brittany Score:    2                                Test(s) Performed:                                     HER2 by Immunohistochemistry:    Equivocal (Score 2+)                                    Percentage of Cells with Uniform Intense Complete Membrane Stainin %                                 Test Type:    Food and Drug Administration (FDA) cleared (test / vendor): [employer]                                  Primary Antibody:    4B5                                Test(s) Performed:    Ki-67                                  Percentage of Cells with Nuclear Positivity:    55 %                                 Primary Antibody:    30-9                                Cold Ischemia and Fixation Times:    Meet requirements specified in latest version of the ASCO / CAP Guidelines                                Cold Ischemia Time (minutes):    10 min                               Fixation Time (hours):    9 hours                               Testing Performed on Block Number(s):    2A                                                         METHODS                               Fixative:    Formalin                                Image Analysis:    Not performed      • Comment 12/15/2021    Final                    Value:This result contains rich text formatting which cannot be displayed here.   • Gross Description 12/15/2021    Final                    Value:This result contains rich text formatting which cannot be displayed here.   • Special Stains 12/15/2021    Final                    Value:This result contains rich text formatting which cannot be displayed here.     XR Chest 2 View    Result Date: 2021  Narrative: CHEST X-RAY, 2021     HISTORY: 81-year-old female presenting to urgent care clinic with 1 day history cough and shortness of breath. History of COPD.  TECHNIQUE: PA and lateral upright  chest series.  COMPARISON: *  Chest x-ray, 05/17/2021.  FINDINGS: Heart size and pulmonary vascularity are normal. The lungs appear clear. No visible pulmonary infiltrate or pleural effusion. Benign calcified granulomas in the right lung base and right hilum. No change since the previous study.      Impression: No active disease. No change since 05/17/2021.  This report was finalized on 12/8/2021 12:08 PM by Dr. Shimon Mcmahon MD.      US Guided Breast Biopsy With & Without Device initial, Mammo Post Clip Placement Left    Result Date: 12/15/2021  Narrative: 12/15/2021: 1. ULTRASOUND-GUIDED LEFT BREAST CORE NEEDLE BIOPSY X2. 2. ULTRASOUND-GUIDED BIOPSY CLIP MARKER PLACEMENT X2. 3. LEFT UNILATERAL LIMITED POST PROCEDURE MAMMOGRAM.   HISTORY: 81-year-old female with left lumpectomy for breast cancer in 2009. New suspicious left breast lesion on recent screening mammogram and subsequent ultrasound for which biopsy is requested.  CONSENT: The procedure, risks and alternatives were discussed in detail with the patient, questions were entertained, and written informed consent was obtained. Timeout was observed in the procedure room for patient identification and procedure site verification, and the procedure site was marked by me. Permanent images were recorded.  PREPROCEDURE ULTRASOUND: Preprocedure ultrasound examination performed by me shows two adjacent lesions in the left breast along the 3:00-5:00 axis about 5 cm from the nipple.  LESION A: Small irregular solid satellite nodule adjacent to previously described larger mass measuring about 1.0 x 1.0 cm. This lesion is slightly more superior and lateral to the larger mass B. LESION B: Larger breast mass measuring 1.7 x 1.5 x 1.3 cm with suspicious imaging features (irregular margins, taller than wide shape and acoustic shadowing).  PROCEDURE: *  LESION A: Using sterile technique, sterile ultrasound probe cover, buffered 1% lidocaine local anesthetic and real-time  ultrasound guidance, the smaller satellite nodule was biopsied 3 times using a 14-gauge Achieve core biopsy needle device without apparent complication. Following biopsy, a clip marker was deployed along the periphery of the lesion using ultrasound guidance.  *  LESION B: Using sterile technique, sterile ultrasound probe cover, buffered 1% lidocaine local anesthetic and real-time ultrasound guidance, the larger mass was biopsied 3 times using a 14-gauge Achieve core biopsy needle device without apparent complication. Following biopsy, a clip marker was deployed within the mass using ultrasound guidance.  Formalin fixation time: 11:35 AM on 12/15/2021.  Post procedure mammogram shows satisfactory position of both of the biopsy clip markers.      Impression: 1.  Technically successful ultrasound-guided core needle biopsy of left lateral breast mass (lesion B). 2.  Technically successful ultrasound-guided core needle biopsy of smaller satellite lesion near the larger mass (lesion A). 3.  Technically successful ultrasound-guided biopsy clip marker placement x2.  This report was finalized on 12/15/2021 1:50 PM by Dr. Shimon Mcmahon MD.      Mammo Diagnostic Digital Tomosynthesis Left With CAD, US Breast Left Limited    Addendum Date: 11/24/2021 Addendum:   BI-RADS Category 4, suspicious findings  This report was finalized on 11/24/2021 3:24 PM by Dr. Milton Figueroa MD.      Result Date: 11/24/2021  Narrative: EXAM, 11/24/2021: 1. Left digital diagnostic mammogram with CAD. 2. Left digital diagnostic breast tomosynthesis. 3. Targeted left breast ultrasound  INDICATION: New 16 mm density in the medial left breast. Patient had breast cancer in 2009 with lumpectomy on left side Family History: None  TECHNIQUE: Left digital diagnostic mammogram images were obtained including digital breast tomosynthesis and CAD review.Targerted ultrasound was also performed  COMPARISON: *  Screening mammogram, 11/02/2021, 10/28/2019 10/30/2020   FINDINGS: There are postlumpectomy changes in the lateral portion the left breast. As compared with the 2020 exam, there is a new roughly oval area of soft tissue density measuring about 16 x 11 mm. This is about 3-4 o'clock in the left breast about 7 cm from the nipple. Ultrasound showed a hypoechoic area tissue in the same region. It measures 16 mm maximum dimension.  Rest of the breast appears stable with benign calcification.       Impression: New 16mm solid mass left breast at 3:00. Ultrasound guided core biopsy is recommended.  FINDINGS were discussed with patient and the need for biopsy was explained.  FINDINGS were discussed with Dr. Augustine lemus at the time of this dictation     Women over the age of 40 undergoing screening mammography are entered into a reminder system with target due date for the next mammogram  This report was finalized on 11/24/2021 2:12 PM by Dr. Milton Figueroa MD.        PHYSICAL EXAM  Debilities/Disabilities Identified: None  Emotional Behavior: Appropriate  Physical Exam  Alert overweight white female in no active distress she is wearing oxygen.  She does appear mildly short of breath at rest.  Heart shows a regular rate and rhythm lungs are clear and equal.  Left breast shows no palpable mass.  Ultrasound was reviewed by myself and does show 2 masses in the left breast the pathology on these biopsies was invasive ductal cancer.  This is remote area from her prior breast mass excision.  ASSESSMENT  Left breast cancer      PLAN  There is benefits and options were discussed with her and her family in detail.  We will proceed with a mastectomy and sentinel lymph node biopsy.  I will check with Dr. Beal and possibly Dr. Sharma regarding her lung disease.  We will have her see Dr. Sanders postoperatively to discuss further treatment and possible genetic testing  I spoke with Dr. Beal and he recommends that she see Dr. Sharma we will make those arrangements.

## 2021-12-22 PROBLEM — J10.1 INFLUENZA A: Status: ACTIVE | Noted: 2021-12-22

## 2021-12-22 LAB
ANION GAP SERPL CALCULATED.3IONS-SCNC: 11.5 MMOL/L (ref 5–15)
B PARAPERT DNA SPEC QL NAA+PROBE: NOT DETECTED
B PERT DNA SPEC QL NAA+PROBE: NOT DETECTED
BUN SERPL-MCNC: 25 MG/DL (ref 8–23)
BUN/CREAT SERPL: 29.1 (ref 7–25)
C PNEUM DNA NPH QL NAA+NON-PROBE: NOT DETECTED
CALCIUM SPEC-SCNC: 9 MG/DL (ref 8.6–10.5)
CHLORIDE SERPL-SCNC: 103 MMOL/L (ref 98–107)
CO2 SERPL-SCNC: 22.5 MMOL/L (ref 22–29)
CREAT SERPL-MCNC: 0.86 MG/DL (ref 0.57–1)
DEPRECATED RDW RBC AUTO: 48.5 FL (ref 37–54)
ERYTHROCYTE [DISTWIDTH] IN BLOOD BY AUTOMATED COUNT: 13.6 % (ref 12.3–15.4)
FLUAV SUBTYP SPEC NAA+PROBE: NOT DETECTED
FLUBV RNA ISLT QL NAA+PROBE: NOT DETECTED
GFR SERPL CREATININE-BSD FRML MDRD: 64 ML/MIN/1.73
GLUCOSE BLDC GLUCOMTR-MCNC: 184 MG/DL (ref 70–130)
GLUCOSE BLDC GLUCOMTR-MCNC: 213 MG/DL (ref 70–130)
GLUCOSE BLDC GLUCOMTR-MCNC: 216 MG/DL (ref 70–130)
GLUCOSE BLDC GLUCOMTR-MCNC: 249 MG/DL (ref 70–130)
GLUCOSE SERPL-MCNC: 221 MG/DL (ref 65–99)
HADV DNA SPEC NAA+PROBE: NOT DETECTED
HCOV 229E RNA SPEC QL NAA+PROBE: NOT DETECTED
HCOV HKU1 RNA SPEC QL NAA+PROBE: NOT DETECTED
HCOV NL63 RNA SPEC QL NAA+PROBE: NOT DETECTED
HCOV OC43 RNA SPEC QL NAA+PROBE: NOT DETECTED
HCT VFR BLD AUTO: 36.7 % (ref 34–46.6)
HGB BLD-MCNC: 11.4 G/DL (ref 12–15.9)
HMPV RNA NPH QL NAA+NON-PROBE: NOT DETECTED
HPIV1 RNA ISLT QL NAA+PROBE: NOT DETECTED
HPIV2 RNA SPEC QL NAA+PROBE: NOT DETECTED
HPIV3 RNA NPH QL NAA+PROBE: NOT DETECTED
HPIV4 P GENE NPH QL NAA+PROBE: NOT DETECTED
M PNEUMO IGG SER IA-ACNC: NOT DETECTED
MCH RBC QN AUTO: 29.5 PG (ref 26.6–33)
MCHC RBC AUTO-ENTMCNC: 31.1 G/DL (ref 31.5–35.7)
MCV RBC AUTO: 94.8 FL (ref 79–97)
PLATELET # BLD AUTO: 254 10*3/MM3 (ref 140–450)
PMV BLD AUTO: 10.6 FL (ref 6–12)
POTASSIUM SERPL-SCNC: 4.3 MMOL/L (ref 3.5–5.2)
QT INTERVAL: 352 MS
RBC # BLD AUTO: 3.87 10*6/MM3 (ref 3.77–5.28)
RHINOVIRUS RNA SPEC NAA+PROBE: NOT DETECTED
RSV RNA NPH QL NAA+NON-PROBE: NOT DETECTED
SODIUM SERPL-SCNC: 137 MMOL/L (ref 136–145)
WBC NRBC COR # BLD: 11.1 10*3/MM3 (ref 3.4–10.8)

## 2021-12-22 PROCEDURE — 63710000001 INSULIN ASPART PER 5 UNITS: Performed by: FAMILY MEDICINE

## 2021-12-22 PROCEDURE — 94761 N-INVAS EAR/PLS OXIMETRY MLT: CPT

## 2021-12-22 PROCEDURE — 94799 UNLISTED PULMONARY SVC/PX: CPT

## 2021-12-22 PROCEDURE — 80048 BASIC METABOLIC PNL TOTAL CA: CPT | Performed by: FAMILY MEDICINE

## 2021-12-22 PROCEDURE — 25010000002 METHYLPREDNISOLONE PER 125 MG: Performed by: FAMILY MEDICINE

## 2021-12-22 PROCEDURE — 85027 COMPLETE CBC AUTOMATED: CPT | Performed by: FAMILY MEDICINE

## 2021-12-22 PROCEDURE — 87205 SMEAR GRAM STAIN: CPT | Performed by: FAMILY MEDICINE

## 2021-12-22 PROCEDURE — 25010000002 ENOXAPARIN PER 10 MG: Performed by: FAMILY MEDICINE

## 2021-12-22 PROCEDURE — 87070 CULTURE OTHR SPECIMN AEROBIC: CPT | Performed by: FAMILY MEDICINE

## 2021-12-22 PROCEDURE — 82962 GLUCOSE BLOOD TEST: CPT

## 2021-12-22 RX ORDER — BENAZEPRIL HYDROCHLORIDE 20 MG/1
40 TABLET ORAL
Status: DISCONTINUED | OUTPATIENT
Start: 2021-12-22 | End: 2021-12-23 | Stop reason: HOSPADM

## 2021-12-22 RX ORDER — OSELTAMIVIR PHOSPHATE 75 MG/1
75 CAPSULE ORAL EVERY 12 HOURS SCHEDULED
Status: DISCONTINUED | OUTPATIENT
Start: 2021-12-22 | End: 2021-12-23 | Stop reason: HOSPADM

## 2021-12-22 RX ADMIN — GUAIFENESIN 600 MG: 600 TABLET, EXTENDED RELEASE ORAL at 20:29

## 2021-12-22 RX ADMIN — IPRATROPIUM BROMIDE AND ALBUTEROL SULFATE 3 ML: .5; 2.5 SOLUTION RESPIRATORY (INHALATION) at 07:17

## 2021-12-22 RX ADMIN — PANTOPRAZOLE SODIUM 40 MG: 40 TABLET, DELAYED RELEASE ORAL at 06:02

## 2021-12-22 RX ADMIN — IPRATROPIUM BROMIDE AND ALBUTEROL SULFATE 3 ML: .5; 2.5 SOLUTION RESPIRATORY (INHALATION) at 11:39

## 2021-12-22 RX ADMIN — METHYLPREDNISOLONE SODIUM SUCCINATE 60 MG: 125 INJECTION, POWDER, FOR SOLUTION INTRAMUSCULAR; INTRAVENOUS at 08:29

## 2021-12-22 RX ADMIN — MIRABEGRON 50 MG: 50 TABLET, FILM COATED, EXTENDED RELEASE ORAL at 17:23

## 2021-12-22 RX ADMIN — BENZONATATE 200 MG: 100 CAPSULE ORAL at 23:00

## 2021-12-22 RX ADMIN — METHYLPREDNISOLONE SODIUM SUCCINATE 60 MG: 125 INJECTION, POWDER, FOR SOLUTION INTRAMUSCULAR; INTRAVENOUS at 15:13

## 2021-12-22 RX ADMIN — BENZONATATE 200 MG: 100 CAPSULE ORAL at 12:58

## 2021-12-22 RX ADMIN — OSELTAMIVIR PHOSPHATE 75 MG: 75 CAPSULE ORAL at 08:33

## 2021-12-22 RX ADMIN — ACETAMINOPHEN 650 MG: 325 TABLET ORAL at 20:48

## 2021-12-22 RX ADMIN — SODIUM CHLORIDE, PRESERVATIVE FREE 10 ML: 5 INJECTION INTRAVENOUS at 20:30

## 2021-12-22 RX ADMIN — HYDRALAZINE HYDROCHLORIDE 50 MG: 50 TABLET, FILM COATED ORAL at 20:29

## 2021-12-22 RX ADMIN — ENOXAPARIN SODIUM 40 MG: 40 INJECTION SUBCUTANEOUS at 06:02

## 2021-12-22 RX ADMIN — HYDRALAZINE HYDROCHLORIDE 50 MG: 50 TABLET, FILM COATED ORAL at 08:28

## 2021-12-22 RX ADMIN — GUAIFENESIN 600 MG: 600 TABLET, EXTENDED RELEASE ORAL at 08:29

## 2021-12-22 RX ADMIN — SODIUM CHLORIDE, PRESERVATIVE FREE 10 ML: 5 INJECTION INTRAVENOUS at 08:30

## 2021-12-22 RX ADMIN — INSULIN ASPART 4 UNITS: 100 INJECTION, SOLUTION INTRAVENOUS; SUBCUTANEOUS at 12:54

## 2021-12-22 RX ADMIN — IPRATROPIUM BROMIDE AND ALBUTEROL SULFATE 3 ML: .5; 2.5 SOLUTION RESPIRATORY (INHALATION) at 19:37

## 2021-12-22 RX ADMIN — IPRATROPIUM BROMIDE AND ALBUTEROL SULFATE 3 ML: .5; 2.5 SOLUTION RESPIRATORY (INHALATION) at 15:22

## 2021-12-22 RX ADMIN — ENOXAPARIN SODIUM 40 MG: 40 INJECTION SUBCUTANEOUS at 17:22

## 2021-12-22 RX ADMIN — INSULIN ASPART 4 UNITS: 100 INJECTION, SOLUTION INTRAVENOUS; SUBCUTANEOUS at 17:22

## 2021-12-22 RX ADMIN — METHYLPREDNISOLONE SODIUM SUCCINATE 60 MG: 125 INJECTION, POWDER, FOR SOLUTION INTRAMUSCULAR; INTRAVENOUS at 01:56

## 2021-12-22 RX ADMIN — OSELTAMIVIR PHOSPHATE 75 MG: 75 CAPSULE ORAL at 20:31

## 2021-12-22 RX ADMIN — INSULIN ASPART 2 UNITS: 100 INJECTION, SOLUTION INTRAVENOUS; SUBCUTANEOUS at 08:29

## 2021-12-22 RX ADMIN — METHYLPREDNISOLONE SODIUM SUCCINATE 60 MG: 125 INJECTION, POWDER, FOR SOLUTION INTRAMUSCULAR; INTRAVENOUS at 20:30

## 2021-12-22 RX ADMIN — CITALOPRAM HYDROBROMIDE 30 MG: 20 TABLET ORAL at 08:28

## 2021-12-22 RX ADMIN — Medication 400 MG: at 08:28

## 2021-12-23 VITALS
RESPIRATION RATE: 20 BRPM | TEMPERATURE: 97 F | HEART RATE: 90 BPM | HEIGHT: 61 IN | OXYGEN SATURATION: 92 % | SYSTOLIC BLOOD PRESSURE: 179 MMHG | WEIGHT: 213.6 LBS | BODY MASS INDEX: 40.33 KG/M2 | DIASTOLIC BLOOD PRESSURE: 83 MMHG

## 2021-12-23 LAB
ANION GAP SERPL CALCULATED.3IONS-SCNC: 10.6 MMOL/L (ref 5–15)
BUN SERPL-MCNC: 32 MG/DL (ref 8–23)
BUN/CREAT SERPL: 35.6 (ref 7–25)
CALCIUM SPEC-SCNC: 9.6 MG/DL (ref 8.6–10.5)
CHLORIDE SERPL-SCNC: 103 MMOL/L (ref 98–107)
CO2 SERPL-SCNC: 23.4 MMOL/L (ref 22–29)
CREAT SERPL-MCNC: 0.9 MG/DL (ref 0.57–1)
DEPRECATED RDW RBC AUTO: 48.4 FL (ref 37–54)
ERYTHROCYTE [DISTWIDTH] IN BLOOD BY AUTOMATED COUNT: 13.8 % (ref 12.3–15.4)
GFR SERPL CREATININE-BSD FRML MDRD: 60 ML/MIN/1.73
GLUCOSE BLDC GLUCOMTR-MCNC: 172 MG/DL (ref 70–130)
GLUCOSE BLDC GLUCOMTR-MCNC: 178 MG/DL (ref 70–130)
GLUCOSE SERPL-MCNC: 194 MG/DL (ref 65–99)
HCT VFR BLD AUTO: 36 % (ref 34–46.6)
HGB BLD-MCNC: 11.2 G/DL (ref 12–15.9)
LAB AP CASE REPORT: NORMAL
LAB AP CLINICAL INFORMATION: NORMAL
LAB AP DIAGNOSIS COMMENT: NORMAL
LAB AP SPECIAL STAINS: NORMAL
LAB AP SYNOPTIC CHECKLIST: NORMAL
MCH RBC QN AUTO: 29.3 PG (ref 26.6–33)
MCHC RBC AUTO-ENTMCNC: 31.1 G/DL (ref 31.5–35.7)
MCV RBC AUTO: 94.2 FL (ref 79–97)
PATH REPORT.ADDENDUM SPEC: NORMAL
PATH REPORT.FINAL DX SPEC: NORMAL
PATH REPORT.GROSS SPEC: NORMAL
PLATELET # BLD AUTO: 319 10*3/MM3 (ref 140–450)
PMV BLD AUTO: 12.1 FL (ref 6–12)
POTASSIUM SERPL-SCNC: 4.4 MMOL/L (ref 3.5–5.2)
RBC # BLD AUTO: 3.82 10*6/MM3 (ref 3.77–5.28)
SODIUM SERPL-SCNC: 137 MMOL/L (ref 136–145)
WBC NRBC COR # BLD: 11.22 10*3/MM3 (ref 3.4–10.8)

## 2021-12-23 PROCEDURE — 94618 PULMONARY STRESS TESTING: CPT

## 2021-12-23 PROCEDURE — 82962 GLUCOSE BLOOD TEST: CPT

## 2021-12-23 PROCEDURE — 25010000002 ENOXAPARIN PER 10 MG: Performed by: FAMILY MEDICINE

## 2021-12-23 PROCEDURE — 80048 BASIC METABOLIC PNL TOTAL CA: CPT | Performed by: FAMILY MEDICINE

## 2021-12-23 PROCEDURE — 99239 HOSP IP/OBS DSCHRG MGMT >30: CPT | Performed by: INTERNAL MEDICINE

## 2021-12-23 PROCEDURE — 94799 UNLISTED PULMONARY SVC/PX: CPT

## 2021-12-23 PROCEDURE — 63710000001 INSULIN ASPART PER 5 UNITS: Performed by: FAMILY MEDICINE

## 2021-12-23 PROCEDURE — 85027 COMPLETE CBC AUTOMATED: CPT | Performed by: FAMILY MEDICINE

## 2021-12-23 PROCEDURE — 25010000002 METHYLPREDNISOLONE PER 125 MG: Performed by: FAMILY MEDICINE

## 2021-12-23 RX ORDER — OSELTAMIVIR PHOSPHATE 75 MG/1
75 CAPSULE ORAL EVERY 12 HOURS SCHEDULED
Qty: 6 CAPSULE | Refills: 0 | Status: SHIPPED | OUTPATIENT
Start: 2021-12-23 | End: 2021-12-26

## 2021-12-23 RX ORDER — METHYLPREDNISOLONE 4 MG/1
TABLET ORAL
Qty: 21 EACH | Refills: 0 | Status: SHIPPED | OUTPATIENT
Start: 2021-12-23 | End: 2022-02-17

## 2021-12-23 RX ORDER — GUAIFENESIN 600 MG/1
600 TABLET, EXTENDED RELEASE ORAL EVERY 12 HOURS SCHEDULED
Qty: 30 TABLET | Refills: 0 | Status: SHIPPED | OUTPATIENT
Start: 2021-12-23

## 2021-12-23 RX ORDER — ONDANSETRON 4 MG/1
4 TABLET, FILM COATED ORAL EVERY 6 HOURS PRN
Qty: 20 TABLET | Refills: 0 | Status: SHIPPED | OUTPATIENT
Start: 2021-12-23

## 2021-12-23 RX ADMIN — OSELTAMIVIR PHOSPHATE 75 MG: 75 CAPSULE ORAL at 09:10

## 2021-12-23 RX ADMIN — BENZONATATE 200 MG: 100 CAPSULE ORAL at 12:00

## 2021-12-23 RX ADMIN — CITALOPRAM HYDROBROMIDE 30 MG: 20 TABLET ORAL at 09:09

## 2021-12-23 RX ADMIN — Medication 400 MG: at 09:09

## 2021-12-23 RX ADMIN — PANTOPRAZOLE SODIUM 40 MG: 40 TABLET, DELAYED RELEASE ORAL at 06:25

## 2021-12-23 RX ADMIN — SODIUM CHLORIDE, PRESERVATIVE FREE 10 ML: 5 INJECTION INTRAVENOUS at 09:09

## 2021-12-23 RX ADMIN — GUAIFENESIN 600 MG: 600 TABLET, EXTENDED RELEASE ORAL at 09:09

## 2021-12-23 RX ADMIN — HYDRALAZINE HYDROCHLORIDE 50 MG: 50 TABLET, FILM COATED ORAL at 09:09

## 2021-12-23 RX ADMIN — INSULIN ASPART 2 UNITS: 100 INJECTION, SOLUTION INTRAVENOUS; SUBCUTANEOUS at 09:15

## 2021-12-23 RX ADMIN — METHYLPREDNISOLONE SODIUM SUCCINATE 60 MG: 125 INJECTION, POWDER, FOR SOLUTION INTRAMUSCULAR; INTRAVENOUS at 02:05

## 2021-12-23 RX ADMIN — METHYLPREDNISOLONE SODIUM SUCCINATE 60 MG: 125 INJECTION, POWDER, FOR SOLUTION INTRAMUSCULAR; INTRAVENOUS at 09:09

## 2021-12-23 RX ADMIN — IPRATROPIUM BROMIDE AND ALBUTEROL SULFATE 3 ML: .5; 2.5 SOLUTION RESPIRATORY (INHALATION) at 12:17

## 2021-12-23 RX ADMIN — IPRATROPIUM BROMIDE AND ALBUTEROL SULFATE 3 ML: .5; 2.5 SOLUTION RESPIRATORY (INHALATION) at 07:31

## 2021-12-23 RX ADMIN — INSULIN ASPART 2 UNITS: 100 INJECTION, SOLUTION INTRAVENOUS; SUBCUTANEOUS at 13:13

## 2021-12-23 RX ADMIN — BENAZEPRIL HYDROCHLORIDE 40 MG: 20 TABLET, FILM COATED ORAL at 09:11

## 2021-12-23 RX ADMIN — ENOXAPARIN SODIUM 40 MG: 40 INJECTION SUBCUTANEOUS at 06:24

## 2021-12-24 ENCOUNTER — READMISSION MANAGEMENT (OUTPATIENT)
Dept: CALL CENTER | Facility: HOSPITAL | Age: 81
End: 2021-12-24

## 2021-12-24 LAB
BACTERIA SPEC RESP CULT: NORMAL
GRAM STN SPEC: NORMAL

## 2021-12-24 NOTE — OUTREACH NOTE
Prep Survey      Responses   [facility] patient discharged from? [place]   Is LACE score < 7 ? No   Emergency Room discharge w/ pulse ox? No   Eligibility Readm Mgmt   Discharge diagnosis Influenza A   Does the patient have one of the following disease processes/diagnoses(primary or secondary)? Other   Does the patient have Home health ordered? No   Is there a DME ordered? No   Prep survey completed? Yes          Donna Zambrano RN

## 2021-12-26 LAB
BACTERIA SPEC AEROBE CULT: NORMAL
BACTERIA SPEC AEROBE CULT: NORMAL

## 2021-12-27 ENCOUNTER — READMISSION MANAGEMENT (OUTPATIENT)
Dept: CALL CENTER | Facility: HOSPITAL | Age: 81
End: 2021-12-27

## 2021-12-27 NOTE — OUTREACH NOTE
Medical Week 1 Survey      Responses   [facility] patient discharged from? [place]   Does the patient have one of the following disease processes/diagnoses(primary or secondary)? Other   Week 1 attempt successful? Yes   Call start time 1411   Call end time 1429   Is patient permission given to speak with other caregiver? Yes   Person spoke with today (if not patient) and relationship Nidhi-daughter and patient.   Meds reviewed with patient/caregiver? Yes   Is the patient having any side effects they believe may be caused by any medication additions or changes? No   Does the patient have all medications ordered at discharge? Yes   Is the patient taking all medications as directed (includes completed medication regime)? Yes   Comments regarding appointments PCP appt 12/28/21, waiting on call back from pulmonology office for appt.   Does the patient have a primary care provider?  Yes   Does the patient have an appointment with their PCP within 7 days of discharge? Yes   Has the patient kept scheduled appointments due by today? N/A   Has home health visited the patient within 72 hours of discharge? N/A   DME comments Daughter states is using home O2 continuous at 3L.   Psychosocial issues? No   Did the patient receive a copy of their discharge instructions? Yes   Nursing interventions Reviewed instructions with patient   What is the patient's perception of their health status since discharge? Improving   Is the patient/caregiver able to teach back signs and symptoms related to disease process for when to call PCP? Yes   Is the patient/caregiver able to teach back signs and symptoms related to disease process for when to call 911? Yes   Is the patient/caregiver able to teach back the hierarchy of who to call/visit for symptoms/problems? PCP, Specialist, Home health nurse, Urgent Care, ED, 911 Yes   If the patient is a current smoker, are they able to teach back resources for cessation? Not a smoker   Week 1 call  completed? Yes   Wrap up additional comments Daughter states is improving-still has frequent cough. States SOA improving. Denies any fever or chest pain. States needing maintenance on O2 regulator-call  placed to [facility] at [facility]-states will visit tomorrow for maintenance and to deliver additional tanks. Notified daughter. Denies any other needs today.          Siobhan Harmon RN

## 2022-01-03 ENCOUNTER — READMISSION MANAGEMENT (OUTPATIENT)
Dept: CALL CENTER | Facility: HOSPITAL | Age: 82
End: 2022-01-03

## 2022-01-03 ENCOUNTER — TELEPHONE (OUTPATIENT)
Dept: SURGERY | Facility: CLINIC | Age: 82
End: 2022-01-03

## 2022-01-03 NOTE — OUTREACH NOTE
Medical Week 2 Survey      Responses   [facility] patient discharged from? [place]   Does the patient have one of the following disease processes/diagnoses(primary or secondary)? Other   Week 2 attempt successful? No   Unsuccessful attempts Attempt 1   Discharge diagnosis Influenza A          Tatiana Hammer RN

## 2022-01-06 ENCOUNTER — READMISSION MANAGEMENT (OUTPATIENT)
Dept: CALL CENTER | Facility: HOSPITAL | Age: 82
End: 2022-01-06

## 2022-01-06 NOTE — OUTREACH NOTE
Medical Week 2 Survey      Responses   [facility] patient discharged from? [place]   Does the patient have one of the following disease processes/diagnoses(primary or secondary)? Other   Week 2 attempt successful? Yes   Call start time 1721   Discharge diagnosis Influenza A   Call end time 1723   Meds reviewed with patient/caregiver? Yes   Is the patient taking all medications as directed (includes completed medication regime)? Yes   Has the patient kept scheduled appointments due by today? Yes   What is the patient's perception of their health status since discharge? Improving   Week 2 Call Completed? Yes   Wrap up additional comments Some better but still has cough.  Does not have to wear O2 all the time but she does a great deal of the time.          Felicia Quach, RN

## 2022-01-11 ENCOUNTER — TRANSCRIBE ORDERS (OUTPATIENT)
Dept: ADMINISTRATIVE | Facility: HOSPITAL | Age: 82
End: 2022-01-11

## 2022-01-11 ENCOUNTER — TRANSCRIBE ORDERS (OUTPATIENT)
Dept: CT IMAGING | Facility: HOSPITAL | Age: 82
End: 2022-01-11

## 2022-01-11 ENCOUNTER — HOSPITAL ENCOUNTER (OUTPATIENT)
Dept: GENERAL RADIOLOGY | Facility: HOSPITAL | Age: 82
Discharge: HOME | End: 2022-01-11
Admitting: FAMILY MEDICINE

## 2022-01-11 DIAGNOSIS — R05.9 COUGH: ICD-10-CM

## 2022-01-11 DIAGNOSIS — R05.9 COUGH: Primary | ICD-10-CM

## 2022-01-11 DIAGNOSIS — J84.9 INTERSTITIAL PNEUMONIA OF BOTH LUNGS: Primary | ICD-10-CM

## 2022-01-11 PROCEDURE — 71046 X-RAY EXAM CHEST 2 VIEWS: CPT

## 2022-01-12 ENCOUNTER — READMISSION MANAGEMENT (OUTPATIENT)
Dept: CALL CENTER | Facility: HOSPITAL | Age: 82
End: 2022-01-12

## 2022-01-12 NOTE — OUTREACH NOTE
Medical Week 3 Survey      Responses   [facility] patient discharged from? [place]   Does the patient have one of the following disease processes/diagnoses(primary or secondary)? Other   Week 3 attempt successful? Yes   Call start time 1444   Call end time 1450   Discharge diagnosis Influenza A   Person spoke with today (if not patient) and relationship Patient   Meds reviewed with patient/caregiver? Yes   Is the patient having any side effects they believe may be caused by any medication additions or changes? No   Does the patient have all medications ordered at discharge? Yes   Is the patient taking all medications as directed (includes completed medication regime)? Yes   Does the patient have a primary care provider?  Yes   Does the patient have an appointment with their PCP within 7 days of discharge? Yes   Has the patient kept scheduled appointments due by today? Yes   Psychosocial issues? No   What is the patient's perception of their health status since discharge? Improving   Is the patient/caregiver able to teach back signs and symptoms related to disease process for when to call PCP? Yes   Is the patient/caregiver able to teach back signs and symptoms related to disease process for when to call 911? Yes   Is the patient/caregiver able to teach back the hierarchy of who to call/visit for symptoms/problems? PCP, Specialist, Home health nurse, Urgent Care, ED, 911 Yes   Week 3 Call Completed? Yes   Wrap up additional comments Pt states she is coughing a lot still. Pt has just started taking mucinex, and will see if this helps with coughing.           Tatiana Hammer RN

## 2022-01-13 ENCOUNTER — HOSPITAL ENCOUNTER (OUTPATIENT)
Dept: CT IMAGING | Facility: HOSPITAL | Age: 82
Discharge: HOME | End: 2022-01-13
Admitting: FAMILY MEDICINE

## 2022-01-13 DIAGNOSIS — J84.9 INTERSTITIAL PNEUMONIA OF BOTH LUNGS: ICD-10-CM

## 2022-01-13 PROCEDURE — 71250 CT THORAX DX C-: CPT

## 2022-01-20 ENCOUNTER — READMISSION MANAGEMENT (OUTPATIENT)
Dept: CALL CENTER | Facility: HOSPITAL | Age: 82
End: 2022-01-20

## 2022-01-20 NOTE — OUTREACH NOTE
Medical Week 4 Survey      Responses   [facility] patient discharged from? [place]   Does the patient have one of the following disease processes/diagnoses(primary or secondary)? Other   Week 4 attempt successful? Yes   Call start time 1502   Call end time 1511   Discharge diagnosis Influenza A   Person spoke with today (if not patient) and relationship Patient   Meds reviewed with patient/caregiver? Yes   Is the patient having any side effects they believe may be caused by any medication additions or changes? No   Is the patient taking all medications as directed (includes completed medication regime)? Yes   Has the patient kept scheduled appointments due by today? Yes   Is the patient still receiving Home Health Services? N/A   DME comments Daughter states is using home O2 continuous at 3L.   Psychosocial issues? No   What is the patient's perception of their health status since discharge? Improving   Is the patient/caregiver able to teach back signs and symptoms related to disease process for when to call PCP? Yes   Is the patient/caregiver able to teach back signs and symptoms related to disease process for when to call 911? Yes   Is the patient/caregiver able to teach back the hierarchy of who to call/visit for symptoms/problems? PCP, Specialist, Home health nurse, Urgent Care, ED, 911 Yes   Week 4 Call Completed? Yes   Wrap up additional comments Pt states she is finishing up another round of antibiotics. Pt is advised if SOB worsens, or if she develops chest pain to call PCP office immediately to report s/s. Pt will fu with PCP next week per pt.          Tatiana Hammer RN

## 2022-02-10 ENCOUNTER — TRANSCRIBE ORDERS (OUTPATIENT)
Dept: ADMINISTRATIVE | Facility: HOSPITAL | Age: 82
End: 2022-02-10

## 2022-02-10 DIAGNOSIS — J18.9 PNEUMONIA DUE TO INFECTIOUS ORGANISM, UNSPECIFIED LATERALITY, UNSPECIFIED PART OF LUNG: Primary | ICD-10-CM

## 2022-02-16 PROBLEM — C50.112 MALIGNANT NEOPLASM OF CENTRAL PORTION OF LEFT BREAST IN FEMALE, ESTROGEN RECEPTOR POSITIVE: Status: ACTIVE | Noted: 2022-02-16

## 2022-02-16 PROBLEM — Z17.0 MALIGNANT NEOPLASM OF CENTRAL PORTION OF LEFT BREAST IN FEMALE, ESTROGEN RECEPTOR POSITIVE (HCC): Status: ACTIVE | Noted: 2022-02-16

## 2022-02-16 RX ORDER — ACETAMINOPHEN 325 MG/1
1000 TABLET ORAL ONCE
Status: CANCELLED | OUTPATIENT
Start: 2022-02-16 | End: 2022-02-16

## 2022-02-16 RX ORDER — SCOLOPAMINE TRANSDERMAL SYSTEM 1 MG/1
1 PATCH, EXTENDED RELEASE TRANSDERMAL CONTINUOUS
Status: CANCELLED | OUTPATIENT
Start: 2022-02-16 | End: 2022-02-19

## 2022-02-16 NOTE — TELEPHONE ENCOUNTER
Patient has been scheduled for surgery on 2/23/22 to arrive at 6:30am. She is aware to hold her Mobic and multivitamin now and may take her albuterol inhaler, hydralazine and Prilosec the morning of surgery. PAT is scheduled on 2/17@2:00pm. And covid test is 2/21@ 8:30am. Patient aware.

## 2022-02-17 ENCOUNTER — PRE-ADMISSION TESTING (OUTPATIENT)
Dept: PREADMISSION TESTING | Facility: HOSPITAL | Age: 82
End: 2022-02-17

## 2022-02-17 VITALS
DIASTOLIC BLOOD PRESSURE: 66 MMHG | WEIGHT: 216.8 LBS | SYSTOLIC BLOOD PRESSURE: 148 MMHG | HEIGHT: 61 IN | OXYGEN SATURATION: 98 % | HEART RATE: 88 BPM | RESPIRATION RATE: 18 BRPM | BODY MASS INDEX: 40.93 KG/M2

## 2022-02-17 DIAGNOSIS — C50.112 MALIGNANT NEOPLASM OF CENTRAL PORTION OF LEFT BREAST IN FEMALE, ESTROGEN RECEPTOR POSITIVE: ICD-10-CM

## 2022-02-17 DIAGNOSIS — K58.0 IRRITABLE BOWEL SYNDROME WITH DIARRHEA: ICD-10-CM

## 2022-02-17 DIAGNOSIS — Z17.0 MALIGNANT NEOPLASM OF CENTRAL PORTION OF LEFT BREAST IN FEMALE, ESTROGEN RECEPTOR POSITIVE: ICD-10-CM

## 2022-02-17 LAB
ANION GAP SERPL CALCULATED.3IONS-SCNC: 10.2 MMOL/L (ref 5–15)
BASOPHILS # BLD AUTO: 0.03 10*3/MM3 (ref 0–0.2)
BASOPHILS NFR BLD AUTO: 0.4 % (ref 0–1.5)
BUN SERPL-MCNC: 16 MG/DL (ref 8–23)
BUN/CREAT SERPL: 18 (ref 7–25)
CALCIUM SPEC-SCNC: 9.6 MG/DL (ref 8.6–10.5)
CHLORIDE SERPL-SCNC: 102 MMOL/L (ref 98–107)
CO2 SERPL-SCNC: 26.8 MMOL/L (ref 22–29)
CREAT SERPL-MCNC: 0.89 MG/DL (ref 0.57–1)
DEPRECATED RDW RBC AUTO: 52.6 FL (ref 37–54)
EOSINOPHIL # BLD AUTO: 0.12 10*3/MM3 (ref 0–0.4)
EOSINOPHIL NFR BLD AUTO: 1.5 % (ref 0.3–6.2)
ERYTHROCYTE [DISTWIDTH] IN BLOOD BY AUTOMATED COUNT: 14.7 % (ref 12.3–15.4)
GFR SERPL CREATININE-BSD FRML MDRD: 61 ML/MIN/1.73
GLUCOSE SERPL-MCNC: 125 MG/DL (ref 65–99)
HBA1C MFR BLD: 5.9 % (ref 4.8–5.6)
HCT VFR BLD AUTO: 38.3 % (ref 34–46.6)
HGB BLD-MCNC: 11.9 G/DL (ref 12–15.9)
IMM GRANULOCYTES # BLD AUTO: 0.02 10*3/MM3 (ref 0–0.05)
IMM GRANULOCYTES NFR BLD AUTO: 0.3 % (ref 0–0.5)
LYMPHOCYTES # BLD AUTO: 2.21 10*3/MM3 (ref 0.7–3.1)
LYMPHOCYTES NFR BLD AUTO: 28.4 % (ref 19.6–45.3)
MCH RBC QN AUTO: 29.9 PG (ref 26.6–33)
MCHC RBC AUTO-ENTMCNC: 31.1 G/DL (ref 31.5–35.7)
MCV RBC AUTO: 96.2 FL (ref 79–97)
MONOCYTES # BLD AUTO: 0.43 10*3/MM3 (ref 0.1–0.9)
MONOCYTES NFR BLD AUTO: 5.5 % (ref 5–12)
NEUTROPHILS NFR BLD AUTO: 4.96 10*3/MM3 (ref 1.7–7)
NEUTROPHILS NFR BLD AUTO: 63.9 % (ref 42.7–76)
NRBC BLD AUTO-RTO: 0 /100 WBC (ref 0–0.2)
PLATELET # BLD AUTO: 232 10*3/MM3 (ref 140–450)
PMV BLD AUTO: 10.2 FL (ref 6–12)
POTASSIUM SERPL-SCNC: 4.2 MMOL/L (ref 3.5–5.2)
QT INTERVAL: 387 MS
RBC # BLD AUTO: 3.98 10*6/MM3 (ref 3.77–5.28)
SODIUM SERPL-SCNC: 139 MMOL/L (ref 136–145)
WBC NRBC COR # BLD: 7.77 10*3/MM3 (ref 3.4–10.8)

## 2022-02-17 PROCEDURE — 85025 COMPLETE CBC W/AUTO DIFF WBC: CPT | Performed by: SURGERY

## 2022-02-17 PROCEDURE — 93005 ELECTROCARDIOGRAM TRACING: CPT

## 2022-02-17 PROCEDURE — 36415 COLL VENOUS BLD VENIPUNCTURE: CPT

## 2022-02-17 PROCEDURE — 83036 HEMOGLOBIN GLYCOSYLATED A1C: CPT | Performed by: SURGERY

## 2022-02-17 PROCEDURE — 93010 ELECTROCARDIOGRAM REPORT: CPT | Performed by: INTERNAL MEDICINE

## 2022-02-17 PROCEDURE — 80048 BASIC METABOLIC PNL TOTAL CA: CPT | Performed by: SURGERY

## 2022-02-17 RX ORDER — MONTELUKAST SODIUM 4 MG/1
TABLET, CHEWABLE ORAL
Qty: 60 TABLET | Refills: 5 | Status: SHIPPED | OUTPATIENT
Start: 2022-02-17 | End: 2023-01-23

## 2022-02-17 RX ORDER — ALBUTEROL SULFATE 90 UG/1
2 AEROSOL, METERED RESPIRATORY (INHALATION) EVERY 4 HOURS PRN
COMMUNITY

## 2022-02-17 NOTE — DISCHARGE INSTRUCTIONS
PRE-ADMISSION TESTING INSTRUCTIONS FOR ADULTS    Take these medications the morning of surgery with a small sip of water:  Use nebulizer and trelegy inhaler the morning of surgery, hydralazine, citalopram, and omeprazole      No aspirin, advil, aleve, ibuprofen, naproxen, diet pills, decongestants, or herbal/vitamins for a week prior to surgery.    General Instructions:    • Do not eat solid food after midnight the night before surgery.  No gum, mints, or hard candy after midnight the night before surgery.  • You may drink clear liquids the day of surgery up until 2 hours before your arrival time.  (4:30 am)  • Clear liquids are liquids you can see through. Nothing RED in color.    Plain water    Sports drinks  Sodas     Gelatin (Jell-O)  Fruit juices without pulp such as white grape juice and apple juice  Popsicles that contain no fruit or yogurt  Tea or coffee (no cream or milk added)    • It is beneficial for you to have a clear drink that contains carbohydrates just before you leave your house and before your fasting time begins.  We suggest a 20 ounce bottle of Gatorade or Powerade for non-diabetic patients or a 20 ounce bottle of G2 or Powerade Zero for diabetic patients.  (4:30 am)    • Patients who avoid smoking, chewing tobacco and alcohol for 4 weeks prior to surgery have a reduced risk of post-operative complications.  If at all possible, quit smoking as many days before surgery as you can.    • Do not smoke, use chewing tobacco or drink alcohol the day of surgery    • Bring your C-PAP/ BI-PAP machine if you use one.  • Wear clean comfortable clothes and socks.  • Do not wear contact lenses, lotion, deodorant, or make-up.  Bring a case for your glasses if applicable. You may brush your teeth the morning of surgery.  • You may wear dentures/partials, do not put adhesive/glue on them.    • Leave all other jewelry and valuables at home.      Preventing a Surgical Site Infection:    • Shower the night before  and on the morning of surgery using the chlorhexidine soap you were given.  Use a clean washcloth with the soap.  Place clean sheets on your bed after showering the night before surgery. Do not use the CHG soap on your hair, face, or private areas. Wash your body gently for five (5) minutes. Do not scrub your skin.  Dry with a clean towel and dress in clean clothing.    • Do not shave the surgical area for 10 days-2 weeks prior to surgery  because the razor can irritate skin and make it easier to develop an infection.  • Make sure you, your family, and all healthcare providers clean their hands with soap and water or an alcohol based hand  before caring for you or your wound.      Day of surgery:    Your surgeon’s office will advise you of your arrival time for the day of surgery.    Upon arrival, a Pre-op nurse and Anesthesia provider will review your health history, obtain vital signs, and answer questions you may have.  The only belongings needed at this time will be your home medications and if applicable your C-PAP/BI-PAP machine.  If you are staying overnight your family can leave the rest of your belongings in the car and bring them to your room later.  A Pre-op nurse will start an IV and you may receive medication in preparation for surgery, including something to help you relax.  Your family will be able to see you in the Pre-op area.  While you are in surgery your family should notify the waiting room  if they leave the waiting room area and provide a contact phone number.    IF you have any questions, you can call the Pre-Admission Department at [phone] or your surgeon's office.  Notify your surgeon if  you become sick, have a fever, productive cough, or cannot be here the day of surgery    Please be aware that surgery does come with discomfort.  We want to make every effort to control your discomfort so please discuss any uncontrolled symptoms with your nurse.   Your doctor  will most likely have prescribed pain medications.      If you are going home after surgery, you will receive individualized written care instructions before being discharged.  A responsible adult (over the age of 18) must drive you to and from the hospital on the day of your surgery and stay with you for 24 hours after anesthesia.    If you are staying overnight following surgery, you will be transported to your hospital room following the recovery period.  [facility] has all private rooms.    You may receive a survey regarding the care you received. Your feedback is very important and will be used to collect the necessary data to help us to continue to provide excellent care.     Deductibles and co-payments are collected on the day of service. Please be prepared to pay the required co-pay, deductible or deposit on the day of service as defined by your plan.

## 2022-02-17 NOTE — PAT
Pt and daughter here for PAT visit.  Pre-op tests completed, chg soap given, and instructions reviewed.  Instructed clears until 4:30 am dos and to bring cpap, voiced understanding. Pt has pulmonary clearance from Dr Sharma in [place], Dr Schultz spoke w/Dr Beal for medical clearance (telephone note under ntoes tab). COVID test 2/21

## 2022-02-21 ENCOUNTER — LAB (OUTPATIENT)
Dept: LAB | Facility: HOSPITAL | Age: 82
End: 2022-02-21

## 2022-02-21 DIAGNOSIS — Z17.0 MALIGNANT NEOPLASM OF CENTRAL PORTION OF LEFT BREAST IN FEMALE, ESTROGEN RECEPTOR POSITIVE: ICD-10-CM

## 2022-02-21 DIAGNOSIS — C50.112 MALIGNANT NEOPLASM OF CENTRAL PORTION OF LEFT BREAST IN FEMALE, ESTROGEN RECEPTOR POSITIVE: ICD-10-CM

## 2022-02-21 LAB — SARS-COV-2 RNA PNL SPEC NAA+PROBE: NOT DETECTED

## 2022-02-21 PROCEDURE — C9803 HOPD COVID-19 SPEC COLLECT: HCPCS

## 2022-02-21 PROCEDURE — 87635 SARS-COV-2 COVID-19 AMP PRB: CPT | Performed by: SURGERY

## 2022-02-22 ENCOUNTER — ANESTHESIA EVENT (OUTPATIENT)
Dept: PERIOP | Facility: HOSPITAL | Age: 82
End: 2022-02-22
Payer: MEDICARE

## 2022-02-22 ENCOUNTER — TRANSCRIBE ORDERS (OUTPATIENT)
Dept: ADMINISTRATIVE | Facility: HOSPITAL | Age: 82
End: 2022-02-22

## 2022-02-22 DIAGNOSIS — J44.9 CHRONIC OBSTRUCTIVE PULMONARY DISEASE, UNSPECIFIED COPD TYPE: Primary | ICD-10-CM

## 2022-02-23 ENCOUNTER — HOSPITAL ENCOUNTER (OUTPATIENT)
Facility: HOSPITAL | Age: 82
Discharge: HOME | End: 2022-02-24
Attending: SURGERY | Admitting: SURGERY
Payer: MEDICARE

## 2022-02-23 ENCOUNTER — ANESTHESIA (OUTPATIENT)
Dept: PERIOP | Facility: HOSPITAL | Age: 82
End: 2022-02-23
Payer: MEDICARE

## 2022-02-23 ENCOUNTER — HOSPITAL ENCOUNTER (OUTPATIENT)
Dept: NUCLEAR MEDICINE | Facility: HOSPITAL | Age: 82
Discharge: HOME | End: 2022-02-23
Payer: MEDICARE

## 2022-02-23 DIAGNOSIS — C50.112 MALIGNANT NEOPLASM OF CENTRAL PORTION OF LEFT BREAST IN FEMALE, ESTROGEN RECEPTOR POSITIVE: ICD-10-CM

## 2022-02-23 DIAGNOSIS — Z17.0 MALIGNANT NEOPLASM OF CENTRAL PORTION OF LEFT BREAST IN FEMALE, ESTROGEN RECEPTOR POSITIVE: ICD-10-CM

## 2022-02-23 LAB — GLUCOSE BLDC GLUCOMTR-MCNC: 132 MG/DL (ref 70–130)

## 2022-02-23 PROCEDURE — 25010000002 ROPIVACAINE PER 1 MG: Performed by: NURSE ANESTHETIST, CERTIFIED REGISTERED

## 2022-02-23 PROCEDURE — G0378 HOSPITAL OBSERVATION PER HR: HCPCS

## 2022-02-23 PROCEDURE — 19303 MAST SIMPLE COMPLETE: CPT | Performed by: SURGERY

## 2022-02-23 PROCEDURE — 63710000001 OXYCODONE-ACETAMINOPHEN 5-325 MG TABLET: Performed by: SURGERY

## 2022-02-23 PROCEDURE — 94761 N-INVAS EAR/PLS OXIMETRY MLT: CPT

## 2022-02-23 PROCEDURE — 63710000001 GUAIFENESIN 600 MG TABLET SUSTAINED-RELEASE 12 HOUR: Performed by: FAMILY MEDICINE

## 2022-02-23 PROCEDURE — 38525 BIOPSY/REMOVAL LYMPH NODES: CPT | Performed by: SURGERY

## 2022-02-23 PROCEDURE — 63710000001 HYDRALAZINE 50 MG TABLET: Performed by: SURGERY

## 2022-02-23 PROCEDURE — A9270 NON-COVERED ITEM OR SERVICE: HCPCS | Performed by: SURGERY

## 2022-02-23 PROCEDURE — S0260 H&P FOR SURGERY: HCPCS | Performed by: SURGERY

## 2022-02-23 PROCEDURE — 25010000002 KETOROLAC TROMETHAMINE PER 15 MG: Performed by: NURSE ANESTHETIST, CERTIFIED REGISTERED

## 2022-02-23 PROCEDURE — 0 CEFAZOLIN SODIUM-DEXTROSE 2-3 GM-%(50ML) RECONSTITUTED SOLUTION: Performed by: SURGERY

## 2022-02-23 PROCEDURE — 19303 MAST SIMPLE COMPLETE: CPT | Performed by: SPECIALIST/TECHNOLOGIST, OTHER

## 2022-02-23 PROCEDURE — 25010000002 FENTANYL CITRATE (PF) 50 MCG/ML SOLUTION: Performed by: NURSE ANESTHETIST, CERTIFIED REGISTERED

## 2022-02-23 PROCEDURE — 99024 POSTOP FOLLOW-UP VISIT: CPT | Performed by: SURGERY

## 2022-02-23 PROCEDURE — 82962 GLUCOSE BLOOD TEST: CPT

## 2022-02-23 PROCEDURE — 25010000002 PROPOFOL 10 MG/ML EMULSION: Performed by: NURSE ANESTHETIST, CERTIFIED REGISTERED

## 2022-02-23 PROCEDURE — 94799 UNLISTED PULMONARY SVC/PX: CPT

## 2022-02-23 PROCEDURE — 38792 RA TRACER ID OF SENTINL NODE: CPT

## 2022-02-23 PROCEDURE — 25010000002 ONDANSETRON PER 1 MG: Performed by: REGISTERED NURSE

## 2022-02-23 PROCEDURE — A9520 TC99 TILMANOCEPT DIAG 0.5MCI: HCPCS | Performed by: SURGERY

## 2022-02-23 PROCEDURE — 25010000002 DEXAMETHASONE PER 1 MG: Performed by: REGISTERED NURSE

## 2022-02-23 PROCEDURE — A9270 NON-COVERED ITEM OR SERVICE: HCPCS | Performed by: FAMILY MEDICINE

## 2022-02-23 PROCEDURE — 63710000001 LISINOPRIL 20 MG TABLET: Performed by: SURGERY

## 2022-02-23 PROCEDURE — C1889 IMPLANT/INSERT DEVICE, NOC: HCPCS | Performed by: SURGERY

## 2022-02-23 PROCEDURE — 88307 TISSUE EXAM BY PATHOLOGIST: CPT | Performed by: SURGERY

## 2022-02-23 PROCEDURE — 25010000002 PHENYLEPHRINE 10 MG/ML SOLUTION: Performed by: NURSE ANESTHETIST, CERTIFIED REGISTERED

## 2022-02-23 PROCEDURE — 38900 IO MAP OF SENT LYMPH NODE: CPT | Performed by: SURGERY

## 2022-02-23 PROCEDURE — 25010000002 MIDAZOLAM PER 1MG: Performed by: REGISTERED NURSE

## 2022-02-23 PROCEDURE — 0 TECHETIUM TC99M TILMANOCEPT: Performed by: SURGERY

## 2022-02-23 DEVICE — LIGACLIP MCA MULTIPLE CLIP APPLIERS, 20 MEDIUM CLIPS
Type: IMPLANTABLE DEVICE | Site: BREAST | Status: FUNCTIONAL
Brand: LIGACLIP

## 2022-02-23 RX ORDER — LIDOCAINE HYDROCHLORIDE 20 MG/ML
INJECTION, SOLUTION INFILTRATION; PERINEURAL AS NEEDED
Status: DISCONTINUED | OUTPATIENT
Start: 2022-02-23 | End: 2022-02-23 | Stop reason: SURG

## 2022-02-23 RX ORDER — SCOLOPAMINE TRANSDERMAL SYSTEM 1 MG/1
1 PATCH, EXTENDED RELEASE TRANSDERMAL CONTINUOUS
Status: DISCONTINUED | OUTPATIENT
Start: 2022-02-23 | End: 2022-02-24 | Stop reason: HOSPADM

## 2022-02-23 RX ORDER — CEFAZOLIN SODIUM 2 G/50ML
2 SOLUTION INTRAVENOUS ONCE
Status: COMPLETED | OUTPATIENT
Start: 2022-02-23 | End: 2022-02-23

## 2022-02-23 RX ORDER — SODIUM CHLORIDE 0.9 % (FLUSH) 0.9 %
10 SYRINGE (ML) INJECTION EVERY 12 HOURS SCHEDULED
Status: DISCONTINUED | OUTPATIENT
Start: 2022-02-23 | End: 2022-02-23 | Stop reason: HOSPADM

## 2022-02-23 RX ORDER — DEXMEDETOMIDINE HYDROCHLORIDE 100 UG/ML
INJECTION, SOLUTION INTRAVENOUS AS NEEDED
Status: DISCONTINUED | OUTPATIENT
Start: 2022-02-23 | End: 2022-02-23 | Stop reason: SURG

## 2022-02-23 RX ORDER — HYOSCYAMINE SULFATE EXTENDED-RELEASE 0.38 MG/1
375 TABLET ORAL EVERY 12 HOURS PRN
Status: DISCONTINUED | OUTPATIENT
Start: 2022-02-23 | End: 2022-02-24 | Stop reason: HOSPADM

## 2022-02-23 RX ORDER — MORPHINE SULFATE 2 MG/ML
2 INJECTION, SOLUTION INTRAMUSCULAR; INTRAVENOUS
Status: DISCONTINUED | OUTPATIENT
Start: 2022-02-23 | End: 2022-02-24 | Stop reason: HOSPADM

## 2022-02-23 RX ORDER — SODIUM CHLORIDE, SODIUM LACTATE, POTASSIUM CHLORIDE, CALCIUM CHLORIDE 600; 310; 30; 20 MG/100ML; MG/100ML; MG/100ML; MG/100ML
75 INJECTION, SOLUTION INTRAVENOUS CONTINUOUS
Status: DISCONTINUED | OUTPATIENT
Start: 2022-02-23 | End: 2022-02-24 | Stop reason: HOSPADM

## 2022-02-23 RX ORDER — LISINOPRIL 20 MG/1
40 TABLET ORAL
Status: DISCONTINUED | OUTPATIENT
Start: 2022-02-23 | End: 2022-02-24 | Stop reason: HOSPADM

## 2022-02-23 RX ORDER — FENTANYL CITRATE 50 UG/ML
25 INJECTION, SOLUTION INTRAMUSCULAR; INTRAVENOUS
Status: DISCONTINUED | OUTPATIENT
Start: 2022-02-23 | End: 2022-02-23 | Stop reason: HOSPADM

## 2022-02-23 RX ORDER — FENTANYL CITRATE 50 UG/ML
50 INJECTION, SOLUTION INTRAMUSCULAR; INTRAVENOUS
Status: DISCONTINUED | OUTPATIENT
Start: 2022-02-23 | End: 2022-02-23 | Stop reason: HOSPADM

## 2022-02-23 RX ORDER — PHENYLEPHRINE HYDROCHLORIDE 10 MG/ML
INJECTION INTRAVENOUS AS NEEDED
Status: DISCONTINUED | OUTPATIENT
Start: 2022-02-23 | End: 2022-02-23 | Stop reason: SURG

## 2022-02-23 RX ORDER — ROPIVACAINE HYDROCHLORIDE 2 MG/ML
INJECTION, SOLUTION EPIDURAL; INFILTRATION; PERINEURAL
Status: COMPLETED | OUTPATIENT
Start: 2022-02-23 | End: 2022-02-23

## 2022-02-23 RX ORDER — DEXAMETHASONE SODIUM PHOSPHATE 4 MG/ML
8 INJECTION, SOLUTION INTRA-ARTICULAR; INTRALESIONAL; INTRAMUSCULAR; INTRAVENOUS; SOFT TISSUE ONCE AS NEEDED
Status: COMPLETED | OUTPATIENT
Start: 2022-02-23 | End: 2022-02-23

## 2022-02-23 RX ORDER — GUAIFENESIN 600 MG/1
600 TABLET, EXTENDED RELEASE ORAL EVERY 12 HOURS SCHEDULED
Status: DISCONTINUED | OUTPATIENT
Start: 2022-02-23 | End: 2022-02-24 | Stop reason: HOSPADM

## 2022-02-23 RX ORDER — KETOROLAC TROMETHAMINE 30 MG/ML
INJECTION, SOLUTION INTRAMUSCULAR; INTRAVENOUS AS NEEDED
Status: DISCONTINUED | OUTPATIENT
Start: 2022-02-23 | End: 2022-02-23 | Stop reason: SURG

## 2022-02-23 RX ORDER — CITALOPRAM 20 MG/1
40 TABLET ORAL EVERY MORNING
Status: DISCONTINUED | OUTPATIENT
Start: 2022-02-24 | End: 2022-02-24 | Stop reason: HOSPADM

## 2022-02-23 RX ORDER — ONDANSETRON 2 MG/ML
4 INJECTION INTRAMUSCULAR; INTRAVENOUS ONCE AS NEEDED
Status: DISCONTINUED | OUTPATIENT
Start: 2022-02-23 | End: 2022-02-23 | Stop reason: HOSPADM

## 2022-02-23 RX ORDER — ALBUTEROL SULFATE 90 UG/1
2 AEROSOL, METERED RESPIRATORY (INHALATION) EVERY 4 HOURS PRN
Status: DISCONTINUED | OUTPATIENT
Start: 2022-02-23 | End: 2022-02-23

## 2022-02-23 RX ORDER — ACETAMINOPHEN 500 MG
1000 TABLET ORAL ONCE
Status: DISCONTINUED | OUTPATIENT
Start: 2022-02-23 | End: 2022-02-23 | Stop reason: HOSPADM

## 2022-02-23 RX ORDER — ACETAMINOPHEN 500 MG
1000 TABLET ORAL EVERY 6 HOURS PRN
COMMUNITY

## 2022-02-23 RX ORDER — MIDAZOLAM HYDROCHLORIDE 2 MG/2ML
0.5 INJECTION, SOLUTION INTRAMUSCULAR; INTRAVENOUS
Status: DISCONTINUED | OUTPATIENT
Start: 2022-02-23 | End: 2022-02-23 | Stop reason: HOSPADM

## 2022-02-23 RX ORDER — ALBUTEROL SULFATE 2.5 MG/3ML
2.5 SOLUTION RESPIRATORY (INHALATION) 2 TIMES DAILY
Status: DISCONTINUED | OUTPATIENT
Start: 2022-02-23 | End: 2022-02-23

## 2022-02-23 RX ORDER — SODIUM CHLORIDE, SODIUM LACTATE, POTASSIUM CHLORIDE, CALCIUM CHLORIDE 600; 310; 30; 20 MG/100ML; MG/100ML; MG/100ML; MG/100ML
100 INJECTION, SOLUTION INTRAVENOUS CONTINUOUS
Status: DISCONTINUED | OUTPATIENT
Start: 2022-02-23 | End: 2022-02-23

## 2022-02-23 RX ORDER — SODIUM CHLORIDE, SODIUM LACTATE, POTASSIUM CHLORIDE, CALCIUM CHLORIDE 600; 310; 30; 20 MG/100ML; MG/100ML; MG/100ML; MG/100ML
9 INJECTION, SOLUTION INTRAVENOUS CONTINUOUS
Status: DISCONTINUED | OUTPATIENT
Start: 2022-02-23 | End: 2022-02-23

## 2022-02-23 RX ORDER — MAGNESIUM HYDROXIDE 1200 MG/15ML
LIQUID ORAL AS NEEDED
Status: DISCONTINUED | OUTPATIENT
Start: 2022-02-23 | End: 2022-02-23 | Stop reason: HOSPADM

## 2022-02-23 RX ORDER — ONDANSETRON 2 MG/ML
4 INJECTION INTRAMUSCULAR; INTRAVENOUS ONCE AS NEEDED
Status: COMPLETED | OUTPATIENT
Start: 2022-02-23 | End: 2022-02-23

## 2022-02-23 RX ORDER — FENTANYL CITRATE 50 UG/ML
INJECTION, SOLUTION INTRAMUSCULAR; INTRAVENOUS AS NEEDED
Status: DISCONTINUED | OUTPATIENT
Start: 2022-02-23 | End: 2022-02-23 | Stop reason: SURG

## 2022-02-23 RX ORDER — MORPHINE SULFATE 10 MG/ML
4 INJECTION INTRAMUSCULAR; INTRAVENOUS; SUBCUTANEOUS
Status: DISCONTINUED | OUTPATIENT
Start: 2022-02-23 | End: 2022-02-24 | Stop reason: HOSPADM

## 2022-02-23 RX ORDER — PANTOPRAZOLE SODIUM 40 MG/1
40 TABLET, DELAYED RELEASE ORAL EVERY MORNING
Refills: 3 | Status: DISCONTINUED | OUTPATIENT
Start: 2022-02-24 | End: 2022-02-24 | Stop reason: HOSPADM

## 2022-02-23 RX ORDER — PROPOFOL 10 MG/ML
VIAL (ML) INTRAVENOUS AS NEEDED
Status: DISCONTINUED | OUTPATIENT
Start: 2022-02-23 | End: 2022-02-23 | Stop reason: SURG

## 2022-02-23 RX ORDER — CEFAZOLIN SODIUM 2 G/50ML
2 SOLUTION INTRAVENOUS EVERY 8 HOURS
Status: COMPLETED | OUTPATIENT
Start: 2022-02-23 | End: 2022-02-24

## 2022-02-23 RX ORDER — ALBUTEROL SULFATE 2.5 MG/3ML
2.5 SOLUTION RESPIRATORY (INHALATION) EVERY 6 HOURS PRN
Status: DISCONTINUED | OUTPATIENT
Start: 2022-02-23 | End: 2022-02-24 | Stop reason: HOSPADM

## 2022-02-23 RX ORDER — OXYCODONE HYDROCHLORIDE AND ACETAMINOPHEN 5; 325 MG/1; MG/1
1 TABLET ORAL EVERY 4 HOURS PRN
Status: DISCONTINUED | OUTPATIENT
Start: 2022-02-23 | End: 2022-02-24 | Stop reason: HOSPADM

## 2022-02-23 RX ORDER — SODIUM CHLORIDE 9 MG/ML
40 INJECTION, SOLUTION INTRAVENOUS AS NEEDED
Status: DISCONTINUED | OUTPATIENT
Start: 2022-02-23 | End: 2022-02-23 | Stop reason: HOSPADM

## 2022-02-23 RX ORDER — HYDRALAZINE HYDROCHLORIDE 50 MG/1
50 TABLET, FILM COATED ORAL 3 TIMES DAILY
Status: DISCONTINUED | OUTPATIENT
Start: 2022-02-23 | End: 2022-02-24 | Stop reason: HOSPADM

## 2022-02-23 RX ORDER — EPHEDRINE SULFATE 50 MG/ML
INJECTION, SOLUTION INTRAVENOUS AS NEEDED
Status: DISCONTINUED | OUTPATIENT
Start: 2022-02-23 | End: 2022-02-23 | Stop reason: SURG

## 2022-02-23 RX ORDER — CHLORAL HYDRATE 500 MG
1000 CAPSULE ORAL
COMMUNITY
End: 2023-01-23

## 2022-02-23 RX ORDER — SODIUM CHLORIDE 0.9 % (FLUSH) 0.9 %
10 SYRINGE (ML) INJECTION AS NEEDED
Status: DISCONTINUED | OUTPATIENT
Start: 2022-02-23 | End: 2022-02-23 | Stop reason: HOSPADM

## 2022-02-23 RX ORDER — ALPRAZOLAM 0.25 MG/1
0.5 TABLET ORAL DAILY PRN
Status: DISCONTINUED | OUTPATIENT
Start: 2022-02-23 | End: 2022-02-24 | Stop reason: HOSPADM

## 2022-02-23 RX ORDER — OXYCODONE HYDROCHLORIDE AND ACETAMINOPHEN 5; 325 MG/1; MG/1
2 TABLET ORAL EVERY 4 HOURS PRN
Status: DISCONTINUED | OUTPATIENT
Start: 2022-02-23 | End: 2022-02-24 | Stop reason: HOSPADM

## 2022-02-23 RX ORDER — SODIUM CHLORIDE, SODIUM LACTATE, POTASSIUM CHLORIDE, CALCIUM CHLORIDE 600; 310; 30; 20 MG/100ML; MG/100ML; MG/100ML; MG/100ML
INJECTION, SOLUTION INTRAVENOUS CONTINUOUS PRN
Status: DISCONTINUED | OUTPATIENT
Start: 2022-02-23 | End: 2022-02-23 | Stop reason: SURG

## 2022-02-23 RX ORDER — KETAMINE HYDROCHLORIDE 10 MG/ML
INJECTION INTRAMUSCULAR; INTRAVENOUS AS NEEDED
Status: DISCONTINUED | OUTPATIENT
Start: 2022-02-23 | End: 2022-02-23 | Stop reason: SURG

## 2022-02-23 RX ORDER — ONDANSETRON 2 MG/ML
4 INJECTION INTRAMUSCULAR; INTRAVENOUS EVERY 4 HOURS PRN
Status: DISCONTINUED | OUTPATIENT
Start: 2022-02-23 | End: 2022-02-24 | Stop reason: HOSPADM

## 2022-02-23 RX ORDER — LIDOCAINE HYDROCHLORIDE 10 MG/ML
0.5 INJECTION, SOLUTION EPIDURAL; INFILTRATION; INTRACAUDAL; PERINEURAL ONCE AS NEEDED
Status: DISCONTINUED | OUTPATIENT
Start: 2022-02-23 | End: 2022-02-23 | Stop reason: HOSPADM

## 2022-02-23 RX ORDER — FAMOTIDINE 10 MG/ML
20 INJECTION, SOLUTION INTRAVENOUS
Status: COMPLETED | OUTPATIENT
Start: 2022-02-23 | End: 2022-02-23

## 2022-02-23 RX ADMIN — GUAIFENESIN 600 MG: 600 TABLET, EXTENDED RELEASE ORAL at 20:34

## 2022-02-23 RX ADMIN — TILMANOCEPT 1 DOSE: KIT at 08:23

## 2022-02-23 RX ADMIN — KETAMINE HYDROCHLORIDE 30 MG: 10 INJECTION, SOLUTION INTRAMUSCULAR; INTRAVENOUS at 10:12

## 2022-02-23 RX ADMIN — PHENYLEPHRINE HYDROCHLORIDE 100 MCG: 10 INJECTION INTRAVENOUS at 10:28

## 2022-02-23 RX ADMIN — FENTANYL CITRATE 25 MCG: 50 INJECTION INTRAMUSCULAR; INTRAVENOUS at 11:05

## 2022-02-23 RX ADMIN — FENTANYL CITRATE 50 MCG: 50 INJECTION INTRAMUSCULAR; INTRAVENOUS at 10:37

## 2022-02-23 RX ADMIN — FENTANYL CITRATE 25 MCG: 50 INJECTION INTRAMUSCULAR; INTRAVENOUS at 13:10

## 2022-02-23 RX ADMIN — LIDOCAINE HYDROCHLORIDE 100 MG: 20 INJECTION, SOLUTION INFILTRATION; PERINEURAL at 10:04

## 2022-02-23 RX ADMIN — SCOPALAMINE 1 PATCH: 1 PATCH, EXTENDED RELEASE TRANSDERMAL at 07:35

## 2022-02-23 RX ADMIN — PHENYLEPHRINE HYDROCHLORIDE 100 MCG: 10 INJECTION INTRAVENOUS at 10:58

## 2022-02-23 RX ADMIN — SODIUM CHLORIDE, POTASSIUM CHLORIDE, SODIUM LACTATE AND CALCIUM CHLORIDE 9 ML/HR: 600; 310; 30; 20 INJECTION, SOLUTION INTRAVENOUS at 07:35

## 2022-02-23 RX ADMIN — CEFAZOLIN SODIUM 2 G: 2 SOLUTION INTRAVENOUS at 10:21

## 2022-02-23 RX ADMIN — FAMOTIDINE 20 MG: 10 INJECTION, SOLUTION INTRAVENOUS at 09:53

## 2022-02-23 RX ADMIN — ONDANSETRON 4 MG: 2 INJECTION INTRAMUSCULAR; INTRAVENOUS at 09:53

## 2022-02-23 RX ADMIN — EPHEDRINE SULFATE 10 MG: 50 INJECTION, SOLUTION INTRAVENOUS at 10:19

## 2022-02-23 RX ADMIN — DEXMEDETOMIDINE 30 MCG: 100 INJECTION, SOLUTION, CONCENTRATE INTRAVENOUS at 10:17

## 2022-02-23 RX ADMIN — MIDAZOLAM HYDROCHLORIDE 0.5 MG: 1 INJECTION, SOLUTION INTRAMUSCULAR; INTRAVENOUS at 09:53

## 2022-02-23 RX ADMIN — FENTANYL CITRATE 50 MCG: 50 INJECTION INTRAMUSCULAR; INTRAVENOUS at 10:32

## 2022-02-23 RX ADMIN — CEFAZOLIN SODIUM 2 G: 2 SOLUTION INTRAVENOUS at 17:41

## 2022-02-23 RX ADMIN — FENTANYL CITRATE 25 MCG: 50 INJECTION INTRAMUSCULAR; INTRAVENOUS at 11:03

## 2022-02-23 RX ADMIN — KETOROLAC TROMETHAMINE 15 MG: 30 INJECTION, SOLUTION INTRAMUSCULAR; INTRAVENOUS at 11:52

## 2022-02-23 RX ADMIN — PROPOFOL 150 MG: 10 INJECTION, EMULSION INTRAVENOUS at 10:04

## 2022-02-23 RX ADMIN — OXYCODONE HYDROCHLORIDE AND ACETAMINOPHEN 2 TABLET: 5; 325 TABLET ORAL at 22:29

## 2022-02-23 RX ADMIN — SODIUM CHLORIDE, POTASSIUM CHLORIDE, SODIUM LACTATE AND CALCIUM CHLORIDE: 600; 310; 30; 20 INJECTION, SOLUTION INTRAVENOUS at 10:00

## 2022-02-23 RX ADMIN — DEXAMETHASONE SODIUM PHOSPHATE 8 MG: 4 INJECTION, SOLUTION INTRAMUSCULAR; INTRAVENOUS at 09:54

## 2022-02-23 RX ADMIN — SODIUM CHLORIDE, POTASSIUM CHLORIDE, SODIUM LACTATE AND CALCIUM CHLORIDE 75 ML/HR: 600; 310; 30; 20 INJECTION, SOLUTION INTRAVENOUS at 17:42

## 2022-02-23 RX ADMIN — HYDRALAZINE HYDROCHLORIDE 50 MG: 50 TABLET, FILM COATED ORAL at 20:34

## 2022-02-23 RX ADMIN — ROPIVACAINE HYDROCHLORIDE 20 ML: 2 INJECTION, SOLUTION EPIDURAL; INFILTRATION at 10:18

## 2022-02-23 RX ADMIN — PHENYLEPHRINE HYDROCHLORIDE 100 MCG: 10 INJECTION INTRAVENOUS at 11:47

## 2022-02-23 RX ADMIN — LISINOPRIL 40 MG: 20 TABLET ORAL at 14:55

## 2022-02-23 RX ADMIN — PHENYLEPHRINE HYDROCHLORIDE 100 MCG: 10 INJECTION INTRAVENOUS at 11:27

## 2022-02-23 RX ADMIN — HYDRALAZINE HYDROCHLORIDE 50 MG: 50 TABLET, FILM COATED ORAL at 17:41

## 2022-02-23 RX ADMIN — PHENYLEPHRINE HYDROCHLORIDE 100 MCG: 10 INJECTION INTRAVENOUS at 10:43

## 2022-02-23 RX ADMIN — PROPOFOL 50 MG: 10 INJECTION, EMULSION INTRAVENOUS at 10:07

## 2022-02-23 NOTE — PROGRESS NOTES
Patient was seen postoperatively.  She feels well.  Her vital signs are stable.  Her Miller-Huber drains are putting out some bloody returns.  The volume is not large but because of this I will hold off on Lovenox until she is reevaluated tomorrow.

## 2022-02-23 NOTE — PLAN OF CARE
Goal Outcome Evaluation:  Plan of Care Reviewed With: patient        Progress: improving  Outcome Summary: vss. pt s/p L mastectomy. a&o x4. Dressing in place c,d,I. iv fluids in place. yesenia drains x2 with bloody output. no complaint of pain at this time. pt up to bathroom with standby assist. scds in place. o2 @ 2L nc. pt has no other complaints at this time.

## 2022-02-23 NOTE — CONSULTS
CONULTATION:      Patient Care Team:  Brayan Beal MD as PCP - General  Brayan Beal MD as PCP - Family Medicine    CHIEF COMPLAINT: Consultation management of diabetes COPD and hypertension    HISTORY OF PRESENT ILLNESS:    81-year-old white female who has been for a left mastectomy and sentinel node biopsy for left breast carcinoma. I am asked to consult management of diabetes hypertension COPD. She is doing well postoperatively no current complaints    Past Medical History:   Diagnosis Date   • Anxiety    • Arthritis    • [place] esophagus    • Breast cancer (HCC) 2009    left   • Colon polyp    • COPD (chronic obstructive pulmonary disease) (HCC)     Dr Edith alba, clearance in [place] 2/2022   • Diarrhea    • Diverticulitis of colon    • Flu 12/2021   • GERD (gastroesophageal reflux disease)    • Hypertension    • Requires supplemental oxygen     3L/NC w/exertion   • Skin cancer    • Sleep apnea     w/CPAP and oxygen at 3L     Past Surgical History:   Procedure Laterality Date   • BLADDER SURGERY      bladder lift   • BREAST LUMPECTOMY Left     breast ca   • CARPAL TUNNEL RELEASE      BOTH HANDS   • CATARACT EXTRACTION W/ INTRAOCULAR LENS  IMPLANT, BILATERAL     • COLONOSCOPY N/A 2/10/2017    Procedure: COLONOSCOPY with polypectomy;  Surgeon: Weston Sue MD;  Location: [facility] OR;  Service:    • COLONOSCOPY N/A 8/12/2020    Procedure: COLONOSCOPY;  Surgeon: Weston Sue MD;  Location: [facility] OR;  Service: Gastroenterology;  Laterality: N/A;  TRANSVERSE COLON POLYP  DIVERTICULOSIS  CECAL TIME at 1356  SIGMOID COLON POLYP   • ENDOSCOPY N/A 2/10/2017    Procedure: ESOPHAGOGASTRODUODENOSCOPY with biopsies;  Surgeon: Weston Sue MD;  Location: [facility] OR;  Service:    • ENDOSCOPY N/A 8/12/2020    Procedure: ESOPHAGOGASTRODUODENOSCOPY;  Surgeon: Weston Sue MD;  Location: [facility] OR;  Service: Gastroenterology;  Laterality:  N/A;  DISTAL ESOPHAGUS BIOPSY  STOCKTON'S ESOPHAGUS   • HYSTERECTOMY     • TOTAL KNEE ARTHROPLASTY Bilateral      Family History   Problem Relation Age of Onset   • Breast cancer Neg Hx    • Malig Hyperthermia Neg Hx      Social History     Tobacco Use   • Smoking status: Former Smoker     Packs/day: 2.00     Years: 30.00     Pack years: 60.00     Types: Cigarettes     Quit date: 1990     Years since quittin.0   • Smokeless tobacco: Never Used   Vaping Use   • Vaping Use: Never used   Substance Use Topics   • Alcohol use: No   • Drug use: No     Medications Prior to Admission   Medication Sig Dispense Refill Last Dose   • acetaminophen (TYLENOL) 500 MG tablet Take 1,000 mg by mouth Every 6 (Six) Hours As Needed for Mild Pain .   2022 at 0500   • albuterol (PROVENTIL) (2.5 MG/3ML) 0.083% nebulizer solution Take 2.5 mg by nebulization 2 (Two) Times a Day. Can use 4 times a day if needed   2022 at 0500   • albuterol sulfate  (90 Base) MCG/ACT inhaler Inhale 2 puffs Every 4 (Four) Hours As Needed for Wheezing or Shortness of Air.   2022 at Unknown time   • ALPRAZolam (XANAX) 0.5 MG tablet Take 0.5 mg by mouth Daily As Needed for Anxiety.   Past Month at Unknown time   • benazepril (LOTENSIN) 40 MG tablet Take 40 mg by mouth Daily.   2022 at 2000   • Calcium Acetate-Magnesium Carb 450-200 MG tablet Take 1 tablet by mouth Daily.   Past Month at Unknown time   • Cholecalciferol (Vitamin D3) 20 MCG (800 UNIT) tablet Take 50 mcg by mouth Daily.   Past Month at Unknown time   • citalopram (CeleXA) 40 MG tablet Take 40 mg by mouth Every Morning.   2022 at 0500   • colestipol (COLESTID) 1 g tablet TAKE TWO TABLETS BY MOUTH DAILY (Patient taking differently: Take 2 g by mouth Daily.) 60 tablet 5 Past Week at Unknown time   • Cyanocobalamin (VITAMIN B-12) 5000 MCG tablet dispersible Take 1 tablet by mouth Daily.   Past Week at Unknown time   • Fluticasone-Umeclidin-Vilant (Trelegy Ellipta)  100-62.5-25 MCG/INH aerosol powder  Inhale 1 puff Daily.   2/23/2022 at 0500   • glucosamine-chondroitin 500-400 MG capsule capsule Take 1 capsule by mouth 2 (Two) Times a Day With Meals.   Past Month at Unknown time   • guaiFENesin (MUCINEX) 600 MG 12 hr tablet Take 1 tablet by mouth Every 12 (Twelve) Hours. (Patient taking differently: Take 600 mg by mouth 2 (Two) Times a Day As Needed for Cough or Congestion.) 30 tablet 0 Past Month at Unknown time   • hydrALAZINE (APRESOLINE) 50 MG tablet Take 50 mg by mouth 3 (Three) Times a Day.   2/23/2022 at 0500   • hyoscyamine (LEVBID) 0.375 MG 12 hr tablet Take 1 tablet by mouth Every 12 (Twelve) Hours As Needed for Cramping. Pt must schedule appt with provider prior next refills request 60 tablet 3 2/22/2022 at Unknown time   • magnesium oxide (MAG-OX) 400 MG tablet Take 400 mg by mouth Daily.   Past Month at Unknown time   • meloxicam (MOBIC) 15 MG tablet Take 15 mg by mouth Daily.   Past Month at Unknown time   • Mirabegron ER (MYRBETRIQ) 50 MG tablet sustained-release 24 hour Take 50 mg by mouth every night at bedtime.   2/22/2022 at 2000   • Multiple Vitamins-Minerals (MULTIVITAMIN ADULT PO) Take 1 tablet by mouth Daily.   Past Month at Unknown time   • O2 (OXYGEN) Inhale 3 L/min 1 (One) Time. Uses w/activity      • omeprazole (priLOSEC) 40 MG capsule Take 1 capsule by mouth Daily. 90 capsule 3 2/22/2022 at Unknown time   • ondansetron (ZOFRAN) 4 MG tablet Take 1 tablet by mouth Every 6 (Six) Hours As Needed for Nausea or Vomiting. 20 tablet 0 Past Month at Unknown time   • Omega-3 Fatty Acids (fish oil) 1000 MG capsule capsule Take 1,000 mg by mouth Daily With Breakfast.   More than a month at Unknown time     Allergies:  Azithromycin, Sulfa antibiotics, and Codeine     Review of Systems   Constitutional: Negative for activity change, appetite change and fatigue.   HENT: Negative for congestion.    Respiratory: Negative for cough, chest tightness, shortness of  "breath and wheezing.    Cardiovascular: Negative for chest pain.   Gastrointestinal: Negative for abdominal distention, abdominal pain, diarrhea, nausea and vomiting.   Endocrine: Negative for polyphagia and polyuria.   Genitourinary: Negative for frequency.   Skin: Negative for rash.   Neurological: Negative for light-headedness.   Hematological: Does not bruise/bleed easily.   Psychiatric/Behavioral: Negative for agitation and behavioral problems.       Vital Signs  Temp:  [96.8 °F (36 °C)-97.8 °F (36.6 °C)] 97.8 °F (36.6 °C)  Heart Rate:  [73-98] 97  Resp:  [14-22] 16  BP: (101-158)/(51-72) 117/56  Oxygen Therapy  SpO2: 100 %  Pulse Oximetry Type: Continuous  Device (Oxygen Therapy): nasal cannula  Flow (L/min): 2  ETCO2 (mmHg): (!) 4 mmHg}  Body mass index is 39.76 kg/m².  Flowsheet Rows      First Filed Value   Admission Height 157.5 cm (62\") Documented at 02/23/2022 0723   Admission Weight 98.6 kg (217 lb 6.4 oz) Documented at 02/23/2022 0723                 Physical Exam  Vitals and nursing note reviewed.   Constitutional:       Appearance: She is well-developed. She is morbidly obese.   HENT:      Head: Normocephalic.   Eyes:      Conjunctiva/sclera: Conjunctivae normal.   Neck:      Thyroid: No thyromegaly.      Vascular: No JVD.   Cardiovascular:      Rate and Rhythm: Normal rate and regular rhythm.      Heart sounds: Normal heart sounds. No murmur heard.      Pulmonary:      Effort: Pulmonary effort is normal. No respiratory distress.      Breath sounds: Wheezing (Few scattered wheezes) present. No rales.   Chest:      Comments: Dressing in place   Abdominal:      General: Bowel sounds are normal. There is no distension.      Palpations: Abdomen is soft.      Tenderness: There is no abdominal tenderness. There is no guarding.   Skin:     General: Skin is warm and dry.      Findings: No rash.   Neurological:      Mental Status: She is alert and oriented to person, place, and time. Mental status is at " baseline.          Debilities/Disabilities Identified: None    Emotional Behavior: Appropriate and Aggressive    Result Review    Result Review:  I have personally reviewed the results from the time of this admission to 2/23/2022 18:33 EST and agree with these findings:  [x]  Laboratory  []  Microbiology  [x]  Radiology  []  EKG/Telemetry   []  Cardiology/Vascular   [x]  Pathology  [x]  Old records  []  Other:          Results Review:    I reviewed the patient's new clinical results.  Lab Results (most recent)     Procedure Component Value Units Date/Time    Tissue Pathology Exam [[id number]] Collected: 02/23/22 1102    Specimen: Tissue from Breast, Left; Tissue from [place] Lymph Node Updated: 02/23/22 1214    POC Glucose Once [[id number]]  (Abnormal) Collected: 02/23/22 0718    Specimen: Blood Updated: 02/23/22 0724     Glucose 132 mg/dL      Comment: Meter: [id number] : Ro Robison RN             Imaging Results (Most Recent)     Procedure Component Value Units Date/Time    US Sonosite Portable [[id number]] Resulted: 02/23/22 0924     Updated: 02/23/22 0924    Narrative:      This procedure was auto-finalized with no dictation required.        reviewed    ECG/EMG Results (most recent)     None        not reviewed    Assessment/Plan       1. Left breast carcinoma status post left mastectomy sentinel biopsy doing well postoperatively nothing acute    2. COPD no exacerbation continue Trelegy and as needed albuterol spirometry early ambulation    3. Adult-onset diabetes mellitus diet-controlled Accu-Chek sliding scale insulin    4. Hypertension stable nothing acute continue with present medications      5. Hyperlipidemia stable continue statin    6. DVT prophylaxis per surgery          I discussed the patients findings and my recommendations with patient     Brayan Beal MD  02/23/22  18:33 EST        Much of this encounter note is an electronic transcription/translation of spoken  language to printed text. The electronic translation of spoken language may permit erroneous, or at times, nonsensical words or phrases to be inadvertently transcribed; Although I have reviewed the note for such errors, some may still exist.

## 2022-02-23 NOTE — ANESTHESIA PROCEDURE NOTES
Airway  Urgency: elective    Date/Time: 2/23/2022 10:06 AM  Airway not difficult    General Information and Staff    Patient location during procedure: OR  CRNA: Edward Parker CRNA    Indications and Patient Condition  Indications for airway management: airway protection    Preoxygenated: yes  Mask difficulty assessment: 0 - not attempted    Final Airway Details  Final airway type: supraglottic airway      Successful airway: unique  Size 4    Number of attempts at approach: 1  Assessment: lips, teeth, and gum same as pre-op

## 2022-02-23 NOTE — ANESTHESIA POSTPROCEDURE EVALUATION
Patient: Katarzyna Ku    Procedure Summary     Date: 02/23/22 Room / Location:  [facility] OR 3 /  [facility] OR    Anesthesia Start: 1000 Anesthesia Stop: 1212    Procedure: BREAST MASTECTOMY WITH SENTINEL NODE BIOPSY (Left Breast) Diagnosis:       Malignant neoplasm of central portion of left breast in female, estrogen receptor positive (HCC)      (Malignant neoplasm of central portion of left breast in female, estrogen receptor positive (HCC) [C50.112, Z17.0])    Surgeons: Afshin Schultz MD Provider: Edward Parker CRNA    Anesthesia Type: general ASA Status: 3          Anesthesia Type: general    Vitals  Vitals Value Taken Time   /59 02/23/22 1315   Temp 97.5 °F (36.4 °C) 02/23/22 1215   Pulse 89 02/23/22 1318   Resp 16 02/23/22 1318   SpO2 92 % 02/23/22 1319   Vitals shown include unvalidated device data.        Post Anesthesia Care and Evaluation    Patient location during evaluation: bedside  Patient participation: complete - patient participated  Level of consciousness: awake and alert  Pain score: 0  Pain management: adequate  Airway patency: patent  Anesthetic complications: No anesthetic complications  PONV Status: none  Cardiovascular status: acceptable  Respiratory status: acceptable  Hydration status: acceptable

## 2022-02-23 NOTE — OP NOTE
Preoperative diagnosis recurrent left breast cancer  Postoperative diagnosis the same  Procedure left mastectomy and sentinel lymph node biopsy  Complications none  Drains Miller-Huber drains x2  Estimated blood loss 25 cc  Anesthesia General via LMA and regional block  Surgeon Dr. Schultz  Assistant Shana Pickett, assistant necessary for retraction  Specimen left breast and low axillary contents and sentinel lymph node to pathology  Findings moderate amount of tissue edema likely secondary to her prior radiation therapy  Procedure after satisfactory induction of general anesthesia via LMA after a left chest wall block was performed the patient left chest wall axilla were prepped and draped free draping her left arm with a sterile stockinette.  Gamma probe was utilized the injection site was hot and the hottest area in the axilla was marked.  Transverse elliptical skin incision was marked encompassing the nipple areolar complex and encompassing laterally the area of highest activity in the axilla.  Skin and subcutaneous tissue was divided.  Flaps were created cephalad to the clavicle medial to the sternum inferiorly to the rectus abdominis and laterally to the latissimus dorsi.  The breast was taken off the pectoral muscle hemostasis was assured.  Attention was directed to the axilla the area was dissected out to just deep to the pectoralis minor muscle.  Gamma probe was utilized the hottest area was identified and the lymph node was excised.  This was checked for ex vivo counts, the ex vivo count was 3605.  The collimator was then placed on the head of the gamma probe and we saw no additional areas of activity over 100.  The axilla was amputated off hemostasis was assured hemoclips were used where appropriate.  The axillary bed now had background count that had dropped to 11.  Wound was liberally irrigated hemostasis was assured Miller-Huber drains were brought out to a separate inferolateral stab wounds 1 was placed  in the axilla and the medial drain was placed underneath the skin flaps.  The skin appeared to be viable.  The subcutaneous tissue was closed in interrupted simple fashion with use of 3-0 Vicryl.  Skin was closed with sterile skin clips.  The drains were sutured to the skin with use of 2-0 silks and had good seals.  Wound was cleaned and dried and sterilely dressed.  Again the flaps appear to be viable.  The site was dressed with fluffs and circumferential 6 and 4 inch Ace wraps.  The patient tolerated procedure well was transferred to the recovery room in stable condition she will be admitted to an observation bed for further care

## 2022-02-23 NOTE — ANESTHESIA PROCEDURE NOTES
Peripheral Block      Patient reassessed immediately prior to procedure    Patient location during procedure: OR  Start time: 2/23/2022 10:13 AM  Stop time: 2/23/2022 10:18 AM  Reason for block: at surgeon's request and post-op pain management  Performed by  CRNA: Musa Ramirez CRNA  Assisted by: Edward Parker CRNA  Preanesthetic Checklist  Completed: patient identified, IV checked, site marked, risks and benefits discussed, surgical consent, monitors and equipment checked, pre-op evaluation and timeout performed  Prep:  Pt Position: supine  Sterile barriers:cap, gloves, mask and washed/disinfected hands  Prep: ChloraPrep  Patient monitoring: blood pressure monitoring, continuous pulse oximetry and EKG  Procedure    Sedation: yes  Performed under: general  Guidance:ultrasound guided    ULTRASOUND INTERPRETATION. Using ultrasound guidance a 21 G gauge needle was placed in close proximity to the nerve, at which point, under ultrasound guidance anesthetic was injected in the area of the nerve and spread of the anesthesia was seen on ultrasound in close proximity thereto.  There were no abnormalities seen on ultrasound; a digital image was taken; and the patient tolerated the procedure with no complications. Images:still images obtained, printed/placed on chart    Laterality:left  Block Type:PECS I and PECS II  Injection Technique:single-shot  Needle Type:echogenic  Needle Gauge:21 G  Resistance on Injection: none    Medications Used: ropivacaine (NAROPIN) 0.2% injection, 20 mL  Med administered at 2/23/2022 10:18 AM      Medications  Comment:30 mcg precedex added    Post Assessment  Injection Assessment: negative aspiration for heme, no paresthesia on injection and incremental injection  Patient Tolerance:comfortable throughout block  Complications:no

## 2022-02-23 NOTE — ANESTHESIA PREPROCEDURE EVALUATION
Anesthesia Evaluation     Patient summary reviewed and Nursing notes reviewed   NPO Solid Status: > 6 hours  NPO Liquid Status: > 6 hours           Airway   Mallampati: II  TM distance: >3 FB  Neck ROM: full  No difficulty expected and Large neck circumference  Dental    (+) upper dentures    Pulmonary - normal exam   (+) a smoker (quit 1990.) Former, COPD, sleep apnea on CPAP,   Cardiovascular - normal exam  Exercise tolerance: good (4-7 METS)    (+) hypertension well controlled,     ROS comment: NORMAL ECG -  Sinus rhythm  NO SIGNIFICANT CHANGE FROM PREVIOUS ECG  Electronically Signed By: Aayush Mendoza ([facility]) 17-Feb-2022 15:16:27  Date and Time of Study: 2022-02-17 14:31:31    Neuro/Psych  GI/Hepatic/Renal/Endo    (+)  GERD well controlled,      Musculoskeletal     Abdominal  - normal exam   Substance History      OB/GYN negative ob/gyn ROS         Other   arthritis,    history of cancer (left breast) active                  Anesthesia Plan    ASA 3     general     intravenous induction     Anesthetic plan, all risks, benefits, and alternatives have been provided, discussed and informed consent has been obtained with: patient.  Use of blood products discussed with patient  Consented to blood products.       CODE STATUS:

## 2022-02-23 NOTE — INTERVAL H&P NOTE
"  H&P updated. The patient was examined and the following changes are noted:        /58 (BP Location: Right arm, Patient Position: Lying)   Pulse 73   Temp 97.8 °F (36.6 °C) (Oral)   Resp 18   Ht 157.5 cm (62\")   Wt 98.6 kg (217 lb 6.4 oz)   SpO2 93%   BMI 39.76 kg/m²     "

## 2022-02-24 VITALS
RESPIRATION RATE: 16 BRPM | TEMPERATURE: 96.6 F | HEIGHT: 62 IN | WEIGHT: 217.4 LBS | SYSTOLIC BLOOD PRESSURE: 154 MMHG | DIASTOLIC BLOOD PRESSURE: 73 MMHG | OXYGEN SATURATION: 91 % | HEART RATE: 94 BPM | BODY MASS INDEX: 40.01 KG/M2

## 2022-02-24 LAB
ANION GAP SERPL CALCULATED.3IONS-SCNC: 8.8 MMOL/L (ref 5–15)
BASOPHILS # BLD AUTO: 0.02 10*3/MM3 (ref 0–0.2)
BASOPHILS NFR BLD AUTO: 0.2 % (ref 0–1.5)
BUN SERPL-MCNC: 23 MG/DL (ref 8–23)
BUN/CREAT SERPL: 23 (ref 7–25)
CALCIUM SPEC-SCNC: 9.4 MG/DL (ref 8.6–10.5)
CHLORIDE SERPL-SCNC: 100 MMOL/L (ref 98–107)
CO2 SERPL-SCNC: 28.2 MMOL/L (ref 22–29)
CREAT SERPL-MCNC: 1 MG/DL (ref 0.57–1)
DEPRECATED RDW RBC AUTO: 54.3 FL (ref 37–54)
EOSINOPHIL # BLD AUTO: 0 10*3/MM3 (ref 0–0.4)
EOSINOPHIL NFR BLD AUTO: 0 % (ref 0.3–6.2)
ERYTHROCYTE [DISTWIDTH] IN BLOOD BY AUTOMATED COUNT: 14.8 % (ref 12.3–15.4)
GFR SERPL CREATININE-BSD FRML MDRD: 53 ML/MIN/1.73
GLUCOSE SERPL-MCNC: 152 MG/DL (ref 65–99)
HCT VFR BLD AUTO: 38 % (ref 34–46.6)
HGB BLD-MCNC: 11.7 G/DL (ref 12–15.9)
IMM GRANULOCYTES # BLD AUTO: 0.04 10*3/MM3 (ref 0–0.05)
IMM GRANULOCYTES NFR BLD AUTO: 0.4 % (ref 0–0.5)
LYMPHOCYTES # BLD AUTO: 1.97 10*3/MM3 (ref 0.7–3.1)
LYMPHOCYTES NFR BLD AUTO: 17.7 % (ref 19.6–45.3)
MCH RBC QN AUTO: 30.3 PG (ref 26.6–33)
MCHC RBC AUTO-ENTMCNC: 30.8 G/DL (ref 31.5–35.7)
MCV RBC AUTO: 98.4 FL (ref 79–97)
MONOCYTES # BLD AUTO: 0.62 10*3/MM3 (ref 0.1–0.9)
MONOCYTES NFR BLD AUTO: 5.6 % (ref 5–12)
NEUTROPHILS NFR BLD AUTO: 76.1 % (ref 42.7–76)
NEUTROPHILS NFR BLD AUTO: 8.5 10*3/MM3 (ref 1.7–7)
NRBC BLD AUTO-RTO: 0 /100 WBC (ref 0–0.2)
PLATELET # BLD AUTO: 250 10*3/MM3 (ref 140–450)
PMV BLD AUTO: 10.8 FL (ref 6–12)
POTASSIUM SERPL-SCNC: 4.7 MMOL/L (ref 3.5–5.2)
RBC # BLD AUTO: 3.86 10*6/MM3 (ref 3.77–5.28)
SODIUM SERPL-SCNC: 137 MMOL/L (ref 136–145)
WBC NRBC COR # BLD: 11.15 10*3/MM3 (ref 3.4–10.8)

## 2022-02-24 PROCEDURE — A9270 NON-COVERED ITEM OR SERVICE: HCPCS | Performed by: SURGERY

## 2022-02-24 PROCEDURE — 63710000001 HYDRALAZINE 50 MG TABLET: Performed by: SURGERY

## 2022-02-24 PROCEDURE — G0378 HOSPITAL OBSERVATION PER HR: HCPCS

## 2022-02-24 PROCEDURE — A9270 NON-COVERED ITEM OR SERVICE: HCPCS | Performed by: FAMILY MEDICINE

## 2022-02-24 PROCEDURE — 85025 COMPLETE CBC W/AUTO DIFF WBC: CPT | Performed by: SURGERY

## 2022-02-24 PROCEDURE — 63710000001 GUAIFENESIN 600 MG TABLET SUSTAINED-RELEASE 12 HOUR: Performed by: FAMILY MEDICINE

## 2022-02-24 PROCEDURE — 99024 POSTOP FOLLOW-UP VISIT: CPT | Performed by: SURGERY

## 2022-02-24 PROCEDURE — 0 CEFAZOLIN SODIUM-DEXTROSE 2-3 GM-%(50ML) RECONSTITUTED SOLUTION: Performed by: SURGERY

## 2022-02-24 PROCEDURE — 63710000001 LISINOPRIL 20 MG TABLET: Performed by: SURGERY

## 2022-02-24 PROCEDURE — 94799 UNLISTED PULMONARY SVC/PX: CPT

## 2022-02-24 PROCEDURE — 80048 BASIC METABOLIC PNL TOTAL CA: CPT | Performed by: SURGERY

## 2022-02-24 PROCEDURE — 63710000001 OXYCODONE-ACETAMINOPHEN 5-325 MG TABLET: Performed by: SURGERY

## 2022-02-24 PROCEDURE — 63710000001 PANTOPRAZOLE 40 MG TABLET DELAYED-RELEASE: Performed by: SURGERY

## 2022-02-24 PROCEDURE — 63710000001 CITALOPRAM 20 MG TABLET: Performed by: SURGERY

## 2022-02-24 PROCEDURE — 94761 N-INVAS EAR/PLS OXIMETRY MLT: CPT

## 2022-02-24 RX ORDER — HYDROCODONE BITARTRATE AND ACETAMINOPHEN 5; 325 MG/1; MG/1
1 TABLET ORAL EVERY 4 HOURS PRN
Qty: 15 TABLET | Refills: 0 | Status: SHIPPED | OUTPATIENT
Start: 2022-02-24 | End: 2022-03-02

## 2022-02-24 RX ADMIN — PANTOPRAZOLE SODIUM 40 MG: 40 TABLET, DELAYED RELEASE ORAL at 06:18

## 2022-02-24 RX ADMIN — SODIUM CHLORIDE, POTASSIUM CHLORIDE, SODIUM LACTATE AND CALCIUM CHLORIDE 75 ML/HR: 600; 310; 30; 20 INJECTION, SOLUTION INTRAVENOUS at 06:19

## 2022-02-24 RX ADMIN — LISINOPRIL 40 MG: 20 TABLET ORAL at 08:41

## 2022-02-24 RX ADMIN — HYDRALAZINE HYDROCHLORIDE 50 MG: 50 TABLET, FILM COATED ORAL at 08:42

## 2022-02-24 RX ADMIN — GUAIFENESIN 600 MG: 600 TABLET, EXTENDED RELEASE ORAL at 08:42

## 2022-02-24 RX ADMIN — CEFAZOLIN SODIUM 2 G: 2 SOLUTION INTRAVENOUS at 02:14

## 2022-02-24 RX ADMIN — OXYCODONE HYDROCHLORIDE AND ACETAMINOPHEN 2 TABLET: 5; 325 TABLET ORAL at 06:17

## 2022-02-24 RX ADMIN — CITALOPRAM HYDROBROMIDE 40 MG: 20 TABLET ORAL at 06:17

## 2022-02-24 NOTE — NURSING NOTE
Case Management Discharge Note      Final Note: dc home         Selected Continued Care - Discharged on 2/24/2022 Admission date: 2/23/2022 - Discharge disposition: Home or Self Care    Destination    No services have been selected for the patient.              Durable Medical Equipment    No services have been selected for the patient.              Dialysis/Infusion    No services have been selected for the patient.              Home Medical Care    No services have been selected for the patient.              Therapy    No services have been selected for the patient.              Community Resources    No services have been selected for the patient.              Community & DME    No services have been selected for the patient.                       Final Discharge Disposition Code: 01 - home or self-care

## 2022-02-24 NOTE — PROGRESS NOTES
Patient: Katarzyna Ku    @A@    Anesthesia Type: general  Patient location: [facility] Surgical Floor  Last vitals  BP      Temp      Pulse 94 (02/24/22 0923)   Resp 16 (02/24/22 0923)    SpO2 91 % (02/24/22 0918)      Post vital signs: stable  Level of consciousness: awake, alert and oriented    Post-anesthesia pain: adequate analgesia  Airway patency: patent  Respiratory: unassisted  Cardiovascular: stable and blood pressure at baseline  Hydration: euvolemic    Difficult Airway: no  Anesthetic complications: no

## 2022-02-24 NOTE — PROGRESS NOTES
"Daily Progress Note:      Chief complaint: Follow-up of hypertension diabetes COPD    Subjective: Postop day #1 left mastectomy and sentinel node biopsy patient without complaints no shortness of breath chest pain cough chest congestion     LOS: 0 days     Vital Signs  Temp:  [96.6 °F (35.9 °C)-97.8 °F (36.6 °C)] 96.6 °F (35.9 °C)  Heart Rate:  [74-98] 80  Resp:  [14-22] 16  BP: (101-158)/(51-72) 123/69  Oxygen Therapy  SpO2: 90 %  Pulse Oximetry Type: Continuous  Device (Oxygen Therapy): room air  Flow (L/min): 3  ETCO2 (mmHg): 40 mmHg}  Body mass index is 39.76 kg/m².  Flowsheet Rows      First Filed Value   Admission Height 157.5 cm (62\") Documented at 02/23/2022 0723   Admission Weight 98.6 kg (217 lb 6.4 oz) Documented at 02/23/2022 0723                   Documented weights    02/23/22 0723   Weight: 98.6 kg (217 lb 6.4 oz)           Patient Vitals for the past 24 hrs:   BP Temp Temp src Pulse Resp SpO2 Height   02/24/22 0604 123/69 96.6 °F (35.9 °C) Oral 80 16 90 % --   02/24/22 0300 -- -- -- 74 16 90 % --   02/23/22 2329 121/61 97.2 °F (36.2 °C) Oral 82 16 90 % --   02/23/22 2235 -- -- -- -- -- 94 % --   02/23/22 1947 128/66 97.7 °F (36.5 °C) Oral 94 16 92 % --   02/23/22 1709 -- -- -- 97 -- 100 % --   02/23/22 1545 117/56 97.8 °F (36.6 °C) Oral 79 16 100 % --   02/23/22 1515 157/66 97.5 °F (36.4 °C) Oral 87 16 94 % --   02/23/22 1445 158/56 97.5 °F (36.4 °C) Oral 95 16 91 % --   02/23/22 1430 149/68 97.5 °F (36.4 °C) Oral 91 18 92 % --   02/23/22 1415 151/65 97.3 °F (36.3 °C) Oral 87 16 92 % --   02/23/22 1400 149/68 97.3 °F (36.3 °C) Oral 93 18 90 % --   02/23/22 1345 108/51 96.8 °F (36 °C) Oral 86 16 92 % 157.5 cm (62\")   02/23/22 1318 -- -- -- 89 16 94 % --   02/23/22 1315 111/59 -- -- 89 16 94 % --   02/23/22 1310 115/61 -- -- 88 16 93 % --   02/23/22 1305 115/60 -- -- 91 16 94 % --   02/23/22 1300 110/63 -- -- 93 16 91 % --   02/23/22 1255 114/61 -- -- 94 16 92 % --   02/23/22 1250 122/64 -- -- 94 16 93 " % --   02/23/22 1245 106/60 -- -- 94 16 93 % --   02/23/22 1240 110/68 -- -- 96 16 92 % --   02/23/22 1235 112/59 -- -- 98 16 90 % --   02/23/22 1230 124/72 -- -- 97 14 98 % --   02/23/22 1228 -- -- -- -- -- 98 % --   02/23/22 1225 120/66 -- -- 94 22 96 % --   02/23/22 1220 101/61 -- -- 98 20 95 % --   02/23/22 1215 109/59 97.5 °F (36.4 °C) Infrared 93 20 90 % --   02/23/22 1212 110/55 -- -- 89 18 90 % --       98.6 kg (217 lb 6.4 oz)    Intake/Output                 02/23/22 0701 - 02/24/22 0700     [id number] 6125-4416 Total              Intake    P.O.  480  240 720    I.V.  1591.3  855 2446.3    IV Piggyback  50  -- 50    Total Intake 2121.3 1095 3216.3       Output    Urine  400  1200 1600    Drains  80  40 120    Blood  10  -- 10    Total Output 490 1240 1730           Intake/Output Summary (Last 24 hours) at 2/24/2022 0735  Last data filed at 2/24/2022 0619  Gross per 24 hour   Intake 3216.25 ml   Output 1730 ml   Net 1486.25 ml        Intake/Output Summary (Last 24 hours) at 2/24/2022 0735  Last data filed at 2/24/2022 0619  Gross per 24 hour   Intake 3216.25 ml   Output 1730 ml   Net 1486.25 ml        Review of Systems   Constitutional: Negative for activity change, appetite change and fatigue.   HENT: Negative for congestion.    Respiratory: Negative for cough, chest tightness, shortness of breath and wheezing.    Cardiovascular: Negative for chest pain.   Gastrointestinal: Negative for abdominal distention, abdominal pain, diarrhea, nausea and vomiting.   Endocrine: Negative for polyphagia and polyuria.   Genitourinary: Negative for frequency.   Skin: Negative for rash.   Neurological: Negative for light-headedness.   Hematological: Does not bruise/bleed easily.   Psychiatric/Behavioral: Negative for agitation and behavioral problems.       Physical Exam  Vitals and nursing note reviewed.   Constitutional:       Appearance: She is well-developed. She is morbidly obese.   HENT:      Head: Normocephalic.    Eyes:      Conjunctiva/sclera: Conjunctivae normal.   Neck:      Thyroid: No thyromegaly.      Vascular: No JVD.   Cardiovascular:      Rate and Rhythm: Normal rate and regular rhythm.      Heart sounds: Normal heart sounds. No murmur heard.      Pulmonary:      Effort: Pulmonary effort is normal. No respiratory distress.      Breath sounds: Decreased breath sounds present. No wheezing or rales.   Chest:      Comments: Dressing in place  Abdominal:      General: Bowel sounds are normal. There is no distension.      Palpations: Abdomen is soft.      Tenderness: There is no abdominal tenderness. There is no guarding.   Skin:     General: Skin is warm and dry.      Findings: No rash.   Neurological:      General: No focal deficit present.      Mental Status: She is alert and oriented to person, place, and time.   Psychiatric:         Attention and Perception: Attention normal.         Mood and Affect: Mood normal.         Medication Review:   I have reviewed the patient's current medication list  Scheduled Meds:citalopram, 40 mg, Oral, QAM  guaiFENesin, 600 mg, Oral, Q12H  hydrALAZINE, 50 mg, Oral, TID  lisinopril, 40 mg, Oral, Q24H  pantoprazole, 40 mg, Oral, QAM  Fluticasone-Umeclidin-Vilant, 1 puff, Inhalation, Daily - RT      Continuous Infusions:lactated ringers, 75 mL/hr, Last Rate: 75 mL/hr (02/24/22 0619)  Scopolamine, 1 patch      PRN Meds:.•  albuterol  •  ALPRAZolam  •  hyoscyamine  •  Morphine  •  Morphine  •  ondansetron  •  oxyCODONE-acetaminophen  •  oxyCODONE-acetaminophen      Result Review    Result Review:  I have personally reviewed the results from the time of this admission to 2/24/2022 07:35 EST and agree with these findings:  [x]  Laboratory  []  Microbiology  [x]  Radiology  []  EKG/Telemetry   []  Cardiology/Vascular   []  Pathology  []  Old records  []  Other:          Labs:  Results from last 7 days   Lab Units 02/24/22  0609 02/17/22  1416   WBC 10*3/mm3 11.15* 7.77   HEMOGLOBIN g/dL  11.7* 11.9*   HEMATOCRIT % 38.0 38.3   PLATELETS 10*3/mm3 250 232     Results from last 7 days   Lab Units 02/24/22  0609 02/17/22  1416   SODIUM mmol/L 137 139   POTASSIUM mmol/L 4.7 4.2   CHLORIDE mmol/L 100 102   CO2 mmol/L 28.2 26.8   BUN mg/dL 23 16   CREATININE mg/dL 1.00 0.89   CALCIUM mg/dL 9.4 9.6   GLUCOSE mg/dL 152* 125*           Results from last 7 days   Lab Units 02/24/22  0609 02/17/22  1416   PLATELETS 10*3/mm3 250 232         Lab Results (last 24 hours)     Procedure Component Value Units Date/Time    Basic Metabolic Panel [[id number]]  (Abnormal) Collected: 02/24/22 0609    Specimen: Blood Updated: 02/24/22 0629     Glucose 152 mg/dL      BUN 23 mg/dL      Creatinine 1.00 mg/dL      Sodium 137 mmol/L      Potassium 4.7 mmol/L      Chloride 100 mmol/L      CO2 28.2 mmol/L      Calcium 9.4 mg/dL      eGFR Non African Amer 53 mL/min/1.73      BUN/Creatinine Ratio 23.0     Anion Gap 8.8 mmol/L     Narrative:      GFR Normal >60  Chronic Kidney Disease <60  Kidney Failure <15      CBC & Differential [[id number]]  (Abnormal) Collected: 02/24/22 0609    Specimen: Blood Updated: 02/24/22 0614    Narrative:      The following orders were created for panel order CBC & Differential.  Procedure                               Abnormality         Status                     ---------                               -----------         ------                     CBC Auto Differential[[id number]]        Abnormal            Final result                 Please view results for these tests on the individual orders.    CBC Auto Differential [[id number]]  (Abnormal) Collected: 02/24/22 0609    Specimen: Blood Updated: 02/24/22 0614     WBC 11.15 10*3/mm3      RBC 3.86 10*6/mm3      Hemoglobin 11.7 g/dL      Hematocrit 38.0 %      MCV 98.4 fL      MCH 30.3 pg      MCHC 30.8 g/dL      RDW 14.8 %      RDW-SD 54.3 fl      MPV 10.8 fL      Platelets 250 10*3/mm3      Neutrophil % 76.1 %      Lymphocyte % 17.7 %      Monocyte % 5.6  %      Eosinophil % 0.0 %      Basophil % 0.2 %      Immature Grans % 0.4 %      Neutrophils, Absolute 8.50 10*3/mm3      Lymphocytes, Absolute 1.97 10*3/mm3      Monocytes, Absolute 0.62 10*3/mm3      Eosinophils, Absolute 0.00 10*3/mm3      Basophils, Absolute 0.02 10*3/mm3      Immature Grans, Absolute 0.04 10*3/mm3      nRBC 0.0 /100 WBC     Tissue Pathology Exam [[id number]] Collected: 02/23/22 1102    Specimen: Tissue from Breast, Left; Tissue from [place] Lymph Node Updated: 02/23/22 1214                                    Results from last 7 days   Lab Units 02/17/22  1416   HEMOGLOBIN A1C % 5.90*     Glucose   Date/Time Value Ref Range Status   02/23/2022 0718 132 (H) 70 - 130 mg/dL Final     Comment:     Meter: [id number] : [id number] Pipe Robison RN                         Radiology:  Imaging Results (Last 24 Hours)     Procedure Component Value Units Date/Time    US Sonosite Portable [[id number]] Resulted: 02/23/22 0924     Updated: 02/23/22 0924    Narrative:      This procedure was auto-finalized with no dictation required.          Cardiology:  ECG/EMG Results (last 24 hours)     ** No results found for the last 24 hours. **          I have reviewed recent labs results and consult notes.    Please note portions of this assessment/plan may have been copied and pasted, but I have personally seen this patient and reviewed each line of this assessment and plan for accuracy and made updates to reflect my necessary changes    Assessment and Plan:    1.  Postop day #1 left breast carcinoma status post left mastectomy sentinel biopsy doing well postoperatively nothing acute     2. COPD no exacerbation continue Trelegy and as needed albuterol spirometry early ambulation     3. Adult-onset diabetes mellitus diet-controlled Accu-Chek sliding scale insulin     4. Hypertension stable nothing acute continue with present medications        5. Hyperlipidemia stable continue statin     6.  Central sleep apnea  on home CPAP patient compliant  Much of this encounter note is an electronic transcription/translation of spoken language to printed text. The electronic translation of spoken language may permit erroneous, or at times, nonsensical words or phrases to be inadvertently transcribed; Although I have reviewed the note for such errors, some may still exist.

## 2022-02-24 NOTE — PROGRESS NOTES
MD order for patient to use home positive airway pressure device.  Unit reviewed and appears to be a device specified in [employer] recall.  Patient provided information on recall and directed to discuss any concerns with sleep provider and DME.    Patient states desire to continue use of home device during hospital stay.

## 2022-02-24 NOTE — PLAN OF CARE
Goal Outcome Evaluation:  Plan of Care Reviewed With: patient        Progress: improving  Outcome Summary: Pt VSS, on home o2 dose, see RT notes. Pt PO day 1 s/p L mastectomy,  surgical dressing c/d/i, yesenia drains x2 continue bloody drainage. Pt up ad jesse in room, to restroom. Pt reports pain controlled with current PO regimen, see emar, flowsheets. Pt denies n/v/d overnight, reports tolertaing diet well. Pt resting at this time.

## 2022-02-24 NOTE — PROGRESS NOTES
Chief Complaint: POD # 1    Subjective   Pt requesting to go home, tolerating diet, pain is well controlled, daughter is at the patient's bedside.  Objective     Vital signs in last 24 hours:  Temp:  [96.6 °F (35.9 °C)-97.8 °F (36.6 °C)] 96.6 °F (35.9 °C)  Heart Rate:  [74-99] 94  Resp:  [14-22] 16  BP: (101-158)/(51-73) 154/73    Intake/Output last 3 shifts:  I/O last 3 completed shifts:  In: 3216.3 [P.O.:720; I.V.:2446.3; IV Piggyback:50]  Out: 1730 [Urine:1600; Drains:120; Blood:10]    Intake/Output this shift:  I/O this shift:  In: 240 [P.O.:240]  Out: -     Physical Exam:  Respiratory: CTA, good inspiratory effort  CV: RRR  Left breast mastectomy flaps - viable and intact with staples, both SAÚL drains are serosanguinous SAÚL #1 60cc, SAÚL #2 60cc    Results from last 7 days   Lab Units 02/24/22  0609   WBC 10*3/mm3 11.15*   HEMOGLOBIN g/dL 11.7*   HEMATOCRIT % 38.0   PLATELETS 10*3/mm3 250     Results from last 7 days   Lab Units 02/24/22  0609   SODIUM mmol/L 137   POTASSIUM mmol/L 4.7   CHLORIDE mmol/L 100   CO2 mmol/L 28.2   BUN mg/dL 23   CREATININE mg/dL 1.00   GLUCOSE mg/dL 152*   CALCIUM mg/dL 9.4       Assessment/Plan   S/P Left mastectomy and sentinel node biopsy   Teach daughter SAÚL drain care  D/c home  F/u with Dr. Schultz in one week   Remove bandage tomorrow  May shower tomorrow     Ginna Gray MD  General, Minimally Invasive and Endoscopic Surgery  [facility]    [address] [facility] [address] [place] [address]    [address]  [place] [address]               [place] [address]    P: [phone]  F: [phone]    Cc:  Brayan Beal MD

## 2022-02-24 NOTE — DISCHARGE INSTRUCTIONS
MASTECTOMY  POST OP RECOMMENDATIONS  Dr. Gray  [id number]  ACTIVITIES:  1. Expect to rest most of the day for 2-3 days after surgery, but do get up several times daily to reduce the risk of getting a clot in your legs.  2. No strenuous activity or lifting over 10 lbs. restriction until 6 weeks out from surgery.  Try to avoid raising your arms above the level of your shoulders until seen at your post-op visit.  3. Do not drive while on pain medicine.  You must be off pain meds 24 hours before driving.  4. You can climb stairs, but do one step at a time (both feet on one step rather than going up with each step.)  5. Post op instructions for range of motion will be given to you at your post op visit.    SYMPTOMS:         1. Fatigue and decreased stamina is not unusual for about a week or so after  surgery due to anesthesia.  Try to take walks and some mild activity  between resting.         2. Constipation is common when taking pain medications.  Over the counter  laxatives, such as Miralax or MOM, can be used.          3. Numbness can occur on the chest, on the arm, back towards the shoulder blade,  as well as the arm pit on the same side as surgery.  This will decrease slowly  over months.         4. Swelling in the arm or arms associated with any lymph node resection is  possible.  Minimizing the activity of that arm  the days after surgery can help  prevent this.  If it occurs, call the office for recommendations.         5. Significant swelling or lymphedema can be addressed with appointments to the  Lymphedema Clinic and can be scheduled on your post-operative visit.    WOUND SITE:  1. Dressings can be removed 2 days after procedure.    2. Dressings may occasionally have spots of blood on them.  As long as it is dry, these do not need to be changed.  If it is soaked, then the dressing should be removed and a new dressing placed.  3. Skin irritation, redness or itching can prompt removal of the bandage earlier if  present.  4. Staples will be removed on your post-operative visit and replaced with steri-strips which will fall off in 1-2 weeks but can be removed sooner if they are causing irritation.  5. Drains may be removed at your follow-up appointment.  Drains should be emptied daily and the amounts recorded. May shower with ivy.    MEALS:  1. Eat and Drink very lightly on the day of surgery.  Jell-O, ginger ale, chicken noodle soup and crackers are good examples.  The day after surgery you may broaden your diet.  2. Do NOT take pain pills on an empty stomach.    WORK:  1. In general if you have a sedentary job, you can return to work in 2-3 weeks at the earliest.  If heavy lifting is required it may be 6 or more weeks.   Any changes to these numbers will be discussed post-operatively.  2. Return to work notes can be provided at the time of your post-operative appointment.    FOLLOW UP:  1. Call and make a post-operative appointment for approximately 1 week after the procedure.  2. A prescription for a prosthetic will be given at your post op visit for a fitting 6 weeks after surgery.    PATHOLOGY:  1. In general your pathology is available 2 business days after surgery.  Call the office at that point to prompt a return call at the end of the work day with results.      Ginna Gray MD  General, Minimally Invasive and Endoscopic Surgery  [facility]    [facility] [address] [place] [address]    [address]  [address] [address]    P: [phone]  F: [phone]    Cc:  Brayan Beal MD

## 2022-02-25 ENCOUNTER — READMISSION MANAGEMENT (OUTPATIENT)
Dept: CALL CENTER | Facility: HOSPITAL | Age: 82
End: 2022-02-25

## 2022-02-25 NOTE — OUTREACH NOTE
Prep Survey      Responses   [facility] facility patient discharged from? [place]   Is LACE score < 7 ? Yes   Emergency Room discharge w/ pulse ox? No   Eligibility Readm Mgmt   Discharge diagnosis s/p left mastectomy and sentinel lymph node biopsy   Does the patient have one of the following disease processes/diagnoses(primary or secondary)? General Surgery   Does the patient have Home health ordered? No   Is there a DME ordered? No   Prep survey completed? Yes          Ghada Caicedo RN

## 2022-02-27 ENCOUNTER — NURSE TRIAGE (OUTPATIENT)
Dept: CALL CENTER | Facility: HOSPITAL | Age: 82
End: 2022-02-27

## 2022-02-27 NOTE — TELEPHONE ENCOUNTER
Caller had a left mastectomy 2/23/22.  She has no taste, fatigue, and chills.  She states that she is afebrile.  She is asking if all of these can be side effects of anesthesia.  Information researched and reviewed with caller that 1 to 2% of surgical patients do have their taste effected post surgery.  Discussed that fatigue is normal and will improve.  She is afebrile but information does state that one of the side effects is feeling cold and having chills.  Denies other questions at this time.  States doing well otherwise.  Encouraged to get plenty of rest and stay hydrated.  Call back with other concerns or questions.  Reason for Disposition  • Other post-op symptom or question    Additional Information  • Negative: Sounds like a life-threatening emergency to the triager  • Negative: Chest pain  • Negative: Difficulty breathing  • Negative: Acting confused (e.g., disoriented, slurred speech) or excessively sleepy  • Negative: Surgical incision symptoms and questions  • Negative: [1] Discomfort (pain, burning or stinging) when passing urine AND [2] male  • Negative: [1] Discomfort (pain, burning or stinging) when passing urine AND [2] female  • Negative: Constipation  • Negative: New or worsening leg (calf, thigh) pain  • Negative: New or worsening leg swelling  • Negative: Dizziness is severe, or persists > 24 hours after surgery  • Negative: Pain, redness, swelling, or pus at IV Site  • Negative: Symptoms arising from use of a urinary catheter (Arriaga or Coude)  • Negative: Cast problems or questions  • Negative: Medication question  • Negative: [1] Widespread rash AND [2] bright red, sunburn-like  • Negative: [1] SEVERE headache AND [2] after spinal (epidural) anesthesia  • Negative: [1] Vomiting AND [2] persists > 4 hours  • Negative: [1] Vomiting AND [2] abdomen looks much more swollen than usual  • Negative: [1] Drinking very little AND [2] dehydration suspected (e.g., no urine > 12 hours, very dry mouth,  "very lightheaded)  • Negative: Patient sounds very sick or weak to the triager  • Negative: Sounds like a serious complication to the triager  • Negative: Fever > 100.4 F (38.0 C)  • Negative: [1] SEVERE post-op pain (e.g., excruciating, pain scale 8-10) AND [2] not controlled with pain medications  • Negative: [1] Caller has URGENT question AND [2] triager unable to answer question  • Negative: [1] Headache AND [2] after spinal (epidural) anesthesia AND [3] not severe  • Negative: Fever present > 3 days (72 hours)  • Negative: [1] MILD-MODERATE post-op pain (e.g., pain scale 1-7) AND [2] not controlled with pain medications  • Negative: [1] Caller has NON-URGENT question AND [2] triager unable to answer question  • Negative: General activity, questions about  • Negative: Resuming driving, questions about  • Negative: Resuming sexual relations, questions about  • Negative: Getting the incision wet, questions about  • Negative: Throat pain after surgery, questions about  • Negative: [1] Vomiting AND [2] present < 4 hours    Answer Assessment - Initial Assessment Questions  1. SYMPTOM: \"What's the main symptom you're concerned about?\" (e.g., pain, fever, vomiting)      No taste and fatigue  2. ONSET: \"When did no taste and fatigue  start?\"      After surgery  3. SURGERY: \"What surgery was performed?\"      Left mastectomy  4. DATE of SURGERY: \"When was surgery performed?\"       2/23  5. ANESTHESIA: \" What type of anesthesia did you have?\" (e.g., general, spinal, epidural, local)      general  6. PAIN: \"Is there any pain?\" If Yes, ask: \"How bad is it?\"  (Scale 1-10; or mild, moderate, severe)      mild  7. FEVER: \"Do you have a fever?\" If Yes, ask: \"What is your temperature, how was it measured, and when did it start?\"      no  8. VOMITING: \"Is there any vomiting?\" If yes, ask: \"How many times?\"      no  9. BLEEDING: \"Is there any bleeding?\" If Yes, ask: \"How much?\" and \"Where?\"      no  10. OTHER SYMPTOMS: \"Do you have " "any other symptoms?\" (e.g., drainage from wound, painful urination, constipation)        chills    Protocols used: POST-OP SYMPTOMS AND QUESTIONS-[facility]    "

## 2022-02-28 LAB
LAB AP CASE REPORT: NORMAL
LAB AP DIAGNOSIS COMMENT: NORMAL
LAB AP SYNOPTIC CHECKLIST: NORMAL
PATH REPORT.FINAL DX SPEC: NORMAL
PATH REPORT.GROSS SPEC: NORMAL

## 2022-03-02 ENCOUNTER — OFFICE VISIT (OUTPATIENT)
Dept: SURGERY | Facility: CLINIC | Age: 82
End: 2022-03-02

## 2022-03-02 DIAGNOSIS — Z09 SURGICAL FOLLOW-UP CARE: Primary | ICD-10-CM

## 2022-03-02 DIAGNOSIS — C50.912 RECURRENT BREAST CANCER, LEFT: ICD-10-CM

## 2022-03-02 PROCEDURE — 99024 POSTOP FOLLOW-UP VISIT: CPT | Performed by: SURGERY

## 2022-03-02 RX ORDER — CODEINE PHOSPHATE AND GUAIFENESIN 10; 100 MG/5ML; MG/5ML
SOLUTION ORAL
COMMUNITY
Start: 2022-01-11 | End: 2023-01-23

## 2022-03-02 RX ORDER — DOXYCYCLINE HYCLATE 100 MG/1
CAPSULE ORAL
COMMUNITY
Start: 2022-01-11 | End: 2022-08-03

## 2022-03-02 RX ORDER — FLUCONAZOLE 100 MG/1
100 TABLET ORAL WEEKLY
COMMUNITY
Start: 2022-02-22

## 2022-03-02 NOTE — PROGRESS NOTES
Patient presents for 7 day po Malignant neoplasm of central portion of left breast in female, estrogen receptor positive. Patient states that the drains are leaking this morning.  No other complaints.  The drain underneath the flap is partially slipped and is why she is having some drainage.  We will no longer hold a seal.  This drain was removed.  The axillary drain was left in place.  It now holds a seal.  Her pathology was discussed.  I would like to see her back in the office in 5 days.  We will refer her to Dr. Sanders

## 2022-03-07 ENCOUNTER — OFFICE VISIT (OUTPATIENT)
Dept: SURGERY | Facility: CLINIC | Age: 82
End: 2022-03-07

## 2022-03-07 DIAGNOSIS — Z09 SURGICAL FOLLOW-UP CARE: Primary | ICD-10-CM

## 2022-03-07 DIAGNOSIS — G89.18 POSTOPERATIVE PAIN: ICD-10-CM

## 2022-03-07 PROCEDURE — 99024 POSTOP FOLLOW-UP VISIT: CPT | Performed by: SURGERY

## 2022-03-07 RX ORDER — HYDROCODONE BITARTRATE AND ACETAMINOPHEN 5; 325 MG/1; MG/1
1 TABLET ORAL EVERY 6 HOURS PRN
Qty: 20 TABLET | Refills: 0 | Status: SHIPPED | OUTPATIENT
Start: 2022-03-07 | End: 2022-05-09

## 2022-03-07 NOTE — PROGRESS NOTES
Patient presents for 12 day po BREAST MASTECTOMY WITH SENTINEL NODE BIOPSY. She states that the staples are pulling her skin.  She complains of some pain along her staple line.  She says she only has 2 Percocet left.  She does have mild swelling along the staple line there is no signs of infection.  There is no evidence of seroma.  The drain that was left in he has picked up the drainage and has over 30 cc output per day.  It is serous.  We will leave the staples in place and leave the drain in place.  I will send a prescription for 20 Percocet for her.  I will see her back in the office in 1 week

## 2022-03-08 ENCOUNTER — PATIENT ROUNDING (BHMG ONLY) (OUTPATIENT)
Dept: SURGERY | Facility: CLINIC | Age: 82
End: 2022-03-08

## 2022-03-08 NOTE — PROGRESS NOTES
March 8, 2022    Hello, may I speak with Katarzyna Ku?    My name is Enriqueta Alford      I am  with [facility]  [address]  [place] [address] [phone].    Before we get started may I verify your date of birth? 1940    I am calling to officially welcome you to our practice and ask about your recent visit. Is this a good time to talk? yes    Tell me about your visit with us. What things went well?  She was pleased with her visit. She is just ready to get her staples out. But she was happy with Dr. Schultz.       We're always looking for ways to make our patients' experiences even better. Do you have recommendations on ways we may improve?  no    Overall were you satisfied with your first visit to our practice? yes       I appreciate you taking the time to speak with me today. Is there anything else I can do for you? no      Thank you, and have a great day.

## 2022-03-15 ENCOUNTER — OFFICE VISIT (OUTPATIENT)
Dept: SURGERY | Facility: CLINIC | Age: 82
End: 2022-03-15

## 2022-03-15 ENCOUNTER — CONSULT (OUTPATIENT)
Dept: ONCOLOGY | Facility: CLINIC | Age: 82
End: 2022-03-15

## 2022-03-15 ENCOUNTER — APPOINTMENT (OUTPATIENT)
Dept: LAB | Facility: HOSPITAL | Age: 82
End: 2022-03-15

## 2022-03-15 VITALS
HEIGHT: 61 IN | DIASTOLIC BLOOD PRESSURE: 72 MMHG | OXYGEN SATURATION: 93 % | HEART RATE: 71 BPM | BODY MASS INDEX: 40.97 KG/M2 | WEIGHT: 217 LBS | TEMPERATURE: 98.2 F | RESPIRATION RATE: 20 BRPM | SYSTOLIC BLOOD PRESSURE: 157 MMHG

## 2022-03-15 DIAGNOSIS — C50.112 MALIGNANT NEOPLASM OF CENTRAL PORTION OF LEFT BREAST IN FEMALE, ESTROGEN RECEPTOR POSITIVE: Primary | ICD-10-CM

## 2022-03-15 DIAGNOSIS — Z09 SURGICAL FOLLOW-UP CARE: Primary | ICD-10-CM

## 2022-03-15 DIAGNOSIS — Z17.0 MALIGNANT NEOPLASM OF CENTRAL PORTION OF LEFT BREAST IN FEMALE, ESTROGEN RECEPTOR POSITIVE: Primary | ICD-10-CM

## 2022-03-15 PROCEDURE — 99205 OFFICE O/P NEW HI 60 MIN: CPT | Performed by: INTERNAL MEDICINE

## 2022-03-15 PROCEDURE — 99024 POSTOP FOLLOW-UP VISIT: CPT | Performed by: SURGERY

## 2022-03-15 RX ORDER — CHLORHEXIDINE GLUCONATE 0.12 MG/ML
RINSE ORAL
COMMUNITY
Start: 2022-03-07

## 2022-03-15 NOTE — PROGRESS NOTES
Patient presents for 20 day po BREAST MASTECTOMY WITH SENTINEL NODE BIOPSY. No complaints.  She is feeling better.  Her drain output is still about 60 cc/day.  One half of her staples were removed.  She sees Dr. Sanders today.  I will see her back in the office in a week

## 2022-03-15 NOTE — PROGRESS NOTES
Subjective     REASON FOR CONSULTATION: Breast cancer  Provide an opinion on any further workup or treatment                             REQUESTING PHYSICIAN: Dr. Schultz    RECORDS OBTAINED:  Records of the patients history including those obtained from the referring provider were reviewed and summarized in detail.    HISTORY OF PRESENT ILLNESS:  The patient is a 81 y.o. year old female who is here for an opinion about the above issue.    History of Present Illness   This is a 81-year-old lady with obesity, COPD/asthma on intermittent O2, hypertension, depression, Casper's esophagus, depression and anxiety.  She has a history of stage I triple negative left breast cancer in 2009 treated with a lumpectomy and radiation therapy completed January 2010.      The patient had an abnormal screening mammogram on the left 11-21 showing calcifications in the left breast and a new oval-shaped asymmetry measuring 15 mm suspicious for malignancy.  Diagnostic mammogram and ultrasound 11/24/2021 showed postlumpectomy changes in the lateral portion of the left breast and a new 1.6 x 1.1 cm lesion at the 3 to 4 o'clock position of the left breast 7 cm from the nipple.  She underwent an ultrasound-guided breast biopsy 12/15/2021 with 2 lesions biopsy including a small solid satellite nodule adjacent to the previously described larger mass.  Pathology from mass 1 showed invasive ductal carcinoma grade 3 (3+3+2) ER 31 to 40% positive moderately, NC negative, HER-2 2+/FISH negative, Ki-67 60% with no in situ component, no lymph vascular or perineural invasion; mass 2 invasive ductal carcinoma grade 2 (3+3+1) ER 1 to 10% weakly positive, NC negative, HER-2 2+/FISH +80% of cells, Ki-67 55% with no in situ component, no lymphovascular or perineural invasion.    The patient underwent a left mastectomy and sentinel lymph node biopsy on 2/23/2022 showing invasive ductal carcinoma grade 3 tumor measuring 3.0 cm in size, negative margins,  several foci of lymphovascular invasion, positive perineural invasion, associated high-grade ductal carcinoma in situ 40 mm negative margins for in situ disease, 5 negative axillary lymph nodes.    The patient's main complaint relates to her shortness of breath related to COPD.  She is on oxygen intermittently.  She relates her breathing has worsened significantly over the previous 4 to 5 years.    Past Medical History:   Diagnosis Date   • Anxiety    • Arthritis    • Asthma    • Casper esophagus    • Breast cancer (HCC) 2009    left   • Colon polyp    • COPD (chronic obstructive pulmonary disease) (HCC)     Dr Edith alba, clearance in media 2/2022   • Depression    • Diarrhea    • Diverticulitis of colon    • Flu 12/2021   • GERD (gastroesophageal reflux disease)    • Hypertension    • Requires supplemental oxygen     3L/NC w/exertion   • Skin cancer    • Skin cancer    • Sleep apnea     w/CPAP and oxygen at 3L        Past Surgical History:   Procedure Laterality Date   • BILATERAL SALPINGO OOPHORECTOMY     • BLADDER SURGERY      bladder lift   • BREAST LUMPECTOMY Left     breast ca   • CARPAL TUNNEL RELEASE      BOTH HANDS   • CATARACT EXTRACTION W/ INTRAOCULAR LENS  IMPLANT, BILATERAL     • COLONOSCOPY N/A 02/10/2017    Procedure: COLONOSCOPY with polypectomy;  Surgeon: Weston Sue MD;  Location: [facility] OR;  Service:    • COLONOSCOPY N/A 08/12/2020    Procedure: COLONOSCOPY;  Surgeon: Weston Sue MD;  Location: [facility] OR;  Service: Gastroenterology;  Laterality: N/A;  TRANSVERSE COLON POLYP  DIVERTICULOSIS  CECAL TIME at 1356  SIGMOID COLON POLYP   • ENDOSCOPY N/A 02/10/2017    Procedure: ESOPHAGOGASTRODUODENOSCOPY with biopsies;  Surgeon: Weston Sue MD;  Location: [facility] OR;  Service:    • ENDOSCOPY N/A 08/12/2020    Procedure: ESOPHAGOGASTRODUODENOSCOPY;  Surgeon: Weston Sue MD;  Location: [facility] OR;  Service: Gastroenterology;  Laterality:  N/A;  DISTAL ESOPHAGUS BIOPSY  STOCKTON'S ESOPHAGUS   • HYSTERECTOMY     • MASTECTOMY W/ SENTINEL NODE BIOPSY Left 02/23/2022    Procedure: BREAST MASTECTOMY WITH SENTINEL NODE BIOPSY;  Surgeon: Afshin Schultz MD;  Location: [facility];  Service: General;  Laterality: Left;   • SKIN BIOPSY     • TOTAL KNEE ARTHROPLASTY Bilateral         Current Outpatient Medications on File Prior to Visit   Medication Sig Dispense Refill   • acetaminophen (TYLENOL) 500 MG tablet Take 1,000 mg by mouth Every 6 (Six) Hours As Needed for Mild Pain .     • albuterol (PROVENTIL) (2.5 MG/3ML) 0.083% nebulizer solution Take 2.5 mg by nebulization 2 (Two) Times a Day. Can use 4 times a day if needed     • albuterol sulfate  (90 Base) MCG/ACT inhaler Inhale 2 puffs Every 4 (Four) Hours As Needed for Wheezing or Shortness of Air.     • ALPRAZolam (XANAX) 0.5 MG tablet Take 0.5 mg by mouth Daily As Needed for Anxiety.     • benazepril (LOTENSIN) 40 MG tablet Take 40 mg by mouth Daily.     • Calcium Acetate-Magnesium Carb 450-200 MG tablet Take 1 tablet by mouth Daily.     • Cholecalciferol (Vitamin D3) 20 MCG (800 UNIT) tablet Take 50 mcg by mouth Daily.     • citalopram (CeleXA) 40 MG tablet Take 40 mg by mouth Every Morning.     • colestipol (COLESTID) 1 g tablet TAKE TWO TABLETS BY MOUTH DAILY (Patient taking differently: Take 2 g by mouth Daily.) 60 tablet 5   • Cyanocobalamin (VITAMIN B-12) 5000 MCG tablet dispersible Take 1 tablet by mouth Daily.     • doxycycline (VIBRAMYCIN) 100 MG capsule      • fluconazole (DIFLUCAN) 100 MG tablet      • Fluticasone-Umeclidin-Vilant (TRELEGY) 100-62.5-25 MCG/INH inhaler Inhale 1 puff Daily.     • glucosamine-chondroitin 500-400 MG capsule capsule Take 1 capsule by mouth 2 (Two) Times a Day With Meals.     • guaiFENesin (MUCINEX) 600 MG 12 hr tablet Take 1 tablet by mouth Every 12 (Twelve) Hours. (Patient taking differently: Take 600 mg by mouth 2 (Two) Times a Day As Needed for Cough or  Congestion.) 30 tablet 0   • guaiFENesin-codeine (ROMILAR-AC) 100-10 MG/5ML syrup      • hydrALAZINE (APRESOLINE) 50 MG tablet Take 50 mg by mouth 3 (Three) Times a Day.     • HYDROcodone-acetaminophen (NORCO) 5-325 MG per tablet Take 1 tablet by mouth Every 6 (Six) Hours As Needed (pain) for up to 20 doses. 20 tablet 0   • hyoscyamine (LEVBID) 0.375 MG 12 hr tablet Take 1 tablet by mouth Every 12 (Twelve) Hours As Needed for Cramping. Pt must schedule appt with provider prior next refills request 60 tablet 3   • magnesium oxide (MAG-OX) 400 MG tablet Take 400 mg by mouth Daily.     • meloxicam (MOBIC) 15 MG tablet Take 15 mg by mouth Daily.     • Mirabegron ER (MYRBETRIQ) 50 MG tablet sustained-release 24 hour 24 hr tablet Take 50 mg by mouth every night at bedtime.     • Multiple Vitamins-Minerals (MULTIVITAMIN ADULT PO) Take 1 tablet by mouth Daily.     • O2 (OXYGEN) Inhale 3 L/min 1 (One) Time. Uses w/activity     • Omega-3 Fatty Acids (fish oil) 1000 MG capsule capsule Take 1,000 mg by mouth Daily With Breakfast.     • omeprazole (priLOSEC) 40 MG capsule Take 1 capsule by mouth Daily. 90 capsule 3   • ondansetron (ZOFRAN) 4 MG tablet Take 1 tablet by mouth Every 6 (Six) Hours As Needed for Nausea or Vomiting. 20 tablet 0     No current facility-administered medications on file prior to visit.        ALLERGIES:    Allergies   Allergen Reactions   • Azithromycin Nausea And Vomiting     SEVERE GI UPSET   • Sulfa Antibiotics Itching   • Codeine Rash        Social History     Socioeconomic History   • Marital status:    Tobacco Use   • Smoking status: Former Smoker     Packs/day: 2.00     Years: 30.00     Pack years: 60.00     Types: Cigarettes     Quit date: 1990     Years since quittin.1   • Smokeless tobacco: Never Used   Vaping Use   • Vaping Use: Never used   Substance and Sexual Activity   • Alcohol use: No   • Drug use: No   • Sexual activity: Defer        Family History   Problem Relation  "Age of Onset   • Breast cancer Neg Hx    • Malig Hyperthermia Neg Hx         Review of Systems   Constitutional: Negative.    HENT: Negative.    Respiratory: Positive for shortness of breath.    Cardiovascular: Negative.    Gastrointestinal: Negative.    Genitourinary: Negative.    Musculoskeletal: Negative.    Skin: Positive for rash (Under right breast).   Allergic/Immunologic: Negative.    Neurological: Negative.    Hematological: Negative.    Psychiatric/Behavioral: Negative.           Objective     Vitals:    03/15/22 1032   BP: 157/72   Pulse: 71   Resp: 20   Temp: 98.2 °F (36.8 °C)   TempSrc: Infrared   SpO2: 93%   Weight: 98.4 kg (217 lb)   Height: 154.9 cm (60.98\")   PainSc: 0-No pain     Current Status 3/15/2022   ECOG score 1       Physical Exam    CONSTITUTIONAL: pleasant well-developed obese elderly woman  HEENT: no icterus, no thrush, moist membranes  NECK: no jvd  LYMPH: no cervical or supraclavicular lad  CV: RRR, S1S2, no murmur  Breast: Status post left mastectomy, fungal rash under the right breast  RESP: cta bilat, no wheezing, no rales, diminished breath sounds  GI: soft, non-tender, no splenomegaly, +bs  MUSC: no edema, normal gait  NEURO: alert and oriented x3, mild global weakness  PSYCH: normal mood and affect    RECENT LABS:  Hematology WBC   Date Value Ref Range Status   02/24/2022 11.15 (H) 3.40 - 10.80 10*3/mm3 Final     RBC   Date Value Ref Range Status   02/24/2022 3.86 3.77 - 5.28 10*6/mm3 Final     Hemoglobin   Date Value Ref Range Status   02/24/2022 11.7 (L) 12.0 - 15.9 g/dL Final     Hematocrit   Date Value Ref Range Status   02/24/2022 38.0 34.0 - 46.6 % Final     Platelets   Date Value Ref Range Status   02/24/2022 250 140 - 450 10*3/mm3 Final        Lab Results   Component Value Date    GLUCOSE 152 (H) 02/24/2022    BUN 23 02/24/2022    CREATININE 1.00 02/24/2022    EGFRIFNONA 53 (L) 02/24/2022    BCR 23.0 02/24/2022    K 4.7 02/24/2022    CO2 28.2 02/24/2022    CALCIUM 9.4 " 02/24/2022    ALBUMIN 4.10 12/21/2021    AST 20 12/21/2021    ALT 21 12/21/2021     CT chest 1/13/2022:  IMPRESSION:  1.  Moderately severe pulmonary emphysema. Central bronchial wall thickening likely reflecting active bronchitis has improved since 12/21/2021. Central airways are patent.  2.  Diffuse ill-defined nodularity in a peribronchial distribution likely reflecting chronic bronchiolitis, slightly improved since the prior study. Opacified bronchiectatic segment in the posteromedial right midlung and suspected scattered regions of  small peripheral bronchiolectasis. Superimposed chronic NTB infection is possible.  3.  Known malignant lesion in the left breast near an old lumpectomy site. No suspicious adenopathy within the chest or within the left axillary or supraclavicular regions.    Breast imaging as reviewed in the HPI    Assessment/Plan     *[id number] ER low (1-10%), DE negative, HER-2 FISH amplified, Ki-67 60% status post left mastectomy 2/23/2022    *History of stage I triple negative ductal cancer left breast status post lumpectomy and radiation therapy 2009    *Comorbidities include fairly advanced COPD requiring intermittent oxygen, hypertension, depression/anxiety      Oncology plan/recommendations:  This is a carolyn 81-year-old lady who presents with a second primary breast cancer in the left breast 3 cm grade 3 with negative lymph nodes which unfortunately is essentially ER/DE negative and HER-2/tam overexpressed which significantly increases her risk of disease recurrence posing threat to life/bodily function.  I discussed the high risk features of the tumor with the patient and her daughter including size of the tumor, grade of the tumor, HER-2 status.  I discussed the option of adjuvant chemotherapy with weekly Taxol/Herceptin for 12 weeks followed by Herceptin every 3 weeks to complete a year of therapy and this would be expected to significantly improve her cure probably to over 95%.  I  explained the risk and side effects associated with the regimen.  I explained the regimen would be to improve the curability of her disease.  The patient has fairly advanced COPD and feels like her life expectancy is likely limited by her COPD.  At this time she has decided not to pursue adjuvant chemotherapy.  She is going to further consider and call back if changes her mind.  I plan to see her back in 3 to 4 months with labs for surveillance.  Of note the patient's daughter also has prior breast cancer and has been referred to genetic counseling which is pending.  I spent 70 minutes of time of this case today including reviewing medical records, reviewing the patient's medical history, reviewing imaging and pathology results, reviewing surgical operative notes, and face-to-face time with the patient and daughter reviewing pathology, options of pursuing adjuvant therapy, risk and side effects of adjuvant therapy, documentation etc.

## 2022-03-21 ENCOUNTER — MDT ASSESSMENT (OUTPATIENT)
Dept: OTHER | Facility: HOSPITAL | Age: 82
End: 2022-03-21

## 2022-03-21 NOTE — PROGRESS NOTES
MULTIDISCIPLINARY TREATMENT PLANNING CONFERENCE  DATE: 3/22/2022      SPECIALTY: General Conference    PRESENTER: Eric Sanders MD   SITE: Left Breast        Please discuss clinical/working stage, TNM, Stage Group, National Treatment Guidelines, and Prognostic Indicators.       CONFERENCE SUMMARY:  This is a 81-year-old lady postmenopausal with prior history of stage I triple negative cancer of the right breast treated with lumpectomy and radiation therapy.  She presented with screening mammography showing a new tumor in the right breast for which she underwent mastectomy.  Patient was offered adjuvant chemotherapy with Taxol/Herceptin but declined secondary to comorbidities.                          AJCC STAGE: pT2 N0 M0 ER low, MS negative, HER-2 tam overexpressed with tumor heterogeneity        REFERRAL SUPPORT   Psychosocial Assessment:  []                Clinical Trials:  []                Genetic Testing:  []                Geriatric Assessment:  []                Smoking Cessation:  []                Nutrition Assessment:  []                Social Work Evaluation/Barriers to Care:  []                Behavioral Oncology Evaluation:  []                Palliative Care:  []        EVIDENCE BASED NATIONAL TREATMENT GUIDELINES:  [x]                   SOCIAL HISTORY:   reports that she quit smoking about 32 years ago. Her smoking use included cigarettes. She has a 60.00 pack-year smoking history. She has never used smokeless tobacco. She reports that she does not drink alcohol and does not use drugs.                  PAST MEDICAL HISTORY:   has a past medical history of Anxiety, Arthritis, Asthma, [place] esophagus, Breast cancer (HCC) (2009), Colon polyp, COPD (chronic obstructive pulmonary disease) (HCC), Depression, Diarrhea, Diverticulitis of colon, Emphysema lung (HCC), Emphysema of lung (HCC), Flu (12/2021), GERD (gastroesophageal reflux disease), Hypertension, Requires supplemental oxygen, Skin cancer, Skin  cancer, and Sleep apnea.                  PAST SURGICAL HISTORY:  @                 IMAGING:  CT Chest Without Contrast Diagnostic    Result Date: 1/14/2022  1.  Moderately severe pulmonary emphysema. Central bronchial wall thickening likely reflecting active bronchitis has improved since 12/21/2021. Central airways are patent. 2.  Diffuse ill-defined nodularity in a peribronchial distribution likely reflecting chronic bronchiolitis, slightly improved since the prior study. Opacified bronchiectatic segment in the posteromedial right midlung and suspected scattered regions of small peripheral bronchiolectasis. Superimposed chronic NTB infection is possible. 3.  Known malignant lesion in the left breast near an old lumpectomy site. No suspicious adenopathy within the chest or within the left axillary or supraclavicular regions. Signer Name: Shimon Mcmahon MD  Signed: 1/14/2022 8:08 AM  Workstation Name: [id number]    CT Chest Without Contrast Diagnostic    Result Date: 12/21/2021  1.  Evidence of extensive centrilobular emphysema with large bulla demonstrated posteriorly in the upper lung fields bilaterally. This was present on the prior study and appears unchanged. There has been the interval development of small areas of patchy infiltrate in both lung fields. Imaging features are atypical or uncommonly reported for COVID-19 pneumonia. Alternative diagnoses should be considered. Prominent atherosclerotic calcifications of the coronary arteries and the thoracic aorta. Hepatic steatosis. Signer Name: Eric Owusu MD  Signed: 12/21/2021 7:00 PM  Workstation Name: [id number]  [facility]    XR Chest 1 View    Result Date: 12/21/2021  Very low lung volumes as compared to 12/8/2021. There is interstitial prominence in the mid and lower lungs likely exaggerated by the expiratory nature of the film. Underlying interstitial pneumonitis or edema is  "difficult to exclude given this technique Signer Name: Lidia Luna MD  Signed: 12/21/2021 3:32 PM  Workstation Name: [id number]  [facility]    XR Chest PA & Lateral    Result Date: 1/11/2022  1.  Persistent or recurrent diffuse reticulonodular infiltrative process throughout both lungs, also present on a previous chest CT. This can be seen with atypical infection or severe bronchiolitis. 2.  No dense airspace consolidation or pleural effusion.  This report was finalized on 1/11/2022 12:42 PM by Dr. Shimon Mcmahon MD.                      SURGICAL PROCEDURE / PATHOLOGY:      12/15/2021: [id number] ([facility])    Final Diagnosis  1. Left Breast, Lateral 3 O'clock, 5 cm from Nipple, Ultrasound-Guided Biopsies for a Mass: INVASIVE MAMMARY  CARCINOMA, NO SPECIAL TYPE (INVASIVE DUCTAL CARCINOMA).  A. Histologic grade: Liscomb histologic score III (tubules = 3, nuclei = 3, mitoses = 2).  B. No definitive in situ component identified by immunohistochemical staining.  C. Largest contiguous focus of invasive tumor measures 8 mm.  D. No definitive lymphovascular nor perineural invasion identified.    2. Left Breast, Medial 3 O'clock, 5 cm from Nipple, Ultrasound-Guided Biopsies for a Mass: INVASIVE MAMMARY  CARCINOMA, NO SPECIAL TYPE (INVASIVE DUCTAL CARCINOMA).  A. Histologic grade: Liscomb histologic score II (tubules = 3, nuclei = 3, mitoses = 1).  B. No definitive in situ component identified.  C. Largest contiguous focus of tumor measures 10 mm maximally.  D. No lymphovascular nor perineural invasion identified.    2/23/2022: [id number] ([facility])    Final Diagnosis  1. Submitted as \"Left Breast and Low Axillary Contents, Mastectomy with [place] Lymph Node Biopsy\" (1,112 grams):  A. INVASIVE DUCTAL CARCINOMA, Poorly differentiated; Liscomb Histologic Grade III/III  (tubule score = 3, nuclear score = 3, mitoses score = 3):  1. Tumor size: 30 mm (gross measurement).  2. Margins are " "negative for invasive carcinoma; Closest distance: Invasive carcinoma is present  10 mm from the anterior margin.  3. Positive for several foci of lymphovascular space invasion.  4. Positive for perineural invasion.    B. 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 Solid and Cribriform types with associated necrosis.  2. Extent of DCIS: 40 mm (based on the slices involved).  3. Margins are negative for in situ carcinoma; Closest distance: DCIS is present >10 mm from the  anterior margin.  C. Two clips retrieved from within invasive carcinoma.  D. Background fibrous tissue and dystrophic calcifications, suggesting prior treatment effect.  E. Four lymph nodes, negative for carcinoma (0/4).  F. Unremarkable skin and nipple.  G. See Synoptic Report (to include part 2).  2. \"[place] Lymph Node #1,\" Biopsy:  A. One lymph node, negative for carcinoma (0/1).                   Labs & Biomarkers:      12/15/2021:    Breast Biomarker Reporting Template (BREAST: BIOMARKER REPORTING TEMPLATE - 2) Protocol posted: 6/30/2021  Test(s) Performed  Estrogen Receptor (ER) Status Low Positive (1-10% of cells with nuclear positivity)  Average Intensity of Staining Weak  Status of Internal Controls Internal control cells absent  Test Type Food and Drug Administration (FDA) cleared (test / vendor): ventana  Primary Antibody SP1  Scoring System Brittany  Proportion Score 2  Intensity Score 1  Total Brittany Score 3  Test(s) Performed  Progesterone Receptor (PgR) Status Negative (less than 1%)  Internal control cells absent  Test Type Food and Drug Administration (FDA) cleared (test / vendor): ventana  Primary Antibody 1E2  Scoring System Brittany  Proportion Score 1  Intensity Score 1  Total Brittany Score 2  Test(s) Performed  HER2 by Immunohistochemistry Equivocal (Score 2+)  Percentage of Cells with Uniform Intense Complete Membrane  Staining  0 %  Test Type Food and Drug Administration (FDA) cleared (test / vendor): " ventana  Primary Antibody 4B5  Test(s) Performed Ki-67  Percentage of Cells with Nuclear Positivity 60 %  Primary Antibody 30-9

## 2022-03-23 ENCOUNTER — OFFICE VISIT (OUTPATIENT)
Dept: SURGERY | Facility: CLINIC | Age: 82
End: 2022-03-23

## 2022-03-23 DIAGNOSIS — Z09 SURGICAL FOLLOW-UP CARE: Primary | ICD-10-CM

## 2022-03-23 PROCEDURE — 99024 POSTOP FOLLOW-UP VISIT: CPT | Performed by: SURGERY

## 2022-03-23 NOTE — PROGRESS NOTES
Patient presents for 1 month po BREAST MASTECTOMY WITH SENTINEL NODE BIOPSY. No complaints.  Her incision is healing well her remaining staples were removed.  Her drain is still putting out about 90 cc/day.  The drain was left in place.  I will see her back in the office in a week

## 2022-03-25 ENCOUNTER — TELEPHONE (OUTPATIENT)
Dept: SURGERY | Facility: CLINIC | Age: 82
End: 2022-03-25

## 2022-03-25 ENCOUNTER — PATIENT ROUNDING (BHMG ONLY) (OUTPATIENT)
Dept: SURGERY | Facility: CLINIC | Age: 82
End: 2022-03-25

## 2022-03-25 NOTE — TELEPHONE ENCOUNTER
Patient is aware and did state that she feels that the amount of drainage has decreased a little bit.

## 2022-03-25 NOTE — PROGRESS NOTES
March 25, 2022    Hello, may I speak with Katarzyna Ku?    My name is Enriqueta Alford      I am  with [facility]  [address]  [place] [address] [phone].    Before we get started may I verify your date of birth? 1940    I am calling to officially welcome you to our practice and ask about your recent visit. Is this a good time to talk? yes    Tell me about your visit with us. What things went well?  Everything was fine. She was pleased with her visit.       We're always looking for ways to make our patients' experiences even better. Do you have recommendations on ways we may improve?  no    Overall were you satisfied with your first visit to our practice? yes       I appreciate you taking the time to speak with me today. Is there anything else I can do for you? Yes  Pt is concerned that she is still having significant draining from her drain tube. I will forward a message to Dr. Schultz to contact pt and address her concerns.      Thank you, and have a great day.

## 2022-03-25 NOTE — TELEPHONE ENCOUNTER
Called pt for pt rounding. She is concerned about the amount of fluid she is still having drain. She would like to know if this is normal or if there is something that needs to be done before she comes in for her follow up appt. Please advise.

## 2022-03-29 ENCOUNTER — TELEPHONE (OUTPATIENT)
Dept: SURGERY | Facility: CLINIC | Age: 82
End: 2022-03-29

## 2022-03-29 ENCOUNTER — OFFICE VISIT (OUTPATIENT)
Dept: SURGERY | Facility: CLINIC | Age: 82
End: 2022-03-29

## 2022-03-29 DIAGNOSIS — Z09 SURGICAL FOLLOW-UP CARE: Primary | ICD-10-CM

## 2022-03-29 PROCEDURE — 99024 POSTOP FOLLOW-UP VISIT: CPT | Performed by: SURGERY

## 2022-03-29 RX ORDER — CLINDAMYCIN HYDROCHLORIDE 150 MG/1
150 CAPSULE ORAL 3 TIMES DAILY
Qty: 21 CAPSULE | Refills: 0 | Status: SHIPPED | OUTPATIENT
Start: 2022-03-29 | End: 2022-04-05

## 2022-03-29 RX ORDER — CLINDAMYCIN HYDROCHLORIDE 150 MG/1
150 CAPSULE ORAL 3 TIMES DAILY
Qty: 21 CAPSULE | Refills: 0 | Status: SHIPPED | OUTPATIENT
Start: 2022-03-29 | End: 2022-03-29 | Stop reason: SDUPTHER

## 2022-03-29 NOTE — PROGRESS NOTES
Patient presents for 1 month 3 day po BREAST MASTECTOMY WITH SENTINEL NODE BIOPSY. No complaints.  Her volume is still hovering around 58 to 60 cc/day.  She does have some increased induration of the wound today.  We will start her on Cleocin 150 mg p.o. 3 times daily x10 days.  They will call with her volumes for the prior 3 days on Monday and if the volumes are still over 30 cc per 24 hours we will arrange to instill a sclerosing agent in to the drain in the ACC unit.

## 2022-03-29 NOTE — TELEPHONE ENCOUNTER
Caller: Katarzyna Ku    Relationship: Self    Best call back number: [phone]  What is the best time to reach you: ANYTIME   Who are you requesting to speak with (clinical staff, provider,  specific staff member): CLINICAL    Do you know the name of the person who called: ALBERTA    What was the call regarding: PT STATES PHARMACY HAVING TROUBLE FILLING RECENT ANTIBOTIC ORDERED TODAY. THE NUMBER OF DAYS DOESN'T MATCH HOW MANY PILLS.     Do you require a callback: IF NEEDED

## 2022-04-04 DIAGNOSIS — N64.89 SEROMA OF BREAST: Primary | ICD-10-CM

## 2022-04-05 ENCOUNTER — HOSPITAL ENCOUNTER (OUTPATIENT)
Dept: CT IMAGING | Facility: HOSPITAL | Age: 82
Discharge: HOME | End: 2022-04-05
Admitting: INTERNAL MEDICINE

## 2022-04-05 DIAGNOSIS — J18.9 PNEUMONIA DUE TO INFECTIOUS ORGANISM, UNSPECIFIED LATERALITY, UNSPECIFIED PART OF LUNG: ICD-10-CM

## 2022-04-05 PROCEDURE — 71250 CT THORAX DX C-: CPT

## 2022-04-07 ENCOUNTER — HOSPITAL ENCOUNTER (OUTPATIENT)
Dept: INFUSION THERAPY | Facility: HOSPITAL | Age: 82
Discharge: HOME | End: 2022-04-07
Admitting: SURGERY

## 2022-04-07 VITALS
HEART RATE: 94 BPM | RESPIRATION RATE: 18 BRPM | TEMPERATURE: 97.2 F | BODY MASS INDEX: 41.02 KG/M2 | DIASTOLIC BLOOD PRESSURE: 70 MMHG | WEIGHT: 217 LBS | OXYGEN SATURATION: 92 % | SYSTOLIC BLOOD PRESSURE: 142 MMHG

## 2022-04-07 DIAGNOSIS — N64.89 SEROMA OF BREAST: Primary | ICD-10-CM

## 2022-04-07 PROCEDURE — G0463 HOSPITAL OUTPT CLINIC VISIT: HCPCS

## 2022-04-07 PROCEDURE — 99024 POSTOP FOLLOW-UP VISIT: CPT | Performed by: SURGERY

## 2022-04-07 RX ADMIN — DOXYCYCLINE: 100 INJECTION, POWDER, LYOPHILIZED, FOR SOLUTION INTRAVENOUS at 13:20

## 2022-04-07 NOTE — NURSING NOTE
1300 Patient to ACC via w/c for scheduled appointment for Dr. Schultz. Drain instilled with antibiotic per Dr. Schultz, then aspirated 30 minutes after instilled. Only small amount aspirated back, replaced bulb to drain. AVS printed & reviewed with patient & family, patient to monitor drainage & call Dr. Schultz with specific amount each day on Monday. Patient & family verbalizes understanding.Discharged without c/o via w/c.

## 2022-04-07 NOTE — PROGRESS NOTES
Drain output still over 30 cc/24 h. Instilled 500 mg doxycycline,50 cc ns, 10 cc 1% lidocaine into drain. Dwell time 30 minutes. Aspirated. bulb replaced to drain. Tolerated well, patient to monitor output volumes and call those in to the office Monday to determine whether additional treatments are necessary

## 2022-04-11 ENCOUNTER — TELEPHONE (OUTPATIENT)
Dept: SURGERY | Facility: CLINIC | Age: 82
End: 2022-04-11

## 2022-04-11 DIAGNOSIS — N64.89 SEROMA OF BREAST: Primary | ICD-10-CM

## 2022-04-11 NOTE — ADDENDUM NOTE
Encounter addended by: Philip Cancino [facility] on: 4/11/2022 1:31 PM   Actions taken: Pharmacy for encounter modified

## 2022-04-11 NOTE — TELEPHONE ENCOUNTER
Add instructions to referral for [facility]. They will see her at 9:00am on 4/13/22. They will call her with her arrival time.

## 2022-04-12 ENCOUNTER — TRANSCRIBE ORDERS (OUTPATIENT)
Dept: ADMINISTRATIVE | Facility: HOSPITAL | Age: 82
End: 2022-04-12

## 2022-04-12 DIAGNOSIS — R91.1 LUNG NODULE: Primary | ICD-10-CM

## 2022-04-13 ENCOUNTER — HOSPITAL ENCOUNTER (OUTPATIENT)
Dept: INFUSION THERAPY | Facility: HOSPITAL | Age: 82
Discharge: HOME | End: 2022-04-13
Admitting: SURGERY

## 2022-04-13 VITALS
HEART RATE: 88 BPM | OXYGEN SATURATION: 92 % | BODY MASS INDEX: 41.02 KG/M2 | DIASTOLIC BLOOD PRESSURE: 80 MMHG | SYSTOLIC BLOOD PRESSURE: 147 MMHG | TEMPERATURE: 98 F | WEIGHT: 217 LBS | RESPIRATION RATE: 18 BRPM

## 2022-04-13 DIAGNOSIS — N64.89 SEROMA OF BREAST: Primary | ICD-10-CM

## 2022-04-13 PROCEDURE — 99024 POSTOP FOLLOW-UP VISIT: CPT | Performed by: SURGERY

## 2022-04-13 PROCEDURE — G0463 HOSPITAL OUTPT CLINIC VISIT: HCPCS

## 2022-04-13 RX ORDER — DOXYCYCLINE HYCLATE 100 MG/1
100 CAPSULE ORAL 2 TIMES DAILY
Qty: 20 CAPSULE | Refills: 0 | Status: SHIPPED | OUTPATIENT
Start: 2022-04-13 | End: 2022-04-23

## 2022-04-13 RX ADMIN — DOXYCYCLINE: 100 INJECTION, POWDER, LYOPHILIZED, FOR SOLUTION INTRAVENOUS at 08:58

## 2022-04-13 NOTE — NURSING NOTE
NURSING PROGRESS NOTE: Patient arrived to [facility] per W/C at 0845 for scheduled procedure with Dr. Schultz.  History and medications reviewed.  States that the drainage from the left breast area SAÚL bulb has decreased over the past couple of days.  Procedure performed per Dr. Schultz at 0900 and medication left to dwell in drain times 30 minutes.  Dr. Schultz withdrew medication from drain at 0930.  New SAÚL bulb applied  And dressing changed at drain insertion site.  Patient tolerated procedure well.  Patient to contact office and make an appointment for next nisreen,.  [facility] ZAC Hernandes

## 2022-04-13 NOTE — PROGRESS NOTES
Patient was seen in the ACC unit.  Her volume has dropped over the last 48 hours from her drain but she has slight increased warmth in the incision I am curious whether the drain has clotted off.  The site was prepped and the drain was injected with 500 mg of doxycycline and 50 cc of normal saline and 10 cc of 1% lidocaine without epinephrine.  It there was some initial resistance but then flowed easily.  Instilled time with 30 minutes.  I will see her back in the office in 1 week.  We will call in a prescription for her for p.o. doxycycline.

## 2022-04-20 ENCOUNTER — TELEPHONE (OUTPATIENT)
Dept: SURGERY | Facility: CLINIC | Age: 82
End: 2022-04-20

## 2022-04-20 NOTE — TELEPHONE ENCOUNTER
Per Dr. Schultz- patient is to call Monday with drain log and status of URI symptoms.  Patient aware.

## 2022-04-20 NOTE — TELEPHONE ENCOUNTER
Patient has a severe URI and can not make it to her appointment today. She did see her PCP yesterday. She could come in Friday if needed. Her drain output is improving.   Sun-120  Mon-110  Tues-100  Please advise.

## 2022-04-21 ENCOUNTER — APPOINTMENT (OUTPATIENT)
Dept: GENERAL RADIOLOGY | Facility: HOSPITAL | Age: 82
End: 2022-04-21

## 2022-04-21 ENCOUNTER — HOSPITAL ENCOUNTER (EMERGENCY)
Facility: HOSPITAL | Age: 82
Discharge: HOME | End: 2022-04-21
Attending: EMERGENCY MEDICINE | Admitting: EMERGENCY MEDICINE

## 2022-04-21 VITALS
HEIGHT: 61 IN | DIASTOLIC BLOOD PRESSURE: 68 MMHG | WEIGHT: 217 LBS | HEART RATE: 72 BPM | SYSTOLIC BLOOD PRESSURE: 142 MMHG | OXYGEN SATURATION: 95 % | RESPIRATION RATE: 24 BRPM | BODY MASS INDEX: 40.97 KG/M2 | TEMPERATURE: 98.8 F

## 2022-04-21 DIAGNOSIS — D64.9 ANEMIA, UNSPECIFIED TYPE: ICD-10-CM

## 2022-04-21 DIAGNOSIS — J44.1 COPD WITH ACUTE EXACERBATION: Primary | ICD-10-CM

## 2022-04-21 LAB
ALBUMIN SERPL-MCNC: 3.2 G/DL (ref 3.5–5.2)
ALBUMIN/GLOB SERPL: 1 G/DL
ALP SERPL-CCNC: 71 U/L (ref 39–117)
ALT SERPL W P-5'-P-CCNC: 18 U/L (ref 1–33)
ANION GAP SERPL CALCULATED.3IONS-SCNC: 8.1 MMOL/L (ref 5–15)
AST SERPL-CCNC: 14 U/L (ref 1–32)
BASOPHILS # BLD AUTO: 0.02 10*3/MM3 (ref 0–0.2)
BASOPHILS NFR BLD AUTO: 0.3 % (ref 0–1.5)
BILIRUB SERPL-MCNC: <0.2 MG/DL (ref 0–1.2)
BILIRUB UR QL STRIP: NEGATIVE
BUN SERPL-MCNC: 20 MG/DL (ref 8–23)
BUN/CREAT SERPL: 26.3 (ref 7–25)
CALCIUM SPEC-SCNC: 8.9 MG/DL (ref 8.6–10.5)
CHLORIDE SERPL-SCNC: 104 MMOL/L (ref 98–107)
CLARITY UR: CLEAR
CO2 SERPL-SCNC: 27.9 MMOL/L (ref 22–29)
COLOR UR: YELLOW
CREAT SERPL-MCNC: 0.76 MG/DL (ref 0.57–1)
DEPRECATED RDW RBC AUTO: 48.2 FL (ref 37–54)
EGFRCR SERPLBLD CKD-EPI 2021: 79.8 ML/MIN/1.73
EOSINOPHIL # BLD AUTO: 0.16 10*3/MM3 (ref 0–0.4)
EOSINOPHIL NFR BLD AUTO: 2 % (ref 0.3–6.2)
ERYTHROCYTE [DISTWIDTH] IN BLOOD BY AUTOMATED COUNT: 14.4 % (ref 12.3–15.4)
GLOBULIN UR ELPH-MCNC: 3.1 GM/DL
GLUCOSE SERPL-MCNC: 115 MG/DL (ref 65–99)
GLUCOSE UR STRIP-MCNC: NEGATIVE MG/DL
HCT VFR BLD AUTO: 30.3 % (ref 34–46.6)
HGB BLD-MCNC: 9.3 G/DL (ref 12–15.9)
HGB UR QL STRIP.AUTO: NEGATIVE
IMM GRANULOCYTES # BLD AUTO: 0.05 10*3/MM3 (ref 0–0.05)
IMM GRANULOCYTES NFR BLD AUTO: 0.6 % (ref 0–0.5)
KETONES UR QL STRIP: NEGATIVE
LEUKOCYTE ESTERASE UR QL STRIP.AUTO: NEGATIVE
LYMPHOCYTES # BLD AUTO: 2.07 10*3/MM3 (ref 0.7–3.1)
LYMPHOCYTES NFR BLD AUTO: 26 % (ref 19.6–45.3)
MCH RBC QN AUTO: 28.2 PG (ref 26.6–33)
MCHC RBC AUTO-ENTMCNC: 30.7 G/DL (ref 31.5–35.7)
MCV RBC AUTO: 91.8 FL (ref 79–97)
MONOCYTES # BLD AUTO: 0.76 10*3/MM3 (ref 0.1–0.9)
MONOCYTES NFR BLD AUTO: 9.5 % (ref 5–12)
NEUTROPHILS NFR BLD AUTO: 4.9 10*3/MM3 (ref 1.7–7)
NEUTROPHILS NFR BLD AUTO: 61.6 % (ref 42.7–76)
NITRITE UR QL STRIP: NEGATIVE
NRBC BLD AUTO-RTO: 0 /100 WBC (ref 0–0.2)
NT-PROBNP SERPL-MCNC: 1217 PG/ML (ref 0–1800)
PH UR STRIP.AUTO: 5.5 [PH] (ref 4.5–8)
PLATELET # BLD AUTO: 315 10*3/MM3 (ref 140–450)
PMV BLD AUTO: 9.5 FL (ref 6–12)
POTASSIUM SERPL-SCNC: 3.8 MMOL/L (ref 3.5–5.2)
PROT SERPL-MCNC: 6.3 G/DL (ref 6–8.5)
PROT UR QL STRIP: NEGATIVE
QT INTERVAL: 445 MS
RBC # BLD AUTO: 3.3 10*6/MM3 (ref 3.77–5.28)
SODIUM SERPL-SCNC: 140 MMOL/L (ref 136–145)
SP GR UR STRIP: 1.01 (ref 1–1.03)
TROPONIN T SERPL-MCNC: <0.01 NG/ML (ref 0–0.03)
UROBILINOGEN UR QL STRIP: NORMAL
WBC NRBC COR # BLD: 7.96 10*3/MM3 (ref 3.4–10.8)

## 2022-04-21 PROCEDURE — 83880 ASSAY OF NATRIURETIC PEPTIDE: CPT | Performed by: EMERGENCY MEDICINE

## 2022-04-21 PROCEDURE — 96374 THER/PROPH/DIAG INJ IV PUSH: CPT

## 2022-04-21 PROCEDURE — 99283 EMERGENCY DEPT VISIT LOW MDM: CPT | Performed by: EMERGENCY MEDICINE

## 2022-04-21 PROCEDURE — 94799 UNLISTED PULMONARY SVC/PX: CPT

## 2022-04-21 PROCEDURE — 93005 ELECTROCARDIOGRAM TRACING: CPT | Performed by: EMERGENCY MEDICINE

## 2022-04-21 PROCEDURE — 83550 IRON BINDING TEST: CPT | Performed by: FAMILY MEDICINE

## 2022-04-21 PROCEDURE — 93010 ELECTROCARDIOGRAM REPORT: CPT | Performed by: INTERNAL MEDICINE

## 2022-04-21 PROCEDURE — 25010000002 METHYLPREDNISOLONE PER 125 MG: Performed by: EMERGENCY MEDICINE

## 2022-04-21 PROCEDURE — 99284 EMERGENCY DEPT VISIT MOD MDM: CPT

## 2022-04-21 PROCEDURE — 94640 AIRWAY INHALATION TREATMENT: CPT

## 2022-04-21 PROCEDURE — 80053 COMPREHEN METABOLIC PANEL: CPT | Performed by: EMERGENCY MEDICINE

## 2022-04-21 PROCEDURE — 71046 X-RAY EXAM CHEST 2 VIEWS: CPT

## 2022-04-21 PROCEDURE — 83540 ASSAY OF IRON: CPT | Performed by: FAMILY MEDICINE

## 2022-04-21 PROCEDURE — 84484 ASSAY OF TROPONIN QUANT: CPT | Performed by: EMERGENCY MEDICINE

## 2022-04-21 PROCEDURE — 82607 VITAMIN B-12: CPT | Performed by: FAMILY MEDICINE

## 2022-04-21 PROCEDURE — 85025 COMPLETE CBC W/AUTO DIFF WBC: CPT | Performed by: EMERGENCY MEDICINE

## 2022-04-21 PROCEDURE — 36415 COLL VENOUS BLD VENIPUNCTURE: CPT

## 2022-04-21 PROCEDURE — 81003 URINALYSIS AUTO W/O SCOPE: CPT | Performed by: EMERGENCY MEDICINE

## 2022-04-21 RX ORDER — ALBUTEROL SULFATE 2.5 MG/3ML
2.5 SOLUTION RESPIRATORY (INHALATION) ONCE
Status: COMPLETED | OUTPATIENT
Start: 2022-04-21 | End: 2022-04-21

## 2022-04-21 RX ORDER — SODIUM CHLORIDE 0.9 % (FLUSH) 0.9 %
10 SYRINGE (ML) INJECTION AS NEEDED
Status: DISCONTINUED | OUTPATIENT
Start: 2022-04-21 | End: 2022-04-21 | Stop reason: HOSPADM

## 2022-04-21 RX ORDER — ALBUTEROL SULFATE 90 UG/1
2 AEROSOL, METERED RESPIRATORY (INHALATION) EVERY 4 HOURS PRN
Qty: 8.5 G | Refills: 0 | Status: SHIPPED | OUTPATIENT
Start: 2022-04-21 | End: 2022-05-06

## 2022-04-21 RX ORDER — IPRATROPIUM BROMIDE AND ALBUTEROL SULFATE 2.5; .5 MG/3ML; MG/3ML
3 SOLUTION RESPIRATORY (INHALATION) ONCE
Status: COMPLETED | OUTPATIENT
Start: 2022-04-21 | End: 2022-04-21

## 2022-04-21 RX ORDER — METHYLPREDNISOLONE 4 MG/1
TABLET ORAL
Qty: 21 TABLET | Refills: 0 | Status: SHIPPED | OUTPATIENT
Start: 2022-04-22 | End: 2022-05-09

## 2022-04-21 RX ORDER — METHYLPREDNISOLONE SODIUM SUCCINATE 125 MG/2ML
125 INJECTION, POWDER, LYOPHILIZED, FOR SOLUTION INTRAMUSCULAR; INTRAVENOUS ONCE
Status: COMPLETED | OUTPATIENT
Start: 2022-04-21 | End: 2022-04-21

## 2022-04-21 RX ADMIN — ALBUTEROL SULFATE 2.5 MG: 2.5 SOLUTION RESPIRATORY (INHALATION) at 13:22

## 2022-04-21 RX ADMIN — IPRATROPIUM BROMIDE AND ALBUTEROL SULFATE 3 ML: 2.5; .5 SOLUTION RESPIRATORY (INHALATION) at 13:09

## 2022-04-21 RX ADMIN — METHYLPREDNISOLONE SODIUM SUCCINATE 125 MG: 125 INJECTION, POWDER, FOR SOLUTION INTRAMUSCULAR; INTRAVENOUS at 12:57

## 2022-04-21 NOTE — ED PROVIDER NOTES
Subjective   Katarzyna Ku is a 81-year-old white female who presents secondary to shortness of breath.  Patient has a history of COPD.  She typically uses oxygen as needed.  However she is needed to use oxygen constantly for the past 3 days.  Associated dyspnea on exertion and nonproductive cough.  Patient was seen by her PCP 3 days ago for the same symptoms.  She was given IM steroids in the office.  She had been taking Tessalon Perles for the cough.  Patient was already taking antibiotics at time of office visit.  Symptoms have not improved.  Thus patient elected to come to the ED for evaluation.   is also concerned she may have a UTI.  She denies dysuria or frequency.  States she is urinating less than normal.  Request we check for UTI.      History provided by:  Patient      Review of Systems   Constitutional: Negative.  Negative for fever.   HENT: Negative.  Negative for rhinorrhea.    Eyes: Negative.  Negative for redness.   Respiratory: Positive for cough and shortness of breath.    Cardiovascular: Negative for chest pain.   Gastrointestinal: Negative for abdominal pain.   Endocrine: Negative.    Genitourinary: Negative.  Negative for difficulty urinating.   Musculoskeletal: Positive for arthralgias. Negative for back pain.   Skin: Negative.  Negative for color change.   Neurological: Negative.  Negative for syncope.   Hematological: Negative.    Psychiatric/Behavioral: The patient is nervous/anxious.    All other systems reviewed and are negative.      Past Medical History:   Diagnosis Date   • Anxiety    • Arthritis    • Asthma    • [place] esophagus    • Breast cancer (HCC)    • Colon polyp    • COPD (chronic obstructive pulmonary disease) (HCC)     Dr Sharma follows, clearance in media 2/2022   • Depression    • Diarrhea    • Diverticulitis of colon    • Emphysema lung (HCC)    • Emphysema of lung (HCC)    • Flu 12/2021   • GERD (gastroesophageal reflux disease)    • Hypertension    • Requires  supplemental oxygen     3L/NC w/exertion   • Skin cancer    • Skin cancer    • Sleep apnea     w/CPAP and oxygen at 3L       Allergies   Allergen Reactions   • Azithromycin Nausea And Vomiting     SEVERE GI UPSET   • Sulfa Antibiotics Itching   • Codeine Rash       Past Surgical History:   Procedure Laterality Date   • BILATERAL SALPINGO OOPHORECTOMY     • BLADDER SURGERY      bladder lift   • BREAST LUMPECTOMY Left     breast ca   • CARPAL TUNNEL RELEASE      BOTH HANDS   • CATARACT EXTRACTION W/ INTRAOCULAR LENS  IMPLANT, BILATERAL     • COLONOSCOPY N/A 02/10/2017    Procedure: COLONOSCOPY with polypectomy;  Surgeon: Weston Sue MD;  Location: [facility];  Service:    • COLONOSCOPY N/A 08/12/2020    Procedure: COLONOSCOPY;  Surgeon: Weston Sue MD;  Location: [facility] OR;  Service: Gastroenterology;  Laterality: N/A;  TRANSVERSE COLON POLYP  DIVERTICULOSIS  CECAL TIME at 1356  SIGMOID COLON POLYP   • ENDOSCOPY N/A 02/10/2017    Procedure: ESOPHAGOGASTRODUODENOSCOPY with biopsies;  Surgeon: Weston Sue MD;  Location: [facility] OR;  Service:    • ENDOSCOPY N/A 08/12/2020    Procedure: ESOPHAGOGASTRODUODENOSCOPY;  Surgeon: Weston Sue MD;  Location: [facility] OR;  Service: Gastroenterology;  Laterality: N/A;  DISTAL ESOPHAGUS BIOPSY  STOCKTON'S ESOPHAGUS   • HYSTERECTOMY     • MASTECTOMY W/ SENTINEL NODE BIOPSY Left 02/23/2022    Procedure: BREAST MASTECTOMY WITH SENTINEL NODE BIOPSY;  Surgeon: Afshin Schultz MD;  Location: [facility];  Service: General;  Laterality: Left;   • SKIN BIOPSY     • TOTAL KNEE ARTHROPLASTY Bilateral        Family History   Problem Relation Age of Onset   • Hypertension Daughter    • Breast cancer Daughter    • Allergies Daughter    • Malig Hyperthermia Neg Hx    • Cancer Neg Hx        Social History     Socioeconomic History   • Marital status:    Tobacco Use   • Smoking status: Former Smoker     Packs/day: 2.00     Years: 30.00      Pack years: 60.00     Types: Cigarettes     Quit date: 1990     Years since quittin.2   • Smokeless tobacco: Never Used   Vaping Use   • Vaping Use: Never used   Substance and Sexual Activity   • Alcohol use: No   • Drug use: No   • Sexual activity: Defer           Objective   Physical Exam  Vitals and nursing note reviewed.   Constitutional:       Appearance: She is well-developed.      Comments: 81-year-old white female lying in bed.  Patient is morbidly obese.  She appears in fair overall health.  Vital signs notable for heart rate of 103, respiratory rate of 22 and blood pressure 196/97.  Oxygen saturations in the high 90s.  Patient speaking shortened sentences.  However she is still able to argue with me that her oxygen saturation is not actually 97% as displayed on the pulse ox.   HENT:      Head: Normocephalic and atraumatic.      Right Ear: External ear normal.      Left Ear: External ear normal.      Nose: Nose normal.      Mouth/Throat:      Mouth: Mucous membranes are moist.      Pharynx: Oropharynx is clear.   Eyes:      Conjunctiva/sclera: Conjunctivae normal.      Pupils: Pupils are equal, round, and reactive to light.   Cardiovascular:      Rate and Rhythm: Normal rate and regular rhythm.      Pulses: Normal pulses.      Heart sounds: Normal heart sounds. No murmur heard.    No friction rub. No gallop.   Pulmonary:      Effort: Tachypnea and respiratory distress present.      Breath sounds: Decreased breath sounds present. No wheezing, rhonchi or rales.   Abdominal:      General: Bowel sounds are normal. There is no distension.      Palpations: Abdomen is soft.      Tenderness: There is no abdominal tenderness. There is no guarding or rebound.   Musculoskeletal:         General: Normal range of motion.      Cervical back: Normal range of motion.   Skin:     General: Skin is warm and dry.      Capillary Refill: Capillary refill takes less than 2 seconds.   Neurological:      Mental  Status: She is alert and oriented to person, place, and time.   Psychiatric:         Mood and Affect: Mood normal.         Behavior: Behavior normal.         Procedures       EKG 12-lead  Date 4/21/2022  Time 12: 40 4 PM  Normal sinus rhythm  Normal rate  Normal axis  Borderline prolonged QT interval   Q wave present and lead III and aVR  No ST elevations or depressions  Nonspecific T wave flattening  Abnormal EKG    Unchanged from EKG dated 2/17/2028      ED Course  ED Course as of 04/21/22 1443   Thu Apr 21, 2022   1251 Patient has fair air movement.  No wheezing on auscultation.  Obtaining EKG, chest x-ray and full set of labs.  Giving Solu-Medrol, duo neb followed by albuterol mini neb. [SS]   1340 Hemoglobin(!): 9.3 [SS]   1340 Hematocrit(!): 30.3 [SS]   1350 CBC notable for mild anemia with hemoglobin 9.3 and hematocrit 30.3.  This is notably decreased as compared to 1 month ago when patient had hemoglobin 11.7 with a hemoglobin of 38.  Obviously concerning for occult blood loss.  Will perform rectal exam and Hemoccult stool.  UA is unremarkable.  Troponin negative.  proBNP normal.  Awaiting chest x-ray.    Patient has received DuoNeb treatments x2 as well as Solu-Medrol.  Due to patient's age her predicted peak flow is an estimate.  Best peak flow was 180.  Predicted approximately 260. [SS]   1431 Rectal exam was unremarkable.  Normal colored stool.  Hemoccult negative.  There is obviously concerning that patient is not making enough red blood cells.  Her breast surgery was in February.  Obviously not a contributing factor.  I recommended patient follow-up with a hematologist.  Will place patient on Medrol Dosepak for home.  Discussed at length with patient and daughter all results, diagnoses, treatment, indications for follow-up with oncologist and with PCP.  Will DC home.    Prescriptions  1-Medrol Dosepak  2-albuterol MDI  3-ipratropium [SS]      ED Course User Index  [SS] Manuel Perez MD      Labs  Reviewed   COMPREHENSIVE METABOLIC PANEL - Abnormal; Notable for the following components:       Result Value    Glucose 115 (*)     Albumin 3.20 (*)     BUN/Creatinine Ratio 26.3 (*)     All other components within normal limits    Narrative:     GFR Normal >60  Chronic Kidney Disease <60  Kidney Failure <15     CBC WITH AUTO DIFFERENTIAL - Abnormal; Notable for the following components:    RBC 3.30 (*)     Hemoglobin 9.3 (*)     Hematocrit 30.3 (*)     MCHC 30.7 (*)     Immature Grans % 0.6 (*)     All other components within normal limits   BNP (IN-HOUSE) - Normal    Narrative:     Among patients with dyspnea, NT-proBNP is highly sensitive for the detection of acute congestive heart failure. In addition NT-proBNP of <300 pg/ml effectively rules out acute congestive heart failure with 99% negative predictive value.    Results may be falsely decreased if patient taking Biotin.     TROPONIN (IN-HOUSE) - Normal    Narrative:     Troponin T Reference Range:  <= 0.03 ng/mL-   Negative for AMI  >0.03 ng/mL-     Abnormal for myocardial necrosis.  Clinicians would have to utilize clinical acumen, EKG, Troponin and serial changes to determine if it is an Acute Myocardial Infarction or myocardial injury due to an underlying chronic condition.       Results may be falsely decreased if patient taking Biotin.     URINALYSIS W/ MICROSCOPIC IF INDICATED (NO CULTURE) - Normal    Narrative:     Urine microscopic not indicated.   CBC AND DIFFERENTIAL    Narrative:     The following orders were created for panel order CBC & Differential.  Procedure                               Abnormality         Status                     ---------                               -----------         ------                     CBC Auto Differential[[id number]]        Abnormal            Final result                 Please view results for these tests on the individual orders.     XR Chest 2 View    Result Date: 4/21/2022  Narrative: XR CHEST 2 VW-:  4/21/2022 1:42 PM  INDICATION:  Short of air and cough. Symptoms 4 days. Ex-smoker with COPD.  COMPARISON:  01/11/2022.  FINDINGS: PA and lateral views of the chest.  Cardiac silhouette is borderline enlarged. Interstitial prominence throughout both lungs and suggestion of new mild cephalization. Correlate with potential mild vascular congestion. Probable background interstitial fibrosis and scarring and there is old healed granulomatous disease. No dense consolidation pneumothorax or effusion. There is thoracic kyphosis and spondylosis and mild pulmonary hyperinflation..       Impression:  1. Cardiomegaly and chronic interstitial prominence. There may be some component of mild superimposed vascular congestion. 2. Imaging findings in keeping with COPD and old healed granulomatous disease 3. Not mentioned above is a left sided surgical drain. The patient has a history of recent left mastectomy and drain placement.  This report was finalized on 4/21/2022 2:14 PM by Dr. Patrick Mar MD.      CT Chest Without Contrast Diagnostic    Result Date: 4/5/2022  Narrative: CT Chest WO INDICATION: 81-year-old woman for follow-up of pneumonia. Patient has continued nonproductive cough. History of breast carcinoma with surgery in early 2022. TECHNIQUE: CT of the thorax without IV contrast. Coronal and sagittal reconstructions were obtained.  Radiation dose reduction techniques included automated exposure control or exposure modulation based on body size. Count of known CT and cardiac nuc med studies performed in previous 12 months: 2. COMPARISON: Chest CT 1/13/2022, 12/21/2021 FINDINGS: Images through the thoracic inlet demonstrate no thyroid mass or supraclavicular adenopathy. There is been interval left mastectomy. There is a percutaneous drain entering from the lateral soft tissues. The drain lies within a large ovoid collection measuring 15.6 x 5.4 x 7.7 cm which is likely a chronic hematoma/seroma. There is mild skin  thickening over the left breast which could represent postsurgical change or postradiation change. Images through the chest demonstrate normal caliber aorta. There are calcifications of the aortic valve leaflets and coronary arteries. No cardiomegaly or pericardial fluid There are stable benign calcified right hilar, infrahilar and subcarinal nodes. No pathologic adenopathy. Esophagus appears normal Lung window images demonstrate underlying emphysematous change with bilateral bullae in the mid and lower lungs. There is no evidence of metastatic disease. Previous old weighted/reticular change at the right lung base persists. Has an anterior superior nodular component measuring 1 cm with a more linear bandlike component posteriorly. This remains indeterminate. The previous groundglass and nodular changes seen diffusely on 12/21/2021 have resolved. Limited views through the upper abdomen are negative.     Impression: 1. Emphysematous change with interval clearing of the fine nodular changes related to bronchial wall thickening. These findings are markedly improved 2. There is a persistent reticulonodular density at the posterior medial right lung base. The anterior superior margin of this appears slightly nodular and irregular measuring 1 cm on image 82 series 3. This is locally likely related to postinflammatory change although small malignancy cannot be excluded. Continued follow-up chest CT in 3-6 months is recommended. 3. Interval left mastectomy with surgical drain present. There is a large subcutaneous ovoid fluid collection most likely a chronic hematoma or seroma. Overlying skin thickening is also noted which could be related to edema, inflammation or postradiation change. Signer Name: Lidia Luna MD  Signed: 4/5/2022 1:22 PM  Workstation Name: [id number]  [facility]    My differential diagnosis for dyspnea includes but is not limited to:  Asthma, COPD, COVID-19, pneumonia,  pulmonary embolus, acute respiratory distress syndrome, RSV, pneumothorax, pleural effusion, pulmonary fibrosis, congestive heart failure, myocardial infarction, DKA, uremia, acidosis, sepsis, anemia, drug related, hyperventilation, CNS disease                                             MDM    Final diagnoses:   COPD with acute exacerbation (HCC)   Anemia, unspecified type       ED Disposition  ED Disposition     ED Disposition   Discharge    Condition   Stable    Comment   --             Brayan Beal MD  [address]  [address]  [place] [address]  [phone]    Schedule an appointment as soon as possible for a visit in 1 week  for continued or worsened symptoms, Sooner if needed    Eric Sanders MD  [address]  [address]  [place] [address]  [phone]    Schedule an appointment as soon as possible for a visit in 1 week  In 1 to 2 weeks for anemia of unknown origin.         Medication List      New Prescriptions    ipratropium 0.02 % nebulizer solution  Commonly known as: ATROVENT  Take 2.5 mL by nebulization 4 (Four) Times a Day for 7 days. Add to albuterol mini nebulizer solution.     methylPREDNISolone 4 MG dose pack  Commonly known as: MEDROL  Take as directed on package instructions.  Start taking on: April 22, 2022        Changed    * albuterol sulfate  (90 Base) MCG/ACT inhaler  Commonly known as: PROVENTIL HFA;VENTOLIN HFA;PROAIR HFA  What changed: Another medication with the same name was added. Make sure you understand how and when to take each.     * albuterol (2.5 MG/3ML) 0.083% nebulizer solution  Commonly known as: PROVENTIL  What changed: Another medication with the same name was added. Make sure you understand how and when to take each.     * albuterol sulfate  (90 Base) MCG/ACT inhaler  Commonly known as: PROVENTIL HFA;VENTOLIN HFA;PROAIR HFA  Inhale 2 puffs Every 4 (Four) Hours As Needed for Wheezing or Shortness of Air for up to 15 days.  What changed:  You were already taking a medication with the same name, and this prescription was added. Make sure you understand how and when to take each.     guaiFENesin 600 MG 12 hr tablet  Commonly known as: MUCINEX  Take 1 tablet by mouth Every 12 (Twelve) Hours.  What changed:   · when to take this  · reasons to take this     HYDROcodone-acetaminophen 5-325 MG per tablet  Commonly known as: NORCO  Take 1 tablet by mouth Every 6 (Six) Hours As Needed (pain) for up to 20 doses.  What changed: when to take this         * This list has 3 medication(s) that are the same as other medications prescribed for you. Read the directions carefully, and ask your doctor or other care provider to review them with you.               Where to Get Your Medications      These medications were sent to [facility] [address] [place] [address] - [address] [address] - [phone] [facility] [phone]   [address] [address]    Phone: [phone]   · albuterol sulfate  (90 Base) MCG/ACT inhaler  · ipratropium 0.02 % nebulizer solution  · methylPREDNISolone 4 MG dose pack          Manuel Perez MD  04/21/22 0076

## 2022-04-21 NOTE — DISCHARGE INSTRUCTIONS
Medication as directed.  You may add ipratropium to your albuterol solution when using your nebulizer.  This may help you breathe easier.  Your lab work today was notable for anemia.  There is no evidence of GI bleed.  I am concerned perhaps your body is not making enough red blood cells.  Follow-up with Dr. Beal as above.  Follow-up with hematology as above.  Return to ED for worsening symptoms, medical emergencies.

## 2022-04-22 ENCOUNTER — TELEPHONE (OUTPATIENT)
Dept: ONCOLOGY | Facility: CLINIC | Age: 82
End: 2022-04-22

## 2022-04-22 DIAGNOSIS — Z17.0 MALIGNANT NEOPLASM OF CENTRAL PORTION OF LEFT BREAST IN FEMALE, ESTROGEN RECEPTOR POSITIVE: Primary | ICD-10-CM

## 2022-04-22 DIAGNOSIS — C50.112 MALIGNANT NEOPLASM OF CENTRAL PORTION OF LEFT BREAST IN FEMALE, ESTROGEN RECEPTOR POSITIVE: Primary | ICD-10-CM

## 2022-04-22 NOTE — TELEPHONE ENCOUNTER
----- Message from Eric Sanders MD sent at 4/22/2022  7:37 AM EDT -----  Offer follow-up next week [place] for her anemia-CBC, reticulocyte count, LDH, ferritin, iron profile, B12 folate

## 2022-04-24 LAB
IRON 24H UR-MRATE: 15 MCG/DL (ref 37–145)
IRON SATN MFR SERPL: 7 % (ref 20–50)
TIBC SERPL-MCNC: 207 MCG/DL (ref 298–536)
UIBC SERPL-MCNC: 192 MCG/DL (ref 112–346)

## 2022-04-25 LAB — VIT B12 BLD-MCNC: 1615 PG/ML (ref 211–946)

## 2022-04-26 ENCOUNTER — APPOINTMENT (OUTPATIENT)
Dept: LAB | Facility: HOSPITAL | Age: 82
End: 2022-04-26

## 2022-04-26 ENCOUNTER — OFFICE VISIT (OUTPATIENT)
Dept: SURGERY | Facility: CLINIC | Age: 82
End: 2022-04-26

## 2022-04-26 DIAGNOSIS — Z09 SURGICAL FOLLOW-UP CARE: Primary | ICD-10-CM

## 2022-04-26 DIAGNOSIS — N64.89 SEROMA OF BREAST: ICD-10-CM

## 2022-04-26 PROCEDURE — 99024 POSTOP FOLLOW-UP VISIT: CPT | Performed by: SURGERY

## 2022-04-26 RX ORDER — CEFUROXIME AXETIL 500 MG/1
500 TABLET ORAL 2 TIMES DAILY
COMMUNITY
Start: 2022-04-25 | End: 2022-05-09

## 2022-04-26 RX ORDER — DOXYCYCLINE HYCLATE 50 MG/1
324 CAPSULE, GELATIN COATED ORAL
COMMUNITY
Start: 2022-04-25 | End: 2023-01-23

## 2022-04-26 NOTE — PROGRESS NOTES
Patient presents for 2 month po BREAST MASTECTOMY WITH SENTINEL NODE BIOPSY.  Her volume is still too high to remove the drain.  It is putting out about 80 cc/day.  There is no fluid underneath the skin flap.  There is no erythema of the wound.  We should try to inject the drain with doxycycline 1 more time.  We will arrange this for tomorrow or Friday.  She is frustrated but she understands and agrees

## 2022-04-29 ENCOUNTER — TELEPHONE (OUTPATIENT)
Dept: SURGERY | Facility: CLINIC | Age: 82
End: 2022-04-29

## 2022-04-29 ENCOUNTER — HOSPITAL ENCOUNTER (OUTPATIENT)
Dept: INFUSION THERAPY | Facility: HOSPITAL | Age: 82
Discharge: HOME | End: 2022-04-29
Admitting: SURGERY

## 2022-04-29 VITALS
SYSTOLIC BLOOD PRESSURE: 166 MMHG | OXYGEN SATURATION: 93 % | HEIGHT: 61 IN | WEIGHT: 217 LBS | HEART RATE: 77 BPM | RESPIRATION RATE: 18 BRPM | BODY MASS INDEX: 40.97 KG/M2 | TEMPERATURE: 97.1 F | DIASTOLIC BLOOD PRESSURE: 74 MMHG

## 2022-04-29 DIAGNOSIS — N64.89 SEROMA OF BREAST: Primary | ICD-10-CM

## 2022-04-29 PROCEDURE — G0463 HOSPITAL OUTPT CLINIC VISIT: HCPCS

## 2022-04-29 RX ADMIN — DOXYCYCLINE: 100 INJECTION, POWDER, LYOPHILIZED, FOR SOLUTION INTRAVENOUS at 09:24

## 2022-04-29 NOTE — TELEPHONE ENCOUNTER
Caller: IRON W/ MADELAINE      Patient is needing: IRON W/ MADELAINE CALLED TO SCHEDULE A FOLLOW-UP FOR SEROMA OF BREAST ON 5/9, PER DR CASTAÑEDA'S REQUEST.    ATTEMPTED TO TRANSFER DUE TO BREAST DX; NO ANSWER AT PRACTICE    APPT SCHEDULED W/ DR CASTAÑEDA 5/9 @ 1:20; PLEASE CONTACT PT IF RESCHEDULE IS NEEDED DUE TO BREAST DX

## 2022-05-09 ENCOUNTER — OFFICE VISIT (OUTPATIENT)
Dept: SURGERY | Facility: CLINIC | Age: 82
End: 2022-05-09

## 2022-05-09 DIAGNOSIS — Z09 SURGICAL FOLLOW-UP CARE: Primary | ICD-10-CM

## 2022-05-09 PROCEDURE — 99024 POSTOP FOLLOW-UP VISIT: CPT | Performed by: SURGERY

## 2022-05-09 NOTE — PROGRESS NOTES
Patient presents for fu SEROMA OF BREAST-LT.  Original surgery 2/23/22 for BREAST MASTECTOMY WITH SENTINEL NODE BIOPSY.  She is without complaints her output from her drain has markedly decreased it is now less than 20 cc/day.  The drain was removed today.  I will see her back in the office in 9 days.

## 2022-05-18 ENCOUNTER — OFFICE VISIT (OUTPATIENT)
Dept: SURGERY | Facility: CLINIC | Age: 82
End: 2022-05-18

## 2022-05-18 DIAGNOSIS — Z09 SURGICAL FOLLOW-UP CARE: Primary | ICD-10-CM

## 2022-05-18 PROCEDURE — 99024 POSTOP FOLLOW-UP VISIT: CPT | Performed by: SURGERY

## 2022-05-18 NOTE — PROGRESS NOTES
Patient presents for fu SEROMA OF BREAST-LT. Daughter states that the area feels hard. Patient states that the left upper outer arm is sore.  She complains of some skin soreness of her upper arm.  She denies any fevers or chills or any drainage or swelling of the area.  The skin on her chest wall is still hard.  We have tried antibiotics without any resolution for this.  I am thinking it is postoperative and due to her prior radiation therapy.  We will continue to observe it.  I have asked her to watch for any temperatures or swelling and to notify me if she has those problems.  I would like to see her back in the office in 1 month.  She is not ready for a breast prosthesis at this time

## 2022-05-26 ENCOUNTER — TELEPHONE (OUTPATIENT)
Dept: GASTROENTEROLOGY | Facility: CLINIC | Age: 82
End: 2022-05-26

## 2022-05-26 ENCOUNTER — OFFICE VISIT (OUTPATIENT)
Dept: GASTROENTEROLOGY | Facility: CLINIC | Age: 82
End: 2022-05-26

## 2022-05-26 VITALS
WEIGHT: 215 LBS | DIASTOLIC BLOOD PRESSURE: 70 MMHG | HEIGHT: 61 IN | BODY MASS INDEX: 40.59 KG/M2 | SYSTOLIC BLOOD PRESSURE: 148 MMHG

## 2022-05-26 DIAGNOSIS — Z86.010 PERSONAL HISTORY OF COLONIC POLYPS: ICD-10-CM

## 2022-05-26 DIAGNOSIS — K22.70 BARRETT'S ESOPHAGUS WITHOUT DYSPLASIA: ICD-10-CM

## 2022-05-26 DIAGNOSIS — C50.112 MALIGNANT NEOPLASM OF CENTRAL PORTION OF LEFT BREAST IN FEMALE, ESTROGEN RECEPTOR POSITIVE: ICD-10-CM

## 2022-05-26 DIAGNOSIS — R10.32 LEFT LOWER QUADRANT ABDOMINAL PAIN: Primary | ICD-10-CM

## 2022-05-26 DIAGNOSIS — Z17.0 MALIGNANT NEOPLASM OF CENTRAL PORTION OF LEFT BREAST IN FEMALE, ESTROGEN RECEPTOR POSITIVE: ICD-10-CM

## 2022-05-26 PROCEDURE — 99214 OFFICE O/P EST MOD 30 MIN: CPT | Performed by: INTERNAL MEDICINE

## 2022-05-26 NOTE — PROGRESS NOTES
"    PATIENT INFORMATION  Katarzyna Ku       - 1940    CHIEF COMPLAINT  Chief Complaint   Patient presents with   • Abdominal Pain   • Abdominal Cramping       HISTORY OF PRESENT ILLNESS  Here after Mastectomy and complains of LUQ bloating and is moving bowels mostly daily but no diarrhea dn on iron feel she even skips days.    No real effect from her bowel movement and doesn't stop her from eating does get hungry and can getr nauseated but no vomiting      REVIEWED PERTINENT RESULTS/ LABS  Lab Results   Component Value Date    CASEREPORT  2022     Surgical Pathology Report                         Case: [id number]                                  Authorizing Provider:  Afshin Schultz MD        Collected:           2022 11:29 AM          Ordering Location:     [facility]   Received:            2022 12:14 PM                                 OR                                                                           Pathologist:           Monserrat Clarke MD                                                          Specimens:   1) - Breast, Left, low axilla included                                                              2) - [place] Lymph Node, #1                                                               FINALDX  2022     1. Submitted as \"Left Breast and Low Axillary Contents, Mastectomy with [place] Lymph Node Biopsy\" (1,112 grams):    A. INVASIVE DUCTAL CARCINOMA, Poorly differentiated; [place] Histologic Grade III/III      (tubule score = 3, nuclear score = 3, mitoses score = 3):   1. Tumor size:  30 mm (gross measurement).   2. Margins are negative for invasive carcinoma; Closest distance: Invasive carcinoma is present       10 mm from the anterior margin.     3. Positive for several foci of lymphovascular space invasion.   4. Positive for perineural invasion.  B. 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 Solid and Cribriform types " "with associated necrosis.     2. Extent of DCIS:  40 mm (based on the slices involved).     3. Margins are negative for in situ carcinoma; Closest distance: DCIS is present >10 mm from the                     anterior margin.   C. Two clips retrieved from within invasive carcinoma.   D. Background fibrous tissue and dystrophic calcifications, suggesting prior treatment effect.    E. Four lymph nodes, negative for carcinoma (0/4).    F. Unremarkable skin and nipple.   G. See Synoptic Report (to include part 2).    2. \"[place] Lymph Node #1,\" Biopsy:   A. One lymph node, negative for carcinoma (0/1).     Jab/kds        Lab Results   Component Value Date    HGB 9.3 (L) 04/21/2022    MCV 91.8 04/21/2022     04/21/2022    ALT 18 04/21/2022    AST 14 04/21/2022    HGBA1C 5.90 (H) 02/17/2022    IRON 15 (L) 04/21/2022    TIBC 207 (L) 04/21/2022      No results found.    REVIEW OF SYSTEMS  Review of Systems   Constitutional: Negative for activity change, chills, fever and unexpected weight change.   HENT: Negative for congestion.    Eyes: Negative for visual disturbance.   Respiratory: Negative for shortness of breath.    Cardiovascular: Negative for chest pain and palpitations.   Gastrointestinal: Positive for abdominal distention, abdominal pain, constipation and nausea. Negative for blood in stool.   Endocrine: Negative for cold intolerance and heat intolerance.   Genitourinary: Negative for hematuria.   Musculoskeletal: Negative for gait problem.   Skin: Negative for color change.   Allergic/Immunologic: Negative for immunocompromised state.   Neurological: Negative for weakness and light-headedness.   Hematological: Negative for adenopathy.   Psychiatric/Behavioral: Negative for sleep disturbance. The patient is not nervous/anxious.          ACTIVE PROBLEMS  Patient Active Problem List    Diagnosis    • Malignant neoplasm of central portion of left breast in female, estrogen receptor positive (HCC) [C50.112, " Z17.0]    • Influenza A [J10.1]    • Influenza A (H1N1) [J10.1]    • Irritable bowel syndrome with diarrhea [K58.0]    • Encounter for screening for malignant neoplasm of colon [Z12.11]    • Personal history of colonic polyps [Z86.010]    • Casper's esophagus without dysplasia [K22.70]    • Abdominal pain, right upper quadrant [R10.11]    • Osteoarthritis of knee [M17.10]    • Chronic obstructive pulmonary disease (HCC) [J44.9]    • Hypertension [I10]    • Primary localized osteoarthrosis [M19.91]    • Midline cystocele [N81.11]    • Mixed incontinence [N39.46]    • Muscular atrophy [M62.50]    • Atrophic vaginitis [N95.2]    • Prolapse of vaginal vault after hysterectomy [N99.3]    • Enterocele [K46.9]          PAST MEDICAL HISTORY  Past Medical History:   Diagnosis Date   • Anxiety    • Arthritis    • Asthma    • Casper esophagus    • Breast cancer (HCC)    • Colon polyp    • COPD (chronic obstructive pulmonary disease) (HCC)     Dr Edith alba, clearance in media 2/2022   • Depression    • Diarrhea    • Diverticulitis of colon    • Emphysema lung (HCC)    • Emphysema of lung (HCC)    • Flu 12/2021   • GERD (gastroesophageal reflux disease)    • Hypertension    • Requires supplemental oxygen     3L/NC w/exertion   • Skin cancer    • Skin cancer    • Sleep apnea     w/CPAP and oxygen at 3L         SURGICAL HISTORY  Past Surgical History:   Procedure Laterality Date   • BILATERAL SALPINGO OOPHORECTOMY     • BLADDER SURGERY      bladder lift   • BREAST LUMPECTOMY Left     breast ca   • CARPAL TUNNEL RELEASE      BOTH HANDS   • CATARACT EXTRACTION W/ INTRAOCULAR LENS  IMPLANT, BILATERAL     • COLONOSCOPY N/A 02/10/2017    Procedure: COLONOSCOPY with polypectomy;  Surgeon: Weston Sue MD;  Location: [facility] OR;  Service:    • COLONOSCOPY N/A 08/12/2020    Procedure: COLONOSCOPY;  Surgeon: Weston Sue MD;  Location: [facility] OR;  Service: Gastroenterology;  Laterality: N/A;  TRANSVERSE  COLON POLYP  DIVERTICULOSIS  CECAL TIME at 1356  SIGMOID COLON POLYP   • ENDOSCOPY N/A 02/10/2017    Procedure: ESOPHAGOGASTRODUODENOSCOPY with biopsies;  Surgeon: Weston Sue MD;  Location: [facility] OR;  Service:    • ENDOSCOPY N/A 2020    Procedure: ESOPHAGOGASTRODUODENOSCOPY;  Surgeon: Weston Sue MD;  Location: [facility] OR;  Service: Gastroenterology;  Laterality: N/A;  DISTAL ESOPHAGUS BIOPSY  STOCKTON'S ESOPHAGUS   • HYSTERECTOMY     • MASTECTOMY W/ SENTINEL NODE BIOPSY Left 2022    Procedure: BREAST MASTECTOMY WITH SENTINEL NODE BIOPSY;  Surgeon: Afshin Schultz MD;  Location: [facility] OR;  Service: General;  Laterality: Left;   • SKIN BIOPSY     • TOTAL KNEE ARTHROPLASTY Bilateral          FAMILY HISTORY  Family History   Problem Relation Age of Onset   • Hypertension Daughter    • Breast cancer Daughter    • Allergies Daughter    • Malig Hyperthermia Neg Hx    • Cancer Neg Hx          SOCIAL HISTORY  Social History     Occupational History   • Not on file   Tobacco Use   • Smoking status: Former Smoker     Packs/day: 2.00     Years: 30.00     Pack years: 60.00     Types: Cigarettes     Quit date: 1990     Years since quittin.3   • Smokeless tobacco: Never Used   Vaping Use   • Vaping Use: Never used   Substance and Sexual Activity   • Alcohol use: No   • Drug use: No   • Sexual activity: Defer         CURRENT MEDICATIONS    Current Outpatient Medications:   •  acetaminophen (TYLENOL) 500 MG tablet, Take 1,000 mg by mouth Every 6 (Six) Hours As Needed for Mild Pain ., Disp: , Rfl:   •  albuterol (PROVENTIL) (2.5 MG/3ML) 0.083% nebulizer solution, Take 2.5 mg by nebulization 2 (Two) Times a Day. Can use 4 times a day if needed, Disp: , Rfl:   •  albuterol sulfate  (90 Base) MCG/ACT inhaler, Inhale 2 puffs Every 4 (Four) Hours As Needed for Wheezing or Shortness of Air., Disp: , Rfl:   •  ALPRAZolam (XANAX) 0.5 MG tablet, Take 0.5 mg by mouth Daily As  Needed for Anxiety., Disp: , Rfl:   •  benazepril (LOTENSIN) 40 MG tablet, Take 40 mg by mouth Daily., Disp: , Rfl:   •  Calcium Acetate-Magnesium Carb 450-200 MG tablet, Take 1 tablet by mouth Daily., Disp: , Rfl:   •  chlorhexidine (PERIDEX) 0.12 % solution, , Disp: , Rfl:   •  Cholecalciferol (Vitamin D3) 20 MCG (800 UNIT) tablet, Take 50 mcg by mouth Daily., Disp: , Rfl:   •  citalopram (CeleXA) 40 MG tablet, Take 40 mg by mouth Every Morning., Disp: , Rfl:   •  colestipol (COLESTID) 1 g tablet, TAKE TWO TABLETS BY MOUTH DAILY (Patient taking differently: Take 2 g by mouth Daily.), Disp: 60 tablet, Rfl: 5  •  Cyanocobalamin (VITAMIN B-12) 5000 MCG tablet dispersible, Take 1 tablet by mouth Daily., Disp: , Rfl:   •  doxycycline (VIBRAMYCIN) 100 MG capsule, , Disp: , Rfl:   •  ferrous gluconate (FERGON) 324 MG tablet, Take 324 mg by mouth Daily With Breakfast., Disp: , Rfl:   •  fluconazole (DIFLUCAN) 100 MG tablet, , Disp: , Rfl:   •  Fluticasone-Umeclidin-Vilant (TRELEGY) 100-62.5-25 MCG/INH inhaler, Inhale 1 puff Daily., Disp: , Rfl:   •  glucosamine-chondroitin 500-400 MG capsule capsule, Take 1 capsule by mouth 2 (Two) Times a Day With Meals., Disp: , Rfl:   •  guaiFENesin (MUCINEX) 600 MG 12 hr tablet, Take 1 tablet by mouth Every 12 (Twelve) Hours. (Patient taking differently: Take 600 mg by mouth 2 (Two) Times a Day As Needed for Cough or Congestion.), Disp: 30 tablet, Rfl: 0  •  guaiFENesin-codeine (ROMILAR-AC) 100-10 MG/5ML syrup, , Disp: , Rfl:   •  hydrALAZINE (APRESOLINE) 50 MG tablet, Take 50 mg by mouth 3 (Three) Times a Day., Disp: , Rfl:   •  hyoscyamine (LEVBID) 0.375 MG 12 hr tablet, Take 1 tablet by mouth Every 12 (Twelve) Hours As Needed for Cramping. Pt must schedule appt with provider prior next refills request, Disp: 60 tablet, Rfl: 3  •  magnesium oxide (MAG-OX) 400 MG tablet, Take 400 mg by mouth Daily., Disp: , Rfl:   •  meloxicam (MOBIC) 15 MG tablet, Take 15 mg by mouth Daily., Disp: ,  "Rfl:   •  Mirabegron ER (MYRBETRIQ) 50 MG tablet sustained-release 24 hour 24 hr tablet, Take 50 mg by mouth every night at bedtime., Disp: , Rfl:   •  Multiple Vitamins-Minerals (MULTIVITAMIN ADULT PO), Take 1 tablet by mouth Daily., Disp: , Rfl:   •  O2 (OXYGEN), Inhale 3 L/min 1 (One) Time. Uses w/activity, Disp: , Rfl:   •  Omega-3 Fatty Acids (fish oil) 1000 MG capsule capsule, Take 1,000 mg by mouth Daily With Breakfast., Disp: , Rfl:   •  omeprazole (priLOSEC) 40 MG capsule, Take 1 capsule by mouth Daily., Disp: 90 capsule, Rfl: 3  •  ondansetron (ZOFRAN) 4 MG tablet, Take 1 tablet by mouth Every 6 (Six) Hours As Needed for Nausea or Vomiting., Disp: 20 tablet, Rfl: 0  •  ipratropium (ATROVENT) 0.02 % nebulizer solution, Take 2.5 mL by nebulization 4 (Four) Times a Day for 7 days. Add to albuterol mini nebulizer solution., Disp: 70 mL, Rfl: 0    ALLERGIES  Azithromycin, Sulfa antibiotics, and Codeine    VITALS  Vitals:    05/26/22 1057   BP: 148/70   BP Location: Left arm   Patient Position: Sitting   Cuff Size: Large Adult   Weight: 97.5 kg (215 lb)   Height: 154.9 cm (61\")       PHYSICAL EXAM  Debilities/Disabilities Identified: None  Emotional Behavior: Appropriate  Wt Readings from Last 3 Encounters:   05/26/22 97.5 kg (215 lb)   04/29/22 98.4 kg (217 lb)   04/21/22 98.4 kg (217 lb)     Ht Readings from Last 1 Encounters:   05/26/22 154.9 cm (61\")     Body mass index is 40.62 kg/m².  Physical Exam  Constitutional:       Appearance: She is well-developed. She is not diaphoretic.   HENT:      Head: Normocephalic and atraumatic.   Eyes:      General: No scleral icterus.     Conjunctiva/sclera: Conjunctivae normal.      Pupils: Pupils are equal, round, and reactive to light.   Neck:      Thyroid: No thyromegaly.   Cardiovascular:      Rate and Rhythm: Normal rate and regular rhythm.      Heart sounds: Normal heart sounds. No murmur heard.    No gallop.   Pulmonary:      Effort: Pulmonary effort is normal.     "  Breath sounds: Normal breath sounds. No wheezing or rales.   Abdominal:      General: Bowel sounds are normal. There is no distension or abdominal bruit.      Palpations: Abdomen is soft. There is no shifting dullness, fluid wave or mass.      Tenderness: There is abdominal tenderness in the epigastric area, left upper quadrant and left lower quadrant. There is no guarding. Negative signs include Madrigal's sign.      Hernia: There is no hernia in the ventral area.   Musculoskeletal:         General: Normal range of motion.      Cervical back: Normal range of motion and neck supple.   Lymphadenopathy:      Cervical: No cervical adenopathy.   Skin:     General: Skin is warm and dry.      Findings: No erythema or rash.   Neurological:      Mental Status: She is alert and oriented to person, place, and time.   Psychiatric:         Mood and Affect: Mood normal.         Behavior: Behavior normal.         CLINICAL DATA REVIEWED   reviewed previous lab results and integrated with today's visit, reviewed notes from other physicians and/or last GI encounter, reviewed previous endoscopy results and available photos, reviewed surgical pathology results from previous biopsies    ASSESSMENT  Diagnoses and all orders for this visit:    Left lower quadrant abdominal pain  -     CT Abdomen Pelvis With Contrast; Future    Casper's esophagus without dysplasia    Personal history of colonic polyps    Malignant neoplasm of central portion of left breast in female, estrogen receptor positive (HCC)          PLAN  Will call with CT results and go from THere  Return if symptoms worsen or fail to improve.    I have discussed the above plan with the patient.  They verbalize understanding and are in agreement with the plan.  They have been advised to contact the office for any questions, concerns, or changes related to their health.

## 2022-06-03 ENCOUNTER — HOSPITAL ENCOUNTER (OUTPATIENT)
Dept: CT IMAGING | Facility: HOSPITAL | Age: 82
Discharge: HOME | End: 2022-06-03
Admitting: INTERNAL MEDICINE

## 2022-06-03 DIAGNOSIS — R10.32 LEFT LOWER QUADRANT ABDOMINAL PAIN: ICD-10-CM

## 2022-06-03 LAB — CREAT BLDA-MCNC: 0.8 MG/DL (ref 0.6–1.3)

## 2022-06-03 PROCEDURE — 0 DIATRIZOATE MEGLUMINE & SODIUM PER 1 ML: Performed by: INTERNAL MEDICINE

## 2022-06-03 PROCEDURE — 0 IOPAMIDOL PER 1 ML: Performed by: INTERNAL MEDICINE

## 2022-06-03 PROCEDURE — 82565 ASSAY OF CREATININE: CPT

## 2022-06-03 PROCEDURE — 74177 CT ABD & PELVIS W/CONTRAST: CPT

## 2022-06-03 RX ADMIN — IOPAMIDOL 100 ML: 755 INJECTION, SOLUTION INTRAVENOUS at 13:56

## 2022-06-03 RX ADMIN — DIATRIZOATE MEGLUMINE AND DIATRIZOATE SODIUM 30 ML: 600; 100 SOLUTION ORAL; RECTAL at 12:26

## 2022-06-15 ENCOUNTER — OFFICE VISIT (OUTPATIENT)
Dept: SURGERY | Facility: CLINIC | Age: 82
End: 2022-06-15

## 2022-06-15 DIAGNOSIS — Z17.0 MALIGNANT NEOPLASM OF CENTRAL PORTION OF LEFT BREAST IN FEMALE, ESTROGEN RECEPTOR POSITIVE: Primary | ICD-10-CM

## 2022-06-15 DIAGNOSIS — C50.112 MALIGNANT NEOPLASM OF CENTRAL PORTION OF LEFT BREAST IN FEMALE, ESTROGEN RECEPTOR POSITIVE: Primary | ICD-10-CM

## 2022-06-15 PROCEDURE — 99212 OFFICE O/P EST SF 10 MIN: CPT | Performed by: SURGERY

## 2022-06-15 NOTE — PROGRESS NOTES
Patient presents for 4 month po Malignant neoplasm of central portion of left breast in female, estrogen receptor positive. No complaints.  She complains of ongoing hardness of her left chest wall.  With intermittent warmth of the site.  She denies any swelling or drainage from the site.  The area of hardness of her skin is anurag and is actually less than it was.  There is no cellulitis there is no abscess.  I felt no dominant mass.  There is no supraclavicular nor axillary adenopathy.  I discussed the options with her and her daughter in detail continued observation versus excision of the area placement of wound VAC that would require general anesthetic.  Because of her severe COPD I have recommended continued observation I will see her back in the office in 3 months

## 2022-06-20 ENCOUNTER — APPOINTMENT (OUTPATIENT)
Dept: PULMONOLOGY | Facility: HOSPITAL | Age: 82
End: 2022-06-20

## 2022-06-20 NOTE — PROGRESS NOTES
Subjective     REASON FOR CONSULTATION: Breast cancer  Provide an opinion on any further workup or treatment                             REQUESTING PHYSICIAN: Dr. Schultz    RECORDS OBTAINED:  Records of the patients history including those obtained from the referring provider were reviewed and summarized in detail.    HISTORY OF PRESENT ILLNESS:  The patient is a 81 y.o. year old female who is here for an opinion about the above issue.    History of Present Illness   This is a 81-year-old lady with obesity, COPD/asthma on intermittent O2, hypertension, depression, Casper's esophagus, depression and anxiety.  She has a history of stage I triple negative left breast cancer in 2009 treated with a lumpectomy and radiation therapy completed January 2010.      The patient had an abnormal screening mammogram on the left 11-21 showing calcifications in the left breast and a new oval-shaped asymmetry measuring 15 mm suspicious for malignancy.  Diagnostic mammogram and ultrasound 11/24/2021 showed postlumpectomy changes in the lateral portion of the left breast and a new 1.6 x 1.1 cm lesion at the 3 to 4 o'clock position of the left breast 7 cm from the nipple.  She underwent an ultrasound-guided breast biopsy 12/15/2021 with 2 lesions biopsy including a small solid satellite nodule adjacent to the previously described larger mass.  Pathology from mass 1 showed invasive ductal carcinoma grade 3 (3+3+2) ER 31 to 40% positive moderately, MD negative, HER-2 2+/FISH negative, Ki-67 60% with no in situ component, no lymph vascular or perineural invasion; mass 2 invasive ductal carcinoma grade 2 (3+3+1) ER 1 to 10% weakly positive, MD negative, HER-2 2+/FISH +80% of cells, Ki-67 55% with no in situ component, no lymphovascular or perineural invasion.    The patient underwent a left mastectomy and sentinel lymph node biopsy on 2/23/2022 showing invasive ductal carcinoma grade 3 tumor measuring 3.0 cm in size, negative margins,  several foci of lymphovascular invasion, positive perineural invasion, associated high-grade ductal carcinoma in situ 40 mm negative margins for in situ disease, 5 negative axillary lymph nodes.    The patient's main complaint relates to her shortness of breath related to COPD.  She is on oxygen intermittently.  She relates her breathing has worsened significantly over the previous 4 to 5 years.    The patient returned today for follow-up.  She has been seen frequently by surgery for issues with wound healing.  She is discouraged regarding the chest wall/cosmetic result of her surgery.  She has chronic shortness of breath/dyspnea on exertion related to COPD.  She denies unusual pain.  Her daughter had genetic testing and has a BRCA gene unsure if 1 versus 2.    Past Medical History:   Diagnosis Date   • Anxiety    • Arthritis    • Asthma    • Casper esophagus    • Breast cancer (HCC)    • Colon polyp    • COPD (chronic obstructive pulmonary disease) (HCC)     Dr Edith alba, clearance in media 2/2022   • Depression    • Diarrhea    • Diverticulitis of colon    • Emphysema lung (HCC)    • Emphysema of lung (HCC)    • Flu 12/2021   • GERD (gastroesophageal reflux disease)    • Hypertension    • Requires supplemental oxygen     3L/NC w/exertion   • Skin cancer    • Skin cancer    • Sleep apnea     w/CPAP and oxygen at 3L        Past Surgical History:   Procedure Laterality Date   • BILATERAL SALPINGO OOPHORECTOMY     • BLADDER SURGERY      bladder lift   • BREAST LUMPECTOMY Left     breast ca   • CARPAL TUNNEL RELEASE      BOTH HANDS   • CATARACT EXTRACTION W/ INTRAOCULAR LENS  IMPLANT, BILATERAL     • COLONOSCOPY N/A 02/10/2017    Procedure: COLONOSCOPY with polypectomy;  Surgeon: Weston Sue MD;  Location: [facility] OR;  Service:    • COLONOSCOPY N/A 08/12/2020    Procedure: COLONOSCOPY;  Surgeon: Weston Sue MD;  Location: [facility] OR;  Service: Gastroenterology;  Laterality: N/A;   TRANSVERSE COLON POLYP  DIVERTICULOSIS  CECAL TIME at 1356  SIGMOID COLON POLYP   • ENDOSCOPY N/A 02/10/2017    Procedure: ESOPHAGOGASTRODUODENOSCOPY with biopsies;  Surgeon: Weston Sue MD;  Location: [facility] OR;  Service:    • ENDOSCOPY N/A 08/12/2020    Procedure: ESOPHAGOGASTRODUODENOSCOPY;  Surgeon: Weston Sue MD;  Location: [facility] OR;  Service: Gastroenterology;  Laterality: N/A;  DISTAL ESOPHAGUS BIOPSY  STOCKTON'S ESOPHAGUS   • HYSTERECTOMY     • MASTECTOMY W/ SENTINEL NODE BIOPSY Left 02/23/2022    Procedure: BREAST MASTECTOMY WITH SENTINEL NODE BIOPSY;  Surgeon: Afshin Schultz MD;  Location: [facility] OR;  Service: General;  Laterality: Left;   • SKIN BIOPSY     • TOTAL KNEE ARTHROPLASTY Bilateral         Current Outpatient Medications on File Prior to Visit   Medication Sig Dispense Refill   • acetaminophen (TYLENOL) 500 MG tablet Take 1,000 mg by mouth Every 6 (Six) Hours As Needed for Mild Pain .     • albuterol (PROVENTIL) (2.5 MG/3ML) 0.083% nebulizer solution Take 2.5 mg by nebulization 2 (Two) Times a Day. Can use 4 times a day if needed     • albuterol sulfate  (90 Base) MCG/ACT inhaler Inhale 2 puffs Every 4 (Four) Hours As Needed for Wheezing or Shortness of Air.     • ALPRAZolam (XANAX) 0.5 MG tablet Take 0.5 mg by mouth Daily As Needed for Anxiety.     • benazepril (LOTENSIN) 40 MG tablet Take 40 mg by mouth Daily.     • Calcium Acetate-Magnesium Carb 450-200 MG tablet Take 1 tablet by mouth Daily.     • chlorhexidine (PERIDEX) 0.12 % solution      • Cholecalciferol (Vitamin D3) 20 MCG (800 UNIT) tablet Take 50 mcg by mouth Daily.     • citalopram (CeleXA) 40 MG tablet Take 40 mg by mouth Every Morning.     • colestipol (COLESTID) 1 g tablet TAKE TWO TABLETS BY MOUTH DAILY (Patient taking differently: Take 2 g by mouth Daily.) 60 tablet 5   • Cyanocobalamin (VITAMIN B-12) 5000 MCG tablet dispersible Take 1 tablet by mouth Daily.     • doxycycline (VIBRAMYCIN)  100 MG capsule      • ferrous gluconate (FERGON) 324 MG tablet Take 324 mg by mouth Daily With Breakfast.     • fluconazole (DIFLUCAN) 100 MG tablet      • Fluticasone-Umeclidin-Vilant (TRELEGY) 100-62.5-25 MCG/INH inhaler Inhale 1 puff Daily.     • glucosamine-chondroitin 500-400 MG capsule capsule Take 1 capsule by mouth 2 (Two) Times a Day With Meals.     • guaiFENesin (MUCINEX) 600 MG 12 hr tablet Take 1 tablet by mouth Every 12 (Twelve) Hours. (Patient taking differently: Take 600 mg by mouth 2 (Two) Times a Day As Needed for Cough or Congestion.) 30 tablet 0   • guaiFENesin-codeine (ROMILAR-AC) 100-10 MG/5ML syrup      • hydrALAZINE (APRESOLINE) 50 MG tablet Take 50 mg by mouth 3 (Three) Times a Day.     • hyoscyamine (LEVBID) 0.375 MG 12 hr tablet Take 1 tablet by mouth Every 12 (Twelve) Hours As Needed for Cramping. Pt must schedule appt with provider prior next refills request 60 tablet 3   • magnesium oxide (MAG-OX) 400 MG tablet Take 400 mg by mouth Daily.     • meloxicam (MOBIC) 15 MG tablet Take 15 mg by mouth Daily.     • Mirabegron ER (MYRBETRIQ) 50 MG tablet sustained-release 24 hour 24 hr tablet Take 50 mg by mouth every night at bedtime.     • mometasone (ELOCON) 0.1 % cream Apply 0.1 application topically to the appropriate area as directed As Needed.     • Multiple Vitamins-Minerals (MULTIVITAMIN ADULT PO) Take 1 tablet by mouth Daily.     • O2 (OXYGEN) Inhale 3 L/min 1 (One) Time. Uses w/activity     • Omega-3 Fatty Acids (fish oil) 1000 MG capsule capsule Take 1,000 mg by mouth Daily With Breakfast.     • omeprazole (priLOSEC) 40 MG capsule Take 1 capsule by mouth Daily. 90 capsule 3   • ondansetron (ZOFRAN) 4 MG tablet Take 1 tablet by mouth Every 6 (Six) Hours As Needed for Nausea or Vomiting. 20 tablet 0   • ipratropium (ATROVENT) 0.02 % nebulizer solution Take 2.5 mL by nebulization 4 (Four) Times a Day for 7 days. Add to albuterol mini nebulizer solution. 70 mL 0     No current  "facility-administered medications on file prior to visit.        ALLERGIES:    Allergies   Allergen Reactions   • Azithromycin Nausea And Vomiting     SEVERE GI UPSET   • Sulfa Antibiotics Itching   • Codeine Rash        Social History     Socioeconomic History   • Marital status:    Tobacco Use   • Smoking status: Former Smoker     Packs/day: 2.00     Years: 30.00     Pack years: 60.00     Types: Cigarettes     Quit date: 1990     Years since quittin.4   • Smokeless tobacco: Never Used   Vaping Use   • Vaping Use: Never used   Substance and Sexual Activity   • Alcohol use: No   • Drug use: No   • Sexual activity: Defer        Family History   Problem Relation Age of Onset   • Hypertension Daughter    • Breast cancer Daughter    • Allergies Daughter    • Malig Hyperthermia Neg Hx    • Cancer Neg Hx         Review of Systems   Constitutional: Negative.    HENT: Negative.    Respiratory: Positive for shortness of breath.    Cardiovascular: Negative.    Gastrointestinal: Negative.    Genitourinary: Negative.    Musculoskeletal: Negative.    Skin: Positive for wound. Negative for rash.   Allergic/Immunologic: Negative.    Neurological: Negative.    Hematological: Negative.    Psychiatric/Behavioral: Negative.           Objective     Vitals:    22 0955   BP: 144/85   Pulse: 80   Resp: 22   Temp: 97.5 °F (36.4 °C)   TempSrc: Infrared   SpO2: 91%  Comment: w/oxy   Weight: 99.4 kg (219 lb 1.6 oz)   Height: 154.9 cm (60.98\")   PainSc: 0-No pain     Current Status 2022   ECOG score 1       Physical Exam    CONSTITUTIONAL: pleasant well-developed obese elderly woman  HEENT: no icterus, no thrush, moist membranes  NECK: no jvd  LYMPH: no cervical or supraclavicular lad  CV: RRR, S1S2, no murmur  Breast: Status post left mastectomy  RESP: cta bilat, no wheezing, no rales, diminished breath sounds  GI: soft, non-tender, no splenomegaly, +bs  MUSC: no edema, normal gait  NEURO: alert and oriented x3, " mild global weakness  PSYCH: normal mood and affect    RECENT LABS:  Hematology WBC   Date Value Ref Range Status   06/21/2022 7.81 3.40 - 10.80 10*3/mm3 Final     RBC   Date Value Ref Range Status   06/21/2022 3.83 3.77 - 5.28 10*6/mm3 Final     Hemoglobin   Date Value Ref Range Status   06/21/2022 10.6 (L) 12.0 - 15.9 g/dL Final     Hematocrit   Date Value Ref Range Status   06/21/2022 35.3 34.0 - 46.6 % Final     Platelets   Date Value Ref Range Status   06/21/2022 282 140 - 450 10*3/mm3 Final        Lab Results   Component Value Date    GLUCOSE 122 (H) 06/21/2022    BUN 16 06/21/2022    CREATININE 0.83 06/21/2022    EGFRIFNONA 53 (L) 02/24/2022    BCR 19.3 06/21/2022    K 4.6 06/21/2022    CO2 26.1 06/21/2022    CALCIUM 9.7 06/21/2022    ALBUMIN 4.00 06/21/2022    AST 13 06/21/2022    ALT 8 06/21/2022     CT chest 1/13/2022:  IMPRESSION:  1.  Moderately severe pulmonary emphysema. Central bronchial wall thickening likely reflecting active bronchitis has improved since 12/21/2021. Central airways are patent.  2.  Diffuse ill-defined nodularity in a peribronchial distribution likely reflecting chronic bronchiolitis, slightly improved since the prior study. Opacified bronchiectatic segment in the posteromedial right midlung and suspected scattered regions of  small peripheral bronchiolectasis. Superimposed chronic NTB infection is possible.  3.  Known malignant lesion in the left breast near an old lumpectomy site. No suspicious adenopathy within the chest or within the left axillary or supraclavicular regions.    Breast imaging as reviewed in the HPI    Assessment & Plan     *[id number] ER low (1-10%), ND negative, HER-2 FISH amplified, Ki-67 60% status post left mastectomy 2/23/2022.  The patient chose not to receive adjuvant chemotherapy    *History of stage I triple negative ductal cancer left breast status post lumpectomy and radiation therapy 2009    *Comorbidities include fairly advanced COPD requiring  intermittent oxygen, hypertension, depression/anxiety    *Daughter with BRCA mutation    Oncology plan/recommendations:  The patient continues to not want adjuvant chemotherapy.  She states she is seeing Dr. Schultz in 3 months I will see her back in 5 months to survey for symptoms that could suggest recurrent or metastatic disease.

## 2022-06-21 ENCOUNTER — OFFICE VISIT (OUTPATIENT)
Dept: ONCOLOGY | Facility: CLINIC | Age: 82
End: 2022-06-21

## 2022-06-21 ENCOUNTER — LAB (OUTPATIENT)
Dept: LAB | Facility: HOSPITAL | Age: 82
End: 2022-06-21

## 2022-06-21 VITALS
WEIGHT: 219.1 LBS | DIASTOLIC BLOOD PRESSURE: 85 MMHG | OXYGEN SATURATION: 91 % | HEART RATE: 80 BPM | BODY MASS INDEX: 41.37 KG/M2 | HEIGHT: 61 IN | SYSTOLIC BLOOD PRESSURE: 144 MMHG | RESPIRATION RATE: 22 BRPM | TEMPERATURE: 97.5 F

## 2022-06-21 DIAGNOSIS — D64.9 ANEMIA, UNSPECIFIED TYPE: ICD-10-CM

## 2022-06-21 DIAGNOSIS — Z17.0 MALIGNANT NEOPLASM OF CENTRAL PORTION OF LEFT BREAST IN FEMALE, ESTROGEN RECEPTOR POSITIVE: Primary | ICD-10-CM

## 2022-06-21 DIAGNOSIS — C50.112 MALIGNANT NEOPLASM OF CENTRAL PORTION OF LEFT BREAST IN FEMALE, ESTROGEN RECEPTOR POSITIVE: ICD-10-CM

## 2022-06-21 DIAGNOSIS — Z17.0 MALIGNANT NEOPLASM OF CENTRAL PORTION OF LEFT BREAST IN FEMALE, ESTROGEN RECEPTOR POSITIVE: ICD-10-CM

## 2022-06-21 DIAGNOSIS — C50.112 MALIGNANT NEOPLASM OF CENTRAL PORTION OF LEFT BREAST IN FEMALE, ESTROGEN RECEPTOR POSITIVE: Primary | ICD-10-CM

## 2022-06-21 LAB
ALBUMIN SERPL-MCNC: 4 G/DL (ref 3.5–5.2)
ALBUMIN/GLOB SERPL: 1.5 G/DL
ALP SERPL-CCNC: 91 U/L (ref 39–117)
ALT SERPL W P-5'-P-CCNC: 8 U/L (ref 1–33)
ANION GAP SERPL CALCULATED.3IONS-SCNC: 9.9 MMOL/L (ref 5–15)
AST SERPL-CCNC: 13 U/L (ref 1–32)
BASOPHILS # BLD AUTO: 0.02 10*3/MM3 (ref 0–0.2)
BASOPHILS NFR BLD AUTO: 0.3 % (ref 0–1.5)
BILIRUB SERPL-MCNC: 0.2 MG/DL (ref 0–1.2)
BUN SERPL-MCNC: 16 MG/DL (ref 8–23)
BUN/CREAT SERPL: 19.3 (ref 7–25)
CALCIUM SPEC-SCNC: 9.7 MG/DL (ref 8.6–10.5)
CHLORIDE SERPL-SCNC: 102 MMOL/L (ref 98–107)
CO2 SERPL-SCNC: 26.1 MMOL/L (ref 22–29)
CREAT SERPL-MCNC: 0.83 MG/DL (ref 0.57–1)
DEPRECATED RDW RBC AUTO: 57.7 FL (ref 37–54)
EGFRCR SERPLBLD CKD-EPI 2021: 71.8 ML/MIN/1.73
EOSINOPHIL # BLD AUTO: 0.17 10*3/MM3 (ref 0–0.4)
EOSINOPHIL NFR BLD AUTO: 2.2 % (ref 0.3–6.2)
ERYTHROCYTE [DISTWIDTH] IN BLOOD BY AUTOMATED COUNT: 16.8 % (ref 12.3–15.4)
GLOBULIN UR ELPH-MCNC: 2.7 GM/DL
GLUCOSE SERPL-MCNC: 122 MG/DL (ref 65–99)
HCT VFR BLD AUTO: 35.3 % (ref 34–46.6)
HGB BLD-MCNC: 10.6 G/DL (ref 12–15.9)
IMM GRANULOCYTES # BLD AUTO: 0.02 10*3/MM3 (ref 0–0.05)
IMM GRANULOCYTES NFR BLD AUTO: 0.3 % (ref 0–0.5)
LYMPHOCYTES # BLD AUTO: 2.19 10*3/MM3 (ref 0.7–3.1)
LYMPHOCYTES NFR BLD AUTO: 28 % (ref 19.6–45.3)
MCH RBC QN AUTO: 27.7 PG (ref 26.6–33)
MCHC RBC AUTO-ENTMCNC: 30 G/DL (ref 31.5–35.7)
MCV RBC AUTO: 92.2 FL (ref 79–97)
MONOCYTES # BLD AUTO: 0.47 10*3/MM3 (ref 0.1–0.9)
MONOCYTES NFR BLD AUTO: 6 % (ref 5–12)
NEUTROPHILS NFR BLD AUTO: 4.94 10*3/MM3 (ref 1.7–7)
NEUTROPHILS NFR BLD AUTO: 63.2 % (ref 42.7–76)
PLATELET # BLD AUTO: 282 10*3/MM3 (ref 140–450)
PMV BLD AUTO: 10.1 FL (ref 6–12)
POTASSIUM SERPL-SCNC: 4.6 MMOL/L (ref 3.5–5.2)
PROT SERPL-MCNC: 6.7 G/DL (ref 6–8.5)
RBC # BLD AUTO: 3.83 10*6/MM3 (ref 3.77–5.28)
SODIUM SERPL-SCNC: 138 MMOL/L (ref 136–145)
WBC NRBC COR # BLD: 7.81 10*3/MM3 (ref 3.4–10.8)

## 2022-06-21 PROCEDURE — 99213 OFFICE O/P EST LOW 20 MIN: CPT | Performed by: INTERNAL MEDICINE

## 2022-06-21 PROCEDURE — 36415 COLL VENOUS BLD VENIPUNCTURE: CPT

## 2022-06-21 PROCEDURE — 85025 COMPLETE CBC W/AUTO DIFF WBC: CPT

## 2022-06-21 PROCEDURE — 80053 COMPREHEN METABOLIC PANEL: CPT

## 2022-06-21 RX ORDER — MOMETASONE FUROATE 1 MG/G
0.1 CREAM TOPICAL AS NEEDED
COMMUNITY
Start: 2022-06-15

## 2022-07-28 ENCOUNTER — TRANSCRIBE ORDERS (OUTPATIENT)
Dept: ADMINISTRATIVE | Facility: HOSPITAL | Age: 82
End: 2022-07-28

## 2022-07-28 DIAGNOSIS — Z01.818 OTHER SPECIFIED PRE-OPERATIVE EXAMINATION: Primary | ICD-10-CM

## 2022-08-01 ENCOUNTER — LAB (OUTPATIENT)
Dept: LAB | Facility: HOSPITAL | Age: 82
End: 2022-08-01

## 2022-08-01 ENCOUNTER — APPOINTMENT (OUTPATIENT)
Dept: CT IMAGING | Facility: HOSPITAL | Age: 82
End: 2022-08-01

## 2022-08-01 DIAGNOSIS — Z01.818 OTHER SPECIFIED PRE-OPERATIVE EXAMINATION: ICD-10-CM

## 2022-08-01 LAB — SARS-COV-2 RNA PNL SPEC NAA+PROBE: NOT DETECTED

## 2022-08-01 PROCEDURE — 87635 SARS-COV-2 COVID-19 AMP PRB: CPT | Performed by: INTERNAL MEDICINE

## 2022-08-01 PROCEDURE — C9803 HOPD COVID-19 SPEC COLLECT: HCPCS

## 2022-08-03 ENCOUNTER — HOSPITAL ENCOUNTER (OUTPATIENT)
Dept: PULMONOLOGY | Facility: HOSPITAL | Age: 82
Discharge: HOME | End: 2022-08-03
Admitting: INTERNAL MEDICINE

## 2022-08-03 VITALS — RESPIRATION RATE: 20 BRPM | HEART RATE: 73 BPM | OXYGEN SATURATION: 95 %

## 2022-08-03 DIAGNOSIS — J44.9 CHRONIC OBSTRUCTIVE PULMONARY DISEASE, UNSPECIFIED COPD TYPE: ICD-10-CM

## 2022-08-03 PROCEDURE — A9270 NON-COVERED ITEM OR SERVICE: HCPCS | Performed by: INTERNAL MEDICINE

## 2022-08-03 PROCEDURE — 63710000001 ALBUTEROL SULFATE HFA 108 (90 BASE) MCG/ACT AEROSOL SOLUTION 8 G INHALER: Performed by: INTERNAL MEDICINE

## 2022-08-03 PROCEDURE — 94060 EVALUATION OF WHEEZING: CPT

## 2022-08-03 RX ORDER — ALBUTEROL SULFATE 90 UG/1
4 AEROSOL, METERED RESPIRATORY (INHALATION) ONCE
Status: COMPLETED | OUTPATIENT
Start: 2022-08-03 | End: 2022-08-03

## 2022-08-03 RX ADMIN — ALBUTEROL SULFATE 4 PUFF: 90 AEROSOL, METERED RESPIRATORY (INHALATION) at 11:08

## 2022-08-05 ENCOUNTER — HOSPITAL ENCOUNTER (OUTPATIENT)
Dept: CT IMAGING | Facility: HOSPITAL | Age: 82
Discharge: HOME | End: 2022-08-05
Admitting: INTERNAL MEDICINE

## 2022-08-05 DIAGNOSIS — R91.1 LUNG NODULE: ICD-10-CM

## 2022-08-05 PROCEDURE — 71250 CT THORAX DX C-: CPT

## 2022-08-10 NOTE — DISCHARGE SUMMARY
Admission date 2/23/2022  Discharge date 2/24/2022  Admission diagnosis recurrent left breast cancer, hypertension, COPD, diabetes  Discharge diagnosis the same  Procedure left mastectomy sentinel lymph node biopsy and low axillary dissection  Prognosis pending pathology  Condition stable  Diet as per prehospitalization  Follow-up in my office next week  Discharge medication Percocet 5/325 1 p.o. every 4 hours as needed pain, and as per prehospitalization  Activity no heavy lifting routine drain care per her daughter  Hospital course patient was taken to the operating room a left mastectomy low axillary dissection sentinel lymph node biopsy was performed postoperatively she did well her drains were initially putting out some bloody returns and her Lovenox was held.  By the next morning her output was serosanguineous her flaps were viable her hemoglobin was stable.  Her daughter was comfortable in taking care of her drains and her pain was well controlled.  She will be discharged home to follow-up in my office next week call for problems.  She needs to bring her drain output to her office visit with her.  She should call for problems.  Pathology is pending.   Document As Units Or Cc?: units

## 2022-08-11 ENCOUNTER — TRANSCRIBE ORDERS (OUTPATIENT)
Dept: ADMINISTRATIVE | Facility: HOSPITAL | Age: 82
End: 2022-08-11

## 2022-08-11 DIAGNOSIS — R91.1 NODULE OF RIGHT LUNG: Primary | ICD-10-CM

## 2022-09-13 ENCOUNTER — OFFICE VISIT (OUTPATIENT)
Dept: SURGERY | Facility: CLINIC | Age: 82
End: 2022-09-13

## 2022-09-13 VITALS
WEIGHT: 221 LBS | HEIGHT: 61 IN | DIASTOLIC BLOOD PRESSURE: 58 MMHG | BODY MASS INDEX: 41.72 KG/M2 | SYSTOLIC BLOOD PRESSURE: 150 MMHG

## 2022-09-13 DIAGNOSIS — Z17.0 MALIGNANT NEOPLASM OF CENTRAL PORTION OF LEFT BREAST IN FEMALE, ESTROGEN RECEPTOR POSITIVE: Primary | ICD-10-CM

## 2022-09-13 DIAGNOSIS — Z12.31 ENCOUNTER FOR SCREENING MAMMOGRAM FOR MALIGNANT NEOPLASM OF BREAST: ICD-10-CM

## 2022-09-13 DIAGNOSIS — C50.112 MALIGNANT NEOPLASM OF CENTRAL PORTION OF LEFT BREAST IN FEMALE, ESTROGEN RECEPTOR POSITIVE: Primary | ICD-10-CM

## 2022-09-13 PROCEDURE — 99212 OFFICE O/P EST SF 10 MIN: CPT | Performed by: SURGERY

## 2022-09-13 RX ORDER — LIDOCAINE HYDROCHLORIDE 20 MG/ML
SOLUTION OROPHARYNGEAL
COMMUNITY
Start: 2022-06-21

## 2022-09-13 NOTE — PROGRESS NOTES
3 month f/u left breast cancer. No complaints. Would like to know if can get a breast prosthesis yet.  She is without complaints.  She is proximately 7 months status postmastectomy and sentinel lymph node biopsy she elected not to have any adjuvant treatment.  She saw Dr. Sanders about 2 months ago.  She says her breathing is pretty good she has intermittent crampy abdominal pain.  She is inquiring if she can get the breast prosthesis.  She is induration of her left chest wall I think this is related to her prior radiation therapy and the need for the infusion of the doxycycline as a sclerosing agent because she developed lymphatic leak.  The induration is about the same.  I do not feel any dominant mass in her right breast nor in her left chest wall.  There is no axillary or supraclavicular adenopathy.  She is due for mammogram on her opposite side and we will order that.  I will see her back in the office in 3 months.  We will order a breast prosthesis.  She has been advised that if it is uncomfortable she should stop wearing it.  We did discuss options regarding the left chest wall fibrosis and she just wants to observe it.  I think the scar will continue to remodel over a year after her surgery.

## 2022-10-03 ENCOUNTER — LAB (OUTPATIENT)
Dept: LAB | Facility: HOSPITAL | Age: 82
End: 2022-10-03

## 2022-10-03 ENCOUNTER — TRANSCRIBE ORDERS (OUTPATIENT)
Dept: ADMINISTRATIVE | Facility: HOSPITAL | Age: 82
End: 2022-10-03

## 2022-10-03 DIAGNOSIS — R10.13 EPIGASTRIC ABDOMINAL PAIN: ICD-10-CM

## 2022-10-03 DIAGNOSIS — R10.13 EPIGASTRIC ABDOMINAL PAIN: Primary | ICD-10-CM

## 2022-10-03 LAB
ALBUMIN SERPL-MCNC: 3.9 G/DL (ref 3.5–5.2)
ALBUMIN/GLOB SERPL: 1.4 G/DL
ALP SERPL-CCNC: 87 U/L (ref 39–117)
ALT SERPL W P-5'-P-CCNC: 11 U/L (ref 1–33)
AMYLASE SERPL-CCNC: 35 U/L (ref 28–100)
ANION GAP SERPL CALCULATED.3IONS-SCNC: 10.7 MMOL/L (ref 5–15)
AST SERPL-CCNC: 16 U/L (ref 1–32)
BASOPHILS # BLD AUTO: 0.03 10*3/MM3 (ref 0–0.2)
BASOPHILS NFR BLD AUTO: 0.4 % (ref 0–1.5)
BILIRUB SERPL-MCNC: 0.2 MG/DL (ref 0–1.2)
BUN SERPL-MCNC: 20 MG/DL (ref 8–23)
BUN/CREAT SERPL: 20.4 (ref 7–25)
CALCIUM SPEC-SCNC: 9.3 MG/DL (ref 8.6–10.5)
CHLORIDE SERPL-SCNC: 105 MMOL/L (ref 98–107)
CO2 SERPL-SCNC: 24.3 MMOL/L (ref 22–29)
CREAT SERPL-MCNC: 0.98 MG/DL (ref 0.57–1)
DEPRECATED RDW RBC AUTO: 51.1 FL (ref 37–54)
EGFRCR SERPLBLD CKD-EPI 2021: 58.5 ML/MIN/1.73
EOSINOPHIL # BLD AUTO: 0.17 10*3/MM3 (ref 0–0.4)
EOSINOPHIL NFR BLD AUTO: 2.2 % (ref 0.3–6.2)
ERYTHROCYTE [DISTWIDTH] IN BLOOD BY AUTOMATED COUNT: 15.5 % (ref 12.3–15.4)
GLOBULIN UR ELPH-MCNC: 2.7 GM/DL
GLUCOSE SERPL-MCNC: 136 MG/DL (ref 65–99)
HCT VFR BLD AUTO: 35.4 % (ref 34–46.6)
HGB BLD-MCNC: 11 G/DL (ref 12–15.9)
IMM GRANULOCYTES # BLD AUTO: 0.04 10*3/MM3 (ref 0–0.05)
IMM GRANULOCYTES NFR BLD AUTO: 0.5 % (ref 0–0.5)
LIPASE SERPL-CCNC: 16 U/L (ref 13–60)
LYMPHOCYTES # BLD AUTO: 2.07 10*3/MM3 (ref 0.7–3.1)
LYMPHOCYTES NFR BLD AUTO: 27.2 % (ref 19.6–45.3)
MCH RBC QN AUTO: 28.3 PG (ref 26.6–33)
MCHC RBC AUTO-ENTMCNC: 31.1 G/DL (ref 31.5–35.7)
MCV RBC AUTO: 91 FL (ref 79–97)
MONOCYTES # BLD AUTO: 0.45 10*3/MM3 (ref 0.1–0.9)
MONOCYTES NFR BLD AUTO: 5.9 % (ref 5–12)
NEUTROPHILS NFR BLD AUTO: 4.85 10*3/MM3 (ref 1.7–7)
NEUTROPHILS NFR BLD AUTO: 63.8 % (ref 42.7–76)
NRBC BLD AUTO-RTO: 0 /100 WBC (ref 0–0.2)
PLATELET # BLD AUTO: 219 10*3/MM3 (ref 140–450)
PMV BLD AUTO: 10.5 FL (ref 6–12)
POTASSIUM SERPL-SCNC: 4.6 MMOL/L (ref 3.5–5.2)
PROT SERPL-MCNC: 6.6 G/DL (ref 6–8.5)
RBC # BLD AUTO: 3.89 10*6/MM3 (ref 3.77–5.28)
SODIUM SERPL-SCNC: 140 MMOL/L (ref 136–145)
WBC NRBC COR # BLD: 7.61 10*3/MM3 (ref 3.4–10.8)

## 2022-10-03 PROCEDURE — 83690 ASSAY OF LIPASE: CPT

## 2022-10-03 PROCEDURE — 80053 COMPREHEN METABOLIC PANEL: CPT

## 2022-10-03 PROCEDURE — 82150 ASSAY OF AMYLASE: CPT

## 2022-10-03 PROCEDURE — 36415 COLL VENOUS BLD VENIPUNCTURE: CPT

## 2022-10-03 PROCEDURE — 85025 COMPLETE CBC W/AUTO DIFF WBC: CPT

## 2022-10-13 ENCOUNTER — TELEPHONE (OUTPATIENT)
Dept: ONCOLOGY | Facility: CLINIC | Age: 82
End: 2022-10-13

## 2022-10-13 NOTE — TELEPHONE ENCOUNTER
----- Message from Frank Morales Rep sent at 10/13/2022  9:14 AM EDT -----  Patients daughter stopped by office. Was at [facility] urology and doctor recommended that she use a Estrace Cream.    Wants to know if this is ok. Told her we would call her back.    Thank you - Anny

## 2022-10-13 NOTE — TELEPHONE ENCOUNTER
Informed Nidhi (daughter) of Dr. Laureano's recommendations to only use it estrogen vaginal cream once a week based on her age, ER + status, etc. Nidhi states urologist ordered it daily with taper. Reiterated that these are our recommendations based on oncology perspective and she said she will inform her mother of this.

## 2022-10-24 RX ORDER — HYOSCYAMINE SULFATE EXTENDED-RELEASE 0.38 MG/1
TABLET ORAL
Qty: 60 TABLET | Refills: 0 | Status: SHIPPED | OUTPATIENT
Start: 2022-10-24 | End: 2022-12-06

## 2022-11-07 ENCOUNTER — HOSPITAL ENCOUNTER (OUTPATIENT)
Dept: MAMMOGRAPHY | Facility: HOSPITAL | Age: 82
Discharge: HOME | End: 2022-11-07
Admitting: SURGERY

## 2022-11-07 DIAGNOSIS — Z17.0 MALIGNANT NEOPLASM OF CENTRAL PORTION OF LEFT BREAST IN FEMALE, ESTROGEN RECEPTOR POSITIVE: ICD-10-CM

## 2022-11-07 DIAGNOSIS — C50.112 MALIGNANT NEOPLASM OF CENTRAL PORTION OF LEFT BREAST IN FEMALE, ESTROGEN RECEPTOR POSITIVE: ICD-10-CM

## 2022-11-07 DIAGNOSIS — Z12.31 ENCOUNTER FOR SCREENING MAMMOGRAM FOR MALIGNANT NEOPLASM OF BREAST: ICD-10-CM

## 2022-11-07 PROCEDURE — 77067 SCR MAMMO BI INCL CAD: CPT

## 2022-11-07 PROCEDURE — 77063 BREAST TOMOSYNTHESIS BI: CPT

## 2022-11-14 NOTE — PROGRESS NOTES
Subjective     REASON FOR CONSULTATION: Breast cancer  Provide an opinion on any further workup or treatment                             REQUESTING PHYSICIAN: Dr. Schultz    RECORDS OBTAINED:  Records of the patients history including those obtained from the referring provider were reviewed and summarized in detail.    HISTORY OF PRESENT ILLNESS:  The patient is a 82 y.o. year old female who is here for an opinion about the above issue.    History of Present Illness   This is a 81-year-old lady with obesity, COPD/asthma on intermittent O2, hypertension, depression, Casper's esophagus, depression and anxiety.  She has a history of stage I triple negative left breast cancer in 2009 treated with a lumpectomy and radiation therapy completed January 2010.      The patient had an abnormal screening mammogram on the left 11-21 showing calcifications in the left breast and a new oval-shaped asymmetry measuring 15 mm suspicious for malignancy.  Diagnostic mammogram and ultrasound 11/24/2021 showed postlumpectomy changes in the lateral portion of the left breast and a new 1.6 x 1.1 cm lesion at the 3 to 4 o'clock position of the left breast 7 cm from the nipple.  She underwent an ultrasound-guided breast biopsy 12/15/2021 with 2 lesions biopsy including a small solid satellite nodule adjacent to the previously described larger mass.  Pathology from mass 1 showed invasive ductal carcinoma grade 3 (3+3+2) ER 31 to 40% positive moderately, KY negative, HER-2 2+/FISH negative, Ki-67 60% with no in situ component, no lymph vascular or perineural invasion; mass 2 invasive ductal carcinoma grade 2 (3+3+1) ER 1 to 10% weakly positive, KY negative, HER-2 2+/FISH +80% of cells, Ki-67 55% with no in situ component, no lymphovascular or perineural invasion.    The patient underwent a left mastectomy and sentinel lymph node biopsy on 2/23/2022 showing invasive ductal carcinoma grade 3 tumor measuring 3.0 cm in size, negative margins,  several foci of lymphovascular invasion, positive perineural invasion, associated high-grade ductal carcinoma in situ 40 mm negative margins for in situ disease, 5 negative axillary lymph nodes.    The patient's main complaint relates to her shortness of breath related to COPD.  She is on oxygen intermittently.  She relates her breathing has worsened significantly over the previous 4 to 5 years.    The patient returned today for follow-up.  She has occasional pain along the left mastectomy site mostly laterally and axilla.  She denies feeling hard nodules in the soft tissue.  She has shortness of breath which is stable.    Past Medical History:   Diagnosis Date   • Anxiety    • Arthritis    • Asthma    • Casper esophagus    • Breast cancer (HCC)     Left 2021   • Colon polyp    • COPD (chronic obstructive pulmonary disease) (HCC)     Dr Sharma follows, clearance in media 2/2022   • Depression    • Diarrhea    • Diverticulitis of colon    • Emphysema lung (HCC)    • Emphysema of lung (HCC)    • Flu 12/2021   • GERD (gastroesophageal reflux disease)    • Hypertension    • Requires supplemental oxygen     3L/NC w/exertion   • Skin cancer    • Skin cancer    • Sleep apnea     w/CPAP and oxygen at 3L        Past Surgical History:   Procedure Laterality Date   • BILATERAL SALPINGO OOPHORECTOMY     • BLADDER SURGERY      bladder lift   • BREAST LUMPECTOMY Left     breast ca   • CARPAL TUNNEL RELEASE      BOTH HANDS   • CATARACT EXTRACTION W/ INTRAOCULAR LENS  IMPLANT, BILATERAL     • COLONOSCOPY N/A 02/10/2017    Procedure: COLONOSCOPY with polypectomy;  Surgeon: Weston Sue MD;  Location: [facility] OR;  Service:    • COLONOSCOPY N/A 08/12/2020    Procedure: COLONOSCOPY;  Surgeon: Weston Sue MD;  Location: [facility] OR;  Service: Gastroenterology;  Laterality: N/A;  TRANSVERSE COLON POLYP  DIVERTICULOSIS  CECAL TIME at 1356  SIGMOID COLON POLYP   • ENDOSCOPY N/A 02/10/2017    Procedure:  ESOPHAGOGASTRODUODENOSCOPY with biopsies;  Surgeon: Weston Sue MD;  Location: [facility] OR;  Service:    • ENDOSCOPY N/A 08/12/2020    Procedure: ESOPHAGOGASTRODUODENOSCOPY;  Surgeon: Weston Sue MD;  Location: [facility] OR;  Service: Gastroenterology;  Laterality: N/A;  DISTAL ESOPHAGUS BIOPSY  STOCKTON'S ESOPHAGUS   • HYSTERECTOMY     • MASTECTOMY W/ SENTINEL NODE BIOPSY Left 02/23/2022    Procedure: BREAST MASTECTOMY WITH SENTINEL NODE BIOPSY;  Surgeon: Afshin Schultz MD;  Location: [facility];  Service: General;  Laterality: Left;   • SKIN BIOPSY     • TOTAL KNEE ARTHROPLASTY Bilateral         Current Outpatient Medications on File Prior to Visit   Medication Sig Dispense Refill   • acetaminophen (TYLENOL) 500 MG tablet Take 1,000 mg by mouth Every 6 (Six) Hours As Needed for Mild Pain .     • albuterol (PROVENTIL) (2.5 MG/3ML) 0.083% nebulizer solution Take 2.5 mg by nebulization 2 (Two) Times a Day. Can use 4 times a day if needed     • albuterol sulfate  (90 Base) MCG/ACT inhaler Inhale 2 puffs Every 4 (Four) Hours As Needed for Wheezing or Shortness of Air.     • ALPRAZolam (XANAX) 0.5 MG tablet Take 0.5 mg by mouth Daily As Needed for Anxiety.     • benazepril (LOTENSIN) 40 MG tablet Take 40 mg by mouth Daily.     • Calcium Acetate-Magnesium Carb 450-200 MG tablet Take 1 tablet by mouth Daily.     • chlorhexidine (PERIDEX) 0.12 % solution      • Cholecalciferol (Vitamin D3) 20 MCG (800 UNIT) tablet Take 50 mcg by mouth Daily.     • citalopram (CeleXA) 40 MG tablet Take 40 mg by mouth Every Morning.     • colestipol (COLESTID) 1 g tablet TAKE TWO TABLETS BY MOUTH DAILY (Patient taking differently: Take 2 g by mouth Daily.) 60 tablet 5   • Cyanocobalamin (VITAMIN B-12) 5000 MCG tablet dispersible Take 1 tablet by mouth Daily.     • estradiol (ESTRACE) 0.1 MG/GM vaginal cream      • ferrous gluconate (FERGON) 324 MG tablet Take 324 mg by mouth Daily With Breakfast.     •  fluconazole (DIFLUCAN) 100 MG tablet      • Fluticasone-Umeclidin-Vilant (TRELEGY) 100-62.5-25 MCG/INH inhaler Inhale 1 puff Daily.     • glucosamine-chondroitin 500-400 MG capsule capsule Take 1 capsule by mouth 2 (Two) Times a Day With Meals.     • guaiFENesin (MUCINEX) 600 MG 12 hr tablet Take 1 tablet by mouth Every 12 (Twelve) Hours. (Patient taking differently: Take 600 mg by mouth 2 (Two) Times a Day As Needed for Cough or Congestion.) 30 tablet 0   • guaiFENesin-codeine (ROMILAR-AC) 100-10 MG/5ML syrup      • hydrALAZINE (APRESOLINE) 50 MG tablet Take 50 mg by mouth 3 (Three) Times a Day.     • hyoscyamine (LEVBID) 0.375 MG 12 hr tablet TAKE ONE TABLET BY MOUTH EVERY 12 HOURS AS NEEDED FOR CRAMPING 60 tablet 0   • Lidocaine Viscous HCl (XYLOCAINE) 2 % solution      • magnesium oxide (MAG-OX) 400 MG tablet Take 400 mg by mouth Daily.     • meloxicam (MOBIC) 15 MG tablet Take 15 mg by mouth Daily.     • Mirabegron ER (MYRBETRIQ) 50 MG tablet sustained-release 24 hour 24 hr tablet Take 50 mg by mouth every night at bedtime.     • mometasone (ELOCON) 0.1 % cream Apply 0.1 application topically to the appropriate area as directed As Needed.     • Multiple Vitamins-Minerals (MULTIVITAMIN ADULT PO) Take 1 tablet by mouth Daily.     • O2 (OXYGEN) Inhale 3 L/min 1 (One) Time. Uses w/activity     • Omega-3 Fatty Acids (fish oil) 1000 MG capsule capsule Take 1,000 mg by mouth Daily With Breakfast.     • omeprazole (priLOSEC) 40 MG capsule Take 1 capsule by mouth Daily. 90 capsule 3   • ondansetron (ZOFRAN) 4 MG tablet Take 1 tablet by mouth Every 6 (Six) Hours As Needed for Nausea or Vomiting. 20 tablet 0   • ipratropium (ATROVENT) 0.02 % nebulizer solution Take 2.5 mL by nebulization 4 (Four) Times a Day for 7 days. Add to albuterol mini nebulizer solution. 70 mL 0     No current facility-administered medications on file prior to visit.        ALLERGIES:    Allergies   Allergen Reactions   • Azithromycin Nausea And  "Vomiting     SEVERE GI UPSET   • Sulfa Antibiotics Itching   • Codeine Rash        Social History     Socioeconomic History   • Marital status:    Tobacco Use   • Smoking status: Former     Packs/day: 2.00     Years: 30.00     Pack years: 60.00     Types: Cigarettes     Quit date: 1990     Years since quittin.8   • Smokeless tobacco: Never   Vaping Use   • Vaping Use: Never used   Substance and Sexual Activity   • Alcohol use: No   • Drug use: No   • Sexual activity: Defer        Family History   Problem Relation Age of Onset   • Hypertension Daughter    • Breast cancer Daughter    • Allergies Daughter    • Malig Hyperthermia Neg Hx    • Cancer Neg Hx         Review of Systems   Constitutional: Negative.    HENT: Negative.    Respiratory: Positive for shortness of breath.    Cardiovascular: Negative.    Gastrointestinal: Negative.    Genitourinary: Negative.    Musculoskeletal: Negative.    Skin: Negative for rash and wound.   Allergic/Immunologic: Negative.    Neurological: Negative.    Hematological: Negative.    Psychiatric/Behavioral: Negative.           Objective     Vitals:    11/15/22 0953   BP: 150/73   Pulse: 72   Resp: 20   Temp: 98.2 °F (36.8 °C)   TempSrc: Temporal   SpO2: 92%   Weight: 99.2 kg (218 lb 11.2 oz)   Height: 154.5 cm (60.83\")  Comment: new ht - no shoes   PainSc: 0-No pain     Current Status 11/15/2022   ECOG score 0       Physical Exam    CONSTITUTIONAL: pleasant well-developed obese elderly woman  HEENT: no icterus, no thrush, moist membranes  LYMPH: no cervical or supraclavicular lad  CV: RRR, S1S2, no murmur  Breast: Status post left mastectomy with significant scar tissue making exam difficult  RESP: cta bilat, no wheezing, no rales, diminished breath sounds  GI: soft, non-tender, no splenomegaly, +bs  MUSC: no edema, normal gait  NEURO: alert and oriented x3, mild global weakness  PSYCH: normal mood and affect    RECENT LABS:  Hematology WBC   Date Value Ref Range " Status   11/15/2022 6.49 3.40 - 10.80 10*3/mm3 Final     RBC   Date Value Ref Range Status   11/15/2022 4.00 3.77 - 5.28 10*6/mm3 Final     Hemoglobin   Date Value Ref Range Status   11/15/2022 11.4 (L) 12.0 - 15.9 g/dL Final     Hematocrit   Date Value Ref Range Status   11/15/2022 36.5 34.0 - 46.6 % Final     Platelets   Date Value Ref Range Status   11/15/2022 239 140 - 450 10*3/mm3 Final        Lab Results   Component Value Date    GLUCOSE 114 (H) 11/15/2022    BUN 24 (H) 11/15/2022    CREATININE 0.88 11/15/2022    EGFRIFNONA 53 (L) 02/24/2022    BCR 27.3 (H) 11/15/2022    K 4.4 11/15/2022    CO2 26.2 11/15/2022    CALCIUM 9.8 11/15/2022    ALBUMIN 4.30 11/15/2022    AST 16 11/15/2022    ALT 14 11/15/2022     Right mammogram 11/7/2022-no evidence of malignancy    Assessment & Plan     *[id number] ER low (1-10%), WI negative, HER-2 FISH amplified, Ki-67 60% status post left mastectomy 2/23/2022.  The patient chose not to receive adjuvant chemotherapy    *History of stage I triple negative ductal cancer left breast status post lumpectomy and radiation therapy 2009    *Comorbidities include fairly advanced COPD requiring intermittent oxygen, hypertension, depression/anxiety    *Daughter with BRCA mutation    Oncology plan/recommendations:  Follow-up 3 to 4 months CBC CMP

## 2022-11-15 ENCOUNTER — LAB (OUTPATIENT)
Dept: LAB | Facility: HOSPITAL | Age: 82
End: 2022-11-15

## 2022-11-15 ENCOUNTER — OFFICE VISIT (OUTPATIENT)
Dept: ONCOLOGY | Facility: CLINIC | Age: 82
End: 2022-11-15

## 2022-11-15 VITALS
HEIGHT: 61 IN | BODY MASS INDEX: 41.29 KG/M2 | HEART RATE: 72 BPM | OXYGEN SATURATION: 92 % | SYSTOLIC BLOOD PRESSURE: 150 MMHG | DIASTOLIC BLOOD PRESSURE: 73 MMHG | RESPIRATION RATE: 20 BRPM | WEIGHT: 218.7 LBS | TEMPERATURE: 98.2 F

## 2022-11-15 DIAGNOSIS — C50.112 MALIGNANT NEOPLASM OF CENTRAL PORTION OF LEFT BREAST IN FEMALE, ESTROGEN RECEPTOR POSITIVE: Primary | ICD-10-CM

## 2022-11-15 DIAGNOSIS — C50.112 MALIGNANT NEOPLASM OF CENTRAL PORTION OF LEFT BREAST IN FEMALE, ESTROGEN RECEPTOR POSITIVE: ICD-10-CM

## 2022-11-15 DIAGNOSIS — Z17.0 MALIGNANT NEOPLASM OF CENTRAL PORTION OF LEFT BREAST IN FEMALE, ESTROGEN RECEPTOR POSITIVE: ICD-10-CM

## 2022-11-15 DIAGNOSIS — Z17.0 MALIGNANT NEOPLASM OF CENTRAL PORTION OF LEFT BREAST IN FEMALE, ESTROGEN RECEPTOR POSITIVE: Primary | ICD-10-CM

## 2022-11-15 DIAGNOSIS — D64.9 ANEMIA, UNSPECIFIED TYPE: ICD-10-CM

## 2022-11-15 LAB
ALBUMIN SERPL-MCNC: 4.3 G/DL (ref 3.5–5.2)
ALBUMIN/GLOB SERPL: 1.6 G/DL
ALP SERPL-CCNC: 92 U/L (ref 39–117)
ALT SERPL W P-5'-P-CCNC: 14 U/L (ref 1–33)
ANION GAP SERPL CALCULATED.3IONS-SCNC: 9.8 MMOL/L (ref 5–15)
AST SERPL-CCNC: 16 U/L (ref 1–32)
BASOPHILS # BLD AUTO: 0.03 10*3/MM3 (ref 0–0.2)
BASOPHILS NFR BLD AUTO: 0.5 % (ref 0–1.5)
BILIRUB SERPL-MCNC: 0.2 MG/DL (ref 0–1.2)
BUN SERPL-MCNC: 24 MG/DL (ref 8–23)
BUN/CREAT SERPL: 27.3 (ref 7–25)
CALCIUM SPEC-SCNC: 9.8 MG/DL (ref 8.6–10.5)
CHLORIDE SERPL-SCNC: 102 MMOL/L (ref 98–107)
CO2 SERPL-SCNC: 26.2 MMOL/L (ref 22–29)
CREAT SERPL-MCNC: 0.88 MG/DL (ref 0.57–1)
DEPRECATED RDW RBC AUTO: 50.7 FL (ref 37–54)
EGFRCR SERPLBLD CKD-EPI 2021: 66.5 ML/MIN/1.73
EOSINOPHIL # BLD AUTO: 0.13 10*3/MM3 (ref 0–0.4)
EOSINOPHIL NFR BLD AUTO: 2 % (ref 0.3–6.2)
ERYTHROCYTE [DISTWIDTH] IN BLOOD BY AUTOMATED COUNT: 14.9 % (ref 12.3–15.4)
FERRITIN SERPL-MCNC: 87 NG/ML (ref 13–150)
GLOBULIN UR ELPH-MCNC: 2.7 GM/DL
GLUCOSE SERPL-MCNC: 114 MG/DL (ref 65–99)
HCT VFR BLD AUTO: 36.5 % (ref 34–46.6)
HGB BLD-MCNC: 11.4 G/DL (ref 12–15.9)
IMM GRANULOCYTES # BLD AUTO: 0.01 10*3/MM3 (ref 0–0.05)
IMM GRANULOCYTES NFR BLD AUTO: 0.2 % (ref 0–0.5)
IRON 24H UR-MRATE: 49 MCG/DL (ref 37–145)
IRON SATN MFR SERPL: 18 % (ref 20–50)
LYMPHOCYTES # BLD AUTO: 2.17 10*3/MM3 (ref 0.7–3.1)
LYMPHOCYTES NFR BLD AUTO: 33.4 % (ref 19.6–45.3)
MCH RBC QN AUTO: 28.5 PG (ref 26.6–33)
MCHC RBC AUTO-ENTMCNC: 31.2 G/DL (ref 31.5–35.7)
MCV RBC AUTO: 91.3 FL (ref 79–97)
MONOCYTES # BLD AUTO: 0.4 10*3/MM3 (ref 0.1–0.9)
MONOCYTES NFR BLD AUTO: 6.2 % (ref 5–12)
NEUTROPHILS NFR BLD AUTO: 3.75 10*3/MM3 (ref 1.7–7)
NEUTROPHILS NFR BLD AUTO: 57.7 % (ref 42.7–76)
PLATELET # BLD AUTO: 239 10*3/MM3 (ref 140–450)
PMV BLD AUTO: 10.3 FL (ref 6–12)
POTASSIUM SERPL-SCNC: 4.4 MMOL/L (ref 3.5–5.2)
PROT SERPL-MCNC: 7 G/DL (ref 6–8.5)
RBC # BLD AUTO: 4 10*6/MM3 (ref 3.77–5.28)
SODIUM SERPL-SCNC: 138 MMOL/L (ref 136–145)
TIBC SERPL-MCNC: 280 MCG/DL (ref 298–536)
UIBC SERPL-MCNC: 231 MCG/DL (ref 112–346)
WBC NRBC COR # BLD: 6.49 10*3/MM3 (ref 3.4–10.8)

## 2022-11-15 PROCEDURE — 99213 OFFICE O/P EST LOW 20 MIN: CPT | Performed by: INTERNAL MEDICINE

## 2022-11-15 PROCEDURE — 85025 COMPLETE CBC W/AUTO DIFF WBC: CPT

## 2022-11-15 PROCEDURE — 82728 ASSAY OF FERRITIN: CPT

## 2022-11-15 PROCEDURE — 83540 ASSAY OF IRON: CPT

## 2022-11-15 PROCEDURE — 83550 IRON BINDING TEST: CPT

## 2022-11-15 PROCEDURE — 80053 COMPREHEN METABOLIC PANEL: CPT

## 2022-11-15 PROCEDURE — 36415 COLL VENOUS BLD VENIPUNCTURE: CPT

## 2022-11-15 RX ORDER — ESTRADIOL 0.1 MG/G
CREAM VAGINAL
COMMUNITY
Start: 2022-10-14

## 2022-12-05 NOTE — PROGRESS NOTES
Patient presents for follow up  BREAST MASTECTOMY WITH SENTINEL NODE BIOPSY (2/23/22). Saw Dr Sanders on 11/15/22. No complaints.  He is status post left mastectomy and sentinel lymph node biopsy 10 months ago.  She saw Dr. Sanders last week.  He sees her again in 3 months.  She had a postoperative persistent lymphatic leak and we had to use some sclerotherapy with doxycycline into the drain site.  Because of that she has induration and a masslike effect of her left chest wall that I think will likely be permanent.  The chest wall scarring is unchanged.  She is able to wear a breast prosthesis without problems.  I feel no supraclavicular or axillary adenopathy her left chest wall scarring is unchanged.  I have recommended that she follow-up with Dr. Sanders and he can determine whether she needs surgical follow-up.  I discussed with her and her daughter that I am leaving [facility] and my last day is January 1

## 2022-12-06 ENCOUNTER — OFFICE VISIT (OUTPATIENT)
Dept: SURGERY | Facility: CLINIC | Age: 82
End: 2022-12-06

## 2022-12-06 DIAGNOSIS — C50.112 MALIGNANT NEOPLASM OF CENTRAL PORTION OF LEFT BREAST IN FEMALE, ESTROGEN RECEPTOR POSITIVE: Primary | ICD-10-CM

## 2022-12-06 DIAGNOSIS — Z17.0 MALIGNANT NEOPLASM OF CENTRAL PORTION OF LEFT BREAST IN FEMALE, ESTROGEN RECEPTOR POSITIVE: Primary | ICD-10-CM

## 2022-12-06 PROCEDURE — 99212 OFFICE O/P EST SF 10 MIN: CPT | Performed by: SURGERY

## 2022-12-06 RX ORDER — HYOSCYAMINE SULFATE EXTENDED-RELEASE 0.38 MG/1
TABLET ORAL
Qty: 60 TABLET | Refills: 11 | Status: SHIPPED | OUTPATIENT
Start: 2022-12-06

## 2023-01-23 ENCOUNTER — OFFICE VISIT (OUTPATIENT)
Dept: GASTROENTEROLOGY | Facility: CLINIC | Age: 83
End: 2023-01-23
Payer: MEDICARE

## 2023-01-23 VITALS
SYSTOLIC BLOOD PRESSURE: 138 MMHG | DIASTOLIC BLOOD PRESSURE: 60 MMHG | BODY MASS INDEX: 41.91 KG/M2 | WEIGHT: 222 LBS | HEIGHT: 61 IN

## 2023-01-23 DIAGNOSIS — Z17.0 MALIGNANT NEOPLASM OF CENTRAL PORTION OF LEFT BREAST IN FEMALE, ESTROGEN RECEPTOR POSITIVE: ICD-10-CM

## 2023-01-23 DIAGNOSIS — J43.9 PULMONARY EMPHYSEMA, UNSPECIFIED EMPHYSEMA TYPE: Primary | ICD-10-CM

## 2023-01-23 DIAGNOSIS — C50.112 MALIGNANT NEOPLASM OF CENTRAL PORTION OF LEFT BREAST IN FEMALE, ESTROGEN RECEPTOR POSITIVE: ICD-10-CM

## 2023-01-23 DIAGNOSIS — K58.0 IRRITABLE BOWEL SYNDROME WITH DIARRHEA: ICD-10-CM

## 2023-01-23 DIAGNOSIS — Z86.010 PERSONAL HISTORY OF COLONIC POLYPS: ICD-10-CM

## 2023-01-23 DIAGNOSIS — K22.70 BARRETT'S ESOPHAGUS WITHOUT DYSPLASIA: ICD-10-CM

## 2023-01-23 PROCEDURE — 99213 OFFICE O/P EST LOW 20 MIN: CPT | Performed by: INTERNAL MEDICINE

## 2023-01-23 RX ORDER — OMEPRAZOLE 40 MG/1
40 CAPSULE, DELAYED RELEASE ORAL
Qty: 180 CAPSULE | Refills: 3 | Status: SHIPPED | OUTPATIENT
Start: 2023-01-23

## 2023-01-23 NOTE — PROGRESS NOTES
"    PATIENT INFORMATION  Katarzyna Ku       - 1940    CHIEF COMPLAINT  Chief Complaint   Patient presents with   • Abdominal Pain     LLQ   • Casper's esophagus       HISTORY OF PRESENT ILLNESS  Here for  follow up treatment for her Breast cancer    Her only complaint is her resultant Left sided bloating post op.     And has intermittant anal bleeding ( but multiple colonosocpy) and doesn't really use PrepH but has A and D ointment    Also describes esophageal spasm worse with breads and always goes down chantal after flushing down with liquids- that is as frequent as weekly    Uses LEVBID daily and prn BID      REVIEWED PERTINENT RESULTS/ LABS  Lab Results   Component Value Date    CASEREPORT  2022     Surgical Pathology Report                         Case: [id number]                                  Authorizing Provider:  Afshin Schultz MD        Collected:           2022 11:29 AM          Ordering Location:     [facility]   Received:            2022 12:14 PM                                 OR                                                                           Pathologist:           Monserrat Clarke MD                                                          Specimens:   1) - Breast, Left, low axilla included                                                              2) - [place] Lymph Node, #1                                                               FINALDX  2022     1. Submitted as \"Left Breast and Low Axillary Contents, Mastectomy with [place] Lymph Node Biopsy\" (1,112 grams):    A. INVASIVE DUCTAL CARCINOMA, Poorly differentiated; [place] Histologic Grade III/III      (tubule score = 3, nuclear score = 3, mitoses score = 3):   1. Tumor size:  30 mm (gross measurement).   2. Margins are negative for invasive carcinoma; Closest distance: Invasive carcinoma is present       10 mm from the anterior margin.     3. Positive for several foci of " "lymphovascular space invasion.   4. Positive for perineural invasion.  B. 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 Solid and Cribriform types with associated necrosis.     2. Extent of DCIS:  40 mm (based on the slices involved).     3. Margins are negative for in situ carcinoma; Closest distance: DCIS is present >10 mm from the                     anterior margin.   C. Two clips retrieved from within invasive carcinoma.   D. Background fibrous tissue and dystrophic calcifications, suggesting prior treatment effect.    E. Four lymph nodes, negative for carcinoma (0/4).    F. Unremarkable skin and nipple.   G. See Synoptic Report (to include part 2).    2. \"[place] Lymph Node #1,\" Biopsy:   A. One lymph node, negative for carcinoma (0/1).     Jab/kds        Lab Results   Component Value Date    HGB 11.4 (L) 11/15/2022    MCV 91.3 11/15/2022     11/15/2022    ALT 14 11/15/2022    AST 16 11/15/2022    HGBA1C 5.90 (H) 02/17/2022    FERRITIN 87.00 11/15/2022    IRON 49 11/15/2022    TIBC 280 (L) 11/15/2022      No results found.    REVIEW OF SYSTEMS  Review of Systems   Constitutional: Negative for activity change, chills, fever and unexpected weight change.   HENT: Positive for trouble swallowing. Negative for congestion.    Eyes: Negative for visual disturbance.   Respiratory: Negative for shortness of breath.    Cardiovascular: Negative for chest pain and palpitations.   Gastrointestinal: Positive for abdominal distention, abdominal pain and blood in stool.   Endocrine: Negative for cold intolerance and heat intolerance.   Genitourinary: Negative for hematuria.   Musculoskeletal: Negative for gait problem.   Skin: Negative for color change.   Allergic/Immunologic: Negative for immunocompromised state.   Neurological: Negative for weakness and light-headedness.   Hematological: Negative for adenopathy.   Psychiatric/Behavioral: Negative for sleep disturbance. The patient is not " nervous/anxious.          ACTIVE PROBLEMS  Patient Active Problem List    Diagnosis    • Anemia [D64.9]    • Malignant neoplasm of central portion of left breast in female, estrogen receptor positive (HCC) [C50.112, Z17.0]    • Influenza A [J10.1]    • Influenza A (H1N1) [J10.1]    • Irritable bowel syndrome with diarrhea [K58.0]    • Encounter for screening for malignant neoplasm of colon [Z12.11]    • Personal history of colonic polyps [Z86.010]    • Casper's esophagus without dysplasia [K22.70]    • Abdominal pain, right upper quadrant [R10.11]    • Osteoarthritis of knee [M17.9]    • Chronic obstructive pulmonary disease (HCC) [J44.9]    • Hypertension [I10]    • Primary localized osteoarthrosis [M19.91]    • Midline cystocele [N81.11]    • Mixed incontinence [N39.46]    • Muscular atrophy [M62.50]    • Atrophic vaginitis [N95.2]    • Prolapse of vaginal vault after hysterectomy [N99.3]    • Enterocele [K46.9]          PAST MEDICAL HISTORY  Past Medical History:   Diagnosis Date   • Anxiety    • Arthritis    • Asthma    • Casper esophagus    • Breast cancer (HCC)     Left 2021   • Colon polyp    • COPD (chronic obstructive pulmonary disease) (HCC)     Dr Edith alba, clearance in media 2/2022   • Depression    • Diarrhea    • Diverticulitis of colon    • Emphysema lung (HCC)    • Emphysema of lung (HCC)    • Flu 12/2021   • GERD (gastroesophageal reflux disease)    • Hypertension    • Requires supplemental oxygen     3L/NC w/exertion   • Skin cancer    • Skin cancer    • Sleep apnea     w/CPAP and oxygen at 3L         SURGICAL HISTORY  Past Surgical History:   Procedure Laterality Date   • BILATERAL SALPINGO OOPHORECTOMY     • BLADDER SURGERY      bladder lift   • BREAST LUMPECTOMY Left     breast ca   • CARPAL TUNNEL RELEASE      BOTH HANDS   • CATARACT EXTRACTION W/ INTRAOCULAR LENS  IMPLANT, BILATERAL     • COLONOSCOPY N/A 02/10/2017    Procedure: COLONOSCOPY with polypectomy;  Surgeon: Weston Molina  MD Vikram;  Location: [facility] OR;  Service:    • COLONOSCOPY N/A 2020    Procedure: COLONOSCOPY;  Surgeon: Weston Sue MD;  Location: [facility] OR;  Service: Gastroenterology;  Laterality: N/A;  TRANSVERSE COLON POLYP  DIVERTICULOSIS  CECAL TIME at 1356  SIGMOID COLON POLYP   • ENDOSCOPY N/A 02/10/2017    Procedure: ESOPHAGOGASTRODUODENOSCOPY with biopsies;  Surgeon: Weston Sue MD;  Location: [facility] OR;  Service:    • ENDOSCOPY N/A 2020    Procedure: ESOPHAGOGASTRODUODENOSCOPY;  Surgeon: Weston Sue MD;  Location: [facility] OR;  Service: Gastroenterology;  Laterality: N/A;  DISTAL ESOPHAGUS BIOPSY  STOCKTON'S ESOPHAGUS   • HYSTERECTOMY     • MASTECTOMY W/ SENTINEL NODE BIOPSY Left 2022    Procedure: BREAST MASTECTOMY WITH SENTINEL NODE BIOPSY;  Surgeon: Afshin Schultz MD;  Location: [facility];  Service: General;  Laterality: Left;   • SKIN BIOPSY     • TOTAL KNEE ARTHROPLASTY Bilateral          FAMILY HISTORY  Family History   Problem Relation Age of Onset   • Hypertension Daughter    • Breast cancer Daughter    • Allergies Daughter    • Malig Hyperthermia Neg Hx    • Cancer Neg Hx          SOCIAL HISTORY  Social History     Occupational History   • Not on file   Tobacco Use   • Smoking status: Former     Packs/day: 2.00     Years: 30.00     Pack years: 60.00     Types: Cigarettes     Quit date: 1990     Years since quittin.0     Passive exposure: Never   • Smokeless tobacco: Never   Vaping Use   • Vaping Use: Never used   Substance and Sexual Activity   • Alcohol use: No   • Drug use: No   • Sexual activity: Defer         CURRENT MEDICATIONS    Current Outpatient Medications:   •  acetaminophen (TYLENOL) 500 MG tablet, Take 1,000 mg by mouth Every 6 (Six) Hours As Needed for Mild Pain ., Disp: , Rfl:   •  albuterol (PROVENTIL) (2.5 MG/3ML) 0.083% nebulizer solution, Take 2.5 mg by nebulization 2 (Two) Times a Day. Can use 4 times a day if  needed, Disp: , Rfl:   •  albuterol sulfate  (90 Base) MCG/ACT inhaler, Inhale 2 puffs Every 4 (Four) Hours As Needed for Wheezing or Shortness of Air., Disp: , Rfl:   •  ALPRAZolam (XANAX) 0.5 MG tablet, Take 0.5 mg by mouth Daily As Needed for Anxiety., Disp: , Rfl:   •  benazepril (LOTENSIN) 40 MG tablet, Take 40 mg by mouth Daily., Disp: , Rfl:   •  Calcium Acetate-Magnesium Carb 450-200 MG tablet, Take 1 tablet by mouth Daily., Disp: , Rfl:   •  chlorhexidine (PERIDEX) 0.12 % solution, , Disp: , Rfl:   •  Cholecalciferol (Vitamin D3) 20 MCG (800 UNIT) tablet, Take 50 mcg by mouth Daily., Disp: , Rfl:   •  citalopram (CeleXA) 40 MG tablet, Take 40 mg by mouth Every Morning., Disp: , Rfl:   •  Cyanocobalamin (VITAMIN B-12) 5000 MCG tablet dispersible, Take 1 tablet by mouth Daily., Disp: , Rfl:   •  estradiol (ESTRACE) 0.1 MG/GM vaginal cream, , Disp: , Rfl:   •  fluconazole (DIFLUCAN) 100 MG tablet, Take 100 mg by mouth 1 (One) Time Per Week., Disp: , Rfl:   •  Fluticasone-Umeclidin-Vilant (TRELEGY) 100-62.5-25 MCG/INH inhaler, Inhale 1 puff Daily., Disp: , Rfl:   •  guaiFENesin (MUCINEX) 600 MG 12 hr tablet, Take 1 tablet by mouth Every 12 (Twelve) Hours. (Patient taking differently: Take 600 mg by mouth 2 (Two) Times a Day As Needed for Cough or Congestion.), Disp: 30 tablet, Rfl: 0  •  hydrALAZINE (APRESOLINE) 50 MG tablet, Take 50 mg by mouth 3 (Three) Times a Day., Disp: , Rfl:   •  hyoscyamine (LEVBID) 0.375 MG 12 hr tablet, TAKE ONE TABLET BY MOUTH EVERY 12 HOURS AS NEEDED FOR CRAMPING, Disp: 60 tablet, Rfl: 11  •  Lidocaine Viscous HCl (XYLOCAINE) 2 % solution, , Disp: , Rfl:   •  magnesium oxide (MAG-OX) 400 MG tablet, Take 400 mg by mouth Daily., Disp: , Rfl:   •  meloxicam (MOBIC) 15 MG tablet, Take 15 mg by mouth Daily., Disp: , Rfl:   •  Mirabegron ER (MYRBETRIQ) 50 MG tablet sustained-release 24 hour 24 hr tablet, Take 50 mg by mouth every night at bedtime., Disp: , Rfl:   •  mometasone  "(ELOCON) 0.1 % cream, Apply 0.1 application topically to the appropriate area as directed As Needed., Disp: , Rfl:   •  Multiple Vitamins-Minerals (MULTIVITAMIN ADULT PO), Take 1 tablet by mouth Daily., Disp: , Rfl:   •  O2 (OXYGEN), Inhale 3 L/min 1 (One) Time. Uses w/activity, Disp: , Rfl:   •  omeprazole (priLOSEC) 40 MG capsule, Take 1 capsule by mouth 2 (Two) Times a Day Before Meals., Disp: 180 capsule, Rfl: 3  •  ondansetron (ZOFRAN) 4 MG tablet, Take 1 tablet by mouth Every 6 (Six) Hours As Needed for Nausea or Vomiting., Disp: 20 tablet, Rfl: 0  •  hydrocortisone 2.5 % ointment, Apply 1 application topically to the appropriate area as directed 2 (Two) Times a Day., Disp: 30 g, Rfl: 11    ALLERGIES  Azithromycin, Sulfa antibiotics, and Codeine    VITALS  Vitals:    01/23/23 1401   BP: 138/60   BP Location: Right arm   Patient Position: Sitting   Cuff Size: Large Adult   Weight: 101 kg (222 lb)   Height: 154.9 cm (61\")       PHYSICAL EXAM  Debilities/Disabilities Identified: None  Emotional Behavior: Appropriate  Wt Readings from Last 3 Encounters:   01/23/23 101 kg (222 lb)   11/15/22 99.2 kg (218 lb 11.2 oz)   09/13/22 100 kg (221 lb)     Ht Readings from Last 1 Encounters:   01/23/23 154.9 cm (61\")     Body mass index is 41.95 kg/m².  Physical Exam  Constitutional:       Appearance: She is well-developed. She is not diaphoretic.   HENT:      Head: Normocephalic and atraumatic.   Eyes:      General: No scleral icterus.     Conjunctiva/sclera: Conjunctivae normal.      Pupils: Pupils are equal, round, and reactive to light.   Neck:      Thyroid: No thyromegaly.   Cardiovascular:      Rate and Rhythm: Normal rate and regular rhythm.      Heart sounds: Normal heart sounds. No murmur heard.    No gallop.   Pulmonary:      Effort: Pulmonary effort is normal.      Breath sounds: Normal breath sounds. No wheezing or rales.   Abdominal:      General: Bowel sounds are normal. There is no distension or abdominal " bruit.      Palpations: Abdomen is soft. There is no shifting dullness, fluid wave or mass.      Tenderness: There is abdominal tenderness in the right upper quadrant and left lower quadrant. There is no guarding. Negative signs include Madrigal's sign.      Hernia: There is no hernia in the ventral area.      Comments: Negative Madrigal's    Prob pelvic adhesions   Musculoskeletal:         General: Normal range of motion.      Cervical back: Normal range of motion and neck supple.   Lymphadenopathy:      Cervical: No cervical adenopathy.   Skin:     General: Skin is warm and dry.      Findings: No erythema or rash.   Neurological:      Mental Status: She is alert and oriented to person, place, and time.   Psychiatric:         Mood and Affect: Mood normal.         Behavior: Behavior normal.         CLINICAL DATA REVIEWED   reviewed previous lab results and integrated with today's visit, reviewed notes from other physicians and/or last GI encounter, reviewed previous endoscopy results and available photos, reviewed surgical pathology results from previous biopsies    ASSESSMENT  Diagnoses and all orders for this visit:    Pulmonary emphysema, unspecified emphysema type (HCC)    Malignant neoplasm of central portion of left breast in female, estrogen receptor positive (HCC)    Personal history of colonic polyps    Irritable bowel syndrome with diarrhea    Casper's esophagus without dysplasia  -     omeprazole (priLOSEC) 40 MG capsule; Take 1 capsule by mouth 2 (Two) Times a Day Before Meals.    Other orders  -     hydrocortisone 2.5 % ointment; Apply 1 application topically to the appropriate area as directed 2 (Two) Times a Day.          PLAN  Increased PPI due to dyspagia but as long as she responds then will repeat both Scopes in 2025    Return in about 4 months (around 5/23/2023).    I have discussed the above plan with the patient.  They verbalize understanding and are in agreement with the plan.  They have been  advised to contact the office for any questions, concerns, or changes related to their health.

## 2023-01-27 NOTE — ADDENDUM NOTE
Addended by: KIRSTEN NESBITT on: 10/30/2018 09:26 AM     Modules accepted: Melanie     Iain Andujar(Attending)

## 2023-02-23 ENCOUNTER — HOSPITAL ENCOUNTER (OUTPATIENT)
Dept: CT IMAGING | Facility: HOSPITAL | Age: 83
Discharge: HOME | End: 2023-02-23
Admitting: INTERNAL MEDICINE
Payer: MEDICARE

## 2023-02-23 DIAGNOSIS — R91.1 NODULE OF RIGHT LUNG: ICD-10-CM

## 2023-02-23 PROCEDURE — 71250 CT THORAX DX C-: CPT

## 2023-03-15 NOTE — PROGRESS NOTES
Subjective     REASON FOR CONSULTATION: Breast cancer  Provide an opinion on any further workup or treatment                             REQUESTING PHYSICIAN: Dr. Schultz    RECORDS OBTAINED:  Records of the patients history including those obtained from the referring provider were reviewed and summarized in detail.    HISTORY OF PRESENT ILLNESS:  The patient is a 82 y.o. year old female who is here for an opinion about the above issue.    History of Present Illness   This is a 81-year-old lady with obesity, COPD/asthma on intermittent O2, hypertension, depression, Casper's esophagus, depression and anxiety.  She has a history of stage I triple negative left breast cancer in 2009 treated with a lumpectomy and radiation therapy completed January 2010.      The patient had an abnormal screening mammogram on the left 11-21 showing calcifications in the left breast and a new oval-shaped asymmetry measuring 15 mm suspicious for malignancy.  Diagnostic mammogram and ultrasound 11/24/2021 showed postlumpectomy changes in the lateral portion of the left breast and a new 1.6 x 1.1 cm lesion at the 3 to 4 o'clock position of the left breast 7 cm from the nipple.  She underwent an ultrasound-guided breast biopsy 12/15/2021 with 2 lesions biopsy including a small solid satellite nodule adjacent to the previously described larger mass.  Pathology from mass 1 showed invasive ductal carcinoma grade 3 (3+3+2) ER 31 to 40% positive moderately, FL negative, HER-2 2+/FISH negative, Ki-67 60% with no in situ component, no lymph vascular or perineural invasion; mass 2 invasive ductal carcinoma grade 2 (3+3+1) ER 1 to 10% weakly positive, FL negative, HER-2 2+/FISH +80% of cells, Ki-67 55% with no in situ component, no lymphovascular or perineural invasion.    The patient underwent a left mastectomy and sentinel lymph node biopsy on 2/23/2022 showing invasive ductal carcinoma grade 3 tumor measuring 3.0 cm in size, negative margins,  several foci of lymphovascular invasion, positive perineural invasion, associated high-grade ductal carcinoma in situ 40 mm negative margins for in situ disease, 5 negative axillary lymph nodes.    The patient's main complaint relates to her shortness of breath related to COPD.  She is on oxygen intermittently.  She relates her breathing has worsened significantly over the previous 4 to 5 years.    The patient returned today for follow-up.  She has occasional pain along the left mastectomy site mostly laterally and axilla/proximal left upper arm.  She denies feeling hard nodules in the soft tissue.  She has shortness of breath related to COPD/emphysema which is stable.    Past Medical History:   Diagnosis Date   • Anxiety    • Arthritis    • Asthma    • Casper esophagus    • Breast cancer (HCC)     Left 2021   • Colon polyp    • COPD (chronic obstructive pulmonary disease) (HCC)     Dr Edith alba, clearance in media 2/2022   • Depression    • Diarrhea    • Diverticulitis of colon    • Emphysema lung (HCC)    • Emphysema of lung (HCC)    • Flu 12/2021   • GERD (gastroesophageal reflux disease)    • Hypertension    • Requires supplemental oxygen     3L/NC w/exertion   • Skin cancer    • Skin cancer    • Sleep apnea     w/CPAP and oxygen at 3L        Past Surgical History:   Procedure Laterality Date   • BILATERAL SALPINGO OOPHORECTOMY     • BLADDER SURGERY      bladder lift   • BREAST LUMPECTOMY Left     breast ca   • CARPAL TUNNEL RELEASE      BOTH HANDS   • CATARACT EXTRACTION W/ INTRAOCULAR LENS  IMPLANT, BILATERAL     • COLONOSCOPY N/A 02/10/2017    Procedure: COLONOSCOPY with polypectomy;  Surgeon: Weston Sue MD;  Location: [facility] OR;  Service:    • COLONOSCOPY N/A 08/12/2020    Procedure: COLONOSCOPY;  Surgeon: Weston Sue MD;  Location: [facility] OR;  Service: Gastroenterology;  Laterality: N/A;  TRANSVERSE COLON POLYP  DIVERTICULOSIS  CECAL TIME at 1356  SIGMOID COLON POLYP   •  ENDOSCOPY N/A 02/10/2017    Procedure: ESOPHAGOGASTRODUODENOSCOPY with biopsies;  Surgeon: Weston Sue MD;  Location: [facility] OR;  Service:    • ENDOSCOPY N/A 08/12/2020    Procedure: ESOPHAGOGASTRODUODENOSCOPY;  Surgeon: Weston Sue MD;  Location: [facility] OR;  Service: Gastroenterology;  Laterality: N/A;  DISTAL ESOPHAGUS BIOPSY  STOCKTON'S ESOPHAGUS   • HYSTERECTOMY     • MASTECTOMY W/ SENTINEL NODE BIOPSY Left 02/23/2022    Procedure: BREAST MASTECTOMY WITH SENTINEL NODE BIOPSY;  Surgeon: Afshin Schultz MD;  Location: [facility] OR;  Service: General;  Laterality: Left;   • SKIN BIOPSY     • TOTAL KNEE ARTHROPLASTY Bilateral         Current Outpatient Medications on File Prior to Visit   Medication Sig Dispense Refill   • acetaminophen (TYLENOL) 500 MG tablet Take 2 tablets by mouth Every 6 (Six) Hours As Needed for Mild Pain.     • albuterol (PROVENTIL) (2.5 MG/3ML) 0.083% nebulizer solution Take 2.5 mg by nebulization 2 (Two) Times a Day. Can use 4 times a day if needed     • albuterol sulfate  (90 Base) MCG/ACT inhaler Inhale 2 puffs Every 4 (Four) Hours As Needed for Wheezing or Shortness of Air.     • ALPRAZolam (XANAX) 0.5 MG tablet Take 1 tablet by mouth Daily As Needed for Anxiety.     • benazepril (LOTENSIN) 40 MG tablet Take 1 tablet by mouth Daily.     • benzonatate (TESSALON) 100 MG capsule      • betamethasone dipropionate 0.05 % cream      • Calcium Acetate-Magnesium Carb 450-200 MG tablet Take 1 tablet by mouth Daily.     • chlorhexidine (PERIDEX) 0.12 % solution      • Cholecalciferol (Vitamin D3) 20 MCG (800 UNIT) tablet Take 50 mcg by mouth Daily.     • citalopram (CeleXA) 40 MG tablet Take 1 tablet by mouth Every Morning.     • Cyanocobalamin (VITAMIN B-12) 5000 MCG tablet dispersible Take 1 tablet by mouth Daily.     • estradiol (ESTRACE) 0.1 MG/GM vaginal cream      • fluconazole (DIFLUCAN) 100 MG tablet Take 1 tablet by mouth 1 (One) Time Per Week.     •  Fluticasone-Umeclidin-Vilant (TRELEGY) 100-62.5-25 MCG/INH inhaler Inhale 1 puff Daily.     • guaiFENesin (MUCINEX) 600 MG 12 hr tablet Take 1 tablet by mouth Every 12 (Twelve) Hours. (Patient taking differently: Take 1 tablet by mouth 2 (Two) Times a Day As Needed for Cough or Congestion.) 30 tablet 0   • hydrALAZINE (APRESOLINE) 50 MG tablet Take 1 tablet by mouth 3 (Three) Times a Day.     • hydrocortisone 2.5 % ointment Apply 1 application topically to the appropriate area as directed 2 (Two) Times a Day. 30 g 11   • hyoscyamine (LEVBID) 0.375 MG 12 hr tablet TAKE ONE TABLET BY MOUTH EVERY 12 HOURS AS NEEDED FOR CRAMPING 60 tablet 11   • Lidocaine Viscous HCl (XYLOCAINE) 2 % solution      • magnesium oxide (MAG-OX) 400 MG tablet Take 1 tablet by mouth Daily.     • meloxicam (MOBIC) 15 MG tablet Take 1 tablet by mouth Daily.     • Mirabegron ER (MYRBETRIQ) 50 MG tablet sustained-release 24 hour 24 hr tablet Take 50 mg by mouth every night at bedtime.     • mometasone (ELOCON) 0.1 % cream Apply 0.1 application topically to the appropriate area as directed As Needed.     • Multiple Vitamins-Minerals (MULTIVITAMIN ADULT PO) Take 1 tablet by mouth Daily.     • O2 (OXYGEN) Inhale 3 L/min 1 (One) Time. Uses w/activity     • omeprazole (priLOSEC) 40 MG capsule Take 1 capsule by mouth 2 (Two) Times a Day Before Meals. 180 capsule 3   • ondansetron (ZOFRAN) 4 MG tablet Take 1 tablet by mouth Every 6 (Six) Hours As Needed for Nausea or Vomiting. 20 tablet 0   • triamcinolone (KENALOG) 0.1 % lotion        No current facility-administered medications on file prior to visit.        ALLERGIES:    Allergies   Allergen Reactions   • Azithromycin Nausea And Vomiting     SEVERE GI UPSET   • Sulfa Antibiotics Itching   • Codeine Rash        Social History     Socioeconomic History   • Marital status:    Tobacco Use   • Smoking status: Former     Packs/day: 2.00     Years: 30.00     Pack years: 60.00     Types: Cigarettes      "Quit date: 1990     Years since quittin.1     Passive exposure: Never   • Smokeless tobacco: Never   Vaping Use   • Vaping Use: Never used   Substance and Sexual Activity   • Alcohol use: No   • Drug use: No   • Sexual activity: Defer        Family History   Problem Relation Age of Onset   • Hypertension Daughter    • Breast cancer Daughter    • Allergies Daughter    • Malig Hyperthermia Neg Hx    • Cancer Neg Hx         Review of Systems   Constitutional: Negative.    HENT: Negative.    Respiratory: Positive for shortness of breath.    Cardiovascular: Negative.    Gastrointestinal: Negative.    Genitourinary: Negative.    Musculoskeletal: Positive for arthralgias and myalgias.   Skin: Negative for rash and wound.   Allergic/Immunologic: Negative.    Neurological: Negative.    Hematological: Negative.    Psychiatric/Behavioral: Negative.           Objective     Vitals:    23 1046   BP: 133/73   Pulse: 74   Resp: 20   Temp: 98.7 °F (37.1 °C)   TempSrc: Infrared   SpO2: 95%   Weight: 101 kg (222 lb 9.6 oz)   Height: 154.5 cm (60.83\")   PainSc: 0-No pain     Current Status 3/21/2023   ECOG score 0       Physical Exam    CONSTITUTIONAL: pleasant well-developed obese elderly woman  HEENT: no icterus, no thrush, moist membranes  LYMPH: no cervical or supraclavicular lad  CV: RRR, S1S2, no murmur  Breast: Status post left mastectomy with significant scar tissue making exam difficult  RESP: cta bilat, faint wheezing, no rales, diminished breath sounds, mild increased work of breathing  GI: soft, non-tender, no splenomegaly, +bs  MUSC: no edema, normal gait  NEURO: alert and oriented x3, mild global weakness  PSYCH: normal mood and affect    RECENT LABS:  Hematology WBC   Date Value Ref Range Status   2023 7.85 3.40 - 10.80 10*3/mm3 Final     RBC   Date Value Ref Range Status   2023 4.09 3.77 - 5.28 10*6/mm3 Final     Hemoglobin   Date Value Ref Range Status   2023 12.0 12.0 - 15.9 g/dL " Final     Hematocrit   Date Value Ref Range Status   03/21/2023 38.0 34.0 - 46.6 % Final     Platelets   Date Value Ref Range Status   03/21/2023 247 140 - 450 10*3/mm3 Final        Lab Results   Component Value Date    GLUCOSE 151 (H) 03/21/2023    BUN 17 03/21/2023    CREATININE 0.94 03/21/2023    EGFRIFNONA 53 (L) 02/24/2022    BCR 18.1 03/21/2023    K 4.3 03/21/2023    CO2 24.5 03/21/2023    CALCIUM 9.2 03/21/2023    ALBUMIN 4.1 03/21/2023    AST 14 03/21/2023    ALT 13 03/21/2023     Right mammogram 11/7/2022-no evidence of malignancy  CT chest without contrast 2/23/2023- no evidence of tumor recurrence along the chest wall  Assessment & Plan     *[id number] ER low (1-10%), CO negative, HER-2 FISH amplified, Ki-67 60% status post left mastectomy 2/23/2022.  The patient chose not to receive adjuvant chemotherapy    *History of stage I triple negative ductal cancer left breast status post lumpectomy and radiation therapy 2009    *Comorbidities include fairly advanced COPD requiring intermittent oxygen, hypertension, depression/anxiety    *Daughter with BRCA mutation    Oncology plan/recommendations:  Follow-up 3  months CBC CMP exam  Right screening mammography due after 11/7/2023

## 2023-03-21 ENCOUNTER — LAB (OUTPATIENT)
Dept: LAB | Facility: HOSPITAL | Age: 83
End: 2023-03-21
Payer: MEDICARE

## 2023-03-21 ENCOUNTER — OFFICE VISIT (OUTPATIENT)
Dept: ONCOLOGY | Facility: CLINIC | Age: 83
End: 2023-03-21
Payer: MEDICARE

## 2023-03-21 VITALS
SYSTOLIC BLOOD PRESSURE: 133 MMHG | DIASTOLIC BLOOD PRESSURE: 73 MMHG | BODY MASS INDEX: 42.03 KG/M2 | RESPIRATION RATE: 20 BRPM | WEIGHT: 222.6 LBS | OXYGEN SATURATION: 95 % | HEART RATE: 74 BPM | TEMPERATURE: 98.7 F | HEIGHT: 61 IN

## 2023-03-21 DIAGNOSIS — Z17.0 MALIGNANT NEOPLASM OF CENTRAL PORTION OF LEFT BREAST IN FEMALE, ESTROGEN RECEPTOR POSITIVE: ICD-10-CM

## 2023-03-21 DIAGNOSIS — C50.112 MALIGNANT NEOPLASM OF CENTRAL PORTION OF LEFT BREAST IN FEMALE, ESTROGEN RECEPTOR POSITIVE: Primary | ICD-10-CM

## 2023-03-21 DIAGNOSIS — D64.9 ANEMIA, UNSPECIFIED TYPE: ICD-10-CM

## 2023-03-21 DIAGNOSIS — Z17.0 MALIGNANT NEOPLASM OF CENTRAL PORTION OF LEFT BREAST IN FEMALE, ESTROGEN RECEPTOR POSITIVE: Primary | ICD-10-CM

## 2023-03-21 DIAGNOSIS — C50.112 MALIGNANT NEOPLASM OF CENTRAL PORTION OF LEFT BREAST IN FEMALE, ESTROGEN RECEPTOR POSITIVE: ICD-10-CM

## 2023-03-21 LAB
ALBUMIN SERPL-MCNC: 4.1 G/DL (ref 3.5–5.2)
ALBUMIN/GLOB SERPL: 1.5 G/DL
ALP SERPL-CCNC: 97 U/L (ref 39–117)
ALT SERPL W P-5'-P-CCNC: 13 U/L (ref 1–33)
ANION GAP SERPL CALCULATED.3IONS-SCNC: 9.5 MMOL/L (ref 5–15)
AST SERPL-CCNC: 14 U/L (ref 1–32)
BASOPHILS # BLD AUTO: 0.04 10*3/MM3 (ref 0–0.2)
BASOPHILS NFR BLD AUTO: 0.5 % (ref 0–1.5)
BILIRUB SERPL-MCNC: 0.2 MG/DL (ref 0–1.2)
BUN SERPL-MCNC: 17 MG/DL (ref 8–23)
BUN/CREAT SERPL: 18.1 (ref 7–25)
CALCIUM SPEC-SCNC: 9.2 MG/DL (ref 8.6–10.5)
CHLORIDE SERPL-SCNC: 103 MMOL/L (ref 98–107)
CO2 SERPL-SCNC: 24.5 MMOL/L (ref 22–29)
CREAT SERPL-MCNC: 0.94 MG/DL (ref 0.57–1)
DEPRECATED RDW RBC AUTO: 48.6 FL (ref 37–54)
EGFRCR SERPLBLD CKD-EPI 2021: 61.5 ML/MIN/1.73
EOSINOPHIL # BLD AUTO: 0.14 10*3/MM3 (ref 0–0.4)
EOSINOPHIL NFR BLD AUTO: 1.8 % (ref 0.3–6.2)
ERYTHROCYTE [DISTWIDTH] IN BLOOD BY AUTOMATED COUNT: 14 % (ref 12.3–15.4)
GLOBULIN UR ELPH-MCNC: 2.8 GM/DL
GLUCOSE SERPL-MCNC: 151 MG/DL (ref 65–99)
HCT VFR BLD AUTO: 38 % (ref 34–46.6)
HGB BLD-MCNC: 12 G/DL (ref 12–15.9)
IMM GRANULOCYTES # BLD AUTO: 0.01 10*3/MM3 (ref 0–0.05)
IMM GRANULOCYTES NFR BLD AUTO: 0.1 % (ref 0–0.5)
LYMPHOCYTES # BLD AUTO: 2.81 10*3/MM3 (ref 0.7–3.1)
LYMPHOCYTES NFR BLD AUTO: 35.8 % (ref 19.6–45.3)
MCH RBC QN AUTO: 29.3 PG (ref 26.6–33)
MCHC RBC AUTO-ENTMCNC: 31.6 G/DL (ref 31.5–35.7)
MCV RBC AUTO: 92.9 FL (ref 79–97)
MONOCYTES # BLD AUTO: 0.41 10*3/MM3 (ref 0.1–0.9)
MONOCYTES NFR BLD AUTO: 5.2 % (ref 5–12)
NEUTROPHILS NFR BLD AUTO: 4.44 10*3/MM3 (ref 1.7–7)
NEUTROPHILS NFR BLD AUTO: 56.6 % (ref 42.7–76)
PLATELET # BLD AUTO: 247 10*3/MM3 (ref 140–450)
PMV BLD AUTO: 10.1 FL (ref 6–12)
POTASSIUM SERPL-SCNC: 4.3 MMOL/L (ref 3.5–5.2)
PROT SERPL-MCNC: 6.9 G/DL (ref 6–8.5)
RBC # BLD AUTO: 4.09 10*6/MM3 (ref 3.77–5.28)
SODIUM SERPL-SCNC: 137 MMOL/L (ref 136–145)
WBC NRBC COR # BLD: 7.85 10*3/MM3 (ref 3.4–10.8)

## 2023-03-21 PROCEDURE — 1126F AMNT PAIN NOTED NONE PRSNT: CPT | Performed by: INTERNAL MEDICINE

## 2023-03-21 PROCEDURE — 80053 COMPREHEN METABOLIC PANEL: CPT | Performed by: INTERNAL MEDICINE

## 2023-03-21 PROCEDURE — 85025 COMPLETE CBC W/AUTO DIFF WBC: CPT | Performed by: INTERNAL MEDICINE

## 2023-03-21 PROCEDURE — 36415 COLL VENOUS BLD VENIPUNCTURE: CPT

## 2023-03-21 PROCEDURE — 3075F SYST BP GE 130 - 139MM HG: CPT | Performed by: INTERNAL MEDICINE

## 2023-03-21 PROCEDURE — 99213 OFFICE O/P EST LOW 20 MIN: CPT | Performed by: INTERNAL MEDICINE

## 2023-03-21 PROCEDURE — 3078F DIAST BP <80 MM HG: CPT | Performed by: INTERNAL MEDICINE

## 2023-03-21 RX ORDER — TRIAMCINOLONE ACETONIDE 1 MG/ML
LOTION TOPICAL
COMMUNITY
Start: 2023-03-08

## 2023-03-21 RX ORDER — BETAMETHASONE DIPROPIONATE 0.5 MG/G
CREAM TOPICAL
COMMUNITY
Start: 2023-03-08

## 2023-03-21 RX ORDER — BENZONATATE 100 MG/1
CAPSULE ORAL
COMMUNITY
Start: 2023-02-09

## 2023-03-23 DIAGNOSIS — Z17.0 MALIGNANT NEOPLASM OF CENTRAL PORTION OF LEFT BREAST IN FEMALE, ESTROGEN RECEPTOR POSITIVE: Primary | ICD-10-CM

## 2023-03-23 DIAGNOSIS — C50.112 MALIGNANT NEOPLASM OF CENTRAL PORTION OF LEFT BREAST IN FEMALE, ESTROGEN RECEPTOR POSITIVE: Primary | ICD-10-CM

## 2023-05-25 ENCOUNTER — OFFICE VISIT (OUTPATIENT)
Dept: GASTROENTEROLOGY | Facility: CLINIC | Age: 83
End: 2023-05-25
Payer: MEDICARE

## 2023-05-25 ENCOUNTER — TRANSCRIBE ORDERS (OUTPATIENT)
Dept: ADMINISTRATIVE | Facility: HOSPITAL | Age: 83
End: 2023-05-25

## 2023-05-25 VITALS
HEIGHT: 61 IN | BODY MASS INDEX: 42.48 KG/M2 | WEIGHT: 225 LBS | SYSTOLIC BLOOD PRESSURE: 128 MMHG | DIASTOLIC BLOOD PRESSURE: 78 MMHG

## 2023-05-25 DIAGNOSIS — K22.70 BARRETT'S ESOPHAGUS WITHOUT DYSPLASIA: Primary | ICD-10-CM

## 2023-05-25 DIAGNOSIS — R91.8 LUNG NODULES: Primary | ICD-10-CM

## 2023-05-25 DIAGNOSIS — Z86.010 PERSONAL HISTORY OF COLONIC POLYPS: ICD-10-CM

## 2023-05-25 PROCEDURE — 3074F SYST BP LT 130 MM HG: CPT | Performed by: INTERNAL MEDICINE

## 2023-05-25 PROCEDURE — 1159F MED LIST DOCD IN RCRD: CPT | Performed by: INTERNAL MEDICINE

## 2023-05-25 PROCEDURE — 99213 OFFICE O/P EST LOW 20 MIN: CPT | Performed by: INTERNAL MEDICINE

## 2023-05-25 PROCEDURE — 1160F RVW MEDS BY RX/DR IN RCRD: CPT | Performed by: INTERNAL MEDICINE

## 2023-05-25 PROCEDURE — 3078F DIAST BP <80 MM HG: CPT | Performed by: INTERNAL MEDICINE

## 2023-05-25 RX ORDER — SODIUM PHOSPHATE,MONO-DIBASIC 19G-7G/118
ENEMA (ML) RECTAL 2 TIMES DAILY WITH MEALS
COMMUNITY

## 2023-05-25 RX ORDER — HYDROCHLOROTHIAZIDE 12.5 MG/1
CAPSULE, GELATIN COATED ORAL
COMMUNITY
Start: 2023-03-22

## 2023-05-25 RX ORDER — HYDROXYZINE HYDROCHLORIDE 25 MG/1
TABLET, FILM COATED ORAL
COMMUNITY
Start: 2023-04-03

## 2023-05-25 RX ORDER — AMLODIPINE BESYLATE 5 MG/1
TABLET ORAL NIGHTLY
COMMUNITY
Start: 2023-05-16

## 2023-05-25 NOTE — PROGRESS NOTES
"    PATIENT INFORMATION  Katarzyna Ku       - 1940    CHIEF COMPLAINT  Chief Complaint   Patient presents with   • Irritable Bowel Syndrome   • Diarrhea   • Casper's esophagus       HISTORY OF PRESENT ILLNESS  Her for follow up and didn't increase her PPI- I didn't know\" so will start that now and shw was sent a new RX but of course is still having symptoms    No meat impaciton nor regurgitation    Her questions are about her fat distribution around her abd, and mild intermittant RLQ pain so reviewd the DDX and she was comfortable with that      REVIEWED PERTINENT RESULTS/ LABS  Lab Results   Component Value Date    CASEREPORT  2022     Surgical Pathology Report                         Case: [id number]                                  Authorizing Provider:  Afshin Schultz MD        Collected:           2022 11:29 AM          Ordering Location:     [facility]   Received:            2022 12:14 PM                                 OR                                                                           Pathologist:           Monserrat Clarke MD                                                          Specimens:   1) - Breast, Left, low axilla included                                                              2) - [place] Lymph Node, #1                                                               FINALDX  2022     1. Submitted as \"Left Breast and Low Axillary Contents, Mastectomy with [place] Lymph Node Biopsy\" (1,112 grams):    A. INVASIVE DUCTAL CARCINOMA, Poorly differentiated; Dorothy Histologic Grade III/III      (tubule score = 3, nuclear score = 3, mitoses score = 3):   1. Tumor size:  30 mm (gross measurement).   2. Margins are negative for invasive carcinoma; Closest distance: Invasive carcinoma is present       10 mm from the anterior margin.     3. Positive for several foci of lymphovascular space invasion.   4. Positive for perineural " "invasion.  B. 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 Solid and Cribriform types with associated necrosis.     2. Extent of DCIS:  40 mm (based on the slices involved).     3. Margins are negative for in situ carcinoma; Closest distance: DCIS is present >10 mm from the                     anterior margin.   C. Two clips retrieved from within invasive carcinoma.   D. Background fibrous tissue and dystrophic calcifications, suggesting prior treatment effect.    E. Four lymph nodes, negative for carcinoma (0/4).    F. Unremarkable skin and nipple.   G. See Synoptic Report (to include part 2).    2. \"[place] Lymph Node #1,\" Biopsy:   A. One lymph node, negative for carcinoma (0/1).     Jab/kds        Lab Results   Component Value Date    HGB 12.0 03/21/2023    MCV 92.9 03/21/2023     03/21/2023    ALT 13 03/21/2023    AST 14 03/21/2023    HGBA1C 5.90 (H) 02/17/2022    FERRITIN 87.00 11/15/2022    IRON 49 11/15/2022    TIBC 280 (L) 11/15/2022      No results found.    REVIEW OF SYSTEMS  Review of Systems   Constitutional: Negative for activity change, chills, fever and unexpected weight change.   HENT: Positive for trouble swallowing. Negative for congestion.    Eyes: Negative for visual disturbance.   Respiratory: Negative for shortness of breath.    Cardiovascular: Negative for chest pain and palpitations.   Gastrointestinal: Positive for abdominal distention, abdominal pain and nausea. Negative for blood in stool.   Endocrine: Positive for polydipsia. Negative for cold intolerance and heat intolerance.   Genitourinary: Negative for hematuria.   Musculoskeletal: Negative for gait problem.   Skin: Negative for color change.   Allergic/Immunologic: Negative for immunocompromised state.   Neurological: Negative for weakness and light-headedness.   Hematological: Negative for adenopathy.   Psychiatric/Behavioral: Negative for sleep disturbance. The patient is not nervous/anxious.          ACTIVE " PROBLEMS  Patient Active Problem List    Diagnosis    • Anemia [D64.9]    • Malignant neoplasm of central portion of left breast in female, estrogen receptor positive [C50.112, Z17.0]    • Influenza A [J10.1]    • Influenza A (H1N1) [J10.1]    • Irritable bowel syndrome with diarrhea [K58.0]    • Encounter for screening for malignant neoplasm of colon [Z12.11]    • Personal history of colonic polyps [Z86.010]    • Casper's esophagus without dysplasia [K22.70]    • Abdominal pain, right upper quadrant [R10.11]    • Osteoarthritis of knee [M17.9]    • Chronic obstructive pulmonary disease [J44.9]    • Hypertension [I10]    • Primary localized osteoarthrosis [M19.91]    • Midline cystocele [N81.11]    • Mixed incontinence [N39.46]    • Muscular atrophy [M62.50]    • Atrophic vaginitis [N95.2]    • Prolapse of vaginal vault after hysterectomy [N99.3]    • Enterocele [K46.9]          PAST MEDICAL HISTORY  Past Medical History:   Diagnosis Date   • Anxiety    • Arthritis    • Asthma    • Casper esophagus    • Breast cancer     Left 2021   • Colon polyp    • COPD (chronic obstructive pulmonary disease)     Dr Edith alba, clearance in media 2/2022   • Depression    • Diarrhea    • Diverticulitis of colon    • Emphysema lung    • Emphysema of lung    • Flu 12/2021   • GERD (gastroesophageal reflux disease)    • Hypertension    • Requires supplemental oxygen     3L/NC w/exertion   • Skin cancer    • Skin cancer    • Sleep apnea     w/CPAP and oxygen at 3L         SURGICAL HISTORY  Past Surgical History:   Procedure Laterality Date   • BILATERAL SALPINGO OOPHORECTOMY     • BLADDER SURGERY      bladder lift   • BREAST LUMPECTOMY Left     breast ca   • CARPAL TUNNEL RELEASE      BOTH HANDS   • CATARACT EXTRACTION W/ INTRAOCULAR LENS  IMPLANT, BILATERAL     • COLONOSCOPY N/A 02/10/2017    Procedure: COLONOSCOPY with polypectomy;  Surgeon: Weston Sue MD;  Location: [facility];  Service:    • COLONOSCOPY N/A  2020    Procedure: COLONOSCOPY;  Surgeon: Weston Sue MD;  Location: [facility] OR;  Service: Gastroenterology;  Laterality: N/A;  TRANSVERSE COLON POLYP  DIVERTICULOSIS  CECAL TIME at 1356  SIGMOID COLON POLYP   • ENDOSCOPY N/A 02/10/2017    Procedure: ESOPHAGOGASTRODUODENOSCOPY with biopsies;  Surgeon: Weston Sue MD;  Location: [facility] OR;  Service:    • ENDOSCOPY N/A 2020    Procedure: ESOPHAGOGASTRODUODENOSCOPY;  Surgeon: Weston Sue MD;  Location: [facility] OR;  Service: Gastroenterology;  Laterality: N/A;  DISTAL ESOPHAGUS BIOPSY  STOCKTON'S ESOPHAGUS   • HYSTERECTOMY     • MASTECTOMY W/ SENTINEL NODE BIOPSY Left 2022    Procedure: BREAST MASTECTOMY WITH SENTINEL NODE BIOPSY;  Surgeon: Afshin Schultz MD;  Location: [facility] OR;  Service: General;  Laterality: Left;   • SKIN BIOPSY     • TOTAL KNEE ARTHROPLASTY Bilateral          FAMILY HISTORY  Family History   Problem Relation Age of Onset   • Hypertension Daughter    • Breast cancer Daughter    • Allergies Daughter    • Malig Hyperthermia Neg Hx    • Cancer Neg Hx          SOCIAL HISTORY  Social History     Occupational History   • Not on file   Tobacco Use   • Smoking status: Former     Packs/day: 2.00     Years: 30.00     Pack years: 60.00     Types: Cigarettes     Quit date: 1990     Years since quittin.3     Passive exposure: Never   • Smokeless tobacco: Never   Vaping Use   • Vaping Use: Never used   Substance and Sexual Activity   • Alcohol use: No   • Drug use: No   • Sexual activity: Defer         CURRENT MEDICATIONS    Current Outpatient Medications:   •  acetaminophen (TYLENOL) 500 MG tablet, Take 2 tablets by mouth Every 6 (Six) Hours As Needed for Mild Pain., Disp: , Rfl:   •  albuterol (PROVENTIL) (2.5 MG/3ML) 0.083% nebulizer solution, Take 2.5 mg by nebulization 2 (Two) Times a Day. Can use 4 times a day if needed, Disp: , Rfl:   •  albuterol sulfate  (90 Base) MCG/ACT  inhaler, Inhale 2 puffs Every 4 (Four) Hours As Needed for Wheezing or Shortness of Air., Disp: , Rfl:   •  ALPRAZolam (XANAX) 0.5 MG tablet, Take 1 tablet by mouth Daily As Needed for Anxiety., Disp: , Rfl:   •  amLODIPine (NORVASC) 5 MG tablet, Every Night., Disp: , Rfl:   •  benazepril (LOTENSIN) 40 MG tablet, Take 1 tablet by mouth Daily., Disp: , Rfl:   •  benzonatate (TESSALON) 100 MG capsule, , Disp: , Rfl:   •  betamethasone dipropionate 0.05 % cream, , Disp: , Rfl:   •  Calcium Acetate-Magnesium Carb 450-200 MG tablet, Take 1 tablet by mouth Daily., Disp: , Rfl:   •  chlorhexidine (PERIDEX) 0.12 % solution, , Disp: , Rfl:   •  Cholecalciferol (Vitamin D3) 20 MCG (800 UNIT) tablet, Take 50 mcg by mouth Daily., Disp: , Rfl:   •  citalopram (CeleXA) 40 MG tablet, Take 1 tablet by mouth Every Morning., Disp: , Rfl:   •  Cyanocobalamin (VITAMIN B-12) 5000 MCG tablet dispersible, Take 1 tablet by mouth Daily., Disp: , Rfl:   •  estradiol (ESTRACE) 0.1 MG/GM vaginal cream, , Disp: , Rfl:   •  fluconazole (DIFLUCAN) 100 MG tablet, Take 1 tablet by mouth As Needed., Disp: , Rfl:   •  Fluticasone-Umeclidin-Vilant (TRELEGY) 100-62.5-25 MCG/INH inhaler, Inhale 1 puff Daily., Disp: , Rfl:   •  glucosamine-chondroitin 500-400 MG capsule capsule, Take  by mouth 2 (Two) Times a Day With Meals., Disp: , Rfl:   •  guaiFENesin (MUCINEX) 600 MG 12 hr tablet, Take 1 tablet by mouth Every 12 (Twelve) Hours. (Patient taking differently: Take 1 tablet by mouth 2 (Two) Times a Day As Needed for Cough or Congestion.), Disp: 30 tablet, Rfl: 0  •  hydrALAZINE (APRESOLINE) 50 MG tablet, Take 1 tablet by mouth 3 (Three) Times a Day., Disp: , Rfl:   •  hydroCHLOROthiazide (MICROZIDE) 12.5 MG capsule, , Disp: , Rfl:   •  hydrocortisone 2.5 % ointment, Apply 1 application topically to the appropriate area as directed 2 (Two) Times a Day., Disp: 30 g, Rfl: 11  •  hydrOXYzine (ATARAX) 25 MG tablet, , Disp: , Rfl:   •  hyoscyamine (LEVBID)  "0.375 MG 12 hr tablet, TAKE ONE TABLET BY MOUTH EVERY 12 HOURS AS NEEDED FOR CRAMPING (Patient taking differently: Take 1 tablet by mouth Daily.), Disp: 60 tablet, Rfl: 11  •  magnesium oxide (MAG-OX) 400 MG tablet, Take 1 tablet by mouth Daily., Disp: , Rfl:   •  meloxicam (MOBIC) 15 MG tablet, Take 1 tablet by mouth Daily., Disp: , Rfl:   •  Mirabegron ER (MYRBETRIQ) 50 MG tablet sustained-release 24 hour 24 hr tablet, Take 50 mg by mouth every night at bedtime., Disp: , Rfl:   •  mometasone (ELOCON) 0.1 % cream, Apply 0.1 application topically to the appropriate area as directed As Needed., Disp: , Rfl:   •  Multiple Vitamins-Minerals (MULTIVITAMIN ADULT PO), Take 1 tablet by mouth Daily., Disp: , Rfl:   •  O2 (OXYGEN), Inhale 3 L/min 1 (One) Time. Uses w/activity, Disp: , Rfl:   •  omeprazole (priLOSEC) 40 MG capsule, Take 1 capsule by mouth 2 (Two) Times a Day Before Meals. (Patient taking differently: Take 1 capsule by mouth Every Morning.), Disp: 180 capsule, Rfl: 3  •  ondansetron (ZOFRAN) 4 MG tablet, Take 1 tablet by mouth Every 6 (Six) Hours As Needed for Nausea or Vomiting., Disp: 20 tablet, Rfl: 0  •  triamcinolone (KENALOG) 0.1 % lotion, , Disp: , Rfl:     ALLERGIES  Azithromycin, Sulfa antibiotics, and Codeine    VITALS  Vitals:    05/25/23 1035   BP: 128/78   BP Location: Left arm   Patient Position: Sitting   Cuff Size: Large Adult   Weight: 102 kg (225 lb)   Height: 154.5 cm (60.83\")       PHYSICAL EXAM  Debilities/Disabilities Identified: None  Emotional Behavior: Appropriate  Wt Readings from Last 3 Encounters:   05/25/23 102 kg (225 lb)   03/21/23 101 kg (222 lb 9.6 oz)   01/23/23 101 kg (222 lb)     Ht Readings from Last 1 Encounters:   05/25/23 154.5 cm (60.83\")     Body mass index is 42.75 kg/m².  Physical Exam  Constitutional:       Appearance: She is well-developed. She is obese. She is not diaphoretic.   HENT:      Head: Atraumatic.   Eyes:      General: No scleral icterus.     " Conjunctiva/sclera: Conjunctivae normal.      Pupils: Pupils are equal, round, and reactive to light.   Neck:      Thyroid: No thyromegaly.   Cardiovascular:      Rate and Rhythm: Normal rate and regular rhythm.      Heart sounds: Normal heart sounds. No murmur heard.    No gallop.   Pulmonary:      Effort: Pulmonary effort is normal.      Breath sounds: Normal breath sounds. No wheezing or rales.   Abdominal:      General: Bowel sounds are normal. There is no distension or abdominal bruit.      Palpations: Abdomen is soft. There is no shifting dullness, fluid wave or mass.      Tenderness: There is no abdominal tenderness. There is no guarding. Negative signs include Madrigal's sign.      Hernia: There is no hernia in the ventral area.   Musculoskeletal:         General: Normal range of motion.      Cervical back: Normal range of motion and neck supple.   Lymphadenopathy:      Cervical: No cervical adenopathy.   Skin:     General: Skin is warm and dry.      Findings: No erythema or rash.   Neurological:      Mental Status: She is alert and oriented to person, place, and time.   Psychiatric:         Mood and Affect: Mood normal.         Behavior: Behavior normal.         CLINICAL DATA REVIEWED   reviewed previous lab results and integrated with today's visit, reviewed notes from other physicians and/or last GI encounter, reviewed previous endoscopy results and available photos, reviewed surgical pathology results from previous biopsies    ASSESSMENT  Diagnoses and all orders for this visit:    Casper's esophagus without dysplasia    Personal history of colonic polyps    Other orders  -     amLODIPine (NORVASC) 5 MG tablet; Every Night.  -     hydrOXYzine (ATARAX) 25 MG tablet  -     hydroCHLOROthiazide (MICROZIDE) 12.5 MG capsule  -     glucosamine-chondroitin 500-400 MG capsule capsule; Take  by mouth 2 (Two) Times a Day With Meals.          PLAN  Roman try to increase her meds this time and have her call if things  dont improve    Return in about 6 months (around 11/25/2023).    I have discussed the above plan with the patient.  They verbalize understanding and are in agreement with the plan.  They have been advised to contact the office for any questions, concerns, or changes related to their health.

## 2023-08-14 NOTE — PROGRESS NOTES
Subjective     REASON FOR CONSULTATION: Breast cancer  Provide an opinion on any further workup or treatment                             REQUESTING PHYSICIAN: Dr. Schultz    RECORDS OBTAINED:  Records of the patients history including those obtained from the referring provider were reviewed and summarized in detail.    HISTORY OF PRESENT ILLNESS:  The patient is a 82 y.o. year old female who is here for an opinion about the above issue.    History of Present Illness   This is a 82-year-old lady with obesity, COPD/asthma on intermittent O2, hypertension, depression, Casper's esophagus, depression and anxiety.  She has a history of stage I triple negative left breast cancer in 2009 treated with a lumpectomy and radiation therapy completed January 2010.      The patient had an abnormal screening mammogram on the left 11-21 showing calcifications in the left breast and a new oval-shaped asymmetry measuring 15 mm suspicious for malignancy.  Diagnostic mammogram and ultrasound 11/24/2021 showed postlumpectomy changes in the lateral portion of the left breast and a new 1.6 x 1.1 cm lesion at the 3 to 4 o'clock position of the left breast 7 cm from the nipple.  She underwent an ultrasound-guided breast biopsy 12/15/2021 with 2 lesions biopsy including a small solid satellite nodule adjacent to the previously described larger mass.  Pathology from mass 1 showed invasive ductal carcinoma grade 3 (3+3+2) ER 31 to 40% positive moderately, AL negative, HER-2 2+/FISH negative, Ki-67 60% with no in situ component, no lymph vascular or perineural invasion; mass 2 invasive ductal carcinoma grade 2 (3+3+1) ER 1 to 10% weakly positive, AL negative, HER-2 2+/FISH +80% of cells, Ki-67 55% with no in situ component, no lymphovascular or perineural invasion.    The patient underwent a left mastectomy and sentinel lymph node biopsy on 2/23/2022 showing invasive ductal carcinoma grade 3 tumor measuring 3.0 cm in size, negative margins,  several foci of lymphovascular invasion, positive perineural invasion, associated high-grade ductal carcinoma in situ 40 mm negative margins for in situ disease, 5 negative axillary lymph nodes.    The patient's main complaint relates to her shortness of breath related to COPD.  She is on oxygen intermittently.  She relates her breathing has worsened significantly over the previous 4 to 5 years.    The patient returned today for follow-up.  She has occasional tightness along the left mastectomy site mostly laterally and axilla/proximal left upper arm.  She has chronic shortness of breath related to COPD and emphysema somewhat worse.  She is followed by pulmonary medicine.  ROS [id number]    Past Medical History:   Diagnosis Date    Anxiety     Arthritis     Asthma     Casper esophagus     Breast cancer     Left 2021    Colon polyp     COPD (chronic obstructive pulmonary disease)     Dr Sharma follows, clearance in media 2/2022    Depression     Diarrhea     Diverticulitis of colon     Emphysema lung     Emphysema of lung     Flu 12/2021    GERD (gastroesophageal reflux disease)     Hypertension     Requires supplemental oxygen     3L/NC w/exertion    Skin cancer     Skin cancer     Sleep apnea     w/CPAP and oxygen at 3L        Past Surgical History:   Procedure Laterality Date    BILATERAL SALPINGO OOPHORECTOMY      BLADDER SURGERY      bladder lift    BREAST LUMPECTOMY Left     breast ca    CARPAL TUNNEL RELEASE      BOTH HANDS    CATARACT EXTRACTION W/ INTRAOCULAR LENS  IMPLANT, BILATERAL      COLONOSCOPY N/A 02/10/2017    Procedure: COLONOSCOPY with polypectomy;  Surgeon: Weston Sue MD;  Location: [facility] OR;  Service:     COLONOSCOPY N/A 08/12/2020    Procedure: COLONOSCOPY;  Surgeon: Weston Sue MD;  Location: [facility] OR;  Service: Gastroenterology;  Laterality: N/A;  TRANSVERSE COLON POLYP  DIVERTICULOSIS  CECAL TIME at 1356  SIGMOID COLON POLYP    ENDOSCOPY N/A 02/10/2017     Procedure: ESOPHAGOGASTRODUODENOSCOPY with biopsies;  Surgeon: Weston Sue MD;  Location: [facility] OR;  Service:     ENDOSCOPY N/A 08/12/2020    Procedure: ESOPHAGOGASTRODUODENOSCOPY;  Surgeon: Weston Sue MD;  Location: [facility] OR;  Service: Gastroenterology;  Laterality: N/A;  DISTAL ESOPHAGUS BIOPSY  STOCKTON'S ESOPHAGUS    HYSTERECTOMY      MASTECTOMY W/ SENTINEL NODE BIOPSY Left 02/23/2022    Procedure: BREAST MASTECTOMY WITH SENTINEL NODE BIOPSY;  Surgeon: Afshin Schultz MD;  Location: [facility] OR;  Service: General;  Laterality: Left;    SKIN BIOPSY      TOTAL KNEE ARTHROPLASTY Bilateral         Current Outpatient Medications on File Prior to Visit   Medication Sig Dispense Refill    acetaminophen (TYLENOL) 500 MG tablet Take 2 tablets by mouth Every 6 (Six) Hours As Needed for Mild Pain.      albuterol (PROVENTIL) (2.5 MG/3ML) 0.083% nebulizer solution Take 2.5 mg by nebulization 2 (Two) Times a Day. Can use 4 times a day if needed      albuterol sulfate  (90 Base) MCG/ACT inhaler Inhale 2 puffs Every 4 (Four) Hours As Needed for Wheezing or Shortness of Air.      ALPRAZolam (XANAX) 0.5 MG tablet Take 1 tablet by mouth Daily As Needed for Anxiety.      amLODIPine (NORVASC) 5 MG tablet Every Night.      benazepril (LOTENSIN) 40 MG tablet Take 1 tablet by mouth Daily.      benzonatate (TESSALON) 100 MG capsule       betamethasone dipropionate 0.05 % cream       Calcium Acetate-Magnesium Carb 450-200 MG tablet Take 1 tablet by mouth Daily.      chlorhexidine (PERIDEX) 0.12 % solution       Cholecalciferol (Vitamin D3) 20 MCG (800 UNIT) tablet Take 50 mcg by mouth Daily.      citalopram (CeleXA) 40 MG tablet Take 1 tablet by mouth Every Morning.      Cyanocobalamin (VITAMIN B-12) 5000 MCG tablet dispersible Take 1 tablet by mouth Daily.      estradiol (ESTRACE) 0.1 MG/GM vaginal cream       fluconazole (DIFLUCAN) 100 MG tablet Take 1 tablet by mouth As Needed.       Fluticasone-Umeclidin-Vilant (TRELEGY) 100-62.5-25 MCG/INH inhaler Inhale 1 puff Daily.      guaiFENesin (MUCINEX) 600 MG 12 hr tablet Take 1 tablet by mouth Every 12 (Twelve) Hours. (Patient taking differently: Take 1 tablet by mouth 2 (Two) Times a Day As Needed for Cough or Congestion.) 30 tablet 0    hydrALAZINE (APRESOLINE) 50 MG tablet Take 1 tablet by mouth 3 (Three) Times a Day.      hydroCHLOROthiazide (MICROZIDE) 12.5 MG capsule       hydrocortisone 2.5 % ointment Apply 1 application topically to the appropriate area as directed 2 (Two) Times a Day. 30 g 11    hydrOXYzine (ATARAX) 25 MG tablet       hyoscyamine (LEVBID) 0.375 MG 12 hr tablet TAKE ONE TABLET BY MOUTH EVERY 12 HOURS AS NEEDED FOR CRAMPING (Patient taking differently: Take 1 tablet by mouth Daily.) 60 tablet 11    magnesium oxide (MAG-OX) 400 MG tablet Take 1 tablet by mouth Daily.      meloxicam (MOBIC) 15 MG tablet Take 1 tablet by mouth Daily.      Mirabegron ER (MYRBETRIQ) 50 MG tablet sustained-release 24 hour 24 hr tablet Take 50 mg by mouth every night at bedtime.      mometasone (ELOCON) 0.1 % cream Apply 0.1 application topically to the appropriate area as directed As Needed.      Multiple Vitamins-Minerals (MULTIVITAMIN ADULT PO) Take 1 tablet by mouth Daily.      O2 (OXYGEN) Inhale 3 L/min 1 (One) Time. Uses w/activity      omeprazole (priLOSEC) 40 MG capsule Take 1 capsule by mouth 2 (Two) Times a Day Before Meals. (Patient taking differently: Take 1 capsule by mouth Every Morning.) 180 capsule 3    ondansetron (ZOFRAN) 4 MG tablet Take 1 tablet by mouth Every 6 (Six) Hours As Needed for Nausea or Vomiting. 20 tablet 0    triamcinolone (KENALOG) 0.1 % lotion       glucosamine-chondroitin 500-400 MG capsule capsule Take  by mouth 2 (Two) Times a Day With Meals.       No current facility-administered medications on file prior to visit.        ALLERGIES:    Allergies   Allergen Reactions    Azithromycin Nausea And Vomiting     SEVERE  "GI UPSET    Sulfa Antibiotics Itching    Codeine Rash        Social History     Socioeconomic History    Marital status:    Tobacco Use    Smoking status: Former     Packs/day: 2.00     Years: 30.00     Pack years: 60.00     Types: Cigarettes     Quit date: 1990     Years since quittin.5     Passive exposure: Never    Smokeless tobacco: Never   Vaping Use    Vaping Use: Never used   Substance and Sexual Activity    Alcohol use: No    Drug use: No    Sexual activity: Defer        Family History   Problem Relation Age of Onset    Hypertension Daughter     Breast cancer Daughter     Allergies Daughter     Malig Hyperthermia Neg Hx     Cancer Neg Hx         Review of Systems   Constitutional: Negative.    HENT: Negative.     Respiratory:  Positive for shortness of breath.    Cardiovascular: Negative.    Gastrointestinal: Negative.    Genitourinary: Negative.    Musculoskeletal:  Positive for arthralgias and myalgias.   Skin:  Negative for rash and wound.   Allergic/Immunologic: Negative.    Neurological: Negative.    Hematological: Negative.    Psychiatric/Behavioral: Negative.          Objective     Vitals:    23 0952   BP: 129/64   Pulse: 76   Resp: 18   Temp: 98.2 øF (36.8 øC)   TempSrc: Temporal   SpO2: 94%   Weight: 103 kg (227 lb)   Height: 154.5 cm (60.83\")   PainSc: 0-No pain         2023     9:53 AM   Current Status   ECOG score 0       Physical Exam    CONSTITUTIONAL: pleasant well-developed obese elderly woman  HEENT: no icterus, no thrush, moist membranes  LYMPH: no cervical or supraclavicular lad  CV: RRR, S1S2, no murmur  Breast: Status post left mastectomy with significant scar tissue making exam difficult-unchanged 2023  RESP: cta bilat, faint wheezing, no rales, diminished breath sounds, mild increased work of breathing  MUSC: no edema, normal gait  NEURO: alert and oriented x3, mild global weakness  PSYCH: normal mood and affect    RECENT LABS:  Hematology WBC   Date " Value Ref Range Status   08/16/2023 7.55 3.40 - 10.80 10*3/mm3 Final     RBC   Date Value Ref Range Status   08/16/2023 3.93 3.77 - 5.28 10*6/mm3 Final     Hemoglobin   Date Value Ref Range Status   08/16/2023 11.7 (L) 12.0 - 15.9 g/dL Final     Hematocrit   Date Value Ref Range Status   08/16/2023 37.5 34.0 - 46.6 % Final     Platelets   Date Value Ref Range Status   08/16/2023 234 140 - 450 10*3/mm3 Final        Lab Results   Component Value Date    GLUCOSE 151 (H) 03/21/2023    BUN 17 03/21/2023    CREATININE 0.94 03/21/2023    EGFRIFNONA 53 (L) 02/24/2022    BCR 18.1 03/21/2023    K 4.3 03/21/2023    CO2 24.5 03/21/2023    CALCIUM 9.2 03/21/2023    ALBUMIN 4.1 03/21/2023    AST 14 03/21/2023    ALT 13 03/21/2023     Right mammogram 11/7/2022-no evidence of malignancy  CT chest without contrast 2/23/2023- no evidence of tumor recurrence along the chest wall  Assessment & Plan     *[id number] ER low (1-10%), MI negative, HER-2 FISH amplified, Ki-67 60% status post left mastectomy 2/23/2022.  The patient chose not to receive adjuvant chemotherapy given age and comorbidities    *History of stage I triple negative ductal cancer left breast status post lumpectomy and radiation therapy 2009    *Comorbidities include fairly advanced COPD requiring intermittent oxygen, hypertension, depression/anxiety    *Daughter with BRCA mutation    *Mild anemia-hemoglobin stable 11.7    Oncology plan/recommendations:  Follow-up 3  months CBC CMP exam and right screening mammogram ordered

## 2023-08-16 ENCOUNTER — LAB (OUTPATIENT)
Dept: LAB | Facility: HOSPITAL | Age: 83
End: 2023-08-16
Payer: MEDICARE

## 2023-08-16 ENCOUNTER — OFFICE VISIT (OUTPATIENT)
Dept: ONCOLOGY | Facility: CLINIC | Age: 83
End: 2023-08-16
Payer: MEDICARE

## 2023-08-16 VITALS
OXYGEN SATURATION: 94 % | BODY MASS INDEX: 42.86 KG/M2 | HEART RATE: 76 BPM | SYSTOLIC BLOOD PRESSURE: 129 MMHG | HEIGHT: 61 IN | WEIGHT: 227 LBS | TEMPERATURE: 98.2 F | DIASTOLIC BLOOD PRESSURE: 64 MMHG | RESPIRATION RATE: 18 BRPM

## 2023-08-16 DIAGNOSIS — Z17.0 MALIGNANT NEOPLASM OF CENTRAL PORTION OF LEFT BREAST IN FEMALE, ESTROGEN RECEPTOR POSITIVE: ICD-10-CM

## 2023-08-16 DIAGNOSIS — C50.112 MALIGNANT NEOPLASM OF CENTRAL PORTION OF LEFT BREAST IN FEMALE, ESTROGEN RECEPTOR POSITIVE: ICD-10-CM

## 2023-08-16 DIAGNOSIS — C50.112 MALIGNANT NEOPLASM OF CENTRAL PORTION OF LEFT BREAST IN FEMALE, ESTROGEN RECEPTOR POSITIVE: Primary | ICD-10-CM

## 2023-08-16 DIAGNOSIS — Z17.0 MALIGNANT NEOPLASM OF CENTRAL PORTION OF LEFT BREAST IN FEMALE, ESTROGEN RECEPTOR POSITIVE: Primary | ICD-10-CM

## 2023-08-16 DIAGNOSIS — D64.9 ANEMIA, UNSPECIFIED TYPE: ICD-10-CM

## 2023-08-16 DIAGNOSIS — Z12.31 ENCOUNTER FOR SCREENING MAMMOGRAM FOR MALIGNANT NEOPLASM OF BREAST: ICD-10-CM

## 2023-08-16 LAB
ALBUMIN SERPL-MCNC: 4.3 G/DL (ref 3.5–5.2)
ALBUMIN/GLOB SERPL: 1.7 G/DL
ALP SERPL-CCNC: 81 U/L (ref 39–117)
ALT SERPL W P-5'-P-CCNC: 14 U/L (ref 1–33)
ANION GAP SERPL CALCULATED.3IONS-SCNC: 11.4 MMOL/L (ref 5–15)
AST SERPL-CCNC: 15 U/L (ref 1–32)
BASOPHILS # BLD AUTO: 0.05 10*3/MM3 (ref 0–0.2)
BASOPHILS NFR BLD AUTO: 0.7 % (ref 0–1.5)
BILIRUB SERPL-MCNC: 0.2 MG/DL (ref 0–1.2)
BUN SERPL-MCNC: 22 MG/DL (ref 8–23)
BUN/CREAT SERPL: 20 (ref 7–25)
CALCIUM SPEC-SCNC: 9.1 MG/DL (ref 8.6–10.5)
CHLORIDE SERPL-SCNC: 106 MMOL/L (ref 98–107)
CO2 SERPL-SCNC: 24.6 MMOL/L (ref 22–29)
CREAT SERPL-MCNC: 1.1 MG/DL (ref 0.57–1)
DEPRECATED RDW RBC AUTO: 49.2 FL (ref 37–54)
EGFRCR SERPLBLD CKD-EPI 2021: 50.9 ML/MIN/1.73
EOSINOPHIL # BLD AUTO: 0.12 10*3/MM3 (ref 0–0.4)
EOSINOPHIL NFR BLD AUTO: 1.6 % (ref 0.3–6.2)
ERYTHROCYTE [DISTWIDTH] IN BLOOD BY AUTOMATED COUNT: 14.1 % (ref 12.3–15.4)
GLOBULIN UR ELPH-MCNC: 2.6 GM/DL
GLUCOSE SERPL-MCNC: 156 MG/DL (ref 65–99)
HCT VFR BLD AUTO: 37.5 % (ref 34–46.6)
HGB BLD-MCNC: 11.7 G/DL (ref 12–15.9)
IMM GRANULOCYTES # BLD AUTO: 0.03 10*3/MM3 (ref 0–0.05)
IMM GRANULOCYTES NFR BLD AUTO: 0.4 % (ref 0–0.5)
LYMPHOCYTES # BLD AUTO: 2.31 10*3/MM3 (ref 0.7–3.1)
LYMPHOCYTES NFR BLD AUTO: 30.6 % (ref 19.6–45.3)
MCH RBC QN AUTO: 29.8 PG (ref 26.6–33)
MCHC RBC AUTO-ENTMCNC: 31.2 G/DL (ref 31.5–35.7)
MCV RBC AUTO: 95.4 FL (ref 79–97)
MONOCYTES # BLD AUTO: 0.47 10*3/MM3 (ref 0.1–0.9)
MONOCYTES NFR BLD AUTO: 6.2 % (ref 5–12)
NEUTROPHILS NFR BLD AUTO: 4.57 10*3/MM3 (ref 1.7–7)
NEUTROPHILS NFR BLD AUTO: 60.5 % (ref 42.7–76)
NRBC BLD AUTO-RTO: 0 /100 WBC (ref 0–0.2)
PLATELET # BLD AUTO: 234 10*3/MM3 (ref 140–450)
PMV BLD AUTO: 10.4 FL (ref 6–12)
POTASSIUM SERPL-SCNC: 4.6 MMOL/L (ref 3.5–5.2)
PROT SERPL-MCNC: 6.9 G/DL (ref 6–8.5)
RBC # BLD AUTO: 3.93 10*6/MM3 (ref 3.77–5.28)
SODIUM SERPL-SCNC: 142 MMOL/L (ref 136–145)
WBC NRBC COR # BLD: 7.55 10*3/MM3 (ref 3.4–10.8)

## 2023-08-16 PROCEDURE — 99214 OFFICE O/P EST MOD 30 MIN: CPT | Performed by: INTERNAL MEDICINE

## 2023-08-16 PROCEDURE — 3078F DIAST BP <80 MM HG: CPT | Performed by: INTERNAL MEDICINE

## 2023-08-16 PROCEDURE — 1126F AMNT PAIN NOTED NONE PRSNT: CPT | Performed by: INTERNAL MEDICINE

## 2023-08-16 PROCEDURE — 80053 COMPREHEN METABOLIC PANEL: CPT | Performed by: INTERNAL MEDICINE

## 2023-08-16 PROCEDURE — 36415 COLL VENOUS BLD VENIPUNCTURE: CPT

## 2023-08-16 PROCEDURE — 85025 COMPLETE CBC W/AUTO DIFF WBC: CPT | Performed by: INTERNAL MEDICINE

## 2023-08-16 PROCEDURE — 3074F SYST BP LT 130 MM HG: CPT | Performed by: INTERNAL MEDICINE

## 2023-10-30 ENCOUNTER — TRANSCRIBE ORDERS (OUTPATIENT)
Dept: BONE DENSITY | Facility: HOSPITAL | Age: 83
End: 2023-10-30
Payer: MEDICARE

## 2023-10-30 DIAGNOSIS — Z78.0 MENOPAUSE: Primary | ICD-10-CM

## 2023-11-14 ENCOUNTER — APPOINTMENT (OUTPATIENT)
Dept: BONE DENSITY | Facility: HOSPITAL | Age: 83
End: 2023-11-14
Payer: MEDICARE

## 2023-11-14 DIAGNOSIS — Z78.0 MENOPAUSE: ICD-10-CM

## 2023-11-14 PROCEDURE — 77080 DXA BONE DENSITY AXIAL: CPT

## 2023-11-16 ENCOUNTER — HOSPITAL ENCOUNTER (OUTPATIENT)
Dept: MAMMOGRAPHY | Facility: HOSPITAL | Age: 83
Discharge: HOME | End: 2023-11-16
Admitting: INTERNAL MEDICINE
Payer: MEDICARE

## 2023-11-16 DIAGNOSIS — Z12.31 ENCOUNTER FOR SCREENING MAMMOGRAM FOR MALIGNANT NEOPLASM OF BREAST: ICD-10-CM

## 2023-11-16 DIAGNOSIS — Z17.0 MALIGNANT NEOPLASM OF CENTRAL PORTION OF LEFT BREAST IN FEMALE, ESTROGEN RECEPTOR POSITIVE: ICD-10-CM

## 2023-11-16 DIAGNOSIS — C50.112 MALIGNANT NEOPLASM OF CENTRAL PORTION OF LEFT BREAST IN FEMALE, ESTROGEN RECEPTOR POSITIVE: ICD-10-CM

## 2023-11-16 DIAGNOSIS — D64.9 ANEMIA, UNSPECIFIED TYPE: ICD-10-CM

## 2023-11-16 PROCEDURE — 77067 SCR MAMMO BI INCL CAD: CPT

## 2023-11-16 PROCEDURE — 77063 BREAST TOMOSYNTHESIS BI: CPT

## 2023-11-29 NOTE — PROGRESS NOTES
Subjective     REASON FOR CONSULTATION: Breast cancer  Provide an opinion on any further workup or treatment                             REQUESTING PHYSICIAN: Dr. Schultz    RECORDS OBTAINED:  Records of the patients history including those obtained from the referring provider were reviewed and summarized in detail.    HISTORY OF PRESENT ILLNESS:  The patient is a 83 y.o. year old female who is here for an opinion about the above issue.    History of Present Illness   This is a 82-year-old lady with obesity, COPD/asthma on intermittent O2, hypertension, depression, Casper's esophagus, depression and anxiety.  She has a history of stage I triple negative left breast cancer in 2009 treated with a lumpectomy and radiation therapy completed January 2010.      The patient had an abnormal screening mammogram on the left 11-21 showing calcifications in the left breast and a new oval-shaped asymmetry measuring 15 mm suspicious for malignancy.  Diagnostic mammogram and ultrasound 11/24/2021 showed postlumpectomy changes in the lateral portion of the left breast and a new 1.6 x 1.1 cm lesion at the 3 to 4 o'clock position of the left breast 7 cm from the nipple.  She underwent an ultrasound-guided breast biopsy 12/15/2021 with 2 lesions biopsy including a small solid satellite nodule adjacent to the previously described larger mass.  Pathology from mass 1 showed invasive ductal carcinoma grade 3 (3+3+2) ER 31 to 40% positive moderately, GA negative, HER-2 2+/FISH negative, Ki-67 60% with no in situ component, no lymph vascular or perineural invasion; mass 2 invasive ductal carcinoma grade 2 (3+3+1) ER 1 to 10% weakly positive, GA negative, HER-2 2+/FISH +80% of cells, Ki-67 55% with no in situ component, no lymphovascular or perineural invasion.    The patient underwent a left mastectomy and sentinel lymph node biopsy on 2/23/2022 showing invasive ductal carcinoma grade 3 tumor measuring 3.0 cm in size, negative margins,  several foci of lymphovascular invasion, positive perineural invasion, associated high-grade ductal carcinoma in situ 40 mm negative margins for in situ disease, 5 negative axillary lymph nodes.    The patient's main complaint relates to her shortness of breath related to COPD.  She is on oxygen intermittently.  She relates her breathing has worsened significantly over the previous 4 to 5 years.    The patient returned today for follow-up.  The patient has been having some abdominal cramping under investigation by GI.  She reports no other unusual sites of pain.  Shortness of breath worsening with time from COPD.  She is looking forward to spending couple months in [place] with her son Colby Nelson.    Past Medical History:   Diagnosis Date    Anxiety     Arthritis     Asthma     Casper esophagus     Breast cancer     Left 2021    Colon polyp     COPD (chronic obstructive pulmonary disease)     Dr Sharma follows, clearance in media 2/2022    Depression     Diarrhea     Diverticulitis of colon     Emphysema lung     Emphysema of lung     Flu 12/2021    GERD (gastroesophageal reflux disease)     Hypertension     Requires supplemental oxygen     3L/NC w/exertion    Skin cancer     Skin cancer     Sleep apnea     w/CPAP and oxygen at 3L        Past Surgical History:   Procedure Laterality Date    BILATERAL SALPINGO OOPHORECTOMY      BLADDER SURGERY      bladder lift    BREAST LUMPECTOMY Left     breast ca    CARPAL TUNNEL RELEASE      BOTH HANDS    CATARACT EXTRACTION W/ INTRAOCULAR LENS  IMPLANT, BILATERAL      COLONOSCOPY N/A 02/10/2017    Procedure: COLONOSCOPY with polypectomy;  Surgeon: Weston Sue MD;  Location: [facility] OR;  Service:     COLONOSCOPY N/A 08/12/2020    Procedure: COLONOSCOPY;  Surgeon: Weston Sue MD;  Location: [facility] OR;  Service: Gastroenterology;  Laterality: N/A;  TRANSVERSE COLON POLYP  DIVERTICULOSIS  CECAL TIME at 1356  SIGMOID COLON POLYP    ENDOSCOPY N/A  02/10/2017    Procedure: ESOPHAGOGASTRODUODENOSCOPY with biopsies;  Surgeon: Weston Sue MD;  Location: [facility] OR;  Service:     ENDOSCOPY N/A 08/12/2020    Procedure: ESOPHAGOGASTRODUODENOSCOPY;  Surgeon: Weston Sue MD;  Location: [facility] OR;  Service: Gastroenterology;  Laterality: N/A;  DISTAL ESOPHAGUS BIOPSY  STOCKTON'S ESOPHAGUS    HYSTERECTOMY      MASTECTOMY      MASTECTOMY W/ SENTINEL NODE BIOPSY Left 02/23/2022    Procedure: BREAST MASTECTOMY WITH SENTINEL NODE BIOPSY;  Surgeon: Afshin Schultz MD;  Location: [facility] OR;  Service: General;  Laterality: Left;    SKIN BIOPSY      TOTAL KNEE ARTHROPLASTY Bilateral         Current Outpatient Medications on File Prior to Visit   Medication Sig Dispense Refill    acetaminophen (TYLENOL) 500 MG tablet Take 2 tablets by mouth Every 6 (Six) Hours As Needed for Mild Pain.      albuterol (PROVENTIL) (2.5 MG/3ML) 0.083% nebulizer solution Take 2.5 mg by nebulization 2 (Two) Times a Day. Can use 4 times a day if needed      albuterol sulfate  (90 Base) MCG/ACT inhaler Inhale 2 puffs Every 4 (Four) Hours As Needed for Wheezing or Shortness of Air.      ALPRAZolam (XANAX) 0.5 MG tablet Take 1 tablet by mouth Daily As Needed for Anxiety.      amLODIPine (NORVASC) 5 MG tablet Every Night.      benazepril (LOTENSIN) 40 MG tablet Take 1 tablet by mouth Daily.      benzonatate (TESSALON) 100 MG capsule       betamethasone dipropionate 0.05 % cream       Calcium Acetate-Magnesium Carb 450-200 MG tablet Take 1 tablet by mouth Daily.      chlorhexidine (PERIDEX) 0.12 % solution       Cholecalciferol (Vitamin D3) 20 MCG (800 UNIT) tablet Take 50 mcg by mouth Daily.      citalopram (CeleXA) 40 MG tablet Take 1 tablet by mouth Every Morning.      Cyanocobalamin (VITAMIN B-12) 5000 MCG tablet dispersible Take 1 tablet by mouth Daily.      estradiol (ESTRACE) 0.1 MG/GM vaginal cream       fluconazole (DIFLUCAN) 100 MG tablet Take 1 tablet by mouth  As Needed.      Fluticasone-Umeclidin-Vilant (TRELEGY) 100-62.5-25 MCG/INH inhaler Inhale 1 puff Daily.      glucosamine-chondroitin 500-400 MG capsule capsule Take  by mouth 2 (Two) Times a Day With Meals.      guaiFENesin (MUCINEX) 600 MG 12 hr tablet Take 1 tablet by mouth Every 12 (Twelve) Hours. (Patient taking differently: Take 1 tablet by mouth 2 (Two) Times a Day As Needed for Cough or Congestion.) 30 tablet 0    hydrALAZINE (APRESOLINE) 50 MG tablet Take 1 tablet by mouth 3 (Three) Times a Day.      hydroCHLOROthiazide (MICROZIDE) 12.5 MG capsule       hydrocortisone 2.5 % ointment Apply 1 application topically to the appropriate area as directed 2 (Two) Times a Day. 30 g 11    hydrOXYzine (ATARAX) 25 MG tablet       hyoscyamine (LEVBID) 0.375 MG 12 hr tablet TAKE ONE TABLET BY MOUTH EVERY 12 HOURS AS NEEDED FOR CRAMPING (Patient taking differently: Take 1 tablet by mouth Daily.) 60 tablet 11    magnesium oxide (MAG-OX) 400 MG tablet Take 1 tablet by mouth Daily.      meloxicam (MOBIC) 15 MG tablet Take 1 tablet by mouth Daily.      Mirabegron ER (MYRBETRIQ) 50 MG tablet sustained-release 24 hour 24 hr tablet Take 50 mg by mouth every night at bedtime.      mometasone (ELOCON) 0.1 % cream Apply 0.1 application topically to the appropriate area as directed As Needed.      Multiple Vitamins-Minerals (MULTIVITAMIN ADULT PO) Take 1 tablet by mouth Daily.      O2 (OXYGEN) Inhale 3 L/min 1 (One) Time. Uses w/activity      omeprazole (priLOSEC) 40 MG capsule Take 1 capsule by mouth 2 (Two) Times a Day Before Meals. (Patient taking differently: Take 1 capsule by mouth Every Morning.) 180 capsule 3    ondansetron (ZOFRAN) 4 MG tablet Take 1 tablet by mouth Every 6 (Six) Hours As Needed for Nausea or Vomiting. 20 tablet 0    triamcinolone (KENALOG) 0.1 % lotion        No current facility-administered medications on file prior to visit.        ALLERGIES:    Allergies   Allergen Reactions    Azithromycin Nausea And  Vomiting     SEVERE GI UPSET    Sulfa Antibiotics Itching    Codeine Rash        Social History     Socioeconomic History    Marital status:    Tobacco Use    Smoking status: Former     Packs/day: 2.00     Years: 30.00     Additional pack years: 0.00     Total pack years: 60.00     Types: Cigarettes     Quit date: 1990     Years since quittin.8     Passive exposure: Never    Smokeless tobacco: Never   Vaping Use    Vaping Use: Never used   Substance and Sexual Activity    Alcohol use: No    Drug use: No    Sexual activity: Defer        Family History   Problem Relation Age of Onset    Hypertension Daughter     Breast cancer Daughter     Allergies Daughter     Malig Hyperthermia Neg Hx     Cancer Neg Hx         Review of Systems   Constitutional: Negative.    HENT: Negative.     Respiratory:  Positive for shortness of breath.    Cardiovascular: Negative.    Gastrointestinal:  Positive for abdominal pain.   Genitourinary: Negative.    Musculoskeletal:  Positive for arthralgias and myalgias.   Skin:  Negative for rash and wound.   Allergic/Immunologic: Negative.    Neurological: Negative.    Hematological: Negative.    Psychiatric/Behavioral: Negative.            Objective     There were no vitals filed for this visit.        2023     9:53 AM   Current Status   ECOG score 0       Physical Exam    CONSTITUTIONAL: pleasant well-developed obese elderly woman  HEENT: no icterus, no thrush, moist membranes  LYMPH: no cervical or supraclavicular lad  CV: RRR, S1S2, no murmur  Breast: Status post left mastectomy with significant scar tissue making exam difficult-unchanged 2023  RESP: cta bilat, faint wheezing, no rales, diminished breath sounds, mild increased work of breathing  GI: soft non-distended, non painful  MUSC: no edema, normal gait  NEURO: alert and oriented x3, mild global weakness  PSYCH: normal mood and affect    RECENT LABS:  Hematology WBC   Date Value Ref Range Status   2023  7.55 3.40 - 10.80 10*3/mm3 Final     RBC   Date Value Ref Range Status   08/16/2023 3.93 3.77 - 5.28 10*6/mm3 Final     Hemoglobin   Date Value Ref Range Status   08/16/2023 11.7 (L) 12.0 - 15.9 g/dL Final     Hematocrit   Date Value Ref Range Status   08/16/2023 37.5 34.0 - 46.6 % Final     Platelets   Date Value Ref Range Status   08/16/2023 234 140 - 450 10*3/mm3 Final        Lab Results   Component Value Date    GLUCOSE 156 (H) 08/16/2023    BUN 22 08/16/2023    CREATININE 1.10 (H) 08/16/2023    EGFRIFNONA 53 (L) 02/24/2022    BCR 20.0 08/16/2023    K 4.6 08/16/2023    CO2 24.6 08/16/2023    CALCIUM 9.1 08/16/2023    ALBUMIN 4.3 08/16/2023    AST 15 08/16/2023    ALT 14 08/16/2023         Mammo Screening Modified With Tomosynthesis Right With CAD 11/16/2023 - IMPRESSION:  Negative right unilateral annual screening mammogram.    DEXA Bone Density Axial 12/14/2023 - IMPRESSION:  1.  Osteopenia.  2.  Left femoral neck T score -1.6.    Assessment & Plan     *[id number] ER low (1-10%), [place] negative, HER-2 FISH amplified, Ki-67 60% status post left mastectomy 2/23/2022.  The patient chose not to receive adjuvant chemotherapy given age and comorbidities    *History of stage I triple negative ductal cancer left breast status post lumpectomy and radiation therapy 2009    *Comorbidities include fairly advanced COPD requiring intermittent oxygen, hypertension, depression/anxiety    *Daughter with BRCA mutation    *Mild anemia-hemoglobin stable 11.1    Oncology plan/recommendations:  Follow-up 4  months CBC CMP

## 2023-11-30 ENCOUNTER — OFFICE VISIT (OUTPATIENT)
Dept: GASTROENTEROLOGY | Facility: CLINIC | Age: 83
End: 2023-11-30
Payer: MEDICARE

## 2023-11-30 VITALS
WEIGHT: 220.6 LBS | HEIGHT: 61 IN | DIASTOLIC BLOOD PRESSURE: 60 MMHG | BODY MASS INDEX: 41.65 KG/M2 | SYSTOLIC BLOOD PRESSURE: 112 MMHG

## 2023-11-30 DIAGNOSIS — K58.0 IRRITABLE BOWEL SYNDROME WITH DIARRHEA: ICD-10-CM

## 2023-11-30 DIAGNOSIS — Z86.010 PERSONAL HISTORY OF COLONIC POLYPS: ICD-10-CM

## 2023-11-30 DIAGNOSIS — K22.70 BARRETT'S ESOPHAGUS WITHOUT DYSPLASIA: Primary | ICD-10-CM

## 2023-11-30 PROCEDURE — 99213 OFFICE O/P EST LOW 20 MIN: CPT | Performed by: INTERNAL MEDICINE

## 2023-11-30 PROCEDURE — 3074F SYST BP LT 130 MM HG: CPT | Performed by: INTERNAL MEDICINE

## 2023-11-30 PROCEDURE — 1159F MED LIST DOCD IN RCRD: CPT | Performed by: INTERNAL MEDICINE

## 2023-11-30 PROCEDURE — 3078F DIAST BP <80 MM HG: CPT | Performed by: INTERNAL MEDICINE

## 2023-11-30 PROCEDURE — 1160F RVW MEDS BY RX/DR IN RCRD: CPT | Performed by: INTERNAL MEDICINE

## 2023-11-30 NOTE — PROGRESS NOTES
"    PATIENT INFORMATION  Katarzyna Ku       - 1940    CHIEF COMPLAINT  Chief Complaint   Patient presents with    Casper's esophagus       HISTORY OF PRESENT ILLNESS  Reviewed her past Endoscopy and had polyps in  3 years after her last so recall both her SSBE and Colon in 2025    Complains of minor feacl leakage and is moving 1-3 times a day and not skipping days and is usually regular in the AM after Bfat. All BMs are in the Am and only rare instances fo abd cramps in the night. Does have some burning after the 3 BMs but uses HC cream and we reviewed using for several days to make sure the issue.     Reviewed KEGEl and will add daily Fiber.        REVIEWED PERTINENT RESULTS/ LABS  Lab Results   Component Value Date    CASEREPORT  2022     Surgical Pathology Report                         Case: [id number]                                  Authorizing Provider:  Afshin Schultz MD        Collected:           2022 11:29 AM          Ordering Location:     [facility]   Received:            2022 12:14 PM                                 OR                                                                           Pathologist:           Monserrat Clarke MD                                                          Specimens:   1) - Breast, Left, low axilla included                                                              2) - [place] Lymph Node, #1                                                               FINALDX  2022     1. Submitted as \"Left Breast and Low Axillary Contents, Mastectomy with [place] Lymph Node Biopsy\" (1,112 grams):    A. INVASIVE DUCTAL CARCINOMA, Poorly differentiated; [place] Histologic Grade III/III      (tubule score = 3, nuclear score = 3, mitoses score = 3):   1. Tumor size:  30 mm (gross measurement).   2. Margins are negative for invasive carcinoma; Closest distance: Invasive carcinoma is present       10 mm from the anterior " "margin.     3. Positive for several foci of lymphovascular space invasion.   4. Positive for perineural invasion.  B. 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 Solid and Cribriform types with associated necrosis.     2. Extent of DCIS:  40 mm (based on the slices involved).     3. Margins are negative for in situ carcinoma; Closest distance: DCIS is present >10 mm from the                     anterior margin.   C. Two clips retrieved from within invasive carcinoma.   D. Background fibrous tissue and dystrophic calcifications, suggesting prior treatment effect.    E. Four lymph nodes, negative for carcinoma (0/4).    F. Unremarkable skin and nipple.   G. See Synoptic Report (to include part 2).    2. \"[place] Lymph Node #1,\" Biopsy:   A. One lymph node, negative for carcinoma (0/1).     Jab/kds        Lab Results   Component Value Date    HGB 11.7 (L) 08/16/2023    MCV 95.4 08/16/2023     08/16/2023    ALT 14 08/16/2023    AST 15 08/16/2023    HGBA1C 5.90 (H) 02/17/2022    FERRITIN 87.00 11/15/2022    IRON 49 11/15/2022    TIBC 280 (L) 11/15/2022      Mammo Screening Modified With Tomosynthesis Right With CAD    Result Date: 11/16/2023  Narrative: EXAM, 11/16/2023: 1. Right unilateral digital screening mammogram with CAD. 2. Right unilateral digital breast tomosynthesis.  INDICATION: 83-year-old female status post left mastectomy for breast cancer 2/23/2022. Family history of breast cancer (daughter). Routine annual surveillance.  TECHNIQUE: Unilateral digital screening mammography including breast tomosynthesis and CAD review.  COMPARISON: *  Screening mammogram, 11/7/2022 and 10/30/2020.  FINDINGS: There are scattered areas of fibroglandular density. Stable scattered benign-appearing calcifications. No significant change when compared with prior images. No mammographic evidence of malignancy. Recommend repeat screening mammogram in one year.      Impression: Negative right unilateral " annual screening mammogram.  BI-RADS CATEGORY 1: Negative   Women over the age of 40 undergoing screening mammography are entered into a reminder system with target due date for the next mammogram.  This report was finalized on 11/16/2023 12:58 PM by Dr. Shimon Mcmahon MD.      DEXA Bone Density Axial    Result Date: 11/15/2023  Narrative: DXA BONE MINERAL DENSITY MEASUREMENT, 11/14/2023  INDICATION: 83-year old postmenopausal female referred for DXA bone mineral density assessment. History of breast cancer.  TECHNIQUE: DXA bone mineral density measurements were obtained at the lumbar spine and left hip.  COMPARISON: 9/3/2021.  HIP: *  At the left femoral neck, lowest T score measures -1.6. *  BMD: 0.668 g/cm2. *  WHO category: Osteopenia. *  Total hip BMD has decreased 5.4% since the prior exam.  LUMBAR SPINE: *  At the lumbar spine, L1-4 T score measures 1.8. *  BMD: 1.247 g/cm2. *  WHO category: Normal. *  BMD is stable since the prior exam.      Impression: 1.  Osteopenia. 2.  Left femoral neck T score -1.6.   FRAX: *  10 year fracture risk: Major osteoporotic fracture, 12%; hip fracture, 2.9%. *  Fracture probability calculated for an untreated patient.   This report was finalized on 11/15/2023 9:46 AM by Dr. Shimon Mcmahon MD.       REVIEW OF SYSTEMS  Review of Systems   Constitutional:  Negative for activity change, chills, fever and unexpected weight change.   HENT:  Negative for congestion.    Eyes:  Negative for visual disturbance.   Respiratory:  Negative for shortness of breath.    Cardiovascular:  Negative for chest pain and palpitations.   Gastrointestinal:  Positive for abdominal distention, abdominal pain, diarrhea and vomiting. Negative for blood in stool.   Endocrine: Negative for cold intolerance and heat intolerance.   Genitourinary:  Negative for hematuria.   Musculoskeletal:  Negative for gait problem.   Skin:  Negative for color change.   Allergic/Immunologic: Negative for  immunocompromised state.   Neurological:  Negative for weakness and light-headedness.   Hematological:  Negative for adenopathy.   Psychiatric/Behavioral:  Negative for sleep disturbance. The patient is not nervous/anxious.          ACTIVE PROBLEMS  Patient Active Problem List    Diagnosis     Anemia [D64.9]     Malignant neoplasm of central portion of left breast in female, estrogen receptor positive [C50.112, Z17.0]     Influenza A [J10.1]     Influenza A (H1N1) [J10.1]     Irritable bowel syndrome with diarrhea [K58.0]     Encounter for screening for malignant neoplasm of colon [Z12.11]     Personal history of colonic polyps [Z86.010]     Casper's esophagus without dysplasia [K22.70]     Abdominal pain, right upper quadrant [R10.11]     Osteoarthritis of knee [M17.9]     Chronic obstructive pulmonary disease [J44.9]     Hypertension [I10]     Primary localized osteoarthrosis [M19.91]     Midline cystocele [N81.11]     Mixed incontinence [N39.46]     Muscular atrophy [M62.50]     Atrophic vaginitis [N95.2]     Prolapse of vaginal vault after hysterectomy [N99.3]     Enterocele [K46.9]          PAST MEDICAL HISTORY  Past Medical History:   Diagnosis Date    Anxiety     Arthritis     Asthma     Casper esophagus     Breast cancer     Left 2021    Colon polyp     COPD (chronic obstructive pulmonary disease)     Dr Sharma follows, clearance in media 2/2022    Depression     Diarrhea     Diverticulitis of colon     Emphysema lung     Emphysema of lung     Flu 12/2021    GERD (gastroesophageal reflux disease)     Hypertension     Requires supplemental oxygen     3L/NC w/exertion    Skin cancer     Skin cancer     Sleep apnea     w/CPAP and oxygen at 3L         SURGICAL HISTORY  Past Surgical History:   Procedure Laterality Date    BILATERAL SALPINGO OOPHORECTOMY      BLADDER SURGERY      bladder lift    BREAST LUMPECTOMY Left     breast ca    CARPAL TUNNEL RELEASE      BOTH HANDS    CATARACT EXTRACTION W/ INTRAOCULAR  LENS  IMPLANT, BILATERAL      COLONOSCOPY N/A 02/10/2017    Procedure: COLONOSCOPY with polypectomy;  Surgeon: Weston Sue MD;  [facility];  Service:     COLONOSCOPY N/A 2020    Procedure: COLONOSCOPY;  Surgeon: Weston Sue MD;  Location: [facility];  Service: Gastroenterology;  Laterality: N/A;  TRANSVERSE COLON POLYP  DIVERTICULOSIS  CECAL TIME at 1356  SIGMOID COLON POLYP    ENDOSCOPY N/A 02/10/2017    Procedure: ESOPHAGOGASTRODUODENOSCOPY with biopsies;  Surgeon: Weston Sue MD;  Location: [facility];  Service:     ENDOSCOPY N/A 2020    Procedure: ESOPHAGOGASTRODUODENOSCOPY;  Surgeon: Weston Sue MD;  Location: [facility];  Service: Gastroenterology;  Laterality: N/A;  DISTAL ESOPHAGUS BIOPSY  STOCKTON'S ESOPHAGUS    HYSTERECTOMY      MASTECTOMY      MASTECTOMY W/ SENTINEL NODE BIOPSY Left 2022    Procedure: BREAST MASTECTOMY WITH SENTINEL NODE BIOPSY;  Surgeon: Afshin Schultz MD;  Location: [facility];  Service: General;  Laterality: Left;    SKIN BIOPSY      TOTAL KNEE ARTHROPLASTY Bilateral          FAMILY HISTORY  Family History   Problem Relation Age of Onset    Hypertension Daughter     Breast cancer Daughter     Allergies Daughter     Malig Hyperthermia Neg Hx     Cancer Neg Hx          SOCIAL HISTORY  Social History     Occupational History    Not on file   Tobacco Use    Smoking status: Former     Packs/day: 2.00     Years: 30.00     Additional pack years: 0.00     Total pack years: 60.00     Types: Cigarettes     Quit date: 1990     Years since quittin.8     Passive exposure: Never    Smokeless tobacco: Never   Vaping Use    Vaping Use: Never used   Substance and Sexual Activity    Alcohol use: No    Drug use: No    Sexual activity: Defer         CURRENT MEDICATIONS    Current Outpatient Medications:     acetaminophen (TYLENOL) 500 MG tablet, Take 2 tablets by mouth Every 6 (Six) Hours As Needed for Mild  Pain., Disp: , Rfl:     albuterol (PROVENTIL) (2.5 MG/3ML) 0.083% nebulizer solution, Take 2.5 mg by nebulization 2 (Two) Times a Day. Can use 4 times a day if needed, Disp: , Rfl:     albuterol sulfate  (90 Base) MCG/ACT inhaler, Inhale 2 puffs Every 4 (Four) Hours As Needed for Wheezing or Shortness of Air., Disp: , Rfl:     ALPRAZolam (XANAX) 0.5 MG tablet, Take 1 tablet by mouth Daily As Needed for Anxiety., Disp: , Rfl:     amLODIPine (NORVASC) 5 MG tablet, Every Night., Disp: , Rfl:     benazepril (LOTENSIN) 40 MG tablet, Take 1 tablet by mouth Daily., Disp: , Rfl:     benzonatate (TESSALON) 100 MG capsule, , Disp: , Rfl:     betamethasone dipropionate 0.05 % cream, , Disp: , Rfl:     Calcium Acetate-Magnesium Carb 450-200 MG tablet, Take 1 tablet by mouth Daily., Disp: , Rfl:     Cholecalciferol (Vitamin D3) 20 MCG (800 UNIT) tablet, Take 50 mcg by mouth Daily., Disp: , Rfl:     citalopram (CeleXA) 40 MG tablet, Take 1 tablet by mouth Every Morning., Disp: , Rfl:     Cyanocobalamin (VITAMIN B-12) 5000 MCG tablet dispersible, Take 1 tablet by mouth Daily., Disp: , Rfl:     estradiol (ESTRACE) 0.1 MG/GM vaginal cream, , Disp: , Rfl:     fluconazole (DIFLUCAN) 100 MG tablet, Take 1 tablet by mouth As Needed., Disp: , Rfl:     Fluticasone-Umeclidin-Vilant (TRELEGY) 100-62.5-25 MCG/INH inhaler, Inhale 1 puff Daily., Disp: , Rfl:     guaiFENesin (MUCINEX) 600 MG 12 hr tablet, Take 1 tablet by mouth Every 12 (Twelve) Hours. (Patient taking differently: Take 1 tablet by mouth 2 (Two) Times a Day As Needed for Cough or Congestion.), Disp: 30 tablet, Rfl: 0    hydrALAZINE (APRESOLINE) 50 MG tablet, Take 1 tablet by mouth 3 (Three) Times a Day., Disp: , Rfl:     hydroCHLOROthiazide (MICROZIDE) 12.5 MG capsule, , Disp: , Rfl:     hydrocortisone 2.5 % ointment, Apply 1 application topically to the appropriate area as directed 2 (Two) Times a Day., Disp: 30 g, Rfl: 11    hyoscyamine (LEVBID) 0.375 MG 12 hr tablet,  "TAKE ONE TABLET BY MOUTH EVERY 12 HOURS AS NEEDED FOR CRAMPING (Patient taking differently: Take 1 tablet by mouth Daily.), Disp: 60 tablet, Rfl: 11    magnesium oxide (MAG-OX) 400 MG tablet, Take 1 tablet by mouth Daily., Disp: , Rfl:     meloxicam (MOBIC) 15 MG tablet, Take 1 tablet by mouth Daily., Disp: , Rfl:     Mirabegron ER (MYRBETRIQ) 50 MG tablet sustained-release 24 hour 24 hr tablet, Take 50 mg by mouth every night at bedtime., Disp: , Rfl:     mometasone (ELOCON) 0.1 % cream, Apply 0.1 application topically to the appropriate area as directed As Needed., Disp: , Rfl:     Multiple Vitamins-Minerals (MULTIVITAMIN ADULT PO), Take 1 tablet by mouth Daily., Disp: , Rfl:     O2 (OXYGEN), Inhale 3 L/min 1 (One) Time. Uses w/activity, Disp: , Rfl:     omeprazole (priLOSEC) 40 MG capsule, Take 1 capsule by mouth 2 (Two) Times a Day Before Meals. (Patient taking differently: Take 1 capsule by mouth Every Morning.), Disp: 180 capsule, Rfl: 3    ondansetron (ZOFRAN) 4 MG tablet, Take 1 tablet by mouth Every 6 (Six) Hours As Needed for Nausea or Vomiting., Disp: 20 tablet, Rfl: 0    ALLERGIES  Azithromycin, Sulfa antibiotics, and Codeine    VITALS  Vitals:    11/30/23 0904   BP: 112/60   BP Location: Right arm   Patient Position: Sitting   Cuff Size: Large Adult   Weight: 100 kg (220 lb 9.6 oz)   Height: 154.5 cm (60.83\")       PHYSICAL EXAM  Debilities/Disabilities Identified: None  Emotional Behavior: Appropriate  Wt Readings from Last 3 Encounters:   11/30/23 100 kg (220 lb 9.6 oz)   08/16/23 103 kg (227 lb)   05/25/23 102 kg (225 lb)     Ht Readings from Last 1 Encounters:   11/30/23 154.5 cm (60.83\")     Body mass index is 41.92 kg/m².  Physical Exam  Constitutional:       Appearance: She is well-developed. She is not diaphoretic.   HENT:      Head: Normocephalic and atraumatic.   Eyes:      General: No scleral icterus.     Conjunctiva/sclera: Conjunctivae normal.      Pupils: Pupils are equal, round, and " reactive to light.   Neck:      Thyroid: No thyromegaly.   Cardiovascular:      Rate and Rhythm: Normal rate and regular rhythm.      Heart sounds: Normal heart sounds. No murmur heard.     No gallop.   Pulmonary:      Effort: Pulmonary effort is normal.      Breath sounds: Normal breath sounds. No wheezing or rales.   Abdominal:      General: Bowel sounds are normal. There is no distension or abdominal bruit.      Palpations: Abdomen is soft. There is no shifting dullness, fluid wave or mass.      Tenderness: There is no abdominal tenderness. There is no guarding. Negative signs include Madrigal's sign.      Hernia: There is no hernia in the ventral area.   Musculoskeletal:         General: Normal range of motion.      Cervical back: Normal range of motion and neck supple.   Lymphadenopathy:      Cervical: No cervical adenopathy.   Skin:     General: Skin is warm and dry.      Findings: No erythema or rash.   Neurological:      Mental Status: She is alert and oriented to person, place, and time.   Psychiatric:         Mood and Affect: Mood normal.         Behavior: Behavior normal.         CLINICAL DATA REVIEWED   reviewed previous lab results and integrated with today's visit, reviewed notes from other physicians and/or last GI encounter, reviewed previous endoscopy results and available photos, reviewed surgical pathology results from previous biopsies    ASSESSMENT  Diagnoses and all orders for this visit:    Casper's esophagus without dysplasia    Irritable bowel syndrome with diarrhea    Personal history of colonic polyps          PLAN  Reviewed fecal incontinence and will follow along on Fiber and with Kegel     Return in about 6 months (around 5/30/2024).    I have discussed the above plan with the patient.  They verbalize understanding and are in agreement with the plan.  They have been advised to contact the office for any questions, concerns, or changes related to their health.

## 2023-12-05 ENCOUNTER — OFFICE VISIT (OUTPATIENT)
Dept: ONCOLOGY | Facility: CLINIC | Age: 83
End: 2023-12-05
Payer: MEDICARE

## 2023-12-05 ENCOUNTER — LAB (OUTPATIENT)
Dept: LAB | Facility: HOSPITAL | Age: 83
End: 2023-12-05
Payer: MEDICARE

## 2023-12-05 VITALS
WEIGHT: 228.2 LBS | RESPIRATION RATE: 18 BRPM | SYSTOLIC BLOOD PRESSURE: 133 MMHG | DIASTOLIC BLOOD PRESSURE: 63 MMHG | OXYGEN SATURATION: 97 % | BODY MASS INDEX: 43.08 KG/M2 | HEART RATE: 71 BPM | TEMPERATURE: 98.6 F | HEIGHT: 61 IN

## 2023-12-05 DIAGNOSIS — Z12.31 ENCOUNTER FOR SCREENING MAMMOGRAM FOR MALIGNANT NEOPLASM OF BREAST: ICD-10-CM

## 2023-12-05 DIAGNOSIS — C50.112 MALIGNANT NEOPLASM OF CENTRAL PORTION OF LEFT BREAST IN FEMALE, ESTROGEN RECEPTOR POSITIVE: ICD-10-CM

## 2023-12-05 DIAGNOSIS — D64.9 ANEMIA, UNSPECIFIED TYPE: ICD-10-CM

## 2023-12-05 DIAGNOSIS — D64.9 ANEMIA, UNSPECIFIED TYPE: Primary | ICD-10-CM

## 2023-12-05 DIAGNOSIS — Z17.0 MALIGNANT NEOPLASM OF CENTRAL PORTION OF LEFT BREAST IN FEMALE, ESTROGEN RECEPTOR POSITIVE: ICD-10-CM

## 2023-12-05 LAB
ALBUMIN SERPL-MCNC: 4.2 G/DL (ref 3.5–5.2)
ALBUMIN/GLOB SERPL: 1.7 G/DL
ALP SERPL-CCNC: 72 U/L (ref 39–117)
ALT SERPL W P-5'-P-CCNC: 13 U/L (ref 1–33)
ANION GAP SERPL CALCULATED.3IONS-SCNC: 11.7 MMOL/L (ref 5–15)
AST SERPL-CCNC: 15 U/L (ref 1–32)
BASOPHILS # BLD AUTO: 0.04 10*3/MM3 (ref 0–0.2)
BASOPHILS NFR BLD AUTO: 0.6 % (ref 0–1.5)
BILIRUB SERPL-MCNC: 0.2 MG/DL (ref 0–1.2)
BUN SERPL-MCNC: 24 MG/DL (ref 8–23)
BUN/CREAT SERPL: 21.4 (ref 7–25)
CALCIUM SPEC-SCNC: 9.6 MG/DL (ref 8.6–10.5)
CHLORIDE SERPL-SCNC: 105 MMOL/L (ref 98–107)
CO2 SERPL-SCNC: 23.3 MMOL/L (ref 22–29)
CREAT SERPL-MCNC: 1.12 MG/DL (ref 0.57–1)
DEPRECATED RDW RBC AUTO: 53.8 FL (ref 37–54)
EGFRCR SERPLBLD CKD-EPI 2021: 49.5 ML/MIN/1.73
EOSINOPHIL # BLD AUTO: 0.16 10*3/MM3 (ref 0–0.4)
EOSINOPHIL NFR BLD AUTO: 2.3 % (ref 0.3–6.2)
ERYTHROCYTE [DISTWIDTH] IN BLOOD BY AUTOMATED COUNT: 15.3 % (ref 12.3–15.4)
GLOBULIN UR ELPH-MCNC: 2.5 GM/DL
GLUCOSE SERPL-MCNC: 102 MG/DL (ref 65–99)
HCT VFR BLD AUTO: 34.8 % (ref 34–46.6)
HGB BLD-MCNC: 11.1 G/DL (ref 12–15.9)
IMM GRANULOCYTES # BLD AUTO: 0.03 10*3/MM3 (ref 0–0.05)
IMM GRANULOCYTES NFR BLD AUTO: 0.4 % (ref 0–0.5)
LYMPHOCYTES # BLD AUTO: 2.21 10*3/MM3 (ref 0.7–3.1)
LYMPHOCYTES NFR BLD AUTO: 31.3 % (ref 19.6–45.3)
MCH RBC QN AUTO: 30.4 PG (ref 26.6–33)
MCHC RBC AUTO-ENTMCNC: 31.9 G/DL (ref 31.5–35.7)
MCV RBC AUTO: 95.3 FL (ref 79–97)
MONOCYTES # BLD AUTO: 0.58 10*3/MM3 (ref 0.1–0.9)
MONOCYTES NFR BLD AUTO: 8.2 % (ref 5–12)
NEUTROPHILS NFR BLD AUTO: 4.03 10*3/MM3 (ref 1.7–7)
NEUTROPHILS NFR BLD AUTO: 57.2 % (ref 42.7–76)
NRBC BLD AUTO-RTO: 0 /100 WBC (ref 0–0.2)
PLATELET # BLD AUTO: 235 10*3/MM3 (ref 140–450)
PMV BLD AUTO: 10.6 FL (ref 6–12)
POTASSIUM SERPL-SCNC: 4.5 MMOL/L (ref 3.5–5.2)
PROT SERPL-MCNC: 6.7 G/DL (ref 6–8.5)
RBC # BLD AUTO: 3.65 10*6/MM3 (ref 3.77–5.28)
SODIUM SERPL-SCNC: 140 MMOL/L (ref 136–145)
WBC NRBC COR # BLD AUTO: 7.05 10*3/MM3 (ref 3.4–10.8)

## 2023-12-05 PROCEDURE — 85025 COMPLETE CBC W/AUTO DIFF WBC: CPT | Performed by: INTERNAL MEDICINE

## 2023-12-05 PROCEDURE — 36415 COLL VENOUS BLD VENIPUNCTURE: CPT

## 2023-12-05 PROCEDURE — 80053 COMPREHEN METABOLIC PANEL: CPT | Performed by: INTERNAL MEDICINE

## 2023-12-05 PROCEDURE — 3075F SYST BP GE 130 - 139MM HG: CPT | Performed by: INTERNAL MEDICINE

## 2023-12-05 PROCEDURE — 3078F DIAST BP <80 MM HG: CPT | Performed by: INTERNAL MEDICINE

## 2023-12-05 PROCEDURE — 99213 OFFICE O/P EST LOW 20 MIN: CPT | Performed by: INTERNAL MEDICINE

## 2023-12-05 PROCEDURE — 1126F AMNT PAIN NOTED NONE PRSNT: CPT | Performed by: INTERNAL MEDICINE

## 2024-02-12 RX ORDER — HYOSCYAMINE SULFATE EXTENDED-RELEASE 0.38 MG/1
TABLET ORAL
Qty: 60 TABLET | Refills: 3 | Status: SHIPPED | OUTPATIENT
Start: 2024-02-12

## 2024-02-21 ENCOUNTER — HOSPITAL ENCOUNTER (OUTPATIENT)
Dept: CT IMAGING | Facility: HOSPITAL | Age: 84
Discharge: HOME | End: 2024-02-21
Admitting: INTERNAL MEDICINE
Payer: MEDICARE

## 2024-02-21 DIAGNOSIS — R91.8 LUNG NODULES: ICD-10-CM

## 2024-02-21 PROCEDURE — 71250 CT THORAX DX C-: CPT

## 2024-03-14 ENCOUNTER — TRANSCRIBE ORDERS (OUTPATIENT)
Dept: CT IMAGING | Facility: HOSPITAL | Age: 84
End: 2024-03-14
Payer: MEDICARE

## 2024-03-14 DIAGNOSIS — R91.1 LUNG NODULE: Primary | ICD-10-CM

## 2024-03-28 ENCOUNTER — TELEPHONE (OUTPATIENT)
Dept: ONCOLOGY | Facility: CLINIC | Age: 84
End: 2024-03-28
Payer: MEDICARE

## 2024-03-28 NOTE — TELEPHONE ENCOUNTER
Caller: Katarzyna Ku    Relationship to patient: Self    Best call back number: [phone]     Chief complaint: PT NEEDS TO R/S APPT    Type of visit: LAB AND FOLLOW UP    Requested date: ANY DAYS EXCEPT FOR A TUESDAY.     If rescheduling, when is the original appointment: 04/17/24

## 2024-04-08 DIAGNOSIS — K22.70 BARRETT'S ESOPHAGUS WITHOUT DYSPLASIA: ICD-10-CM

## 2024-04-08 RX ORDER — OMEPRAZOLE 40 MG/1
40 CAPSULE, DELAYED RELEASE ORAL
Qty: 180 CAPSULE | Refills: 3 | Status: SHIPPED | OUTPATIENT
Start: 2024-04-08

## 2024-04-22 NOTE — PROGRESS NOTES
Subjective     REASON FOR CONSULTATION: Breast cancer  Provide an opinion on any further workup or treatment                             REQUESTING PHYSICIAN: Dr. Schultz    RECORDS OBTAINED:  Records of the patients history including those obtained from the referring provider were reviewed and summarized in detail.    HISTORY OF PRESENT ILLNESS:  The patient is a 83 y.o. year old female who is here for an opinion about the above issue.    History of Present Illness   This is a 82-year-old lady with obesity, COPD/asthma on intermittent O2, hypertension, depression, Casper's esophagus, depression and anxiety.  She has a history of stage I triple negative left breast cancer in 2009 treated with a lumpectomy and radiation therapy completed January 2010.      The patient had an abnormal screening mammogram on the left 11-21 showing calcifications in the left breast and a new oval-shaped asymmetry measuring 15 mm suspicious for malignancy.  Diagnostic mammogram and ultrasound 11/24/2021 showed postlumpectomy changes in the lateral portion of the left breast and a new 1.6 x 1.1 cm lesion at the 3 to 4 o'clock position of the left breast 7 cm from the nipple.  She underwent an ultrasound-guided breast biopsy 12/15/2021 with 2 lesions biopsy including a small solid satellite nodule adjacent to the previously described larger mass.  Pathology from mass 1 showed invasive ductal carcinoma grade 3 (3+3+2) ER 31 to 40% positive moderately, GA negative, HER-2 2+/FISH negative, Ki-67 60% with no in situ component, no lymph vascular or perineural invasion; mass 2 invasive ductal carcinoma grade 2 (3+3+1) ER 1 to 10% weakly positive, GA negative, HER-2 2+/FISH +80% of cells, Ki-67 55% with no in situ component, no lymphovascular or perineural invasion.    The patient underwent a left mastectomy and sentinel lymph node biopsy on 2/23/2022 showing invasive ductal carcinoma grade 3 tumor measuring 3.0 cm in size, negative margins,  several foci of lymphovascular invasion, positive perineural invasion, associated high-grade ductal carcinoma in situ 40 mm negative margins for in situ disease, 5 negative axillary lymph nodes.    The patient's main complaint relates to her shortness of breath related to COPD.  She is on oxygen intermittently.  She relates her breathing has worsened significantly over the previous 4 to 5 years.    The patient returned today for follow-up.  Patient complains of dysphagia lower esophageal area and scheduled to see GI next month for evaluation.  She has stable shortness of breath related to COPD.  She denies changes to the left chest wall exam.    Past Medical History:   Diagnosis Date    Anxiety     Arthritis     Asthma     Casper esophagus     Breast cancer     Left 2021    Colon polyp     COPD (chronic obstructive pulmonary disease)     Dr Sharma follows, clearance in media 2/2022    Depression     Diarrhea     Diverticulitis of colon     Emphysema lung     Emphysema of lung     Flu 12/2021    GERD (gastroesophageal reflux disease)     Hypertension     Requires supplemental oxygen     3L/NC w/exertion    Skin cancer     Skin cancer     Sleep apnea     w/CPAP and oxygen at 3L        Past Surgical History:   Procedure Laterality Date    BILATERAL SALPINGO OOPHORECTOMY      BLADDER SURGERY      bladder lift    BREAST LUMPECTOMY Left     breast ca    CARPAL TUNNEL RELEASE      BOTH HANDS    CATARACT EXTRACTION W/ INTRAOCULAR LENS  IMPLANT, BILATERAL      COLONOSCOPY N/A 02/10/2017    Procedure: COLONOSCOPY with polypectomy;  Surgeon: Weston Sue MD;  Location:  [facility] OR;  Service:     COLONOSCOPY N/A 08/12/2020    Procedure: COLONOSCOPY;  Surgeon: Weston Sue MD;  Location:  [facility] OR;  Service: Gastroenterology;  Laterality: N/A;  TRANSVERSE COLON POLYP  DIVERTICULOSIS  CECAL TIME at 1356  SIGMOID COLON POLYP    ENDOSCOPY N/A 02/10/2017    Procedure: ESOPHAGOGASTRODUODENOSCOPY with  biopsies;  Surgeon: Weston Sue MD;  Location: [facility] OR;  Service:     ENDOSCOPY N/A 08/12/2020    Procedure: ESOPHAGOGASTRODUODENOSCOPY;  Surgeon: Weston Sue MD;  Location: [facility] OR;  Service: Gastroenterology;  Laterality: N/A;  DISTAL ESOPHAGUS BIOPSY  STOCKTON'S ESOPHAGUS    HYSTERECTOMY      MASTECTOMY      MASTECTOMY W/ SENTINEL NODE BIOPSY Left 02/23/2022    Procedure: BREAST MASTECTOMY WITH SENTINEL NODE BIOPSY;  Surgeon: Afshin Schultz MD;  Location: [facility] OR;  Service: General;  Laterality: Left;    SKIN BIOPSY      TOTAL KNEE ARTHROPLASTY Bilateral         Current Outpatient Medications on File Prior to Visit   Medication Sig Dispense Refill    acetaminophen (TYLENOL) 500 MG tablet Take 2 tablets by mouth Every 6 (Six) Hours As Needed for Mild Pain.      albuterol (PROVENTIL) (2.5 MG/3ML) 0.083% nebulizer solution Take 2.5 mg by nebulization 2 (Two) Times a Day. Can use 4 times a day if needed      albuterol sulfate  (90 Base) MCG/ACT inhaler Inhale 2 puffs Every 4 (Four) Hours As Needed for Wheezing or Shortness of Air.      ALPRAZolam (XANAX) 0.5 MG tablet Take 1 tablet by mouth Daily As Needed for Anxiety.      amLODIPine (NORVASC) 5 MG tablet Every Night.      benazepril (LOTENSIN) 40 MG tablet Take 1 tablet by mouth Daily.      benzonatate (TESSALON) 100 MG capsule       betamethasone dipropionate 0.05 % cream       Calcium Acetate-Magnesium Carb 450-200 MG tablet Take 1 tablet by mouth Daily.      chlorhexidine (PERIDEX) 0.12 % solution       Cholecalciferol (Vitamin D3) 20 MCG (800 UNIT) tablet Take 50 mcg by mouth Daily.      citalopram (CeleXA) 40 MG tablet Take 1 tablet by mouth Every Morning.      Cyanocobalamin (VITAMIN B-12) 5000 MCG tablet dispersible Take 1 tablet by mouth Daily.      estradiol (ESTRACE) 0.1 MG/GM vaginal cream       fluconazole (DIFLUCAN) 100 MG tablet Take 1 tablet by mouth As Needed.      Fluticasone-Umeclidin-Vilant (TRELEGY)  100-62.5-25 MCG/INH inhaler Inhale 1 puff Daily.      guaiFENesin (MUCINEX) 600 MG 12 hr tablet Take 1 tablet by mouth Every 12 (Twelve) Hours. (Patient taking differently: Take 1 tablet by mouth 2 (Two) Times a Day As Needed for Cough or Congestion.) 30 tablet 0    hydrALAZINE (APRESOLINE) 50 MG tablet Take 1 tablet by mouth 3 (Three) Times a Day.      hydroCHLOROthiazide (MICROZIDE) 12.5 MG capsule       hydrocortisone 2.5 % ointment Apply 1 application topically to the appropriate area as directed 2 (Two) Times a Day. 30 g 11    hyoscyamine (LEVBID) 0.375 MG 12 hr tablet TAKE ONE TABLET BY MOUTH EVERY 12 HOURS  AS NEEDED FOR CRAMPING 60 tablet 3    magnesium oxide (MAG-OX) 400 MG tablet Take 1 tablet by mouth Daily.      meloxicam (MOBIC) 15 MG tablet Take 1 tablet by mouth Daily.      Mirabegron ER (MYRBETRIQ) 50 MG tablet sustained-release 24 hour 24 hr tablet Take 50 mg by mouth every night at bedtime.      mometasone (ELOCON) 0.1 % cream Apply 0.1 application topically to the appropriate area as directed As Needed.      Multiple Vitamins-Minerals (MULTIVITAMIN ADULT PO) Take 1 tablet by mouth Daily.      O2 (OXYGEN) Inhale 3 L/min 1 (One) Time. Uses w/activity      omeprazole (priLOSEC) 40 MG capsule TAKE ONE CAPSULE BY MOUTH TWICE A DAY BEFORE MEALS 180 capsule 3    ondansetron (ZOFRAN) 4 MG tablet Take 1 tablet by mouth Every 6 (Six) Hours As Needed for Nausea or Vomiting. 20 tablet 0     No current facility-administered medications on file prior to visit.        ALLERGIES:    Allergies   Allergen Reactions    Azithromycin Nausea And Vomiting     SEVERE GI UPSET    Sulfa Antibiotics Itching    Codeine Rash        Social History     Socioeconomic History    Marital status:    Tobacco Use    Smoking status: Former     Current packs/day: 0.00     Average packs/day: 2.0 packs/day for 30.0 years (60.0 ttl pk-yrs)     Types: Cigarettes     Start date: 1/30/1960     Quit date: 1/30/1990     Years since  "quittin.2     Passive exposure: Never    Smokeless tobacco: Never   Vaping Use    Vaping status: Never Used   Substance and Sexual Activity    Alcohol use: No    Drug use: No    Sexual activity: Defer        Family History   Problem Relation Age of Onset    Hypertension Daughter     Breast cancer Daughter     Allergies Daughter     Malig Hyperthermia Neg Hx     Cancer Neg Hx         Review of Systems   Constitutional: Negative.    HENT:  Positive for trouble swallowing.    Respiratory:  Positive for shortness of breath.    Cardiovascular: Negative.    Gastrointestinal:  Positive for abdominal pain.   Genitourinary: Negative.    Musculoskeletal:  Positive for arthralgias and myalgias.   Skin:  Negative for rash and wound.   Allergic/Immunologic: Negative.    Neurological: Negative.    Hematological: Negative.    Psychiatric/Behavioral: Negative.            Objective     Vitals:    24 1356   BP: 133/67   Pulse: 62   Resp: 18   Temp: 98 °F (36.7 °C)   TempSrc: Infrared   SpO2: 94%   Weight: 99.8 kg (220 lb)   Height: 154.5 cm (60.83\")   PainSc: 0-No pain           2024     2:02 PM   Current Status   ECOG score 1       Physical Exam    CONSTITUTIONAL: pleasant well-developed obese elderly woman  HEENT: no icterus, no thrush, moist membranes  LYMPH: no cervical or supraclavicular lad  CV: RRR, S1S2, no murmur  Breast: Status post left mastectomy with significant scar tissue making exam difficult-unchanged 2024  RESP: cta bilat, faint wheezing, no rales, diminished breath sounds, mild increased work of breathing  GI: soft non-distended, non painful  MUSC: no edema, normal gait  NEURO: alert and oriented x3, mild global weakness  PSYCH: normal mood and affect  Exam otherwise unchanged 2024  RECENT LABS:  Hematology WBC   Date Value Ref Range Status   2024 8.08 3.40 - 10.80 10*3/mm3 Final     RBC   Date Value Ref Range Status   2024 3.77 3.77 - 5.28 10*6/mm3 Final     Hemoglobin   Date " Value Ref Range Status   04/30/2024 11.2 (L) 12.0 - 15.9 g/dL Final     Hematocrit   Date Value Ref Range Status   04/30/2024 35.7 34.0 - 46.6 % Final     Platelets   Date Value Ref Range Status   04/30/2024 242 140 - 450 10*3/mm3 Final        Lab Results   Component Value Date    GLUCOSE 136 (H) 04/30/2024    BUN 23 04/30/2024    CREATININE 0.98 04/30/2024    EGFRIFNONA 53 (L) 02/24/2022    BCR 23.5 04/30/2024    K 4.3 04/30/2024    CO2 24.4 04/30/2024    CALCIUM 9.1 04/30/2024    ALBUMIN 4.1 04/30/2024    AST 17 04/30/2024    ALT 13 04/30/2024         Mammo Screening Modified With Tomosynthesis Right With CAD 11/16/2023 - IMPRESSION:  Negative right unilateral annual screening mammogram.    DEXA Bone Density Axial 12/14/2023 - IMPRESSION:  1.  Osteopenia.  2.  Left femoral neck T score -1.6.    Assessment & Plan     *[id number] ER low (1-10%), UT negative, HER-2 FISH amplified, Ki-67 60% status post left mastectomy 2/23/2022.  The patient chose not to receive adjuvant chemotherapy given age and comorbidities    *History of stage I triple negative ductal cancer left breast status post lumpectomy and radiation therapy 2009    *Comorbidities include fairly advanced COPD requiring intermittent oxygen, hypertension, depression/anxiety    *Daughter with BRCA mutation    *Mild anemia-hemoglobin stable 11.2    *Dysphagia-scheduled to see GI    Oncology plan/recommendations:  Follow-up 6 months CBC CMP

## 2024-04-30 ENCOUNTER — OFFICE VISIT (OUTPATIENT)
Dept: ONCOLOGY | Facility: CLINIC | Age: 84
End: 2024-04-30
Payer: MEDICARE

## 2024-04-30 ENCOUNTER — LAB (OUTPATIENT)
Dept: LAB | Facility: HOSPITAL | Age: 84
End: 2024-04-30
Payer: MEDICARE

## 2024-04-30 VITALS
SYSTOLIC BLOOD PRESSURE: 133 MMHG | BODY MASS INDEX: 41.54 KG/M2 | WEIGHT: 220 LBS | HEART RATE: 62 BPM | OXYGEN SATURATION: 94 % | HEIGHT: 61 IN | RESPIRATION RATE: 18 BRPM | DIASTOLIC BLOOD PRESSURE: 67 MMHG | TEMPERATURE: 98 F

## 2024-04-30 DIAGNOSIS — C50.112 MALIGNANT NEOPLASM OF CENTRAL PORTION OF LEFT BREAST IN FEMALE, ESTROGEN RECEPTOR POSITIVE: Primary | ICD-10-CM

## 2024-04-30 DIAGNOSIS — D64.9 ANEMIA, UNSPECIFIED TYPE: ICD-10-CM

## 2024-04-30 DIAGNOSIS — Z17.0 MALIGNANT NEOPLASM OF CENTRAL PORTION OF LEFT BREAST IN FEMALE, ESTROGEN RECEPTOR POSITIVE: Primary | ICD-10-CM

## 2024-04-30 LAB
ALBUMIN SERPL-MCNC: 4.1 G/DL (ref 3.5–5.2)
ALBUMIN/GLOB SERPL: 1.6 G/DL
ALP SERPL-CCNC: 83 U/L (ref 39–117)
ALT SERPL W P-5'-P-CCNC: 13 U/L (ref 1–33)
ANION GAP SERPL CALCULATED.3IONS-SCNC: 11.6 MMOL/L (ref 5–15)
AST SERPL-CCNC: 17 U/L (ref 1–32)
BASOPHILS # BLD AUTO: 0.06 10*3/MM3 (ref 0–0.2)
BASOPHILS NFR BLD AUTO: 0.7 % (ref 0–1.5)
BILIRUB SERPL-MCNC: <0.2 MG/DL (ref 0–1.2)
BUN SERPL-MCNC: 23 MG/DL (ref 8–23)
BUN/CREAT SERPL: 23.5 (ref 7–25)
CALCIUM SPEC-SCNC: 9.1 MG/DL (ref 8.6–10.5)
CHLORIDE SERPL-SCNC: 103 MMOL/L (ref 98–107)
CO2 SERPL-SCNC: 24.4 MMOL/L (ref 22–29)
CREAT SERPL-MCNC: 0.98 MG/DL (ref 0.57–1)
DEPRECATED RDW RBC AUTO: 50.1 FL (ref 37–54)
EGFRCR SERPLBLD CKD-EPI 2021: 58.1 ML/MIN/1.73
EOSINOPHIL # BLD AUTO: 0.16 10*3/MM3 (ref 0–0.4)
EOSINOPHIL NFR BLD AUTO: 2 % (ref 0.3–6.2)
ERYTHROCYTE [DISTWIDTH] IN BLOOD BY AUTOMATED COUNT: 14.4 % (ref 12.3–15.4)
GLOBULIN UR ELPH-MCNC: 2.5 GM/DL
GLUCOSE SERPL-MCNC: 136 MG/DL (ref 65–99)
HCT VFR BLD AUTO: 35.7 % (ref 34–46.6)
HGB BLD-MCNC: 11.2 G/DL (ref 12–15.9)
IMM GRANULOCYTES # BLD AUTO: 0.01 10*3/MM3 (ref 0–0.05)
IMM GRANULOCYTES NFR BLD AUTO: 0.1 % (ref 0–0.5)
LYMPHOCYTES # BLD AUTO: 2.49 10*3/MM3 (ref 0.7–3.1)
LYMPHOCYTES NFR BLD AUTO: 30.8 % (ref 19.6–45.3)
MCH RBC QN AUTO: 29.7 PG (ref 26.6–33)
MCHC RBC AUTO-ENTMCNC: 31.4 G/DL (ref 31.5–35.7)
MCV RBC AUTO: 94.7 FL (ref 79–97)
MONOCYTES # BLD AUTO: 0.39 10*3/MM3 (ref 0.1–0.9)
MONOCYTES NFR BLD AUTO: 4.8 % (ref 5–12)
NEUTROPHILS NFR BLD AUTO: 4.97 10*3/MM3 (ref 1.7–7)
NEUTROPHILS NFR BLD AUTO: 61.6 % (ref 42.7–76)
PLATELET # BLD AUTO: 242 10*3/MM3 (ref 140–450)
PMV BLD AUTO: 10.3 FL (ref 6–12)
POTASSIUM SERPL-SCNC: 4.3 MMOL/L (ref 3.5–5.2)
PROT SERPL-MCNC: 6.6 G/DL (ref 6–8.5)
RBC # BLD AUTO: 3.77 10*6/MM3 (ref 3.77–5.28)
SODIUM SERPL-SCNC: 139 MMOL/L (ref 136–145)
WBC NRBC COR # BLD AUTO: 8.08 10*3/MM3 (ref 3.4–10.8)

## 2024-04-30 PROCEDURE — 3075F SYST BP GE 130 - 139MM HG: CPT | Performed by: INTERNAL MEDICINE

## 2024-04-30 PROCEDURE — 99214 OFFICE O/P EST MOD 30 MIN: CPT | Performed by: INTERNAL MEDICINE

## 2024-04-30 PROCEDURE — 80053 COMPREHEN METABOLIC PANEL: CPT | Performed by: INTERNAL MEDICINE

## 2024-04-30 PROCEDURE — 1126F AMNT PAIN NOTED NONE PRSNT: CPT | Performed by: INTERNAL MEDICINE

## 2024-04-30 PROCEDURE — 3078F DIAST BP <80 MM HG: CPT | Performed by: INTERNAL MEDICINE

## 2024-04-30 PROCEDURE — 85025 COMPLETE CBC W/AUTO DIFF WBC: CPT | Performed by: INTERNAL MEDICINE

## 2024-04-30 PROCEDURE — 36415 COLL VENOUS BLD VENIPUNCTURE: CPT

## 2024-04-30 RX ORDER — CHLORHEXIDINE GLUCONATE ORAL RINSE 1.2 MG/ML
SOLUTION DENTAL
COMMUNITY
Start: 2024-04-26

## 2024-05-30 ENCOUNTER — OFFICE VISIT (OUTPATIENT)
Dept: GASTROENTEROLOGY | Facility: CLINIC | Age: 84
End: 2024-05-30
Payer: MEDICARE

## 2024-05-30 VITALS
BODY MASS INDEX: 41.88 KG/M2 | WEIGHT: 221.8 LBS | SYSTOLIC BLOOD PRESSURE: 148 MMHG | DIASTOLIC BLOOD PRESSURE: 78 MMHG | HEIGHT: 61 IN

## 2024-05-30 DIAGNOSIS — R15.1 FECAL SMEARING: ICD-10-CM

## 2024-05-30 DIAGNOSIS — Z86.010 PERSONAL HISTORY OF COLONIC POLYPS: Primary | ICD-10-CM

## 2024-05-30 DIAGNOSIS — K22.70 BARRETT'S ESOPHAGUS WITHOUT DYSPLASIA: ICD-10-CM

## 2024-05-30 DIAGNOSIS — K58.0 IRRITABLE BOWEL SYNDROME WITH DIARRHEA: ICD-10-CM

## 2024-05-30 DIAGNOSIS — R13.19 ESOPHAGEAL DYSPHAGIA: ICD-10-CM

## 2024-05-30 NOTE — PROGRESS NOTES
"    PATIENT INFORMATION  Katarzyna Ku       - 1940    CHIEF COMPLAINT  Chief Complaint   Patient presents with    Casper's esophagus    Irritable Bowel Syndrome    Diarrhea       HISTORY OF PRESENT ILLNESS  LOV:  Reviewed her past Endoscopy and had polyps in  3 years after her last so recall both her SSBE and Colon in 2025     Complains of minor feacl leakage and is moving 1-3 times a day and not skipping days and is usually regular in the AM after Bfat. All BMs are in the Am and only rare instances fo abd cramps in the night. Does have some burning after the 3 BMs but uses HC cream and we reviewed using for several days to make sure the issue.      Reviewed KEGEl and will add daily Fiber.      TODAY: Mild esophageal dysphagia consitent with esophagel spasm / Presbyesophagus    Worse with breads and rice and if she tries to fix it with water it can come back up     Also reviewed her IBS-D and is doing well on her LEVBID - taken on schedule        REVIEWED PERTINENT RESULTS/ LABS  Lab Results   Component Value Date    CASEREPORT  2022     Surgical Pathology Report                         Case: [id number]                                  Authorizing Provider:  Afshin Schultz MD        Collected:           2022 11:29 AM          Ordering Location:     [facility]   Received:            2022 12:14 PM                                 OR                                                                           Pathologist:           Monserrat Clarke MD                                                          Specimens:   1) - Breast, Left, low axilla included                                                              2) - [place] Lymph Node, #1                                                               FINALDX  2022     1. Submitted as \"Left Breast and Low Axillary Contents, Mastectomy with [place] Lymph Node Biopsy\" (1,112 grams):    A. INVASIVE DUCTAL " "CARCINOMA, Poorly differentiated; Dorothy Histologic Grade III/III      (tubule score = 3, nuclear score = 3, mitoses score = 3):   1. Tumor size:  30 mm (gross measurement).   2. Margins are negative for invasive carcinoma; Closest distance: Invasive carcinoma is present       10 mm from the anterior margin.     3. Positive for several foci of lymphovascular space invasion.   4. Positive for perineural invasion.  B. 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 Solid and Cribriform types with associated necrosis.     2. Extent of DCIS:  40 mm (based on the slices involved).     3. Margins are negative for in situ carcinoma; Closest distance: DCIS is present >10 mm from the                     anterior margin.   C. Two clips retrieved from within invasive carcinoma.   D. Background fibrous tissue and dystrophic calcifications, suggesting prior treatment effect.    E. Four lymph nodes, negative for carcinoma (0/4).    F. Unremarkable skin and nipple.   G. See Synoptic Report (to include part 2).    2. \"[place] Lymph Node #1,\" Biopsy:   A. One lymph node, negative for carcinoma (0/1).     Jab/kds        Lab Results   Component Value Date    HGB 11.2 (L) 04/30/2024    MCV 94.7 04/30/2024     04/30/2024    ALT 13 04/30/2024    AST 17 04/30/2024    HGBA1C 5.90 (H) 02/17/2022    FERRITIN 87.00 11/15/2022    IRON 49 11/15/2022    TIBC 280 (L) 11/15/2022      No results found.    REVIEW OF SYSTEMS  Review of Systems   Constitutional:  Negative for activity change, chills, fever and unexpected weight change.   HENT:  Positive for trouble swallowing. Negative for congestion.    Eyes:  Negative for visual disturbance.   Respiratory:  Positive for cough, choking and shortness of breath.    Cardiovascular:  Negative for chest pain and palpitations.   Gastrointestinal:  Positive for abdominal pain. Negative for blood in stool.   Endocrine: Negative for cold intolerance and heat intolerance. "   Genitourinary:  Negative for hematuria.   Musculoskeletal:  Negative for gait problem.   Skin:  Negative for color change.   Allergic/Immunologic: Negative for immunocompromised state.   Neurological:  Negative for weakness and light-headedness.   Hematological:  Negative for adenopathy.   Psychiatric/Behavioral:  Negative for sleep disturbance. The patient is not nervous/anxious.          ACTIVE PROBLEMS  Patient Active Problem List    Diagnosis     Fecal smearing [R15.1]     Anemia [D64.9]     Malignant neoplasm of central portion of left breast in female, estrogen receptor positive [C50.112, Z17.0]     Influenza A [J10.1]     Influenza A (H1N1) [J10.1]     Irritable bowel syndrome with diarrhea [K58.0]     Encounter for screening for malignant neoplasm of colon [Z12.11]     Personal history of colonic polyps [Z86.010]     Casper's esophagus without dysplasia [K22.70]     Abdominal pain, right upper quadrant [R10.11]     Osteoarthritis of knee [M17.9]     Chronic obstructive pulmonary disease [J44.9]     Hypertension [I10]     Primary localized osteoarthrosis [M19.91]     Midline cystocele [N81.11]     Mixed incontinence [N39.46]     Muscular atrophy [M62.50]     Atrophic vaginitis [N95.2]     Prolapse of vaginal vault after hysterectomy [N99.3]     Enterocele [K46.9]          PAST MEDICAL HISTORY  Past Medical History:   Diagnosis Date    Anxiety     Arthritis     Asthma     Casper esophagus     Breast cancer     Left 2021    Colon polyp     COPD (chronic obstructive pulmonary disease)     Dr Sharma follows, clearance in media 2/2022    Depression     Diarrhea     Diverticulitis of colon     Emphysema lung     Emphysema of lung     Flu 12/2021    GERD (gastroesophageal reflux disease)     Hypertension     Requires supplemental oxygen     3L/NC w/exertion    Skin cancer     Skin cancer     Sleep apnea     w/CPAP and oxygen at 3L         SURGICAL HISTORY  Past Surgical History:   Procedure Laterality Date     BILATERAL SALPINGO OOPHORECTOMY      BLADDER SURGERY      bladder lift    BREAST LUMPECTOMY Left     breast ca    CARPAL TUNNEL RELEASE      BOTH HANDS    CATARACT EXTRACTION W/ INTRAOCULAR LENS  IMPLANT, BILATERAL      COLONOSCOPY N/A 02/10/2017    Procedure: COLONOSCOPY with polypectomy;  Surgeon: Weston Sue MD;  Location: [facility] OR;  Service:     COLONOSCOPY N/A 2020    Procedure: COLONOSCOPY;  Surgeon: Weston Sue MD;  Location: [facility] OR;  Service: Gastroenterology;  Laterality: N/A;  TRANSVERSE COLON POLYP  DIVERTICULOSIS  CECAL TIME at 1356  SIGMOID COLON POLYP    ENDOSCOPY N/A 02/10/2017    Procedure: ESOPHAGOGASTRODUODENOSCOPY with biopsies;  Surgeon: Weston Sue MD;  Location: [facility] OR;  Service:     ENDOSCOPY N/A 2020    Procedure: ESOPHAGOGASTRODUODENOSCOPY;  Surgeon: Weston Sue MD;  Location: [facility] OR;  Service: Gastroenterology;  Laterality: N/A;  DISTAL ESOPHAGUS BIOPSY  STOCKTON'S ESOPHAGUS    HYSTERECTOMY      MASTECTOMY      MASTECTOMY W/ SENTINEL NODE BIOPSY Left 2022    Procedure: BREAST MASTECTOMY WITH SENTINEL NODE BIOPSY;  Surgeon: Afshin Schultz MD;  Location: [facility];  Service: General;  Laterality: Left;    SKIN BIOPSY      TOTAL KNEE ARTHROPLASTY Bilateral          FAMILY HISTORY  Family History   Problem Relation Age of Onset    Hypertension Daughter     Breast cancer Daughter     Allergies Daughter     Malig Hyperthermia Neg Hx     Cancer Neg Hx          SOCIAL HISTORY  Social History     Occupational History    Not on file   Tobacco Use    Smoking status: Former     Current packs/day: 0.00     Average packs/day: 2.0 packs/day for 30.0 years (60.0 ttl pk-yrs)     Types: Cigarettes     Start date: 1960     Quit date: 1990     Years since quittin.3     Passive exposure: Never    Smokeless tobacco: Never   Vaping Use    Vaping status: Never Used   Substance and Sexual Activity    Alcohol  use: No    Drug use: No    Sexual activity: Defer         CURRENT MEDICATIONS    Current Outpatient Medications:     acetaminophen (TYLENOL) 500 MG tablet, Take 2 tablets by mouth Every 6 (Six) Hours As Needed for Mild Pain., Disp: , Rfl:     albuterol (PROVENTIL) (2.5 MG/3ML) 0.083% nebulizer solution, Take 2.5 mg by nebulization 2 (Two) Times a Day. Can use 4 times a day if needed, Disp: , Rfl:     albuterol sulfate  (90 Base) MCG/ACT inhaler, Inhale 2 puffs Every 4 (Four) Hours As Needed for Wheezing or Shortness of Air., Disp: , Rfl:     ALPRAZolam (XANAX) 0.5 MG tablet, Take 1 tablet by mouth Daily As Needed for Anxiety., Disp: , Rfl:     amLODIPine (NORVASC) 5 MG tablet, Every Night., Disp: , Rfl:     benazepril (LOTENSIN) 40 MG tablet, Take 1 tablet by mouth Daily., Disp: , Rfl:     benzonatate (TESSALON) 100 MG capsule, , Disp: , Rfl:     betamethasone dipropionate 0.05 % cream, , Disp: , Rfl:     Calcium Acetate-Magnesium Carb 450-200 MG tablet, Take 1 tablet by mouth Daily., Disp: , Rfl:     Cholecalciferol (Vitamin D3) 20 MCG (800 UNIT) tablet, Take 50 mcg by mouth Daily., Disp: , Rfl:     citalopram (CeleXA) 40 MG tablet, Take 1 tablet by mouth Every Morning., Disp: , Rfl:     Cyanocobalamin (VITAMIN B-12) 5000 MCG tablet dispersible, Take 1 tablet by mouth Daily., Disp: , Rfl:     estradiol (ESTRACE) 0.1 MG/GM vaginal cream, , Disp: , Rfl:     fluconazole (DIFLUCAN) 100 MG tablet, Take 1 tablet by mouth As Needed., Disp: , Rfl:     Fluticasone-Umeclidin-Vilant (TRELEGY) 100-62.5-25 MCG/INH inhaler, Inhale 1 puff Daily., Disp: , Rfl:     guaiFENesin (MUCINEX) 600 MG 12 hr tablet, Take 1 tablet by mouth Every 12 (Twelve) Hours. (Patient taking differently: Take 1 tablet by mouth 2 (Two) Times a Day As Needed for Cough or Congestion.), Disp: 30 tablet, Rfl: 0    hydrALAZINE (APRESOLINE) 50 MG tablet, Take 1 tablet by mouth 3 (Three) Times a Day., Disp: , Rfl:     hydroCHLOROthiazide (MICROZIDE) 12.5  "MG capsule, , Disp: , Rfl:     hydrocortisone 2.5 % ointment, Apply 1 application topically to the appropriate area as directed 2 (Two) Times a Day., Disp: 30 g, Rfl: 11    hyoscyamine (LEVBID) 0.375 MG 12 hr tablet, TAKE ONE TABLET BY MOUTH EVERY 12 HOURS  AS NEEDED FOR CRAMPING, Disp: 60 tablet, Rfl: 3    magnesium oxide (MAG-OX) 400 MG tablet, Take 1 tablet by mouth Daily., Disp: , Rfl:     meloxicam (MOBIC) 15 MG tablet, Take 1 tablet by mouth Daily., Disp: , Rfl:     Mirabegron ER (MYRBETRIQ) 50 MG tablet sustained-release 24 hour 24 hr tablet, Take 50 mg by mouth every night at bedtime., Disp: , Rfl:     mometasone (ELOCON) 0.1 % cream, Apply 0.1 application topically to the appropriate area as directed As Needed., Disp: , Rfl:     Multiple Vitamins-Minerals (MULTIVITAMIN ADULT PO), Take 1 tablet by mouth Daily., Disp: , Rfl:     O2 (OXYGEN), Inhale 3 L/min 1 (One) Time. Uses w/activity, Disp: , Rfl:     omeprazole (priLOSEC) 40 MG capsule, TAKE ONE CAPSULE BY MOUTH TWICE A DAY BEFORE MEALS, Disp: 180 capsule, Rfl: 3    ALLERGIES  Azithromycin, Sulfa antibiotics, and Codeine    VITALS  Vitals:    05/30/24 1000   BP: 148/78   BP Location: Left arm   Patient Position: Sitting   Cuff Size: Large Adult   Weight: 101 kg (221 lb 12.8 oz)   Height: 154.5 cm (60.83\")       PHYSICAL EXAM  Debilities/Disabilities Identified: None  Emotional Behavior: Appropriate  Wt Readings from Last 3 Encounters:   05/30/24 101 kg (221 lb 12.8 oz)   04/30/24 99.8 kg (220 lb)   12/05/23 104 kg (228 lb 3.2 oz)     Ht Readings from Last 1 Encounters:   05/30/24 154.5 cm (60.83\")     Body mass index is 42.14 kg/m².  Physical Exam  Constitutional:       Appearance: She is well-developed. She is not diaphoretic.   Eyes:      General: No scleral icterus.     Conjunctiva/sclera: Conjunctivae normal.      Pupils: Pupils are equal, round, and reactive to light.   Neck:      Thyroid: No thyromegaly.   Cardiovascular:      Rate and Rhythm: Normal " rate and regular rhythm.      Heart sounds: Normal heart sounds. No murmur heard.     No gallop.   Pulmonary:      Effort: Pulmonary effort is normal.      Breath sounds: Normal breath sounds. No wheezing or rales.   Abdominal:      General: Bowel sounds are normal. There is no distension or abdominal bruit.      Palpations: Abdomen is soft. There is no shifting dullness, fluid wave or mass.      Tenderness: There is no abdominal tenderness. There is no guarding. Negative signs include Madrigal's sign.      Hernia: There is no hernia in the ventral area.   Musculoskeletal:         General: Normal range of motion.      Cervical back: Normal range of motion and neck supple.   Lymphadenopathy:      Cervical: No cervical adenopathy.   Skin:     General: Skin is warm and dry.      Findings: No erythema or rash.   Neurological:      Mental Status: She is alert and oriented to person, place, and time.         CLINICAL DATA REVIEWED   reviewed previous lab results and integrated with today's visit, reviewed notes from other physicians and/or last GI encounter, reviewed previous endoscopy results and available photos, reviewed surgical pathology results from previous biopsies    ASSESSMENT  Diagnoses and all orders for this visit:    Personal history of colonic polyps    Casper's esophagus without dysplasia    Irritable bowel syndrome with diarrhea    Fecal smearing    Esophageal dysphagia  -     FL ESOPHAGRAM DOUBLE CONTRAST; Future          PLAN  Return in about 6 months (around 11/30/2024).    I have discussed the above plan with the patient.  They verbalize understanding and are in agreement with the plan.  They have been advised to contact the office for any questions, concerns, or changes related to their health.

## 2024-06-26 ENCOUNTER — HOSPITAL ENCOUNTER (OUTPATIENT)
Dept: GENERAL RADIOLOGY | Facility: HOSPITAL | Age: 84
Discharge: HOME | End: 2024-06-26
Admitting: INTERNAL MEDICINE
Payer: MEDICARE

## 2024-06-26 DIAGNOSIS — R13.19 ESOPHAGEAL DYSPHAGIA: ICD-10-CM

## 2024-06-26 PROCEDURE — A9270 NON-COVERED ITEM OR SERVICE: HCPCS | Performed by: INTERNAL MEDICINE

## 2024-06-26 PROCEDURE — 63710000001 SOD BICARB-CITRIC ACID-SIMETHICONE 2.21-1.53-0.04 G PACK: Performed by: INTERNAL MEDICINE

## 2024-06-26 PROCEDURE — 63710000001 BARIUM SULFATE 98 % RECONSTITUTED SUSPENSION: Performed by: INTERNAL MEDICINE

## 2024-06-26 PROCEDURE — 63710000001 BARIUM SULFATE 96 % RECONSTITUTED SUSPENSION: Performed by: INTERNAL MEDICINE

## 2024-06-26 PROCEDURE — 74221 X-RAY XM ESOPHAGUS 2CNTRST: CPT

## 2024-06-26 PROCEDURE — 63710000001 BARIUM SULFATE 700 MG TABLET: Performed by: INTERNAL MEDICINE

## 2024-06-26 RX ADMIN — BARIUM SULFATE 340 ML: 980 POWDER, FOR SUSPENSION ORAL at 08:18

## 2024-06-26 RX ADMIN — BARIUM SULFATE 700 MG: 700 TABLET ORAL at 08:17

## 2024-06-26 RX ADMIN — ANTACID/ANTIFLATULENT 1 PACKET: 380; 550; 10; 10 GRANULE, EFFERVESCENT ORAL at 08:18

## 2024-06-26 RX ADMIN — BARIUM SULFATE 183 ML: 960 POWDER, FOR SUSPENSION ORAL at 08:17

## 2024-08-02 RX ORDER — HYOSCYAMINE SULFATE 0.38 MG/1
0.38 TABLET, EXTENDED RELEASE ORAL EVERY 12 HOURS PRN
Qty: 180 TABLET | Refills: 3 | Status: SHIPPED | OUTPATIENT
Start: 2024-08-02

## 2024-08-06 ENCOUNTER — TRANSCRIBE ORDERS (OUTPATIENT)
Dept: ULTRASOUND IMAGING | Facility: HOSPITAL | Age: 84
End: 2024-08-06
Payer: MEDICARE

## 2024-08-06 DIAGNOSIS — R29.2: Primary | ICD-10-CM

## 2024-08-15 ENCOUNTER — HOSPITAL ENCOUNTER (OUTPATIENT)
Dept: ULTRASOUND IMAGING | Facility: HOSPITAL | Age: 84
Discharge: HOME | End: 2024-08-15
Admitting: FAMILY MEDICINE
Payer: MEDICARE

## 2024-08-15 DIAGNOSIS — R29.2: ICD-10-CM

## 2024-08-15 PROCEDURE — 93923 UPR/LXTR ART STDY 3+ LVLS: CPT

## 2024-09-12 ENCOUNTER — OFFICE VISIT (OUTPATIENT)
Dept: GASTROENTEROLOGY | Facility: CLINIC | Age: 84
End: 2024-09-12
Payer: MEDICARE

## 2024-09-12 VITALS
BODY MASS INDEX: 41.72 KG/M2 | HEIGHT: 61 IN | WEIGHT: 221 LBS | SYSTOLIC BLOOD PRESSURE: 112 MMHG | DIASTOLIC BLOOD PRESSURE: 60 MMHG

## 2024-09-12 DIAGNOSIS — Z86.010 PERSONAL HISTORY OF COLONIC POLYPS: ICD-10-CM

## 2024-09-12 DIAGNOSIS — K58.0 IRRITABLE BOWEL SYNDROME WITH DIARRHEA: ICD-10-CM

## 2024-09-12 DIAGNOSIS — K22.70 BARRETT'S ESOPHAGUS WITHOUT DYSPLASIA: Primary | ICD-10-CM

## 2024-09-12 PROCEDURE — 3074F SYST BP LT 130 MM HG: CPT | Performed by: INTERNAL MEDICINE

## 2024-09-12 PROCEDURE — 99213 OFFICE O/P EST LOW 20 MIN: CPT | Performed by: INTERNAL MEDICINE

## 2024-09-12 PROCEDURE — 1160F RVW MEDS BY RX/DR IN RCRD: CPT | Performed by: INTERNAL MEDICINE

## 2024-09-12 PROCEDURE — 3078F DIAST BP <80 MM HG: CPT | Performed by: INTERNAL MEDICINE

## 2024-09-12 PROCEDURE — 1159F MED LIST DOCD IN RCRD: CPT | Performed by: INTERNAL MEDICINE

## 2024-09-12 RX ORDER — PREGABALIN 75 MG/1
75 CAPSULE ORAL DAILY
COMMUNITY
Start: 2024-08-26

## 2024-09-12 RX ORDER — CHLORHEXIDINE GLUCONATE ORAL RINSE 1.2 MG/ML
15 SOLUTION DENTAL DAILY
COMMUNITY
Start: 2024-08-30

## 2024-09-12 RX ORDER — HYDROCODONE BITARTRATE AND ACETAMINOPHEN 7.5; 325 MG/1; MG/1
1 TABLET ORAL EVERY 6 HOURS PRN
COMMUNITY
Start: 2024-08-19

## 2024-09-12 RX ORDER — HYDROCORTISONE 25 MG/G
OINTMENT TOPICAL SEE ADMIN INSTRUCTIONS
Qty: 28.35 G | Refills: 11 | Status: SHIPPED | OUTPATIENT
Start: 2024-09-12

## 2024-09-12 NOTE — PROGRESS NOTES
"    PATIENT INFORMATION  Katarzyna Ku       - 1940    CHIEF COMPLAINT  Chief Complaint   Patient presents with    Casper's esophagus    Difficulty Swallowing    Irritable Bowel Syndrome    Diarrhea    Fecal smearing       HISTORY OF PRESENT ILLNESS  Again reviewed her Esophagram and occasionally will feel her food hang up- rice and breads are still the worst    Recent Shingles and has left her with shoulder pain and fels her weakness is from that.        REVIEWED PERTINENT RESULTS/ LABS  Lab Results   Component Value Date    CASEREPORT  2022     Surgical Pathology Report                         Case: [id number]                                  Authorizing Provider:  Afshin Schultz MD        Collected:           2022 11:29 AM          Ordering Location:     [facility]   Received:            2022 12:14 PM                                 OR                                                                           Pathologist:           Monserrat Clarke MD                                                          Specimens:   1) - Breast, Left, low axilla included                                                              2) - [place] Lymph Node, #1                                                               FINALDX  2022     1. Submitted as \"Left Breast and Low Axillary Contents, Mastectomy with [place] Lymph Node Biopsy\" (1,112 grams):    A. INVASIVE DUCTAL CARCINOMA, Poorly differentiated; Dorothy Histologic Grade III/III      (tubule score = 3, nuclear score = 3, mitoses score = 3):   1. Tumor size:  30 mm (gross measurement).   2. Margins are negative for invasive carcinoma; Closest distance: Invasive carcinoma is present       10 mm from the anterior margin.     3. Positive for several foci of lymphovascular space invasion.   4. Positive for perineural invasion.  B. 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 Solid and Cribriform types " "with associated necrosis.     2. Extent of DCIS:  40 mm (based on the slices involved).     3. Margins are negative for in situ carcinoma; Closest distance: DCIS is present >10 mm from the                     anterior margin.   C. Two clips retrieved from within invasive carcinoma.   D. Background fibrous tissue and dystrophic calcifications, suggesting prior treatment effect.    E. Four lymph nodes, negative for carcinoma (0/4).    F. Unremarkable skin and nipple.   G. See Synoptic Report (to include part 2).    2. \"[place] Lymph Node #1,\" Biopsy:   A. One lymph node, negative for carcinoma (0/1).     Jab/kds        Lab Results   Component Value Date    HGB 11.2 (L) 04/30/2024    MCV 94.7 04/30/2024     04/30/2024    ALT 13 04/30/2024    AST 17 04/30/2024    HGBA1C 5.90 (H) 02/17/2022    FERRITIN 87.00 11/15/2022    IRON 49 11/15/2022    TIBC 280 (L) 11/15/2022      US Arterial Doppler Upper Extremity Bilateral    Result Date: 8/15/2024  Narrative: US ARTERIAL DOPPLER UPPER EXTREMITY  BILATERAL Date of Exam: 8/15/2024 10:32 AM EDT Indication: UNEQUAL RADIAL REFLEXES. Comparison: No comparisons available. Technique: Routine gray scale, color flow and spectral Doppler analysis of bilateral upper extremities.  The arterial study was performed with image documentation obtained per protocol.  Findings: Wrist brachial indices were obtained in both the right and left upper extremities. The right wrist brachial index is 1.25 and the left wrist brachial index is 1.13. The waveforms are triphasic in the radial arteries bilaterally. On the left the ulnar waveform is monophasic although the ulnar pressure is normal. Pulse volume recordings are symmetric in the digits with no evidence of decreased distal perfusion.     Impression: Impression: No evidence of significant arterial occlusive disease in the upper extremities Electronically Signed: Randy Goncalves MD  8/15/2024 10:44 AM EDT  Workstation ID: [id number]     REVIEW " OF SYSTEMS  Review of Systems   Constitutional:  Negative for activity change, chills, fever and unexpected weight change.   HENT:  Positive for trouble swallowing. Negative for congestion.    Eyes:  Negative for visual disturbance.   Respiratory:  Negative for shortness of breath.    Cardiovascular:  Negative for chest pain and palpitations.   Gastrointestinal:  Positive for abdominal distention, abdominal pain, constipation and diarrhea. Negative for blood in stool.   Endocrine: Negative for cold intolerance and heat intolerance.   Genitourinary:  Negative for hematuria.   Musculoskeletal:  Negative for gait problem.   Skin:  Negative for color change.   Allergic/Immunologic: Negative for immunocompromised state.   Neurological:  Negative for weakness and light-headedness.   Hematological:  Negative for adenopathy.   Psychiatric/Behavioral:  Negative for sleep disturbance. The patient is not nervous/anxious.          ACTIVE PROBLEMS  Patient Active Problem List    Diagnosis     Fecal smearing [R15.1]     Anemia [D64.9]     Malignant neoplasm of central portion of left breast in female, estrogen receptor positive [C50.112, Z17.0]     Influenza A [J10.1]     Influenza A (H1N1) [J10.1]     Irritable bowel syndrome with diarrhea [K58.0]     Encounter for screening for malignant neoplasm of colon [Z12.11]     Personal history of colonic polyps [Z86.010]     Casper's esophagus without dysplasia [K22.70]     Abdominal pain, right upper quadrant [R10.11]     Osteoarthritis of knee [M17.9]     Chronic obstructive pulmonary disease [J44.9]     Hypertension [I10]     Primary localized osteoarthrosis [M19.91]     Midline cystocele [N81.11]     Mixed incontinence [N39.46]     Muscular atrophy [M62.50]     Atrophic vaginitis [N95.2]     Prolapse of vaginal vault after hysterectomy [N99.3]     Enterocele [K46.9]          PAST MEDICAL HISTORY  Past Medical History:   Diagnosis Date    Anxiety     Arthritis     Asthma     Casper  esophagus     Breast cancer     Left 2021    Colon polyp     COPD (chronic obstructive pulmonary disease)     Dr Sharma follows, clearance in media 2/2022    Depression     Diarrhea     Diverticulitis of colon     Emphysema lung     Emphysema of lung     Flu 12/2021    GERD (gastroesophageal reflux disease)     Hypertension     Requires supplemental oxygen     3L/NC w/exertion    Skin cancer     Skin cancer     Sleep apnea     w/CPAP and oxygen at 3L         SURGICAL HISTORY  Past Surgical History:   Procedure Laterality Date    BILATERAL SALPINGO OOPHORECTOMY      BLADDER SURGERY      bladder lift    BREAST LUMPECTOMY Left     breast ca    CARPAL TUNNEL RELEASE      BOTH HANDS    CATARACT EXTRACTION W/ INTRAOCULAR LENS  IMPLANT, BILATERAL      COLONOSCOPY N/A 02/10/2017    Procedure: COLONOSCOPY with polypectomy;  Surgeon: Weston Sue MD;  Location: [facility];  Service:     COLONOSCOPY N/A 08/12/2020    Procedure: COLONOSCOPY;  Surgeon: Weston Sue MD;  Location: [facility];  Service: Gastroenterology;  Laterality: N/A;  TRANSVERSE COLON POLYP  DIVERTICULOSIS  CECAL TIME at 1356  SIGMOID COLON POLYP    ENDOSCOPY N/A 02/10/2017    Procedure: ESOPHAGOGASTRODUODENOSCOPY with biopsies;  Surgeon: Weston Sue MD;  Location: [facility];  Service:     ENDOSCOPY N/A 08/12/2020    Procedure: ESOPHAGOGASTRODUODENOSCOPY;  Surgeon: Weston Sue MD;  Location: [facility];  Service: Gastroenterology;  Laterality: N/A;  DISTAL ESOPHAGUS BIOPSY  STOCKTON'S ESOPHAGUS    HYSTERECTOMY      MASTECTOMY      MASTECTOMY W/ SENTINEL NODE BIOPSY Left 02/23/2022    Procedure: BREAST MASTECTOMY WITH SENTINEL NODE BIOPSY;  Surgeon: Afshin Schultz MD;  Location: [facility];  Service: General;  Laterality: Left;    SKIN BIOPSY      TOTAL KNEE ARTHROPLASTY Bilateral          FAMILY HISTORY  Family History   Problem Relation Age of Onset    Hypertension Daughter     Breast cancer Daughter      Allergies Daughter     Malig Hyperthermia Neg Hx     Cancer Neg Hx          SOCIAL HISTORY  Social History     Occupational History    Not on file   Tobacco Use    Smoking status: Former     Current packs/day: 0.00     Average packs/day: 2.0 packs/day for 30.0 years (60.0 ttl pk-yrs)     Types: Cigarettes     Start date: 1960     Quit date: 1990     Years since quittin.6     Passive exposure: Never    Smokeless tobacco: Never   Vaping Use    Vaping status: Never Used   Substance and Sexual Activity    Alcohol use: No    Drug use: No    Sexual activity: Defer         CURRENT MEDICATIONS    Current Outpatient Medications:     acetaminophen (TYLENOL) 500 MG tablet, Take 2 tablets by mouth Every 6 (Six) Hours As Needed for Mild Pain., Disp: , Rfl:     albuterol (PROVENTIL) (2.5 MG/3ML) 0.083% nebulizer solution, Take 2.5 mg by nebulization 2 (Two) Times a Day. Can use 4 times a day if needed, Disp: , Rfl:     albuterol sulfate  (90 Base) MCG/ACT inhaler, Inhale 2 puffs Every 4 (Four) Hours As Needed for Wheezing or Shortness of Air., Disp: , Rfl:     ALPRAZolam (XANAX) 0.5 MG tablet, Take 1 tablet by mouth Daily As Needed for Anxiety., Disp: , Rfl:     amLODIPine (NORVASC) 5 MG tablet, Every Night., Disp: , Rfl:     benazepril (LOTENSIN) 40 MG tablet, Take 1 tablet by mouth Daily., Disp: , Rfl:     benzonatate (TESSALON) 100 MG capsule, , Disp: , Rfl:     betamethasone dipropionate 0.05 % cream, , Disp: , Rfl:     Calcium Acetate-Magnesium Carb 450-200 MG tablet, Take 1 tablet by mouth Daily., Disp: , Rfl:     chlorhexidine (PERIDEX) 0.12 % solution, Apply 15 mL to the mouth or throat Daily., Disp: , Rfl:     Cholecalciferol (Vitamin D3) 20 MCG (800 UNIT) tablet, Take 50 mcg by mouth Daily., Disp: , Rfl:     citalopram (CeleXA) 40 MG tablet, Take 1 tablet by mouth Every Morning., Disp: , Rfl:     Cyanocobalamin (VITAMIN B-12) 5000 MCG tablet dispersible, Take 1 tablet by mouth Daily., Disp: ,  Rfl:     estradiol (ESTRACE) 0.1 MG/GM vaginal cream, , Disp: , Rfl:     fluconazole (DIFLUCAN) 100 MG tablet, Take 1 tablet by mouth As Needed., Disp: , Rfl:     Fluticasone-Umeclidin-Vilant (TRELEGY) 100-62.5-25 MCG/INH inhaler, Inhale 1 puff Daily., Disp: , Rfl:     guaiFENesin (MUCINEX) 600 MG 12 hr tablet, Take 1 tablet by mouth Every 12 (Twelve) Hours. (Patient taking differently: Take 1 tablet by mouth 2 (Two) Times a Day As Needed for Cough or Congestion.), Disp: 30 tablet, Rfl: 0    hydrALAZINE (APRESOLINE) 50 MG tablet, Take 1 tablet by mouth 3 (Three) Times a Day., Disp: , Rfl:     hydroCHLOROthiazide (MICROZIDE) 12.5 MG capsule, , Disp: , Rfl:     HYDROcodone-acetaminophen (NORCO) 7.5-325 MG per tablet, Take 1 tablet by mouth Every 6 (Six) Hours As Needed., Disp: , Rfl:     hydrocortisone 2.5 % ointment, Apply  topically to the appropriate area as directed See Admin Instructions. Apply topically to the affected areas twice daily as directed, Disp: 28.35 g, Rfl: 11    hyoscyamine (LEVBID) 0.375 MG 12 hr tablet, Take 1 tablet by mouth Every 12 (Twelve) Hours As Needed for Cramping., Disp: 180 tablet, Rfl: 3    magnesium oxide (MAG-OX) 400 MG tablet, Take 1 tablet by mouth Daily., Disp: , Rfl:     meloxicam (MOBIC) 15 MG tablet, Take 1 tablet by mouth Daily., Disp: , Rfl:     Mirabegron ER (MYRBETRIQ) 50 MG tablet sustained-release 24 hour 24 hr tablet, Take 50 mg by mouth every night at bedtime., Disp: , Rfl:     mometasone (ELOCON) 0.1 % cream, Apply 0.1 application topically to the appropriate area as directed As Needed., Disp: , Rfl:     Multiple Vitamins-Minerals (MULTIVITAMIN ADULT PO), Take 1 tablet by mouth Daily., Disp: , Rfl:     O2 (OXYGEN), Inhale 3 L/min 1 (One) Time. Uses w/activity, Disp: , Rfl:     omeprazole (priLOSEC) 40 MG capsule, TAKE ONE CAPSULE BY MOUTH TWICE A DAY BEFORE MEALS, Disp: 180 capsule, Rfl: 3    pregabalin (LYRICA) 75 MG capsule, Take 1 capsule by mouth Daily., Disp: ,  "Rfl:     ALLERGIES  Azithromycin, Sulfa antibiotics, and Codeine    VITALS  Vitals:    09/12/24 1042   BP: 112/60   BP Location: Left arm   Patient Position: Sitting   Cuff Size: Large Adult   Weight: 100 kg (221 lb)   Height: 154.5 cm (60.83\")       PHYSICAL EXAM  Debilities/Disabilities Identified: None  Emotional Behavior: Appropriate  Wt Readings from Last 3 Encounters:   09/12/24 100 kg (221 lb)   05/30/24 101 kg (221 lb 12.8 oz)   04/30/24 99.8 kg (220 lb)     Ht Readings from Last 1 Encounters:   09/12/24 154.5 cm (60.83\")     Body mass index is 41.99 kg/m².  Physical Exam  Constitutional:       Appearance: She is well-developed. She is not diaphoretic.   HENT:      Head: Normocephalic and atraumatic.   Eyes:      General: No scleral icterus.     Conjunctiva/sclera: Conjunctivae normal.      Pupils: Pupils are equal, round, and reactive to light.   Neck:      Thyroid: No thyromegaly.   Cardiovascular:      Rate and Rhythm: Normal rate and regular rhythm.      Heart sounds: Normal heart sounds. No murmur heard.     No gallop.   Pulmonary:      Effort: Pulmonary effort is normal.      Breath sounds: Normal breath sounds. No wheezing or rales.   Abdominal:      General: Bowel sounds are normal. There is no distension or abdominal bruit.      Palpations: Abdomen is soft. There is no shifting dullness, fluid wave or mass.      Tenderness: There is no abdominal tenderness. There is no guarding. Negative signs include Madrigal's sign.      Hernia: There is no hernia in the ventral area.   Musculoskeletal:         General: Normal range of motion.      Cervical back: Normal range of motion and neck supple.   Lymphadenopathy:      Cervical: No cervical adenopathy.   Skin:     General: Skin is warm and dry.      Findings: No erythema or rash.   Neurological:      Mental Status: She is alert and oriented to person, place, and time.   Psychiatric:         Mood and Affect: Mood normal.         Behavior: Behavior normal. "         CLINICAL DATA REVIEWED   reviewed previous lab results and integrated with today's visit, reviewed notes from other physicians and/or last GI encounter, reviewed previous endoscopy results and available photos, reviewed surgical pathology results from previous biopsies    ASSESSMENT  Diagnoses and all orders for this visit:    Casper's esophagus without dysplasia    Irritable bowel syndrome with diarrhea    Personal history of colonic polyps    Other orders  -     chlorhexidine (PERIDEX) 0.12 % solution; Apply 15 mL to the mouth or throat Daily.  -     HYDROcodone-acetaminophen (NORCO) 7.5-325 MG per tablet; Take 1 tablet by mouth Every 6 (Six) Hours As Needed.  -     pregabalin (LYRICA) 75 MG capsule; Take 1 capsule by mouth Daily.  -     hydrocortisone 2.5 % ointment; Apply  topically to the appropriate area as directed See Admin Instructions. Apply topically to the affected areas twice daily as directed          PLAN  Return in about 6 months (around 3/12/2025).    I have discussed the above plan with the patient.  They verbalize understanding and are in agreement with the plan.  They have been advised to contact the office for any questions, concerns, or changes related to their health.

## 2024-09-13 ENCOUNTER — HOSPITAL ENCOUNTER (OUTPATIENT)
Dept: PHYSICAL THERAPY | Facility: HOSPITAL | Age: 84
Setting detail: THERAPIES SERIES
Discharge: HOME | End: 2024-09-13
Payer: MEDICARE

## 2024-09-13 DIAGNOSIS — R29.898 RIGHT ARM WEAKNESS: Primary | ICD-10-CM

## 2024-09-13 PROCEDURE — 97161 PT EVAL LOW COMPLEX 20 MIN: CPT | Performed by: PHYSICAL THERAPIST

## 2024-09-13 NOTE — THERAPY EVALUATION
Outpatient Physical Therapy Ortho Initial Evaluation   Jill Hernandez     Patient Name: Katarzyna Ku  : 1940  MRN: [id number]  Today's Date: 2024      Visit Date: 2024    Patient Active Problem List   Diagnosis    Chronic obstructive pulmonary disease    Midline cystocele    Hypertension    Mixed incontinence    Muscular atrophy    Osteoarthritis of knee    Atrophic vaginitis    Primary localized osteoarthrosis    Prolapse of vaginal vault after hysterectomy    Abdominal pain, right upper quadrant    Enterocele    Casper's esophagus without dysplasia    Irritable bowel syndrome with diarrhea    Encounter for screening for malignant neoplasm of colon    Personal history of colonic polyps    Influenza A (H1N1)    Influenza A    Malignant neoplasm of central portion of left breast in female, estrogen receptor positive    Anemia    Fecal smearing        Past Medical History:   Diagnosis Date    Anxiety     Arthritis     Asthma     Casper esophagus     Breast cancer     Left     Colon polyp     COPD (chronic obstructive pulmonary disease)     Dr Sharma follows, clearance in media 2022    Depression     Diarrhea     Diverticulitis of colon     Emphysema lung     Emphysema of lung     Flu 2021    GERD (gastroesophageal reflux disease)     Hypertension     Requires supplemental oxygen     3L/NC w/exertion    Skin cancer     Skin cancer     Sleep apnea     w/CPAP and oxygen at 3L        Past Surgical History:   Procedure Laterality Date    BILATERAL SALPINGO OOPHORECTOMY      BLADDER SURGERY      bladder lift    BREAST LUMPECTOMY Left     breast ca    CARPAL TUNNEL RELEASE      BOTH HANDS    CATARACT EXTRACTION W/ INTRAOCULAR LENS  IMPLANT, BILATERAL      COLONOSCOPY N/A 02/10/2017    Procedure: COLONOSCOPY with polypectomy;  Surgeon: Weston Sue MD;  Location: [facility];  Service:     COLONOSCOPY N/A 2020    Procedure: COLONOSCOPY;  Surgeon: Weston Sue,  MD;  Location: [facility] OR;  Service: Gastroenterology;  Laterality: N/A;  TRANSVERSE COLON POLYP  DIVERTICULOSIS  CECAL TIME at 1356  SIGMOID COLON POLYP    ENDOSCOPY N/A 02/10/2017    Procedure: ESOPHAGOGASTRODUODENOSCOPY with biopsies;  Surgeon: Weston Sue MD;  Location: [facility] OR;  Service:     ENDOSCOPY N/A 08/12/2020    Procedure: ESOPHAGOGASTRODUODENOSCOPY;  Surgeon: Weston Sue MD;  Location: [facility] OR;  Service: Gastroenterology;  Laterality: N/A;  DISTAL ESOPHAGUS BIOPSY  STOCKTON'S ESOPHAGUS    HYSTERECTOMY      MASTECTOMY      MASTECTOMY W/ SENTINEL NODE BIOPSY Left 02/23/2022    Procedure: BREAST MASTECTOMY WITH SENTINEL NODE BIOPSY;  Surgeon: Afshin Schultz MD;  Location: [facility];  Service: General;  Laterality: Left;    SKIN BIOPSY      TOTAL KNEE ARTHROPLASTY Bilateral        Visit Dx:     ICD-10-CM ICD-9-CM   1. 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 She is having difficulty with daily activities that involve reaching or lifting with her right UE.  -      Patient/Caregiver Goals Relieve pain;Return to prior level of function;Improve strength  -      Hand Dominance right-handed  -      Occupation/sports/leisure activities pt enjoys reading and cooking  -      What clinical tests have you had for this problem? X-ray;CT scan  -         Pain     Pain Location Shoulder  right  -GC      Pain at Present 2  -GC      What Performance Factors Make the Current Problem(s) WORSE? Pt c/o pain with reaching and lifting with her right UE  -      What Performance Factors Make the Current Problem(s) BETTER? Pt has no pain at  rest  -GC      Difficulties with ADL's? Pt has difficulty with dressing and grooming tasks, lifting tasks  -GC         Daily Activities    Primary Language English  -GC      Are you able to read Yes  -GC      Are you able to write Yes  -GC      How does patient learn best? Listening;Reading  -GC      Teaching needs identified Home Exercise Program;Management of Condition  -GC      Patient is concerned about/has problems with Difficulty with self care (i.e. bathing, dressing, toileting:;Grasping objects lifting;Performing home management (household chores, shopping, care of dependents);Performing job responsibilities/community activities (work, school,;Reaching over head;Repetitive movements of the hand, arm, shoulder  -GC      Does patient have problems with the following? Anxiety  -GC      Barriers to learning None  -GC      Functional Status mobility issues preventing performance of daily activities  -GC      Pt Participated in POC and Goals Yes  -GC         Safety    Are you being hurt, hit, or frightened by anyone at home or in your life? No  -GC      Are you being neglected by a caregiver No  -GC                User Key  (r) = Recorded By, (t) = Taken By, (c) = Cosigned By      Initials Name Provider Type    GC Jefry Boyer, PT Physical Therapist                     PT Ortho       Row Name 09/13/24 0900       DTR- Upper Quarter Clearing    Biceps (C5/6) Right:;1- Minimal response  -GC    Brachioradialis (C6) Right:;1- Minimal response  -GC    Triceps (C7) Right:;2- Normal response  -GC       Sensory Screen for Light Touch- Upper Quarter Clearing    C4 (posterior shoulder) Right:;Intact  -GC    C5 (lateral upper arm) Right:;Intact  -GC    C6 (tip of thumb) Right:;Intact  -GC    C7 (tip of 3rd finger) Right:;Intact  -GC    C8 (tip of 5th finger) Right:;Intact  -GC    T1 (medial lower arm) Right:;Intact  -GC       Right Upper Ext    Rt Shoulder Abduction AROM 60 degrees  -GC    Rt Shoulder Flexion AROM 66  degrees  -GC    Rt Shoulder External Rotation AROM 39 degrees  -GC    Rt Shoulder Internal Rotation AROM 86 degrees  -GC       MMT (Manual Muscle Testing)    General MMT Comments scaption strength is 2/5  -GC       MMT Right Upper Ext    Rt Shoulder Flexion MMT, Gross Movement (2/5) poor  -GC    Rt Shoulder Extension MMT, Gross Movement (4/5) good  -GC    Rt Shoulder ABduction MMT, Gross Movement (2/5) poor  -GC    Rt Shoulder ADduction MMT, Gross Movement (4/5) good  -GC    Rt Shoulder Internal Rotation MMT, Gross Movement (3/5) fair  -GC    Rt Shoulder External Rotation MMT, Gross Movement (2/5) poor  -GC    Rt Elbow Flexion MMT, Gross Movement: (4/5) good  -GC    Rt Elbow Extension MMT, Gross Movement: (4+/5) good plus  -GC              User Key  (r) = Recorded By, (t) = Taken By, (c) = Cosigned By      Initials Name Provider Type    GC Jefry Boyer, PT Physical Therapist                                Therapy Education  Given: HEP, Symptoms/condition management, Pain management  Program: New  How Provided: Verbal, Demonstration, Written  Provided to: Patient, Caregiver  Level of Understanding: Teach back education performed, Verbalized, Demonstrated      PT OP Goals       Row Name 09/13/24 0900          PT Short Term Goals    STG Date to Achieve 10/04/24  -     STG 1 Decrease right shoulder pain to 1/10 with activity.  -     STG 2 Increase right shoulder AROM to 120 degrees FLEX and ABD and 60 degrees ER with testing.  -     STG 3 Increase right shoulder girdle strength to at least 3/5 all planes with testing.  -     STG 4 Pt will be independnet with her HEP issued by this therapist.  -        Long Term Goals    LTG Date to Achieve 10/25/24  -     LTG 1 Decrease right shoulder pain to 0/10 with activity.  -     LTG 2 Increase right shoulder AROM to 160 degrees FLEX and ABD and 75 degrees ER with testing.  -     LTG 3 Increase right shoulder girdle strength to at least 4/5 all planes with  testing.  -     LTG 4 Pt will be independent with all ADLs without pain.  -        Time Calculation    PT Goal Re-Cert Due Date 10/11/24  -               User Key  (r) = Recorded By, (t) = Taken By, (c) = Cosigned By      Initials Name Provider Type     Jefry Boyer, PT Physical Therapist                     PT Assessment/Plan       Row Name 09/13/24 0900          PT Assessment    Functional Limitations Limitation in home management;Limitations in community activities;Limitations in functional capacity and performance;Performance in leisure activities;Performance in self-care ADL  -     Impairments Range of motion;Pain;Muscle strength  -     Assessment Comments Pt presents approximately 3 weeks s/p shingles diagnosis with 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 She has pain that she rates as a 2/10 with activity. She has decreased right shoulder ROM, decreased right shoulder girdle strength, and decreased function secondary to the above.  -     Rehab Potential Fair  -     Patient/caregiver participated in establishment of treatment plan and goals Yes  -     Patient would benefit from skilled therapy intervention Yes  -        PT Plan    PT Frequency 1x/week;2x/week;3x/week  -     Predicted Duration of Therapy Intervention (PT) 6 weeks  -     Planned CPT's? PT EVAL LOW COMPLEXITY: 14561;PT THER PROC EA 15 MIN: 96730;PT MANUAL THERAPY EA 15 MIN: 66444;PT HOT OR COLD PACK TREAT MCARE  -     PT Plan Comments Pt is to continue her HEP 2x daily  -               User Key  (r) = Recorded By, (t) = Taken By, (c) = Cosigned By      Initials Name Provider Type     Jefry Boyer, PT Physical Therapist                       OP Exercises       Row Name 09/13/24 0900             Exercise 1    Exercise Name 1 Scaption down  -GC      Reps 1 25  -GC         Exercise 2    Exercise Name 2 Scaption Up  -GC      Reps 2 25  -GC         Exercise 3    Exercise Name 3 Shoulder ER vs theraband  -      Reps 3 25  -GC      Time  3 yellow  -GC         Exercise 4    Exercise Name 4 Shoulder IR vs theraband  -GC      Reps 4 25  -GC      Time 4 yellow  -GC         Exercise 5    Exercise Name 5 Shoulder EXT vs theraband  -GC      Reps 5 25  -GC      Time 5 yellow  -GC         Exercise 6    Exercise Name 6 Shoulder FLEX vs theraband  -GC      Reps 6 25  -GC      Time 6 yellow  -GC         Exercise 7    Exercise Name 7 Shoulder FLEX vs theraband  -GC      Reps 7 25  -GC      Time 7 yellow  -GC         Exercise 8    Exercise Name 8 Shoulder ABD vs theraband  -GC      Reps 8 25  -GC      Time 8 yellow  -GC                User Key  (r) = Recorded By, (t) = Taken By, (c) = Cosigned By      Initials Name Provider Type    Jefry Narayan PT Physical Therapist                                  Outcome Measure Options: Quick DASH  Quick DASH  Open a tight or new jar.: Unable  Do heavy household chores (e.g., wash walls, wash floors): Unable  Carry a shopping bag or briefcase: Moderate Difficulty  Wash your back: Unable  Use a knife to cut food: Unable  Recreational activities in which you take some force or impact through your arm, should or hand (e.g. golf, hammering, tennis, etc.): Unable  During the past week, to what extent has your arm, shoulder, or hand problem interfered with your normal social activites with family, friends, neighbors or groups?: Extremely  During the past week, were you limited in your work or other regular daily activities as a result of your arm, shoulder or hand problem?: Unable  Arm, Shoulder, or hand pain: Mild  Tingling (pins and needles) in your arm, shoulder, or hand: None  During the past week, how much difficulty have you had sleeping because of the pain in your arm, shoulder or hand?: Mild Difficulty  Number of Questions Answered: 11  Quick DASH Score: 72.73         Time Calculation:     Start Time: 0900  Stop Time: 0955  Time Calculation (min): 55 min     Therapy Charges for Today       Code Description Service Date  Service Provider Modifiers Qty    [id number] HC PT EVAL LOW COMPLEXITY 3 9/13/2024 Jefry Boyer, PT GP 1            PT G-Codes  Outcome Measure Options: Quick DASH  Quick DASH Score: 72.73         Jefry Boyer, PT  9/13/2024

## 2024-09-23 ENCOUNTER — HOSPITAL ENCOUNTER (OUTPATIENT)
Dept: PHYSICAL THERAPY | Facility: HOSPITAL | Age: 84
Setting detail: THERAPIES SERIES
Discharge: HOME | End: 2024-09-23
Payer: MEDICARE

## 2024-09-23 DIAGNOSIS — R29.898 RIGHT ARM WEAKNESS: Primary | ICD-10-CM

## 2024-09-23 PROCEDURE — 97110 THERAPEUTIC EXERCISES: CPT | Performed by: PHYSICAL THERAPIST

## 2024-10-02 ENCOUNTER — HOSPITAL ENCOUNTER (OUTPATIENT)
Dept: PHYSICAL THERAPY | Facility: HOSPITAL | Age: 84
Setting detail: THERAPIES SERIES
Discharge: HOME | End: 2024-10-02
Payer: MEDICARE

## 2024-10-02 DIAGNOSIS — R29.898 RIGHT ARM WEAKNESS: Primary | ICD-10-CM

## 2024-10-02 PROCEDURE — 97110 THERAPEUTIC EXERCISES: CPT | Performed by: PHYSICAL THERAPIST

## 2024-10-02 NOTE — THERAPY TREATMENT NOTE
Outpatient Physical Therapy Ortho Treatment Note   Jill Hernandez     Patient Name: Katarzyna Ku  : 1940  MRN: [id number]  Today's Date: 10/2/2024      Visit Date: 10/02/2024    Visit Dx:    ICD-10-CM ICD-9-CM   1. 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)    Influenza A    Malignant neoplasm of central portion of left breast in female, estrogen receptor positive    Anemia    Fecal smearing        Past Medical History:   Diagnosis Date    Anxiety     Arthritis     Asthma     Casper esophagus     Breast cancer     Left     Colon polyp     COPD (chronic obstructive pulmonary disease)     Dr Sharma follows, clearance in media 2022    Depression     Diarrhea     Diverticulitis of colon     Emphysema lung     Emphysema of lung     Flu 2021    GERD (gastroesophageal reflux disease)     Hypertension     Requires supplemental oxygen     3L/NC w/exertion    Skin cancer     Skin cancer     Sleep apnea     w/CPAP and oxygen at 3L        Past Surgical History:   Procedure Laterality Date    BILATERAL SALPINGO OOPHORECTOMY      BLADDER SURGERY      bladder lift    BREAST LUMPECTOMY Left     breast ca    CARPAL TUNNEL RELEASE      BOTH HANDS    CATARACT EXTRACTION W/ INTRAOCULAR LENS  IMPLANT, BILATERAL      COLONOSCOPY N/A 02/10/2017    Procedure: COLONOSCOPY with polypectomy;  Surgeon: Weston Sue MD;  Location: [facility];  Service:     COLONOSCOPY N/A  08/12/2020    Procedure: COLONOSCOPY;  Surgeon: Weston Sue MD;  Location: [facility] OR;  Service: Gastroenterology;  Laterality: N/A;  TRANSVERSE COLON POLYP  DIVERTICULOSIS  CECAL TIME at 1356  SIGMOID COLON POLYP    ENDOSCOPY N/A 02/10/2017    Procedure: ESOPHAGOGASTRODUODENOSCOPY with biopsies;  Surgeon: Weston Sue MD;  Location: [facility] OR;  Service:     ENDOSCOPY N/A 08/12/2020    Procedure: ESOPHAGOGASTRODUODENOSCOPY;  Surgeon: Weston Sue MD;  Location: [facility] OR;  Service: Gastroenterology;  Laterality: N/A;  DISTAL ESOPHAGUS BIOPSY  STOCKTON'S ESOPHAGUS    HYSTERECTOMY      MASTECTOMY      MASTECTOMY W/ SENTINEL NODE BIOPSY Left 02/23/2022    Procedure: BREAST MASTECTOMY WITH SENTINEL NODE BIOPSY;  Surgeon: Afshin Schultz MD;  Location: [facility] OR;  Service: General;  Laterality: Left;    SKIN BIOPSY      TOTAL KNEE ARTHROPLASTY Bilateral         PT Ortho       Row Name 10/02/24 1000       Subjective    Subjective Comments Pt feels like her right arm is back where it needs to be and is as good as her left.  -GC       Right Upper Ext    Rt Shoulder Abduction AROM 95 degrees  -GC    Rt Shoulder Flexion AROM 93 degrees  -GC    Rt Shoulder External Rotation AROM 37 degrees  -GC    Rt Shoulder Internal Rotation AROM 85 degrees  -GC       MMT (Manual Muscle Testing)    General MMT Comments scaption strength is 3/5  -GC       MMT Right Upper Ext    Rt Shoulder Flexion MMT, Gross Movement (3+/5) fair plus  -GC    Rt Shoulder Extension MMT, Gross Movement (4+/5) good plus  -GC    Rt Shoulder ABduction MMT, Gross Movement (3/5) fair  -GC    Rt Shoulder ADduction MMT, Gross Movement (4+/5) good plus  -GC    Rt Shoulder Internal Rotation MMT, Gross Movement (4/5) good  -GC    Rt Shoulder External Rotation MMT, Gross Movement (3/5) fair  -GC    Rt Elbow Flexion MMT, Gross Movement: (4+/5) good plus  -GC    Rt Elbow Extension MMT, Gross Movement: (4+/5) good plus  -GC               User Key  (r) = Recorded By, (t) = Taken By, (c) = Cosigned By      Initials Name Provider Type    GC Jefry Boyer PT Physical Therapist                                 PT Assessment/Plan       Row Name 10/02/24 1000          PT Assessment    Assessment Comments Pt is doing well with good progress towards her goals. She reports no real issues with her ADL function and she feels like her right shoulder is essentially back to baseline interms of how she is able to use it. Feel pt can continue to make gains with her HEP.  -GC        PT Plan    PT Plan Comments Pt is to continue her HEP daily. Will call this therapist if problems arise or if she feels like she is no longer progressing with her HEP>  -GC               User Key  (r) = Recorded By, (t) = Taken By, (c) = Cosigned By      Initials Name Provider Type    Jefry Narayan PT Physical Therapist                       OP Exercises       Row Name 10/02/24 1000             Subjective    Subjective Comments Pt feels like her right arm is back where it needs to be and is as good as her left.  -GC         Exercise 1    Exercise Name 1 Scaption down  -GC      Reps 1 25  -GC      Time 1 1#  -GC         Exercise 2    Exercise Name 2 Scaption Up  -GC      Reps 2 25  -GC      Time 2 1#  -GC         Exercise 3    Exercise Name 3 Shoulder ER vs theraband  -GC      Reps 3 25  -GC      Time 3 yellow  -GC         Exercise 4    Exercise Name 4 Shoulder IR vs theraband  -GC      Reps 4 25  -GC      Time 4 red  -GC         Exercise 5    Exercise Name 5 Shoulder EXT vs theraband  -GC      Reps 5 25  -GC      Time 5 red  -GC         Exercise 6    Exercise Name 6 Shoulder Rows vs theraband  -GC      Reps 6 25  -GC      Time 6 red  -GC         Exercise 7    Exercise Name 7 Shoulder FLEX vs theraband  -GC      Reps 7 25  -GC      Time 7 red  -GC         Exercise 8    Exercise Name 8 Shoulder ABD vs theraband  -GC      Reps 8 25  -GC      Time 8 red  -GC         Exercise 9     Exercise Name 9 bent over Ts and Ys  -GC      Reps 9 25  -GC      Time 9 1#  -                User Key  (r) = Recorded By, (t) = Taken By, (c) = Cosigned By      Initials Name Provider Type     Jefry Boyer PT Physical Therapist                                  PT OP Goals       Row Name 10/02/24 1000          PT Short Term Goals    STG Date to Achieve 10/04/24  -GC     STG 1 Decrease right shoulder pain to 1/10 with activity.  -GC     STG 1 Progress Met  -GC     STG 2 Increase right shoulder AROM to 120 degrees FLEX and ABD and 60 degrees ER with testing.  -GC     STG 2 Progress Partially Met  -GC     STG 3 Increase right shoulder girdle strength to at least 3/5 all planes with testing.  -     STG 3 Progress Met  -GC     STG 4 Pt will be independnet with her HEP issued by this therapist.  -     STG 4 Progress Met  -        Long Term Goals    LTG Date to Achieve 10/25/24  -     LTG 1 Decrease right shoulder pain to 0/10 with activity.  -     LTG 1 Progress Partially Met  -     LTG 2 Increase right shoulder AROM to 160 degrees FLEX and ABD and 75 degrees ER with testing.  -GC     LTG 2 Progress Ongoing  -GC     LTG 3 Increase right shoulder girdle strength to at least 4/5 all planes with testing.  -     LTG 3 Progress Partially Met  -GC     LTG 4 Pt will be independent with all ADLs without pain.  -     LTG 4 Progress Partially Met  -GC               User Key  (r) = Recorded By, (t) = Taken By, (c) = Cosigned By      Initials Name Provider Type     Jefry Boyer PT Physical Therapist                                   Time Calculation:   Start Time: 1000  Stop Time: 1028  Time Calculation (min): 28 min  Therapy Charges for Today       Code Description Service Date Service Provider Modifiers Qty    [id number]  PT THER PROC EA 15 MIN 10/2/2024 Jerfy Boyer, PT GP 1                      Jefry Boyer PT  10/2/2024

## 2024-10-31 ENCOUNTER — TRANSCRIBE ORDERS (OUTPATIENT)
Dept: ADMINISTRATIVE | Facility: HOSPITAL | Age: 84
End: 2024-10-31
Payer: MEDICARE

## 2024-10-31 DIAGNOSIS — Z12.31 VISIT FOR SCREENING MAMMOGRAM: Primary | ICD-10-CM

## 2024-10-31 NOTE — PROGRESS NOTES
Subjective     REASON FOR CONSULTATION: Breast cancer  Provide an opinion on any further workup or treatment                             REQUESTING PHYSICIAN: Dr. Schultz    RECORDS OBTAINED:  Records of the patients history including those obtained from the referring provider were reviewed and summarized in detail.    HISTORY OF PRESENT ILLNESS:  The patient is a 84 y.o. year old female who is here for an opinion about the above issue.    History of Present Illness   This is a 82-year-old lady with obesity, COPD/asthma on intermittent O2, hypertension, depression, Casper's esophagus, depression and anxiety.  She has a history of stage I triple negative left breast cancer in 2009 treated with a lumpectomy and radiation therapy completed January 2010.      The patient had an abnormal screening mammogram on the left 11-21 showing calcifications in the left breast and a new oval-shaped asymmetry measuring 15 mm suspicious for malignancy.  Diagnostic mammogram and ultrasound 11/24/2021 showed postlumpectomy changes in the lateral portion of the left breast and a new 1.6 x 1.1 cm lesion at the 3 to 4 o'clock position of the left breast 7 cm from the nipple.  She underwent an ultrasound-guided breast biopsy 12/15/2021 with 2 lesions biopsy including a small solid satellite nodule adjacent to the previously described larger mass.  Pathology from mass 1 showed invasive ductal carcinoma grade 3 (3+3+2) ER 31 to 40% positive moderately, IL negative, HER-2 2+/FISH negative, Ki-67 60% with no in situ component, no lymph vascular or perineural invasion; mass 2 invasive ductal carcinoma grade 2 (3+3+1) ER 1 to 10% weakly positive, IL negative, HER-2 2+/FISH +80% of cells, Ki-67 55% with no in situ component, no lymphovascular or perineural invasion.    The patient underwent a left mastectomy and sentinel lymph node biopsy on 2/23/2022 showing invasive ductal carcinoma grade 3 tumor measuring 3.0 cm in size, negative margins,  several foci of lymphovascular invasion, positive perineural invasion, associated high-grade ductal carcinoma in situ 40 mm negative margins for in situ disease, 5 negative axillary lymph nodes.    The patient's main complaint relates to her shortness of breath related to COPD.  She is on oxygen intermittently.  She relates her breathing has worsened significantly over the previous 4 to 5 years.    The patient returned today for follow-up.  She denies changes in the left chest wall exam her right breast.  She denies unusual pain.  She denies weight loss.  She is scheduled for screening mammography of the right breast.    Past Medical History:   Diagnosis Date    Anxiety     Arthritis     Asthma     Casper esophagus     Breast cancer     Left 2021    Colon polyp     COPD (chronic obstructive pulmonary disease)     Dr Sharma follows, clearance in media 2/2022    Depression     Diarrhea     Diverticulitis of colon     Emphysema lung     Emphysema of lung     Flu 12/2021    GERD (gastroesophageal reflux disease)     Hypertension     Requires supplemental oxygen     3L/NC w/exertion    Skin cancer     Skin cancer     Sleep apnea     w/CPAP and oxygen at 3L        Past Surgical History:   Procedure Laterality Date    BILATERAL SALPINGO OOPHORECTOMY      BLADDER SURGERY      bladder lift    BREAST LUMPECTOMY Left     breast ca    CARPAL TUNNEL RELEASE      BOTH HANDS    CATARACT EXTRACTION W/ INTRAOCULAR LENS  IMPLANT, BILATERAL      COLONOSCOPY N/A 02/10/2017    Procedure: COLONOSCOPY with polypectomy;  Surgeon: Weston uSe MD;  Location:  [facility] OR;  Service:     COLONOSCOPY N/A 08/12/2020    Procedure: COLONOSCOPY;  Surgeon: Weston Sue MD;  Location: [facility] OR;  Service: Gastroenterology;  Laterality: N/A;  TRANSVERSE COLON POLYP  DIVERTICULOSIS  CECAL TIME at 1356  SIGMOID COLON POLYP    ENDOSCOPY N/A 02/10/2017    Procedure: ESOPHAGOGASTRODUODENOSCOPY with biopsies;  Surgeon: Weston  Tracy Sue MD;  Location: [facility] OR;  Service:     ENDOSCOPY N/A 08/12/2020    Procedure: ESOPHAGOGASTRODUODENOSCOPY;  Surgeon: Weston Sue MD;  Location: [facility] OR;  Service: Gastroenterology;  Laterality: N/A;  DISTAL ESOPHAGUS BIOPSY  STOCKTON'S ESOPHAGUS    HYSTERECTOMY      MASTECTOMY      MASTECTOMY W/ SENTINEL NODE BIOPSY Left 02/23/2022    Procedure: BREAST MASTECTOMY WITH SENTINEL NODE BIOPSY;  Surgeon: Afshin Schultz MD;  Location: [facility];  Service: General;  Laterality: Left;    SKIN BIOPSY      TOTAL KNEE ARTHROPLASTY Bilateral         Current Outpatient Medications on File Prior to Visit   Medication Sig Dispense Refill    acetaminophen (TYLENOL) 500 MG tablet Take 2 tablets by mouth Every 6 (Six) Hours As Needed for Mild Pain.      albuterol (PROVENTIL) (2.5 MG/3ML) 0.083% nebulizer solution Take 2.5 mg by nebulization 2 (Two) Times a Day. Can use 4 times a day if needed      albuterol sulfate  (90 Base) MCG/ACT inhaler Inhale 2 puffs Every 4 (Four) Hours As Needed for Wheezing or Shortness of Air.      ALPRAZolam (XANAX) 0.5 MG tablet Take 1 tablet by mouth Daily As Needed for Anxiety.      amLODIPine (NORVASC) 5 MG tablet Every Night.      benazepril (LOTENSIN) 40 MG tablet Take 1 tablet by mouth Daily.      benzonatate (TESSALON) 100 MG capsule       betamethasone dipropionate 0.05 % cream       Calcium Acetate-Magnesium Carb 450-200 MG tablet Take 1 tablet by mouth Daily.      chlorhexidine (PERIDEX) 0.12 % solution Apply 15 mL to the mouth or throat Daily.      Cholecalciferol (Vitamin D3) 20 MCG (800 UNIT) tablet Take 50 mcg by mouth Daily.      citalopram (CeleXA) 40 MG tablet Take 1 tablet by mouth Every Morning.      Cyanocobalamin (VITAMIN B-12) 5000 MCG tablet dispersible Take 1 tablet by mouth Daily.      estradiol (ESTRACE) 0.1 MG/GM vaginal cream       fluconazole (DIFLUCAN) 100 MG tablet Take 1 tablet by mouth As Needed.       Fluticasone-Umeclidin-Vilant (TRELEGY) 100-62.5-25 MCG/INH inhaler Inhale 1 puff Daily.      guaiFENesin (MUCINEX) 600 MG 12 hr tablet Take 1 tablet by mouth Every 12 (Twelve) Hours. (Patient taking differently: Take 1 tablet by mouth 2 (Two) Times a Day As Needed for Cough or Congestion.) 30 tablet 0    hydrALAZINE (APRESOLINE) 50 MG tablet Take 1 tablet by mouth 3 (Three) Times a Day.      hydroCHLOROthiazide (MICROZIDE) 12.5 MG capsule       HYDROcodone-acetaminophen (NORCO) 7.5-325 MG per tablet Take 1 tablet by mouth Every 6 (Six) Hours As Needed.      hydrocortisone 2.5 % ointment Apply  topically to the appropriate area as directed See Admin Instructions. Apply topically to the affected areas twice daily as directed 28.35 g 11    hyoscyamine (LEVBID) 0.375 MG 12 hr tablet Take 1 tablet by mouth Every 12 (Twelve) Hours As Needed for Cramping. 180 tablet 3    magnesium oxide (MAG-OX) 400 MG tablet Take 1 tablet by mouth Daily.      meloxicam (MOBIC) 15 MG tablet Take 1 tablet by mouth Daily.      Mirabegron ER (MYRBETRIQ) 50 MG tablet sustained-release 24 hour 24 hr tablet Take 50 mg by mouth every night at bedtime.      mometasone (ELOCON) 0.1 % cream Apply 0.1 application topically to the appropriate area as directed As Needed.      Multiple Vitamins-Minerals (MULTIVITAMIN ADULT PO) Take 1 tablet by mouth Daily.      O2 (OXYGEN) Inhale 3 L/min 1 (One) Time. Uses w/activity      omeprazole (priLOSEC) 40 MG capsule TAKE ONE CAPSULE BY MOUTH TWICE A DAY BEFORE MEALS 180 capsule 3    pregabalin (LYRICA) 75 MG capsule Take 1 capsule by mouth Daily.       No current facility-administered medications on file prior to visit.        ALLERGIES:    Allergies   Allergen Reactions    Azithromycin Nausea And Vomiting     SEVERE GI UPSET    Sulfa Antibiotics Itching    Codeine Rash        Social History     Socioeconomic History    Marital status:    Tobacco Use    Smoking status: Former     Current packs/day: 0.00  "    Average packs/day: 2.0 packs/day for 30.0 years (60.0 ttl pk-yrs)     Types: Cigarettes     Start date: 1960     Quit date: 1990     Years since quittin.7     Passive exposure: Never    Smokeless tobacco: Never   Vaping Use    Vaping status: Never Used   Substance and Sexual Activity    Alcohol use: No    Drug use: No    Sexual activity: Defer        Family History   Problem Relation Age of Onset    Hypertension Daughter     Breast cancer Daughter     Allergies Daughter     Malig Hyperthermia Neg Hx     Cancer Neg Hx         Review of Systems   Constitutional: Negative.    HENT:  Negative for trouble swallowing.    Respiratory:  Positive for shortness of breath.    Cardiovascular: Negative.    Gastrointestinal:  Negative for abdominal pain.   Genitourinary: Negative.    Musculoskeletal:  Positive for arthralgias and myalgias.   Skin:  Negative for rash and wound.   Allergic/Immunologic: Negative.    Neurological: Negative.    Hematological: Negative.    Psychiatric/Behavioral: Negative.            Objective     Vitals:    24 1257   BP: 144/75   Pulse: 88   Resp: 16   Temp: 98 °F (36.7 °C)   TempSrc: Infrared   SpO2: 90%   Weight: 101 kg (222 lb 3.2 oz)   Height: 154.5 cm (60.83\")   PainSc: 0-No pain             2024    12:57 PM   Current Status   ECOG score 0       Physical Exam    CONSTITUTIONAL: pleasant well-developed obese elderly woman  HEENT: no icterus, no thrush, moist membranes  LYMPH: no cervical or supraclavicular lad  CV: RRR, S1S2, no murmur  Breast: Status post left mastectomy with significant scar tissue making exam difficult-unchanged 2024  RESP: cta bilat, faint wheezing, no rales, diminished breath sounds, mild increased work of breathing  NEURO: alert and oriented x3, mild global weakness  PSYCH: normal mood and affect  Exam otherwise unchanged 2024  RECENT LABS:  Hematology WBC   Date Value Ref Range Status   2024 9.36 3.40 - 10.80 10*3/mm3 Final "     RBC   Date Value Ref Range Status   11/06/2024 3.98 3.77 - 5.28 10*6/mm3 Final     Hemoglobin   Date Value Ref Range Status   11/06/2024 11.8 (L) 12.0 - 15.9 g/dL Final     Hematocrit   Date Value Ref Range Status   11/06/2024 37.8 34.0 - 46.6 % Final     Platelets   Date Value Ref Range Status   11/06/2024 247 140 - 450 10*3/mm3 Final        Lab Results   Component Value Date    GLUCOSE 81 11/06/2024    BUN 22 11/06/2024    CREATININE 1.03 (H) 11/06/2024    EGFRIFNONA 53 (L) 02/24/2022    BCR 21.4 11/06/2024    K 3.8 11/06/2024    CO2 26.1 11/06/2024    CALCIUM 9.6 11/06/2024    ALBUMIN 4.4 11/06/2024    AST 16 11/06/2024    ALT 13 11/06/2024         Mammo Screening Modified With Tomosynthesis Right With CAD 11/16/2023 - IMPRESSION:  Negative right unilateral annual screening mammogram.    DEXA Bone Density Axial 12/14/2023 - IMPRESSION:  1.  Osteopenia.  2.  Left femoral neck T score -1.6.    Assessment & Plan     *[id number] ER low (1-10%), IN negative, HER-2 FISH amplified, Ki-67 60% status post left mastectomy 2/23/2022.  The patient chose not to receive adjuvant chemotherapy given age and comorbidities    *History of stage I triple negative ductal cancer left breast status post lumpectomy and radiation therapy 2009    *Comorbidities include fairly advanced COPD requiring intermittent oxygen, hypertension, depression/anxiety    *Daughter with BRCA mutation    *Mild anemia-hemoglobin stable 11.8        Oncology plan/recommendations:  Follow-up 6 months CBC CMP  Right screening mammogram scheduled

## 2024-11-06 ENCOUNTER — LAB (OUTPATIENT)
Dept: LAB | Facility: HOSPITAL | Age: 84
End: 2024-11-06
Payer: MEDICARE

## 2024-11-06 ENCOUNTER — OFFICE VISIT (OUTPATIENT)
Dept: ONCOLOGY | Facility: CLINIC | Age: 84
End: 2024-11-06
Payer: MEDICARE

## 2024-11-06 VITALS
BODY MASS INDEX: 41.95 KG/M2 | OXYGEN SATURATION: 90 % | SYSTOLIC BLOOD PRESSURE: 144 MMHG | RESPIRATION RATE: 16 BRPM | DIASTOLIC BLOOD PRESSURE: 75 MMHG | HEART RATE: 88 BPM | TEMPERATURE: 98 F | HEIGHT: 61 IN | WEIGHT: 222.2 LBS

## 2024-11-06 DIAGNOSIS — C50.112 MALIGNANT NEOPLASM OF CENTRAL PORTION OF LEFT BREAST IN FEMALE, ESTROGEN RECEPTOR POSITIVE: ICD-10-CM

## 2024-11-06 DIAGNOSIS — Z17.0 MALIGNANT NEOPLASM OF CENTRAL PORTION OF LEFT BREAST IN FEMALE, ESTROGEN RECEPTOR POSITIVE: ICD-10-CM

## 2024-11-06 DIAGNOSIS — D64.9 ANEMIA, UNSPECIFIED TYPE: ICD-10-CM

## 2024-11-06 DIAGNOSIS — Z17.0 MALIGNANT NEOPLASM OF CENTRAL PORTION OF LEFT BREAST IN FEMALE, ESTROGEN RECEPTOR POSITIVE: Primary | ICD-10-CM

## 2024-11-06 DIAGNOSIS — C50.112 MALIGNANT NEOPLASM OF CENTRAL PORTION OF LEFT BREAST IN FEMALE, ESTROGEN RECEPTOR POSITIVE: Primary | ICD-10-CM

## 2024-11-06 LAB
ALBUMIN SERPL-MCNC: 4.4 G/DL (ref 3.5–5.2)
ALBUMIN/GLOB SERPL: 1.5 G/DL
ALP SERPL-CCNC: 83 U/L (ref 39–117)
ALT SERPL W P-5'-P-CCNC: 13 U/L (ref 1–33)
ANION GAP SERPL CALCULATED.3IONS-SCNC: 10.9 MMOL/L (ref 5–15)
AST SERPL-CCNC: 16 U/L (ref 1–32)
BASOPHILS # BLD AUTO: 0.03 10*3/MM3 (ref 0–0.2)
BASOPHILS NFR BLD AUTO: 0.3 % (ref 0–1.5)
BILIRUB SERPL-MCNC: 0.2 MG/DL (ref 0–1.2)
BUN SERPL-MCNC: 22 MG/DL (ref 8–23)
BUN/CREAT SERPL: 21.4 (ref 7–25)
CALCIUM SPEC-SCNC: 9.6 MG/DL (ref 8.6–10.5)
CHLORIDE SERPL-SCNC: 102 MMOL/L (ref 98–107)
CO2 SERPL-SCNC: 26.1 MMOL/L (ref 22–29)
CREAT SERPL-MCNC: 1.03 MG/DL (ref 0.57–1)
DEPRECATED RDW RBC AUTO: 49.2 FL (ref 37–54)
EGFRCR SERPLBLD CKD-EPI 2021: 54.4 ML/MIN/1.73
EOSINOPHIL # BLD AUTO: 0.13 10*3/MM3 (ref 0–0.4)
EOSINOPHIL NFR BLD AUTO: 1.4 % (ref 0.3–6.2)
ERYTHROCYTE [DISTWIDTH] IN BLOOD BY AUTOMATED COUNT: 14 % (ref 12.3–15.4)
GLOBULIN UR ELPH-MCNC: 2.9 GM/DL
GLUCOSE SERPL-MCNC: 81 MG/DL (ref 65–99)
HCT VFR BLD AUTO: 37.8 % (ref 34–46.6)
HGB BLD-MCNC: 11.8 G/DL (ref 12–15.9)
IMM GRANULOCYTES # BLD AUTO: 0.02 10*3/MM3 (ref 0–0.05)
IMM GRANULOCYTES NFR BLD AUTO: 0.2 % (ref 0–0.5)
LYMPHOCYTES # BLD AUTO: 2.68 10*3/MM3 (ref 0.7–3.1)
LYMPHOCYTES NFR BLD AUTO: 28.6 % (ref 19.6–45.3)
MCH RBC QN AUTO: 29.6 PG (ref 26.6–33)
MCHC RBC AUTO-ENTMCNC: 31.2 G/DL (ref 31.5–35.7)
MCV RBC AUTO: 95 FL (ref 79–97)
MONOCYTES # BLD AUTO: 0.46 10*3/MM3 (ref 0.1–0.9)
MONOCYTES NFR BLD AUTO: 4.9 % (ref 5–12)
NEUTROPHILS NFR BLD AUTO: 6.04 10*3/MM3 (ref 1.7–7)
NEUTROPHILS NFR BLD AUTO: 64.6 % (ref 42.7–76)
PLATELET # BLD AUTO: 247 10*3/MM3 (ref 140–450)
PMV BLD AUTO: 10.3 FL (ref 6–12)
POTASSIUM SERPL-SCNC: 3.8 MMOL/L (ref 3.5–5.2)
PROT SERPL-MCNC: 7.3 G/DL (ref 6–8.5)
RBC # BLD AUTO: 3.98 10*6/MM3 (ref 3.77–5.28)
SODIUM SERPL-SCNC: 139 MMOL/L (ref 136–145)
WBC NRBC COR # BLD AUTO: 9.36 10*3/MM3 (ref 3.4–10.8)

## 2024-11-06 PROCEDURE — 99213 OFFICE O/P EST LOW 20 MIN: CPT | Performed by: INTERNAL MEDICINE

## 2024-11-06 PROCEDURE — 3077F SYST BP >= 140 MM HG: CPT | Performed by: INTERNAL MEDICINE

## 2024-11-06 PROCEDURE — 1126F AMNT PAIN NOTED NONE PRSNT: CPT | Performed by: INTERNAL MEDICINE

## 2024-11-06 PROCEDURE — 80053 COMPREHEN METABOLIC PANEL: CPT | Performed by: INTERNAL MEDICINE

## 2024-11-06 PROCEDURE — 85025 COMPLETE CBC W/AUTO DIFF WBC: CPT | Performed by: INTERNAL MEDICINE

## 2024-11-06 PROCEDURE — 3078F DIAST BP <80 MM HG: CPT | Performed by: INTERNAL MEDICINE

## 2024-11-06 PROCEDURE — 36415 COLL VENOUS BLD VENIPUNCTURE: CPT

## 2024-11-14 NOTE — PLAN OF CARE
Problem: Patient Care Overview (Adult)  Goal: Adult Individualization and Mutuality  Outcome: Ongoing (interventions implemented as appropriate)    02/10/17 1105   Individualization   Patient Specific Preferences none            RECEIVING UNIT ED HANDOFF REVIEW    ED Nurse Handoff Report was reviewed by: Liza Alarcon RN on November 14, 2024 at 4:44 PM

## 2024-11-21 ENCOUNTER — OFFICE VISIT (OUTPATIENT)
Dept: GASTROENTEROLOGY | Facility: CLINIC | Age: 84
End: 2024-11-21
Payer: MEDICARE

## 2024-11-21 VITALS
BODY MASS INDEX: 41.5 KG/M2 | DIASTOLIC BLOOD PRESSURE: 70 MMHG | HEIGHT: 61 IN | SYSTOLIC BLOOD PRESSURE: 118 MMHG | WEIGHT: 219.8 LBS

## 2024-11-21 DIAGNOSIS — K58.0 IRRITABLE BOWEL SYNDROME WITH DIARRHEA: ICD-10-CM

## 2024-11-21 DIAGNOSIS — K22.70 BARRETT'S ESOPHAGUS WITHOUT DYSPLASIA: Primary | ICD-10-CM

## 2024-11-21 DIAGNOSIS — Z86.0100 HISTORY OF COLONIC POLYPS: ICD-10-CM

## 2024-11-21 PROCEDURE — 1159F MED LIST DOCD IN RCRD: CPT | Performed by: INTERNAL MEDICINE

## 2024-11-21 PROCEDURE — 3078F DIAST BP <80 MM HG: CPT | Performed by: INTERNAL MEDICINE

## 2024-11-21 PROCEDURE — 1160F RVW MEDS BY RX/DR IN RCRD: CPT | Performed by: INTERNAL MEDICINE

## 2024-11-21 PROCEDURE — 99213 OFFICE O/P EST LOW 20 MIN: CPT | Performed by: INTERNAL MEDICINE

## 2024-11-21 PROCEDURE — 3074F SYST BP LT 130 MM HG: CPT | Performed by: INTERNAL MEDICINE

## 2024-11-21 NOTE — PROGRESS NOTES
"    PATIENT INFORMATION  Katarzyna Ku       - 1940    CHIEF COMPLAINT  Chief Complaint   Patient presents with    Casper's esophagus    Irritable Bowel Syndrome    Diarrhea       HISTORY OF PRESENT ILLNESS  Follow up from [facility] and moving mostly once and using LEVBID with success and has Imodium for breakthrough diarrhea    GERD well controlled presently and no dysphagia as long as she drinks during her meals            REVIEWED PERTINENT RESULTS/ LABS  Lab Results   Component Value Date    CASEREPORT  2022     Surgical Pathology Report                         Case: [id number]                                  Authorizing Provider:  Afshin Schultz MD        Collected:           2022 11:29 AM          Ordering Location:     [facility]   Received:            2022 12:14 PM                                 OR                                                                           Pathologist:           Monserrat Clarke MD                                                          Specimens:   1) - Breast, Left, low axilla included                                                              2) - [place] Lymph Node, #1                                                               FINALDX  2022     1. Submitted as \"Left Breast and Low Axillary Contents, Mastectomy with [place] Lymph Node Biopsy\" (1,112 grams):    A. INVASIVE DUCTAL CARCINOMA, Poorly differentiated; [place] Histologic Grade III/III      (tubule score = 3, nuclear score = 3, mitoses score = 3):   1. Tumor size:  30 mm (gross measurement).   2. Margins are negative for invasive carcinoma; Closest distance: Invasive carcinoma is present       10 mm from the anterior margin.     3. Positive for several foci of lymphovascular space invasion.   4. Positive for perineural invasion.  B. 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 Solid and Cribriform types with associated necrosis.     2. " "Extent of DCIS:  40 mm (based on the slices involved).     3. Margins are negative for in situ carcinoma; Closest distance: DCIS is present >10 mm from the                     anterior margin.   C. Two clips retrieved from within invasive carcinoma.   D. Background fibrous tissue and dystrophic calcifications, suggesting prior treatment effect.    E. Four lymph nodes, negative for carcinoma (0/4).    F. Unremarkable skin and nipple.   G. See Synoptic Report (to include part 2).    2. \"[place] Lymph Node #1,\" Biopsy:   A. One lymph node, negative for carcinoma (0/1).     Jab/kds        Lab Results   Component Value Date    HGB 11.8 (L) 11/06/2024    MCV 95.0 11/06/2024     11/06/2024    ALT 13 11/06/2024    AST 16 11/06/2024    HGBA1C 5.90 (H) 02/17/2022    FERRITIN 87.00 11/15/2022    IRON 49 11/15/2022    TIBC 280 (L) 11/15/2022      No results found.    REVIEW OF SYSTEMS  Review of Systems   Constitutional:  Negative for activity change, chills, fever and unexpected weight change.   HENT:  Positive for trouble swallowing. Negative for congestion.    Eyes:  Negative for visual disturbance.   Respiratory:  Negative for shortness of breath.    Cardiovascular:  Negative for chest pain and palpitations.   Gastrointestinal:  Positive for abdominal distention, abdominal pain, constipation and diarrhea. Negative for blood in stool.   Endocrine: Negative for cold intolerance and heat intolerance.   Genitourinary:  Negative for hematuria.   Musculoskeletal:  Negative for gait problem.   Skin:  Negative for color change.   Allergic/Immunologic: Negative for immunocompromised state.   Neurological:  Negative for weakness and light-headedness.   Hematological:  Negative for adenopathy.   Psychiatric/Behavioral:  Negative for sleep disturbance. The patient is not nervous/anxious.          ACTIVE PROBLEMS  Patient Active Problem List    Diagnosis     Fecal smearing [R15.1]     Anemia [D64.9]     Malignant neoplasm of " central portion of left breast in female, estrogen receptor positive [C50.112, Z17.0]     Influenza A [J10.1]     Influenza A (H1N1) [J10.1]     Irritable bowel syndrome with diarrhea [K58.0]     Encounter for screening for malignant neoplasm of colon [Z12.11]     Personal history of colonic polyps [Z86.0100]     Casper's esophagus without dysplasia [K22.70]     Abdominal pain, right upper quadrant [R10.11]     Osteoarthritis of knee [M17.9]     Chronic obstructive pulmonary disease [J44.9]     Hypertension [I10]     Primary localized osteoarthrosis [M19.91]     Midline cystocele [N81.11]     Mixed incontinence [N39.46]     Muscular atrophy [M62.50]     Atrophic vaginitis [N95.2]     Prolapse of vaginal vault after hysterectomy [N99.3]     Enterocele [K46.9]          PAST MEDICAL HISTORY  Past Medical History:   Diagnosis Date    Anxiety     Arthritis     Asthma     Casper esophagus     Breast cancer     Left 2021    Colon polyp     COPD (chronic obstructive pulmonary disease)     Dr Sharma follows, clearance in media 2/2022    Depression     Diarrhea     Diverticulitis of colon     Emphysema lung     Emphysema of lung     Flu 12/2021    GERD (gastroesophageal reflux disease)     Hypertension     Requires supplemental oxygen     3L/NC w/exertion    Skin cancer     Skin cancer     Sleep apnea     w/CPAP and oxygen at 3L         SURGICAL HISTORY  Past Surgical History:   Procedure Laterality Date    BILATERAL SALPINGO OOPHORECTOMY      BLADDER SURGERY      bladder lift    BREAST LUMPECTOMY Left     breast ca    CARPAL TUNNEL RELEASE      BOTH HANDS    CATARACT EXTRACTION W/ INTRAOCULAR LENS  IMPLANT, BILATERAL      COLONOSCOPY N/A 02/10/2017    Procedure: COLONOSCOPY with polypectomy;  Surgeon: Weston Sue MD;  Location:  [facility] OR;  Service:     COLONOSCOPY N/A 08/12/2020    Procedure: COLONOSCOPY;  Surgeon: Weston Sue MD;  Location:  [facility] OR;  Service: Gastroenterology;  Laterality:  N/A;  TRANSVERSE COLON POLYP  DIVERTICULOSIS  CECAL TIME at 1356  SIGMOID COLON POLYP    ENDOSCOPY N/A 02/10/2017    Procedure: ESOPHAGOGASTRODUODENOSCOPY with biopsies;  Surgeon: Weston Sue MD;  [facility];  Service:     ENDOSCOPY N/A 2020    Procedure: ESOPHAGOGASTRODUODENOSCOPY;  Surgeon: Weston Sue MD;  Location: [facility];  Service: Gastroenterology;  Laterality: N/A;  DISTAL ESOPHAGUS BIOPSY  STOCKTON'S ESOPHAGUS    HYSTERECTOMY      MASTECTOMY      MASTECTOMY W/ SENTINEL NODE BIOPSY Left 2022    Procedure: BREAST MASTECTOMY WITH SENTINEL NODE BIOPSY;  Surgeon: Afshin Schultz MD;  [facility];  Service: General;  Laterality: Left;    SKIN BIOPSY      TOTAL KNEE ARTHROPLASTY Bilateral          FAMILY HISTORY  Family History   Problem Relation Age of Onset    Hypertension Daughter     Breast cancer Daughter     Allergies Daughter     Malig Hyperthermia Neg Hx     Cancer Neg Hx          SOCIAL HISTORY  Social History     Occupational History    Not on file   Tobacco Use    Smoking status: Former     Current packs/day: 0.00     Average packs/day: 2.0 packs/day for 30.0 years (60.0 ttl pk-yrs)     Types: Cigarettes     Start date: 1960     Quit date: 1990     Years since quittin.8     Passive exposure: Never    Smokeless tobacco: Never   Vaping Use    Vaping status: Never Used   Substance and Sexual Activity    Alcohol use: No    Drug use: No    Sexual activity: Defer         CURRENT MEDICATIONS    Current Outpatient Medications:     acetaminophen (TYLENOL) 500 MG tablet, Take 2 tablets by mouth Every 6 (Six) Hours As Needed for Mild Pain., Disp: , Rfl:     albuterol (PROVENTIL) (2.5 MG/3ML) 0.083% nebulizer solution, Take 2.5 mg by nebulization 2 (Two) Times a Day. Can use 4 times a day if needed, Disp: , Rfl:     albuterol sulfate  (90 Base) MCG/ACT inhaler, Inhale 2 puffs Every 4 (Four) Hours As Needed for Wheezing or Shortness  of Air., Disp: , Rfl:     ALPRAZolam (XANAX) 0.5 MG tablet, Take 1 tablet by mouth Daily As Needed for Anxiety., Disp: , Rfl:     amLODIPine (NORVASC) 5 MG tablet, Every Night., Disp: , Rfl:     benazepril (LOTENSIN) 40 MG tablet, Take 1 tablet by mouth Daily., Disp: , Rfl:     benzonatate (TESSALON) 100 MG capsule, , Disp: , Rfl:     betamethasone dipropionate 0.05 % cream, , Disp: , Rfl:     Calcium Acetate-Magnesium Carb 450-200 MG tablet, Take 1 tablet by mouth Daily., Disp: , Rfl:     chlorhexidine (PERIDEX) 0.12 % solution, Apply 15 mL to the mouth or throat Daily., Disp: , Rfl:     Cholecalciferol (Vitamin D3) 20 MCG (800 UNIT) tablet, Take 50 mcg by mouth Daily., Disp: , Rfl:     citalopram (CeleXA) 40 MG tablet, Take 1 tablet by mouth Every Morning., Disp: , Rfl:     Cyanocobalamin (VITAMIN B-12) 5000 MCG tablet dispersible, Take 1 tablet by mouth Daily., Disp: , Rfl:     estradiol (ESTRACE) 0.1 MG/GM vaginal cream, , Disp: , Rfl:     fluconazole (DIFLUCAN) 100 MG tablet, Take 1 tablet by mouth As Needed., Disp: , Rfl:     Fluticasone-Umeclidin-Vilant (TRELEGY) 100-62.5-25 MCG/INH inhaler, Inhale 1 puff Daily., Disp: , Rfl:     guaiFENesin (MUCINEX) 600 MG 12 hr tablet, Take 1 tablet by mouth Every 12 (Twelve) Hours. (Patient taking differently: Take 1 tablet by mouth 2 (Two) Times a Day As Needed for Cough or Congestion.), Disp: 30 tablet, Rfl: 0    hydrALAZINE (APRESOLINE) 50 MG tablet, Take 1 tablet by mouth 3 (Three) Times a Day., Disp: , Rfl:     hydroCHLOROthiazide (MICROZIDE) 12.5 MG capsule, , Disp: , Rfl:     HYDROcodone-acetaminophen (NORCO) 7.5-325 MG per tablet, Take 1 tablet by mouth Every 6 (Six) Hours As Needed., Disp: , Rfl:     hydrocortisone 2.5 % ointment, Apply  topically to the appropriate area as directed See Admin Instructions. Apply topically to the affected areas twice daily as directed, Disp: 28.35 g, Rfl: 11    hyoscyamine (LEVBID) 0.375 MG 12 hr tablet, Take 1 tablet by mouth  "Every 12 (Twelve) Hours As Needed for Cramping., Disp: 180 tablet, Rfl: 3    magnesium oxide (MAG-OX) 400 MG tablet, Take 1 tablet by mouth Daily., Disp: , Rfl:     meloxicam (MOBIC) 15 MG tablet, Take 1 tablet by mouth Daily., Disp: , Rfl:     Mirabegron ER (MYRBETRIQ) 50 MG tablet sustained-release 24 hour 24 hr tablet, Take 50 mg by mouth every night at bedtime., Disp: , Rfl:     mometasone (ELOCON) 0.1 % cream, Apply 0.1 application topically to the appropriate area as directed As Needed., Disp: , Rfl:     Multiple Vitamins-Minerals (MULTIVITAMIN ADULT PO), Take 1 tablet by mouth Daily., Disp: , Rfl:     O2 (OXYGEN), Inhale 3 L/min 1 (One) Time. Uses w/activity, Disp: , Rfl:     omeprazole (priLOSEC) 40 MG capsule, TAKE ONE CAPSULE BY MOUTH TWICE A DAY BEFORE MEALS, Disp: 180 capsule, Rfl: 3    ALLERGIES  Azithromycin, Sulfa antibiotics, and Codeine    VITALS  Vitals:    11/21/24 1041   BP: 118/70   BP Location: Left arm   Patient Position: Sitting   Cuff Size: Large Adult   Weight: 99.7 kg (219 lb 12.8 oz)   Height: 154.5 cm (60.83\")       PHYSICAL EXAM  Debilities/Disabilities Identified: None  Emotional Behavior: Appropriate  Wt Readings from Last 3 Encounters:   11/21/24 99.7 kg (219 lb 12.8 oz)   11/06/24 101 kg (222 lb 3.2 oz)   09/12/24 100 kg (221 lb)     Ht Readings from Last 1 Encounters:   11/21/24 154.5 cm (60.83\")     Body mass index is 41.76 kg/m².  Physical Exam  Constitutional:       Appearance: She is well-developed. She is not diaphoretic.   HENT:      Head: Normocephalic and atraumatic.   Eyes:      General: No scleral icterus.     Conjunctiva/sclera: Conjunctivae normal.      Pupils: Pupils are equal, round, and reactive to light.   Neck:      Thyroid: No thyromegaly.   Cardiovascular:      Rate and Rhythm: Normal rate and regular rhythm.      Heart sounds: Normal heart sounds. No murmur heard.     No gallop.   Pulmonary:      Effort: Pulmonary effort is normal.      Breath sounds: Normal " breath sounds. No wheezing or rales.   Abdominal:      General: Bowel sounds are normal. There is no distension or abdominal bruit.      Palpations: Abdomen is soft. There is no shifting dullness, fluid wave or mass.      Tenderness: There is no abdominal tenderness. There is no guarding. Negative signs include Madrigal's sign.      Hernia: There is no hernia in the ventral area.   Musculoskeletal:         General: Normal range of motion.      Cervical back: Normal range of motion and neck supple.   Lymphadenopathy:      Cervical: No cervical adenopathy.   Skin:     General: Skin is warm and dry.      Findings: No erythema or rash.   Neurological:      Mental Status: She is alert and oriented to person, place, and time.   Psychiatric:         Mood and Affect: Mood normal.         Behavior: Behavior normal.         CLINICAL DATA REVIEWED   reviewed previous lab results and integrated with today's visit, reviewed notes from other physicians and/or last GI encounter, reviewed previous endoscopy results and available photos, reviewed surgical pathology results from previous biopsies    ASSESSMENT  Diagnoses and all orders for this visit:    Casper's esophagus without dysplasia    Irritable bowel syndrome with diarrhea    Personal history of colonic polyps          PLAN  No changes for now   Return in about 6 months (around 5/21/2025).  Is looking to move to [place] next fall  I have discussed the above plan with the patient.  They verbalize understanding and are in agreement with the plan.  They have been advised to contact the office for any questions, concerns, or changes related to their health.

## 2024-12-30 ENCOUNTER — HOSPITAL ENCOUNTER (OUTPATIENT)
Dept: MAMMOGRAPHY | Facility: HOSPITAL | Age: 84
Discharge: HOME | End: 2024-12-30
Admitting: FAMILY MEDICINE
Payer: MEDICARE

## 2024-12-30 DIAGNOSIS — Z12.31 VISIT FOR SCREENING MAMMOGRAM: ICD-10-CM

## 2024-12-30 PROCEDURE — 77067 SCR MAMMO BI INCL CAD: CPT

## 2024-12-30 PROCEDURE — 77063 BREAST TOMOSYNTHESIS BI: CPT | Performed by: RADIOLOGY

## 2024-12-30 PROCEDURE — 77063 BREAST TOMOSYNTHESIS BI: CPT

## 2024-12-30 PROCEDURE — 77067 SCR MAMMO BI INCL CAD: CPT | Performed by: RADIOLOGY

## 2025-01-03 ENCOUNTER — TRANSCRIBE ORDERS (OUTPATIENT)
Dept: ADMINISTRATIVE | Facility: HOSPITAL | Age: 85
End: 2025-01-03
Payer: MEDICARE

## 2025-01-03 DIAGNOSIS — R92.8 ABNORMAL MAMMOGRAM OF RIGHT BREAST: Primary | ICD-10-CM

## 2025-02-20 ENCOUNTER — HOSPITAL ENCOUNTER (OUTPATIENT)
Dept: ULTRASOUND IMAGING | Facility: HOSPITAL | Age: 85
Discharge: HOME | End: 2025-02-20
Payer: MEDICARE

## 2025-02-20 ENCOUNTER — HOSPITAL ENCOUNTER (OUTPATIENT)
Dept: MAMMOGRAPHY | Facility: HOSPITAL | Age: 85
Discharge: HOME | End: 2025-02-20
Payer: MEDICARE

## 2025-02-20 DIAGNOSIS — R92.8 ABNORMAL MAMMOGRAM OF RIGHT BREAST: ICD-10-CM

## 2025-02-20 PROCEDURE — G0279 TOMOSYNTHESIS, MAMMO: HCPCS | Performed by: RADIOLOGY

## 2025-02-20 PROCEDURE — 77065 DX MAMMO INCL CAD UNI: CPT

## 2025-02-20 PROCEDURE — 76642 ULTRASOUND BREAST LIMITED: CPT | Performed by: RADIOLOGY

## 2025-02-20 PROCEDURE — 77065 DX MAMMO INCL CAD UNI: CPT | Performed by: RADIOLOGY

## 2025-02-20 PROCEDURE — G0279 TOMOSYNTHESIS, MAMMO: HCPCS

## 2025-02-20 PROCEDURE — 76642 ULTRASOUND BREAST LIMITED: CPT

## 2025-02-25 ENCOUNTER — HOSPITAL ENCOUNTER (OUTPATIENT)
Dept: CT IMAGING | Facility: HOSPITAL | Age: 85
Discharge: HOME | End: 2025-02-25
Admitting: HOSPITALIST
Payer: MEDICARE

## 2025-02-25 DIAGNOSIS — R91.1 LUNG NODULE: ICD-10-CM

## 2025-02-25 PROCEDURE — 71250 CT THORAX DX C-: CPT

## 2025-03-05 ENCOUNTER — LAB (OUTPATIENT)
Dept: LAB | Facility: HOSPITAL | Age: 85
End: 2025-03-05
Payer: MEDICARE

## 2025-03-05 ENCOUNTER — TRANSCRIBE ORDERS (OUTPATIENT)
Dept: ADMINISTRATIVE | Facility: HOSPITAL | Age: 85
End: 2025-03-05
Payer: MEDICARE

## 2025-03-05 DIAGNOSIS — J44.9 CHRONIC OBSTRUCTIVE PULMONARY DISEASE, UNSPECIFIED COPD TYPE: ICD-10-CM

## 2025-03-05 DIAGNOSIS — R06.09 DYSPNEA ON EXERTION: Primary | ICD-10-CM

## 2025-03-05 DIAGNOSIS — R06.09 DYSPNEA ON EXERTION: ICD-10-CM

## 2025-03-05 LAB
BASOPHILS # BLD AUTO: 0.03 10*3/MM3 (ref 0–0.2)
BASOPHILS NFR BLD AUTO: 0.3 % (ref 0–1.5)
DEPRECATED RDW RBC AUTO: 43.7 FL (ref 37–54)
EOSINOPHIL # BLD AUTO: 0.16 10*3/MM3 (ref 0–0.4)
EOSINOPHIL NFR BLD AUTO: 1.5 % (ref 0.3–6.2)
ERYTHROCYTE [DISTWIDTH] IN BLOOD BY AUTOMATED COUNT: 13.4 % (ref 12.3–15.4)
HCT VFR BLD AUTO: 34.8 % (ref 34–46.6)
HGB BLD-MCNC: 11.4 G/DL (ref 12–15.9)
IMM GRANULOCYTES # BLD AUTO: 0.06 10*3/MM3 (ref 0–0.05)
IMM GRANULOCYTES NFR BLD AUTO: 0.6 % (ref 0–0.5)
LYMPHOCYTES # BLD AUTO: 2.69 10*3/MM3 (ref 0.7–3.1)
LYMPHOCYTES NFR BLD AUTO: 25 % (ref 19.6–45.3)
MCH RBC QN AUTO: 29.5 PG (ref 26.6–33)
MCHC RBC AUTO-ENTMCNC: 32.8 G/DL (ref 31.5–35.7)
MCV RBC AUTO: 89.9 FL (ref 79–97)
MONOCYTES # BLD AUTO: 0.54 10*3/MM3 (ref 0.1–0.9)
MONOCYTES NFR BLD AUTO: 5 % (ref 5–12)
NEUTROPHILS NFR BLD AUTO: 67.6 % (ref 42.7–76)
NEUTROPHILS NFR BLD AUTO: 7.28 10*3/MM3 (ref 1.7–7)
NRBC BLD AUTO-RTO: 0 /100 WBC (ref 0–0.2)
PLATELET # BLD AUTO: 282 10*3/MM3 (ref 140–450)
PMV BLD AUTO: 11 FL (ref 6–12)
RBC # BLD AUTO: 3.87 10*6/MM3 (ref 3.77–5.28)
WBC NRBC COR # BLD AUTO: 10.76 10*3/MM3 (ref 3.4–10.8)

## 2025-03-05 PROCEDURE — 85025 COMPLETE CBC W/AUTO DIFF WBC: CPT

## 2025-03-05 PROCEDURE — 36415 COLL VENOUS BLD VENIPUNCTURE: CPT

## 2025-03-07 ENCOUNTER — TRANSCRIBE ORDERS (OUTPATIENT)
Dept: ADMINISTRATIVE | Facility: HOSPITAL | Age: 85
End: 2025-03-07
Payer: MEDICARE

## 2025-03-07 DIAGNOSIS — J44.9 CHRONIC OBSTRUCTIVE PULMONARY DISEASE, UNSPECIFIED COPD TYPE: Primary | ICD-10-CM

## 2025-03-13 ENCOUNTER — OFFICE VISIT (OUTPATIENT)
Dept: GASTROENTEROLOGY | Facility: CLINIC | Age: 85
End: 2025-03-13
Payer: MEDICARE

## 2025-03-13 VITALS
SYSTOLIC BLOOD PRESSURE: 132 MMHG | HEIGHT: 61 IN | BODY MASS INDEX: 42.29 KG/M2 | DIASTOLIC BLOOD PRESSURE: 70 MMHG | WEIGHT: 224 LBS

## 2025-03-13 DIAGNOSIS — K58.0 IRRITABLE BOWEL SYNDROME WITH DIARRHEA: Primary | ICD-10-CM

## 2025-03-13 DIAGNOSIS — Z86.0101 PERSONAL HISTORY OF ADENOMATOUS AND SERRATED COLON POLYPS: ICD-10-CM

## 2025-03-13 DIAGNOSIS — K22.70 BARRETT'S ESOPHAGUS WITHOUT DYSPLASIA: ICD-10-CM

## 2025-03-13 PROCEDURE — 99213 OFFICE O/P EST LOW 20 MIN: CPT | Performed by: INTERNAL MEDICINE

## 2025-03-13 PROCEDURE — 3075F SYST BP GE 130 - 139MM HG: CPT | Performed by: INTERNAL MEDICINE

## 2025-03-13 PROCEDURE — 1159F MED LIST DOCD IN RCRD: CPT | Performed by: INTERNAL MEDICINE

## 2025-03-13 PROCEDURE — 3078F DIAST BP <80 MM HG: CPT | Performed by: INTERNAL MEDICINE

## 2025-03-13 PROCEDURE — 1160F RVW MEDS BY RX/DR IN RCRD: CPT | Performed by: INTERNAL MEDICINE

## 2025-03-13 NOTE — PROGRESS NOTES
"    PATIENT INFORMATION  Katarzyna Ku       - 1940    CHIEF COMPLAINT  Chief Complaint   Patient presents with    Casper's esophagus    Irritable Bowel Syndrome    Diarrhea       HISTORY OF PRESENT ILLNESS  Here for follow up but is interested in getting her endoscopies schedled and has had SSBE dan Polyps nd is in recall for 2025 but would prefer it in the summer in that she isplanning on relocating in the fall sometime    Her bowels are good on her HYosciamine, and no GERD complaints     Only a intermtittant RUQ pain w/o any known associations and nothing helps it but is is selflimited and brief        REVIEWED PERTINENT RESULTS/ LABS  Lab Results   Component Value Date    CASEREPORT  2022     Surgical Pathology Report                         Case: [id number]                                  Authorizing Provider:  Afshin Schultz MD        Collected:           2022 11:29 AM          Ordering Location:     [facility]   Received:            2022 12:14 PM                                 OR                                                                           Pathologist:           Monserrat Clarke MD                                                          Specimens:   1) - Breast, Left, low axilla included                                                              2) - [place] Lymph Node, #1                                                               FINALDX  2022     1. Submitted as \"Left Breast and Low Axillary Contents, Mastectomy with [place] Lymph Node Biopsy\" (1,112 grams):    A. INVASIVE DUCTAL CARCINOMA, Poorly differentiated; [place] Histologic Grade III/III      (tubule score = 3, nuclear score = 3, mitoses score = 3):   1. Tumor size:  30 mm (gross measurement).   2. Margins are negative for invasive carcinoma; Closest distance: Invasive carcinoma is present       10 mm from the anterior margin.     3. Positive for several foci of " "lymphovascular space invasion.   4. Positive for perineural invasion.  B. 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 Solid and Cribriform types with associated necrosis.     2. Extent of DCIS:  40 mm (based on the slices involved).     3. Margins are negative for in situ carcinoma; Closest distance: DCIS is present >10 mm from the                     anterior margin.   C. Two clips retrieved from within invasive carcinoma.   D. Background fibrous tissue and dystrophic calcifications, suggesting prior treatment effect.    E. Four lymph nodes, negative for carcinoma (0/4).    F. Unremarkable skin and nipple.   G. See Synoptic Report (to include part 2).    2. \"[place] Lymph Node #1,\" Biopsy:   A. One lymph node, negative for carcinoma (0/1).     Jab/kds        Lab Results   Component Value Date    HGB 11.4 (L) 03/05/2025    MCV 89.9 03/05/2025     03/05/2025    ALT 13 11/06/2024    AST 16 11/06/2024    HGBA1C 5.90 (H) 02/17/2022    FERRITIN 87.00 11/15/2022    IRON 49 11/15/2022    TIBC 280 (L) 11/15/2022      CT Chest Without Contrast Diagnostic  Result Date: 2/25/2025  Narrative: CT CHEST WO CONTRAST DIAGNOSTIC Date of Exam: 2/25/2025 8:57 AM EST Indication: LUNG NODULE. Comparison: 2/21/2024, 2/23/2023 Technique: Axial CT images were obtained of the chest without contrast administration.  Sagittal and coronal reconstructions were performed.  Automated exposure control and iterative reconstruction methods were used. FINDINGS: Scattered atelectasis is noted. Scattered bronchiectasis is noted. Scattered bullous changes are also observed, more pronounced in the upper lobes. The findings indicate moderate changes of emphysema/COPD. No well-defined consolidations or pleural effusions are observed. Residual ill-defined focal density is seen at the medial aspect of the right lower lobe. This area measures 3.1 x 1.2 cm. This area is stable when compared to multiple prior studies given differences in " technique and slice positioning. A calcified granulomas again noted in the right lower lobe. No new suspicious pulmonary nodules or abnormal pulmonary masses are seen. No significant hilar, mediastinal, or axillary lymphadenopathy is observed. The heart and great vessels are stable. Mild atherosclerosis is noted. Coronary artery calcifications are identified. The thyroid gland is unremarkable. The esophagus is unremarkable. The limited evaluation of the upper abdomen demonstrates no evidence for acute abnormality. No acute osseous abnormalities are observed.     Impression: 1.No evidence for acute intrathoracic abnormality. 2.Moderate changes of emphysema/COPD are noted. 3.Stable somewhat ill-defined focal density at the medial aspect of the right lower lobe when compared to multiple prior studies. No new suspicious nodules are identified. 4.Coronary artery calcifications are identified. Electronically Signed: Miki Golden MD  2/25/2025 9:55 AM EST  Workstation ID: [id number]    Mammo Diagnostic Digital Tomosynthesis Right With CAD  Mammo Diagnostic Digital Tomosynthesis Right With CAD, US Breast Right Limited  Result Date: 2/20/2025  Narrative:  RIGHT BREAST DIGITAL DIAGNOSTIC MAMMOGRAM WITH TOMOSYNTHESIS  HISTORY: 84-year-old female with history of left mastectomy presents for further evaluation of screen-detected mass in the right breast at 9:00.   FILMS COMPARED: Mammograms 12/30/2024, 11/16/2023, 11/7/2022, 10/30/2020    TECHNIQUE:  The following views were obtained: Right craniocaudal spot with tomosynthesis, right mediolateral oblique spot with tomosynthesis, right mediolateral. This examination was reviewed with the aid of R2 aided detection.   FINDINGS:  Breast Density: There are scattered areas of fibroglandular density.  There are diffuse round and large rodlike calcifications.  1. Diagnostic evaluation reveals a persistent oval, indistinct, equal density mass measuring 10 mm at 9-10:00.  2. There is a  similar oval, indistinct, equal density mass measuring 5 mm at 9-10:00.   RIGHT BREAST REALTIME LIMITED BREAST ULTRASOUND High resolution real-time targeted ultrasound scanning was performed by the ultrasound technologist. Still images were obtained by the ultrasound technologist and submitted for radiologist review.  FINDINGS:   1. There is an irregular hypoechoic indistinct and microlobulated hypoechoic mass at 9:30, 5 cm from the nipple measuring 11 x 11 x 7 mm. This displays some internal color Doppler flow and correlates with the larger mammographic mass in question.  2. There is an oval indistinct hypoechoic mass at 10:00, 4 cm from the nipple measuring 7 x 4 x 6 mm which may correlate with the second smaller mass in question.        Impression:  1. Irregular mass at 9:30 is of high suspicion for malignancy. Recommendation is for ultrasound-guided core biopsy.  2. Complicated cyst versus solid mass at 10:00 may correlate with a second mammographic mass. Ultrasound-guided biopsy is recommended. If ultrasound biopsy does not resolve the mammographic finding, then a separate stereotactic biopsy would be recommended.  Results were discussed with the patient after the examination. She is aware that her primary clinical doctor will be in touch to schedule the biopsy.    The patient was sent a notification.  BI-RADS Category 4C: High Suspicion.    2/20/2025 3:51 PM by Luz Maria Talamantes MD on Workstation: [id number]        REVIEW OF SYSTEMS  Review of Systems   Constitutional:  Negative for activity change, chills, fever and unexpected weight change.   HENT:  Positive for trouble swallowing. Negative for congestion.    Eyes:  Negative for visual disturbance.   Respiratory:  Negative for shortness of breath.    Cardiovascular:  Negative for chest pain and palpitations.   Gastrointestinal:  Positive for abdominal distention, abdominal pain (cramping), constipation and diarrhea. Negative for blood in stool.   Endocrine:  Negative for cold intolerance and heat intolerance.   Genitourinary:  Negative for hematuria.   Musculoskeletal:  Negative for gait problem.   Skin:  Negative for color change.   Allergic/Immunologic: Negative for immunocompromised state.   Neurological:  Negative for weakness and light-headedness.   Hematological:  Negative for adenopathy.   Psychiatric/Behavioral:  Negative for sleep disturbance. The patient is not nervous/anxious.          ACTIVE PROBLEMS  Patient Active Problem List    Diagnosis     Personal history of adenomatous and serrated colon polyps [Z86.0101]     Fecal smearing [R15.1]     Anemia [D64.9]     Malignant neoplasm of central portion of left breast in female, estrogen receptor positive [C50.112, Z17.0]     Influenza A [J10.1]     Influenza A (H1N1) [J10.1]     Irritable bowel syndrome with diarrhea [K58.0]     Encounter for screening for malignant neoplasm of colon [Z12.11]     Personal history of colonic polyps [Z86.0100]     Casper's esophagus without dysplasia [K22.70]     Abdominal pain, right upper quadrant [R10.11]     Osteoarthritis of knee [M17.9]     Chronic obstructive pulmonary disease [J44.9]     Hypertension [I10]     Primary localized osteoarthrosis [M19.91]     Midline cystocele [N81.11]     Mixed incontinence [N39.46]     Muscular atrophy [M62.50]     Atrophic vaginitis [N95.2]     Prolapse of vaginal vault after hysterectomy [N99.3]     Enterocele [K46.9]          PAST MEDICAL HISTORY  Past Medical History:   Diagnosis Date    Anxiety     Arthritis     Asthma     Casper esophagus     Breast cancer     Left 2021    Colon polyp     COPD (chronic obstructive pulmonary disease)     Dr Sharma follows, clearance in media 2/2022    Depression     Diarrhea     Diverticulitis of colon     Emphysema lung     Emphysema of lung     Flu 12/2021    GERD (gastroesophageal reflux disease)     Hx of radiation therapy     left breast cancer, 2021    Hypertension     Requires supplemental  oxygen     3L/NC w/exertion    Skin cancer     Skin cancer     Sleep apnea     w/CPAP and oxygen at 3L         SURGICAL HISTORY  Past Surgical History:   Procedure Laterality Date    BILATERAL SALPINGO OOPHORECTOMY      BLADDER SURGERY      bladder lift    BREAST LUMPECTOMY Left     breast ca    CARPAL TUNNEL RELEASE      BOTH HANDS    CATARACT EXTRACTION W/ INTRAOCULAR LENS  IMPLANT, BILATERAL      COLONOSCOPY N/A 02/10/2017    Procedure: COLONOSCOPY with polypectomy;  Surgeon: Weston Sue MD;  Location: [facility] OR;  Service:     COLONOSCOPY N/A 08/12/2020    Procedure: COLONOSCOPY;  Surgeon: Weston Sue MD;  Location: [facility] OR;  Service: Gastroenterology;  Laterality: N/A;  TRANSVERSE COLON POLYP  DIVERTICULOSIS  CECAL TIME at 1356  SIGMOID COLON POLYP    ENDOSCOPY N/A 02/10/2017    Procedure: ESOPHAGOGASTRODUODENOSCOPY with biopsies;  Surgeon: Weston Sue MD;  Location: [facility] OR;  Service:     ENDOSCOPY N/A 08/12/2020    Procedure: ESOPHAGOGASTRODUODENOSCOPY;  Surgeon: Weston Sue MD;  Location: [facility] OR;  Service: Gastroenterology;  Laterality: N/A;  DISTAL ESOPHAGUS BIOPSY  STOCKTON'S ESOPHAGUS    HYSTERECTOMY      MASTECTOMY      MASTECTOMY W/ SENTINEL NODE BIOPSY Left 02/23/2022    Procedure: BREAST MASTECTOMY WITH SENTINEL NODE BIOPSY;  Surgeon: Afshin Schultz MD;  Location: [facility];  Service: General;  Laterality: Left;    SKIN BIOPSY      TOTAL KNEE ARTHROPLASTY Bilateral          FAMILY HISTORY  Family History   Problem Relation Age of Onset    Hypertension Daughter     Breast cancer Daughter     Allergies Daughter     Malig Hyperthermia Neg Hx     Cancer Neg Hx          SOCIAL HISTORY  Social History     Occupational History    Not on file   Tobacco Use    Smoking status: Former     Current packs/day: 0.00     Average packs/day: 2.0 packs/day for 30.0 years (60.0 ttl pk-yrs)     Types: Cigarettes     Start date: 1/30/1960     Quit date:  1990     Years since quittin.1     Passive exposure: Never    Smokeless tobacco: Never   Vaping Use    Vaping status: Never Used   Substance and Sexual Activity    Alcohol use: No    Drug use: No    Sexual activity: Defer         CURRENT MEDICATIONS    Current Outpatient Medications:     acetaminophen (TYLENOL) 500 MG tablet, Take 2 tablets by mouth Every 6 (Six) Hours As Needed for Mild Pain., Disp: , Rfl:     albuterol (PROVENTIL) (2.5 MG/3ML) 0.083% nebulizer solution, Take 2.5 mg by nebulization 2 (Two) Times a Day. Can use 4 times a day if needed, Disp: , Rfl:     albuterol sulfate  (90 Base) MCG/ACT inhaler, Inhale 2 puffs Every 4 (Four) Hours As Needed for Wheezing or Shortness of Air., Disp: , Rfl:     ALPRAZolam (XANAX) 0.5 MG tablet, Take 1 tablet by mouth Daily As Needed for Anxiety., Disp: , Rfl:     amLODIPine (NORVASC) 5 MG tablet, Every Night., Disp: , Rfl:     benazepril (LOTENSIN) 40 MG tablet, Take 1 tablet by mouth Daily., Disp: , Rfl:     benzonatate (TESSALON) 100 MG capsule, , Disp: , Rfl:     betamethasone dipropionate 0.05 % cream, , Disp: , Rfl:     Calcium Acetate-Magnesium Carb 450-200 MG tablet, Take 1 tablet by mouth Daily., Disp: , Rfl:     chlorhexidine (PERIDEX) 0.12 % solution, Apply 15 mL to the mouth or throat Daily., Disp: , Rfl:     Cholecalciferol (Vitamin D3) 20 MCG (800 UNIT) tablet, Take 50 mcg by mouth Daily., Disp: , Rfl:     citalopram (CeleXA) 40 MG tablet, Take 1 tablet by mouth Every Morning., Disp: , Rfl:     Cyanocobalamin (VITAMIN B-12) 5000 MCG tablet dispersible, Take 1 tablet by mouth Daily., Disp: , Rfl:     estradiol (ESTRACE) 0.1 MG/GM vaginal cream, , Disp: , Rfl:     fluconazole (DIFLUCAN) 100 MG tablet, Take 1 tablet by mouth As Needed., Disp: , Rfl:     Fluticasone-Umeclidin-Vilant (TRELEGY) 100-62.5-25 MCG/INH inhaler, Inhale 1 puff Daily., Disp: , Rfl:     guaiFENesin (MUCINEX) 600 MG 12 hr tablet, Take 1 tablet by mouth Every 12 (Twelve)  "Hours. (Patient taking differently: Take 1 tablet by mouth 2 (Two) Times a Day As Needed for Cough or Congestion.), Disp: 30 tablet, Rfl: 0    hydrALAZINE (APRESOLINE) 50 MG tablet, Take 1 tablet by mouth 3 (Three) Times a Day., Disp: , Rfl:     hydroCHLOROthiazide (MICROZIDE) 12.5 MG capsule, , Disp: , Rfl:     HYDROcodone-acetaminophen (NORCO) 7.5-325 MG per tablet, Take 1 tablet by mouth Every 6 (Six) Hours As Needed., Disp: , Rfl:     hydrocortisone 2.5 % ointment, Apply  topically to the appropriate area as directed See Admin Instructions. Apply topically to the affected areas twice daily as directed, Disp: 28.35 g, Rfl: 11    hyoscyamine (LEVBID) 0.375 MG 12 hr tablet, Take 1 tablet by mouth Every 12 (Twelve) Hours As Needed for Cramping., Disp: 180 tablet, Rfl: 3    magnesium oxide (MAG-OX) 400 MG tablet, Take 1 tablet by mouth Daily., Disp: , Rfl:     meloxicam (MOBIC) 15 MG tablet, Take 1 tablet by mouth Daily., Disp: , Rfl:     Mirabegron ER (MYRBETRIQ) 50 MG tablet sustained-release 24 hour 24 hr tablet, Take 50 mg by mouth every night at bedtime., Disp: , Rfl:     mometasone (ELOCON) 0.1 % cream, Apply 0.1 application topically to the appropriate area as directed As Needed., Disp: , Rfl:     Multiple Vitamins-Minerals (MULTIVITAMIN ADULT PO), Take 1 tablet by mouth Daily., Disp: , Rfl:     O2 (OXYGEN), Inhale 3 L/min 1 (One) Time. Uses w/activity, Disp: , Rfl:     omeprazole (priLOSEC) 40 MG capsule, TAKE ONE CAPSULE BY MOUTH TWICE A DAY BEFORE MEALS, Disp: 180 capsule, Rfl: 3    ALLERGIES  Azithromycin, Sulfa antibiotics, and Codeine    VITALS  Vitals:    03/13/25 0906   BP: 132/70   BP Location: Left arm   Patient Position: Sitting   Cuff Size: Large Adult   Weight: 102 kg (224 lb)   Height: 154.5 cm (60.83\")       PHYSICAL EXAM  Debilities/Disabilities Identified: None  Emotional Behavior: Appropriate  Wt Readings from Last 3 Encounters:   03/13/25 102 kg (224 lb)   11/21/24 99.7 kg (219 lb 12.8 oz) " "  11/06/24 101 kg (222 lb 3.2 oz)     Ht Readings from Last 1 Encounters:   03/13/25 154.5 cm (60.83\")     Body mass index is 42.56 kg/m².  Physical Exam  Constitutional:       Appearance: She is well-developed. She is not diaphoretic.   Eyes:      General: No scleral icterus.     Conjunctiva/sclera: Conjunctivae normal.      Pupils: Pupils are equal, round, and reactive to light.   Neck:      Thyroid: No thyromegaly.   Cardiovascular:      Rate and Rhythm: Normal rate and regular rhythm.      Heart sounds: Normal heart sounds. No murmur heard.     No gallop.   Pulmonary:      Effort: Pulmonary effort is normal.      Breath sounds: Normal breath sounds. No wheezing or rales.   Abdominal:      General: Bowel sounds are normal. There is no distension or abdominal bruit.      Palpations: Abdomen is soft. There is no shifting dullness, fluid wave or mass.      Tenderness: There is no abdominal tenderness. There is no guarding. Negative signs include Madrigal's sign.      Hernia: There is no hernia in the ventral area.   Musculoskeletal:         General: Normal range of motion.      Cervical back: Normal range of motion and neck supple.   Lymphadenopathy:      Cervical: No cervical adenopathy.   Skin:     General: Skin is warm and dry.      Findings: No erythema or rash.   Neurological:      Mental Status: She is alert and oriented to person, place, and time.   Psychiatric:         Mood and Affect: Mood normal.         Behavior: Behavior normal.         CLINICAL DATA REVIEWED   reviewed previous lab results and integrated with today's visit, reviewed notes from other physicians and/or last GI encounter, reviewed previous endoscopy results and available photos, reviewed surgical pathology results from previous biopsies    ASSESSMENT  Diagnoses and all orders for this visit:    Irritable bowel syndrome with diarrhea    Casper's esophagus without dysplasia  -     Case Request; Standing  -     Case Request    Personal history " of adenomatous and serrated colon polyps  -     Case Request; Standing  -     Case Request    Other orders  -     Follow Anesthesia Guidelines / Protocol; Future          PLAN  Will proceed to EGD Colon as per her wishes prior to Relocating    Return if symptoms worsen or fail to improve.    I have discussed the above plan with the patient.  They verbalize understanding and are in agreement with the plan.  They have been advised to contact the office for any questions, concerns, or changes related to their health.

## 2025-03-18 ENCOUNTER — HOSPITAL ENCOUNTER (OUTPATIENT)
Dept: MAMMOGRAPHY | Facility: HOSPITAL | Age: 85
Discharge: HOME | End: 2025-03-18
Payer: MEDICARE

## 2025-03-18 ENCOUNTER — HOSPITAL ENCOUNTER (OUTPATIENT)
Dept: ULTRASOUND IMAGING | Facility: HOSPITAL | Age: 85
Discharge: HOME | End: 2025-03-18
Payer: MEDICARE

## 2025-03-18 DIAGNOSIS — N63.0 BREAST MASS IN FEMALE: ICD-10-CM

## 2025-03-18 PROCEDURE — 88342 IMHCHEM/IMCYTCHM 1ST ANTB: CPT | Performed by: FAMILY MEDICINE

## 2025-03-18 PROCEDURE — 88341 IMHCHEM/IMCYTCHM EA ADD ANTB: CPT | Performed by: FAMILY MEDICINE

## 2025-03-18 PROCEDURE — 88305 TISSUE EXAM BY PATHOLOGIST: CPT | Performed by: FAMILY MEDICINE

## 2025-03-18 PROCEDURE — 77065 DX MAMMO INCL CAD UNI: CPT | Performed by: RADIOLOGY

## 2025-03-18 PROCEDURE — 88360 TUMOR IMMUNOHISTOCHEM/MANUAL: CPT | Performed by: FAMILY MEDICINE

## 2025-03-18 PROCEDURE — 19083 BX BREAST 1ST LESION US IMAG: CPT | Performed by: RADIOLOGY

## 2025-03-18 PROCEDURE — 25010000002 LIDOCAINE 1 % SOLUTION: Performed by: FAMILY MEDICINE

## 2025-03-18 PROCEDURE — 25010000002 LIDOCAINE 1% - EPINEPHRINE 1:100000 1 %-1:100000 SOLUTION: Performed by: FAMILY MEDICINE

## 2025-03-18 PROCEDURE — A4648 IMPLANTABLE TISSUE MARKER: HCPCS

## 2025-03-18 RX ORDER — LIDOCAINE HYDROCHLORIDE AND EPINEPHRINE 10; 10 MG/ML; UG/ML
5 INJECTION, SOLUTION INFILTRATION; PERINEURAL ONCE
Status: COMPLETED | OUTPATIENT
Start: 2025-03-18 | End: 2025-03-18

## 2025-03-18 RX ORDER — LIDOCAINE HYDROCHLORIDE 10 MG/ML
10 INJECTION, SOLUTION INFILTRATION; PERINEURAL ONCE
Status: COMPLETED | OUTPATIENT
Start: 2025-03-18 | End: 2025-03-18

## 2025-03-18 RX ADMIN — LIDOCAINE HYDROCHLORIDE 10 ML: 10 INJECTION, SOLUTION INFILTRATION; PERINEURAL at 09:39

## 2025-03-18 RX ADMIN — LIDOCAINE HYDROCHLORIDE AND EPINEPHRINE 5 ML: 10; 10 INJECTION, SOLUTION INFILTRATION; PERINEURAL at 09:39

## 2025-03-20 ENCOUNTER — TELEPHONE (OUTPATIENT)
Dept: MAMMOGRAPHY | Facility: HOSPITAL | Age: 85
End: 2025-03-20
Payer: MEDICARE

## 2025-03-20 DIAGNOSIS — C50.919 MALIGNANT NEOPLASM OF FEMALE BREAST, UNSPECIFIED ESTROGEN RECEPTOR STATUS, UNSPECIFIED LATERALITY, UNSPECIFIED SITE OF BREAST: Primary | ICD-10-CM

## 2025-03-20 NOTE — TELEPHONE ENCOUNTER
Dr. Jimenez spoke with pt @ 6596 regarding malignant bx results. Pt reports no abnormal issues with bx site. I shared details of her surgical consultation appt: Dr. Valencia Cedillo at [place] on 4/15/25 @ 1100. Pt agreeable to appt date/time. An appt was offered for a week sooner but pt declined as she prefers the [place] location. Dr. Jimenez notified of appt date/time. AE

## 2025-03-21 ENCOUNTER — HOSPITAL ENCOUNTER (OUTPATIENT)
Dept: PULMONOLOGY | Facility: HOSPITAL | Age: 85
Discharge: HOME | End: 2025-03-21
Payer: MEDICARE

## 2025-03-21 VITALS — OXYGEN SATURATION: 92 % | RESPIRATION RATE: 16 BRPM | HEART RATE: 70 BPM

## 2025-03-21 DIAGNOSIS — J44.9 CHRONIC OBSTRUCTIVE PULMONARY DISEASE, UNSPECIFIED COPD TYPE: ICD-10-CM

## 2025-03-21 LAB
BDY SITE: ABNORMAL
HGB BLDA-MCNC: 11.7 G/DL (ref 13.5–17.5)
Lab: ABNORMAL

## 2025-03-21 PROCEDURE — 94726 PLETHYSMOGRAPHY LUNG VOLUMES: CPT

## 2025-03-21 PROCEDURE — 94729 DIFFUSING CAPACITY: CPT

## 2025-03-21 PROCEDURE — 82820 HEMOGLOBIN-OXYGEN AFFINITY: CPT

## 2025-03-21 PROCEDURE — 94618 PULMONARY STRESS TESTING: CPT

## 2025-03-21 PROCEDURE — 94060 EVALUATION OF WHEEZING: CPT

## 2025-03-21 RX ORDER — ALBUTEROL SULFATE 0.83 MG/ML
2.5 SOLUTION RESPIRATORY (INHALATION) ONCE
Status: COMPLETED | OUTPATIENT
Start: 2025-03-21 | End: 2025-03-21

## 2025-03-21 RX ADMIN — ALBUTEROL SULFATE 2.5 MG: 2.5 SOLUTION RESPIRATORY (INHALATION) at 08:15

## 2025-03-21 NOTE — PROGRESS NOTES
Exercise Oximetry    Patient Name:Katarzyna Ku   MRN: [id number]   Date: 03/21/25             ROOM AIR BASELINE   SpO2% 93   Heart Rate 68   Blood Pressure      EXERCISE ON ROOM AIR SpO2% EXERCISE ON O2 @ 1 LPM SpO2%   1 MINUTE 88 1 MINUTE    2 MINUTES 87 2 MINUTES    3 MINUTES  3 MINUTES 91   4 MINUTES  4 MINUTES 95   5 MINUTES  5 MINUTES 93   6 MINUTES  6 MINUTES 90              Distance Walked   Distance Walked  148 meters   Dyspnea (Ky Scale)   Dyspnea (Ky Scale)  2/7   Fatigue (Ky Scale)   Fatigue (Ky Scale)  2/7   SpO2% Post Exercise   SpO2% Post Exercise  92  Room air   HR Post Exercise   HR Post Exercise   102   Time to Recovery   Time to Recovery  2 min     Comments:

## 2025-03-24 ENCOUNTER — PATIENT OUTREACH (OUTPATIENT)
Dept: OTHER | Facility: HOSPITAL | Age: 85
End: 2025-03-24
Payer: MEDICARE

## 2025-03-24 LAB
CYTO UR: NORMAL
LAB AP CASE REPORT: NORMAL
LAB AP CLINICAL INFORMATION: NORMAL
LAB AP SPECIAL STAINS: NORMAL
LAB AP SYNOPTIC CHECKLIST: NORMAL
PATH REPORT.ADDENDUM SPEC: NORMAL
PATH REPORT.FINAL DX SPEC: NORMAL
PATH REPORT.GROSS SPEC: NORMAL

## 2025-03-24 NOTE — PROGRESS NOTES
Introductory call placed to Ms. Ku. Introduced myself and navigation services. She stated she is aware of appointment time and place. She will bring her daughter with her to the consult. She has no immediate needs at this time. Will call her after her surgery appointment April 15th.

## 2025-03-25 ENCOUNTER — TRANSCRIBE ORDERS (OUTPATIENT)
Dept: ADMINISTRATIVE | Facility: HOSPITAL | Age: 85
End: 2025-03-25
Payer: MEDICARE

## 2025-03-25 ENCOUNTER — TRANSCRIBE ORDERS (OUTPATIENT)
Dept: CARDIOLOGY | Facility: HOSPITAL | Age: 85
End: 2025-03-25
Payer: MEDICARE

## 2025-03-25 DIAGNOSIS — I20.89 ATYPICAL ANGINA: Primary | ICD-10-CM

## 2025-03-31 ENCOUNTER — TELEPHONE (OUTPATIENT)
Dept: GASTROENTEROLOGY | Facility: CLINIC | Age: 85
End: 2025-03-31
Payer: MEDICARE

## 2025-03-31 NOTE — TELEPHONE ENCOUNTER
PT MOVED HER FOLLOW UP VISIT UNTIL AFTER HER SCOPE 06/09/2025  NEXT AVAILABLE WASN'T UNTIL 06/26/2025  IF PT NEEDS TO BE SEEN SOONER, PLEASE CALL

## 2025-04-14 ENCOUNTER — APPOINTMENT (OUTPATIENT)
Dept: NUCLEAR MEDICINE | Facility: HOSPITAL | Age: 85
End: 2025-04-14
Payer: MEDICARE

## 2025-04-14 ENCOUNTER — APPOINTMENT (OUTPATIENT)
Dept: CARDIOLOGY | Facility: HOSPITAL | Age: 85
End: 2025-04-14
Payer: MEDICARE

## 2025-04-14 ENCOUNTER — HOSPITAL ENCOUNTER (OUTPATIENT)
Dept: CARDIOLOGY | Facility: HOSPITAL | Age: 85
Discharge: HOME | End: 2025-04-14
Payer: MEDICARE

## 2025-04-14 VITALS
BODY MASS INDEX: 43.98 KG/M2 | DIASTOLIC BLOOD PRESSURE: 66 MMHG | HEIGHT: 60 IN | SYSTOLIC BLOOD PRESSURE: 198 MMHG | HEART RATE: 95 BPM | WEIGHT: 224 LBS

## 2025-04-14 DIAGNOSIS — I20.89 ATYPICAL ANGINA: ICD-10-CM

## 2025-04-14 LAB
AORTIC DIMENSIONLESS INDEX: 0.61 (DI)
AV MEAN PRESS GRAD SYS DOP V1V2: 11.8 MMHG
AV VMAX SYS DOP: 226.2 CM/SEC
BH CV ECHO MEAS - ACS: 1.46 CM
BH CV ECHO MEAS - AO MAX PG: 20.5 MMHG
BH CV ECHO MEAS - AO ROOT DIAM: 3.1 CM
BH CV ECHO MEAS - AO V2 VTI: 41.2 CM
BH CV ECHO MEAS - AVA(I,D): 1.83 CM2
BH CV ECHO MEAS - EDV(CUBED): 109.2 ML
BH CV ECHO MEAS - EDV(MOD-SP2): 97 ML
BH CV ECHO MEAS - EDV(MOD-SP4): 91 ML
BH CV ECHO MEAS - EF(MOD-SP2): 64.9 %
BH CV ECHO MEAS - EF(MOD-SP4): 62.6 %
BH CV ECHO MEAS - ESV(CUBED): 33.6 ML
BH CV ECHO MEAS - ESV(MOD-SP2): 34 ML
BH CV ECHO MEAS - ESV(MOD-SP4): 34 ML
BH CV ECHO MEAS - FS: 32.5 %
BH CV ECHO MEAS - IVS/LVPW: 1.06 CM
BH CV ECHO MEAS - IVSD: 1.09 CM
BH CV ECHO MEAS - LA DIMENSION: 3.9 CM
BH CV ECHO MEAS - LAT PEAK E' VEL: 11.2 CM/SEC
BH CV ECHO MEAS - LV DIASTOLIC VOL/BSA (35-75): 46.5 CM2
BH CV ECHO MEAS - LV MASS(C)D: 183.1 GRAMS
BH CV ECHO MEAS - LV MAX PG: 5.7 MMHG
BH CV ECHO MEAS - LV MEAN PG: 2.5 MMHG
BH CV ECHO MEAS - LV SYSTOLIC VOL/BSA (12-30): 17.4 CM2
BH CV ECHO MEAS - LV V1 MAX: 119.2 CM/SEC
BH CV ECHO MEAS - LV V1 VTI: 25.3 CM
BH CV ECHO MEAS - LVIDD: 4.8 CM
BH CV ECHO MEAS - LVIDS: 3.2 CM
BH CV ECHO MEAS - LVOT AREA: 3 CM2
BH CV ECHO MEAS - LVOT DIAM: 1.95 CM
BH CV ECHO MEAS - LVPWD: 1.03 CM
BH CV ECHO MEAS - MED PEAK E' VEL: 8.4 CM/SEC
BH CV ECHO MEAS - MV A DUR: 0.13 SEC
BH CV ECHO MEAS - MV A MAX VEL: 143.1 CM/SEC
BH CV ECHO MEAS - MV DEC SLOPE: 636.2 CM/SEC2
BH CV ECHO MEAS - MV DEC TIME: 0.2 SEC
BH CV ECHO MEAS - MV E MAX VEL: 133 CM/SEC
BH CV ECHO MEAS - MV E/A: 0.93
BH CV ECHO MEAS - MV MAX PG: 9.1 MMHG
BH CV ECHO MEAS - MV MEAN PG: 4.4 MMHG
BH CV ECHO MEAS - MV P1/2T: 70.2 MSEC
BH CV ECHO MEAS - MV V2 VTI: 43 CM
BH CV ECHO MEAS - MVA(P1/2T): 3.1 CM2
BH CV ECHO MEAS - MVA(VTI): 1.76 CM2
BH CV ECHO MEAS - PA ACC TIME: 0.16 SEC
BH CV ECHO MEAS - PA V2 MAX: 134.7 CM/SEC
BH CV ECHO MEAS - PI END-D VEL: 166.8 CM/SEC
BH CV ECHO MEAS - PULM A REVS DUR: 0.14 SEC
BH CV ECHO MEAS - PULM A REVS VEL: 31.4 CM/SEC
BH CV ECHO MEAS - PULM DIAS VEL: 59.9 CM/SEC
BH CV ECHO MEAS - PULM S/D: 1.18
BH CV ECHO MEAS - PULM SYS VEL: 70.9 CM/SEC
BH CV ECHO MEAS - QP/QS: 1.8
BH CV ECHO MEAS - RAP SYSTOLE: 3 MMHG
BH CV ECHO MEAS - RV MAX PG: 3.9 MMHG
BH CV ECHO MEAS - RV V1 MAX: 99 CM/SEC
BH CV ECHO MEAS - RV V1 VTI: 20.7 CM
BH CV ECHO MEAS - RVDD: 2.01 CM
BH CV ECHO MEAS - RVOT DIAM: 2.9 CM
BH CV ECHO MEAS - RVSP: 49.7 MMHG
BH CV ECHO MEAS - SV(LVOT): 75.5 ML
BH CV ECHO MEAS - SV(MOD-SP2): 63 ML
BH CV ECHO MEAS - SV(MOD-SP4): 57 ML
BH CV ECHO MEAS - SV(RVOT): 136.2 ML
BH CV ECHO MEAS - SVI(LVOT): 38.5 ML/M2
BH CV ECHO MEAS - SVI(MOD-SP2): 32.2 ML/M2
BH CV ECHO MEAS - SVI(MOD-SP4): 29.1 ML/M2
BH CV ECHO MEAS - TAPSE (>1.6): 2.7 CM
BH CV ECHO MEAS - TR MAX PG: 46.7 MMHG
BH CV ECHO MEAS - TR MAX VEL: 341.7 CM/SEC
BH CV ECHO MEASUREMENTS AVERAGE E/E' RATIO: 13.57
BH CV XLRA - RV BASE: 3.9 CM
BH CV XLRA - RV LENGTH: 6.9 CM
BH CV XLRA - RV MID: 3.2 CM
BH CV XLRA - TDI S': 17.8 CM/SEC
LV EF BIPLANE MOD: 62.7 %
SINUS: 2.7 CM
STJ: 2.45 CM

## 2025-04-14 PROCEDURE — 93306 TTE W/DOPPLER COMPLETE: CPT

## 2025-04-14 PROCEDURE — 93306 TTE W/DOPPLER COMPLETE: CPT | Performed by: STUDENT IN AN ORGANIZED HEALTH CARE EDUCATION/TRAINING PROGRAM

## 2025-04-14 PROCEDURE — 25510000001 PERFLUTREN 6.52 MG/ML SUSPENSION 2 ML VIAL: Performed by: FAMILY MEDICINE

## 2025-04-14 RX ADMIN — SODIUM CHLORIDE 2 ML: 9 INJECTION INTRAMUSCULAR; INTRAVENOUS; SUBCUTANEOUS at 08:28

## 2025-04-15 ENCOUNTER — OFFICE VISIT (OUTPATIENT)
Dept: SURGERY | Facility: CLINIC | Age: 85
End: 2025-04-15
Payer: MEDICARE

## 2025-04-15 ENCOUNTER — PATIENT OUTREACH (OUTPATIENT)
Dept: OTHER | Facility: HOSPITAL | Age: 85
End: 2025-04-15
Payer: MEDICARE

## 2025-04-15 VITALS
HEIGHT: 60 IN | WEIGHT: 225.8 LBS | SYSTOLIC BLOOD PRESSURE: 150 MMHG | DIASTOLIC BLOOD PRESSURE: 80 MMHG | OXYGEN SATURATION: 93 % | BODY MASS INDEX: 44.33 KG/M2 | HEART RATE: 88 BPM

## 2025-04-15 DIAGNOSIS — C50.919 MALIGNANT NEOPLASM OF FEMALE BREAST, UNSPECIFIED ESTROGEN RECEPTOR STATUS, UNSPECIFIED LATERALITY, UNSPECIFIED SITE OF BREAST: Primary | ICD-10-CM

## 2025-04-15 NOTE — PROGRESS NOTES
General Surgery Breast Cancer History and Physical Exam     Summary:    Katarzyna Ku is a 84 y.o. 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      A multidisciplinary plan has been formulated for the patient:    (1) Breast Surgical Oncology:  - Her daughter and granddaughter are positive for BRCA2 pathogenic mutation.  She declines genetic testing right now.  -Nurse navigator consult.   -Surgical plan: She would like to proceed with right breast total mastectomy with sentinel lymph node biopsy.  -Plastic surgery referral declined.    (2) Medical Oncology:  - Follows with Dr. Sanders.    (3) Radiation Oncology:  -Will refer postoperatively for evaluation for radiation therapy.    (4) history of left breast cancer x 2:  -Diagnosed with stage I triple negative breast cancer of the left breast in 2009; status post lumpectomy and radiation therapy  -Diagnosed with stage II ER low, VA negative, HER2 positive left breast cancer in February 2022, now status post left total mastectomy.  Declined adjuvant chemotherapy.    Referring Provider: No ref. provider found    Chief Complaint: abnormal breast imaging    History of Present Illness: Ms. Katarzyna Ku is a 84 y.o. year old lady, seen at the request of No ref. provider found for a new diagnosis of right breast cancer.      This was initially detected as an imaging abnormality. She has had annual mammograms each year. She denies any prior history of abnormal mammograms or breast biopsies. Her work-up is detailed in the oncologic history below.     She denies any breast lumps, pain, skin changes, or nipple discharge. She has a family history of breast cancer in her daughter and granddaughter.  Both tested positive for BRCA2 pathogenic mutations. She denies any family history of ovarian cancer.     Workup of Current Diagnosis:    12/30/2024  Bilateral Screening Mammogram:  Breast Density: There are scattered areas of fibroglandular density. In the right breast in the middle one third at the 9 o'clock position is a 8 mm mass with indistinct borders. This is a new development. There are stable benign-appearing calcifications scattered in the right breast.  IMPRESSION:   There is an 8 mm oval mass of the 9 o'clock position of the right breast and further evaluation with a diagnostic mammogram and limited ultrasound study is recommended. A notification was sent to the patient.   BI-RADS Category 0: Incomplete    2/20/2025 Right Breast Diagnostic Mammogram with Ultrasound:   Breast Density: There are scattered areas of fibroglandular density. There are diffuse round and large rodlike calcifications.    1. Diagnostic evaluation reveals a persistent oval, indistinct, equal density mass measuring 10 mm at 9-10:00.   2. There is a similar oval, indistinct, equal density mass measuring 5 mm at 9-10:00.   RIGHT BREAST REALTIME LIMITED BREAST ULTRASOUND  High resolution real-time targeted ultrasound scanning was performed by the ultrasound technologist. Still images were obtained by the ultrasound technologist and submitted for radiologist review.  FINDINGS:    1. There is an irregular hypoechoic indistinct and microlobulated hypoechoic mass at 9:30, 5 cm from the nipple measuring 11 x 11 x 7 mm. This displays some internal color Doppler flow and correlates with the  larger mammographic mass in question.  2. There is an oval indistinct hypoechoic mass at 10:00, 4 cm from the nipple measuring 7 x 4 x 6 mm which may correlate with the second smaller mass in question.   IMPRESSION:   1. Irregular mass at 9:30 is of high suspicion for malignancy. Recommendation is for ultrasound-guided core biopsy.    2. Complicated cyst versus solid mass at 10:00 may correlate with a second mammographic mass. Ultrasound-guided biopsy is recommended. If ultrasound biopsy does not  "resolve the mammographic finding, then a separate stereotactic biopsy would be recommended.  Results were discussed with the patient after the examination. She is aware that her primary clinical doctor will be in touch to schedule the biopsy.   The patient was sent a notification.   BI-RADS Category 4C: High Suspicion.    3/18/2025 Right Breast US Guided Biopsy:   PROCEDURE: Written and verbal consent was obtained for ultrasound guided core biopsy of a 1.1 cm right breast mass located at 9:30, 5 cm from the nipple.  \"Time out\" was observed to verify the patient's identity and correct location of the breast abnormality. The presence of the lesion was confirmed ultrasound and a lateral approach was chosen. The breast was prepped and draped in the usual sterile fashion. 10 mL 1% lidocaine with epinephrine and 10 mL 1% lidocaine without epinephrine were  utilized for local anesthesia. A small skin incision was made with the coaxial needle and the biopsy needle was placed into the mass. A total of 5 core samples were obtained. The specimens were placed in formalin and forwarded to the Pathology Department. A mini cork shaped post biopsy marking clip was placed. Post procedure mammogram for marker placement was performed. The mini cork shaped clip is in satisfactory position. Upon completion of the procedure, compression was applied to the biopsy  site until all appreciable bleeding subsided and a sterile dressing was applied. Post biopsy instructions were reviewed with the patient by our clinical breast imaging staff. A written copy of these instructions was also given to the patient. The patient tolerated the procedure well and no immediate complications occurred.    SUMMARY: 14-gauge ultrasound guided core biopsy of a 1.1 cm right breast mass located at 9:30, 5 cm from the nipple.  A mini cork shaped post biopsy marking clip was placed. The mini cork shaped clip is in  satisfactory position.    IMPRESSION:  Pathology " results are malignant and are concordant with imaging.  RECOMMENDATION: Surgical consultation is advised.    3/18/2025 Pathology:   Final Diagnosis   1.  Breast, right 9: 30 o'clock position 5 CMFN, core biopsy: ([place] clip)   A.  Invasive mammary carcinoma, no special type (ductal), [place] histologic grade 3 (tubules = 3, nuclei = 3, mitoses = 3), measuring 6 mm maximally, and involving approximately 5 core fragments.     Past Medical History:   COPD, on nightly O2  HTN  GERD    Past Surgical History:    Past Surgical History:   Procedure Laterality Date    BILATERAL SALPINGO OOPHORECTOMY      BLADDER SURGERY      bladder lift    BREAST LUMPECTOMY Left     breast ca    CARPAL TUNNEL RELEASE      BOTH HANDS    CATARACT EXTRACTION W/ INTRAOCULAR LENS  IMPLANT, BILATERAL      COLONOSCOPY N/A 02/10/2017    Procedure: COLONOSCOPY with polypectomy;  Surgeon: Weston Sue MD;  Location: [facility] OR;  Service:     COLONOSCOPY N/A 08/12/2020    Procedure: COLONOSCOPY;  Surgeon: Weston Sue MD;  Location: [facility] OR;  Service: Gastroenterology;  Laterality: N/A;  TRANSVERSE COLON POLYP  DIVERTICULOSIS  CECAL TIME at 1356  SIGMOID COLON POLYP    ENDOSCOPY N/A 02/10/2017    Procedure: ESOPHAGOGASTRODUODENOSCOPY with biopsies;  Surgeon: Weston Sue MD;  Location: [facility] OR;  Service:     ENDOSCOPY N/A 08/12/2020    Procedure: ESOPHAGOGASTRODUODENOSCOPY;  Surgeon: Weston Sue MD;  Location: [facility] OR;  Service: Gastroenterology;  Laterality: N/A;  DISTAL ESOPHAGUS BIOPSY  STOCKTON'S ESOPHAGUS    HYSTERECTOMY      MASTECTOMY      MASTECTOMY W/ SENTINEL NODE BIOPSY Left 02/23/2022    Procedure: BREAST MASTECTOMY WITH SENTINEL NODE BIOPSY;  Surgeon: Afshin Schultz MD;  Location: [facility] OR;  Service: General;  Laterality: Left;    SKIN BIOPSY      TOTAL KNEE ARTHROPLASTY Bilateral      Family History:    As above    Social History:  Denies tobacco use  Occasional  alcohol use    Allergies:   Allergies   Allergen Reactions    Azithromycin Nausea And Vomiting     SEVERE GI UPSET    Sulfa Antibiotics Itching    Codeine Rash     Medications:     Current Outpatient Medications:     acetaminophen (TYLENOL) 500 MG tablet, Take 2 tablets by mouth Every 6 (Six) Hours As Needed for Mild Pain., Disp: , Rfl:     albuterol (PROVENTIL) (2.5 MG/3ML) 0.083% nebulizer solution, Take 2.5 mg by nebulization 2 (Two) Times a Day. Can use 4 times a day if needed, Disp: , Rfl:     albuterol sulfate  (90 Base) MCG/ACT inhaler, Inhale 2 puffs Every 4 (Four) Hours As Needed for Wheezing or Shortness of Air., Disp: , Rfl:     ALPRAZolam (XANAX) 0.5 MG tablet, Take 1 tablet by mouth Daily As Needed for Anxiety., Disp: , Rfl:     amLODIPine (NORVASC) 5 MG tablet, Every Night., Disp: , Rfl:     benazepril (LOTENSIN) 40 MG tablet, Take 1 tablet by mouth Daily., Disp: , Rfl:     benzonatate (TESSALON) 100 MG capsule, , Disp: , Rfl:     betamethasone dipropionate 0.05 % cream, , Disp: , Rfl:     Calcium Acetate-Magnesium Carb 450-200 MG tablet, Take 1 tablet by mouth Daily., Disp: , Rfl:     chlorhexidine (PERIDEX) 0.12 % solution, Apply 15 mL to the mouth or throat Daily., Disp: , Rfl:     Cholecalciferol (Vitamin D3) 20 MCG (800 UNIT) tablet, Take 50 mcg by mouth Daily., Disp: , Rfl:     citalopram (CeleXA) 40 MG tablet, Take 1 tablet by mouth Every Morning., Disp: , Rfl:     Cyanocobalamin (VITAMIN B-12) 5000 MCG tablet dispersible, Take 1 tablet by mouth Daily., Disp: , Rfl:     estradiol (ESTRACE) 0.1 MG/GM vaginal cream, , Disp: , Rfl:     fluconazole (DIFLUCAN) 100 MG tablet, Take 1 tablet by mouth As Needed., Disp: , Rfl:     Fluticasone-Umeclidin-Vilant (TRELEGY) 100-62.5-25 MCG/INH inhaler, Inhale 1 puff Daily., Disp: , Rfl:     guaiFENesin (MUCINEX) 600 MG 12 hr tablet, Take 1 tablet by mouth Every 12 (Twelve) Hours. (Patient taking differently: Take 1 tablet by mouth 2 (Two) Times a Day As  Needed for Cough or Congestion.), Disp: 30 tablet, Rfl: 0    hydrALAZINE (APRESOLINE) 50 MG tablet, Take 1 tablet by mouth 3 (Three) Times a Day., Disp: , Rfl:     hydroCHLOROthiazide (MICROZIDE) 12.5 MG capsule, , Disp: , Rfl:     HYDROcodone-acetaminophen (NORCO) 7.5-325 MG per tablet, Take 1 tablet by mouth Every 6 (Six) Hours As Needed., Disp: , Rfl:     hydrocortisone 2.5 % ointment, Apply  topically to the appropriate area as directed See Admin Instructions. Apply topically to the affected areas twice daily as directed, Disp: 28.35 g, Rfl: 11    hyoscyamine (LEVBID) 0.375 MG 12 hr tablet, Take 1 tablet by mouth Every 12 (Twelve) Hours As Needed for Cramping., Disp: 180 tablet, Rfl: 3    magnesium oxide (MAG-OX) 400 MG tablet, Take 1 tablet by mouth Daily., Disp: , Rfl:     meloxicam (MOBIC) 15 MG tablet, Take 1 tablet by mouth Daily., Disp: , Rfl:     Mirabegron ER (MYRBETRIQ) 50 MG tablet sustained-release 24 hour 24 hr tablet, Take 50 mg by mouth every night at bedtime., Disp: , Rfl:     mometasone (ELOCON) 0.1 % cream, Apply 0.1 application topically to the appropriate area as directed As Needed., Disp: , Rfl:     Multiple Vitamins-Minerals (MULTIVITAMIN ADULT PO), Take 1 tablet by mouth Daily., Disp: , Rfl:     O2 (OXYGEN), Inhale 3 L/min 1 (One) Time. Uses w/activity, Disp: , Rfl:     omeprazole (priLOSEC) 40 MG capsule, TAKE ONE CAPSULE BY MOUTH TWICE A DAY BEFORE MEALS, Disp: 180 capsule, Rfl: 3    Laboratory Values:    Labs from 3/5/2025 reviewed by me     Review of Systems:   Influenza-like illness: no fever, no  cough, no  sore throat, no  body aches, no loss of sense of taste or smell, no known exposure to person with Covid-19.  Constitutional: Negative for fevers or chills  HENT: Negative for hearing loss or runny nose  Eyes: Negative for vision changes or scleral icterus  Respiratory: Negative for cough or shortness of breath  Cardiovascular: Negative for chest pain or heart  palpitations  Gastrointestinal: Negative for abdominal pain, nausea, vomiting, constipation, melena, or hematochezia  Genitourinary: Negative for hematuria or dysuria  Musculoskeletal: Negative for joint swelling or gait instability  Neurologic: Negative for tremors or seizures  Psychiatric: Negative for suicidal ideations or depression  All other systems reviewed and negative    Physical Exam:   ECO - Symptomatic but completely ambulatory  Constitutional: Well-developed well-nourished, no acute distress  Eyes: Conjunctiva normal, sclera nonicteric  ENMT: Hearing grossly normal, oral mucosa moist  Neck: Supple, no palpable mass, trachea midline  Respiratory: Clear to auscultation, normal inspiratory effort  Cardiovascular: Regular rate, no peripheral edema, no jugular venous distention  Breast: symmetric  Right: No visible abnormalities on inspection while seated, with arms raised or hands on hips. No masses, skin changes, or nipple abnormalities.  Left: Left breast mastectomy, no masses or skin changes  Biopsy site appreciated in the right breast, otherwise no skin changes.   No clinical chest wall involvement.  Gastrointestinal: Soft, nontender  Lymphatics (palpable nodes): No cervical, supraclavicular or axillary lymphadenopathy  Skin:  Warm, dry, no rash on visualized skin surfaces  Musculoskeletal: Symmetric strength, normal gait  Psychiatric: Alert and oriented ×3, normal affect     Discussion:  I had an extensive discussion with the patient and her family about the nature of her breast cancer diagnosis. We reviewed the components of breast tissue including ducts and lobules. We reviewed her pathology report in detail. We reviewed breast cancer histology, including stage, grade, ER/TX receptors, HER2 receptors and how this applies to her diagnosis. We reviewed the basics of systemic and local/regional management of breast cancer.     The patient's clinical stage is documented as above. This was discussed  with the patient prior to initiation of treatment. All available pathology reports were discussed with the patient today. All treatment decisions were made via shared decision making with the patient. This patient was evaluated for appropriate ancillary referrals including, pre-treatment functional assessment, exercise program, nutrition program, genetics, and lymphedema clinic. This patient received preoperative and postoperative education. The patient was offered a breast reconstruction referral; risks and benefits were discussed today. The patient was educated on perioperative multimodal pain management strategies. Barriers to effective and efficient care are/will be evaluated by the nurse navigator.    We discussed that most breast cancer is not hereditary, however given her family history, this may play a role in her case. I believe genetic testing is warranted and could affect surgical decision making.     We reviewed potential surgical treatments to include partial mastectomy, mastectomy, sentinel lymph node biopsy and axillary node dissection and discussed the rationale associated with each approach. Regarding radiation therapy, we discussed that radiation is indicated in all cases of breast conservation and in only limited circumstances following mastectomy. We discussed that the primary goal of adjuvant radiation is to decrease the likelihood of local recurrence.     We discussed axillary staging. I described the procedure for sentinel lymph node biopsy in detail, including the preoperative injection of technetium sulfur colloid and intraoperative injection of lymphazurin blue dye. I explained that this is a mapping test and not a cancer test, that all of the lymph nodes containing these dyes will be removed for complete testing by pathology, and that the results could impact the decision for adjuvant treatment or additional surgery.    I described additional risks and potential complications associated  with surgery, including, but not limited to, bleeding, infection, complications related to blue dye, lymphedema, deformity/poor cosmetic result, chronic pain, neurovascular injury, numbness, seroma, hematoma, deep venous thrombosis, skin flap necrosis, disease recurrence and the possibility of requiring additional surgery. We also discussed other treatment options including the option of not undergoing any surgical treatment and the risks associated with this including disease progression. She expressed an understanding of these factors and wished to proceed.    We discussed that in her case, systemic treatment would involve endocrine therapy and possibly chemotherapy. She will be referred to medical oncology postoperatively to discuss this further.     PAULA ROSA M.D.  General and Endoscopic Surgery  [facility]    [address], [address]  [place], [place], [address]  P: [phone]  F: [phone]

## 2025-04-15 NOTE — PROGRESS NOTES
Referral received from Dr. Cedillo's office. Met MsIsamar Ku during her surgery consult. I introduced myself and navigational services. She has a new diagnosis of right breast invasive ductal carcinoma, ER +, OR +, her2 equivocal, FISH neg, Ki-67 25% Stage I    She stated this is her 3rd breast cancer and she has a good understanding of her pathology and treatment options presented to her by Dr. Cedillo and was able to verbalize teach back. After the consult she is leaning toward having a mastectomy without reconstruction. She is comfortable with this plan and has no questions or concerns following the consult. She will need pulmonary clearance prior to surgery.     We discussed resource needs and she stated she has adequate housing, no food insecurity, adequate transportation to and from appointments,is not concerned with finances at this time, does not feel lonely or isolated, and has not felt depressed, anxious or hopeless in the past two weeks. She stated they are in the process of relocating to [place] this summer.    We discussed her support system and she stated she lives with her daughter and feels well supported. We discussed our supportive oncology clinic if the need arises and she was thankful for the information.     We discussed integrative therapies and other [facility]. I gave her a navigation folder with the following information:     [employer] [facility], [employer], [employer], [employer] Exercise program, Guide for the Newly Diagnosed, [employer], Cancer Support Services Brochure, Breast Cancer Program Brochure.     She verbalized appreciation for navigational services and she has my contact information and will call with any questions that arise.

## 2025-04-15 NOTE — H&P (VIEW-ONLY)
General Surgery Breast Cancer History and Physical Exam     Summary:    Katarzyna Ku is a 84 y.o. 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      A multidisciplinary plan has been formulated for the patient:    (1) Breast Surgical Oncology:  - Her daughter and granddaughter are positive for BRCA2 pathogenic mutation.  She declines genetic testing right now.  -Nurse navigator consult.   -Surgical plan: She would like to proceed with right breast total mastectomy with sentinel lymph node biopsy.  -Plastic surgery referral declined.    (2) Medical Oncology:  - Follows with Dr. Sanders.    (3) Radiation Oncology:  -Will refer postoperatively for evaluation for radiation therapy.    (4) history of left breast cancer x 2:  -Diagnosed with stage I triple negative breast cancer of the left breast in 2009; status post lumpectomy and radiation therapy  -Diagnosed with stage II ER low, ME negative, HER2 positive left breast cancer in February 2022, now status post left total mastectomy.  Declined adjuvant chemotherapy.    Referring Provider: No ref. provider found    Chief Complaint: abnormal breast imaging    History of Present Illness: Ms. Katarzyna Ku is a 84 y.o. year old lady, seen at the request of No ref. provider found for a new diagnosis of right breast cancer.      This was initially detected as an imaging abnormality. She has had annual mammograms each year. She denies any prior history of abnormal mammograms or breast biopsies. Her work-up is detailed in the oncologic history below.     She denies any breast lumps, pain, skin changes, or nipple discharge. She has a family history of breast cancer in her daughter and granddaughter.  Both tested positive for BRCA2 pathogenic mutations. She denies any family history of ovarian cancer.     Workup of Current Diagnosis:    12/30/2024  Bilateral Screening Mammogram:  Breast Density: There are scattered areas of fibroglandular density. In the right breast in the middle one third at the 9 o'clock position is a 8 mm mass with indistinct borders. This is a new development. There are stable benign-appearing calcifications scattered in the right breast.  IMPRESSION:   There is an 8 mm oval mass of the 9 o'clock position of the right breast and further evaluation with a diagnostic mammogram and limited ultrasound study is recommended. A notification was sent to the patient.   BI-RADS Category 0: Incomplete    2/20/2025 Right Breast Diagnostic Mammogram with Ultrasound:   Breast Density: There are scattered areas of fibroglandular density. There are diffuse round and large rodlike calcifications.    1. Diagnostic evaluation reveals a persistent oval, indistinct, equal density mass measuring 10 mm at 9-10:00.   2. There is a similar oval, indistinct, equal density mass measuring 5 mm at 9-10:00.   RIGHT BREAST REALTIME LIMITED BREAST ULTRASOUND  High resolution real-time targeted ultrasound scanning was performed by the ultrasound technologist. Still images were obtained by the ultrasound technologist and submitted for radiologist review.  FINDINGS:    1. There is an irregular hypoechoic indistinct and microlobulated hypoechoic mass at 9:30, 5 cm from the nipple measuring 11 x 11 x 7 mm. This displays some internal color Doppler flow and correlates with the  larger mammographic mass in question.  2. There is an oval indistinct hypoechoic mass at 10:00, 4 cm from the nipple measuring 7 x 4 x 6 mm which may correlate with the second smaller mass in question.   IMPRESSION:   1. Irregular mass at 9:30 is of high suspicion for malignancy. Recommendation is for ultrasound-guided core biopsy.    2. Complicated cyst versus solid mass at 10:00 may correlate with a second mammographic mass. Ultrasound-guided biopsy is recommended. If ultrasound biopsy does not  "resolve the mammographic finding, then a separate stereotactic biopsy would be recommended.  Results were discussed with the patient after the examination. She is aware that her primary clinical doctor will be in touch to schedule the biopsy.   The patient was sent a notification.   BI-RADS Category 4C: High Suspicion.    3/18/2025 Right Breast US Guided Biopsy:   PROCEDURE: Written and verbal consent was obtained for ultrasound guided core biopsy of a 1.1 cm right breast mass located at 9:30, 5 cm from the nipple.  \"Time out\" was observed to verify the patient's identity and correct location of the breast abnormality. The presence of the lesion was confirmed ultrasound and a lateral approach was chosen. The breast was prepped and draped in the usual sterile fashion. 10 mL 1% lidocaine with epinephrine and 10 mL 1% lidocaine without epinephrine were  utilized for local anesthesia. A small skin incision was made with the coaxial needle and the biopsy needle was placed into the mass. A total of 5 core samples were obtained. The specimens were placed in formalin and forwarded to the Pathology Department. A mini cork shaped post biopsy marking clip was placed. Post procedure mammogram for marker placement was performed. The mini cork shaped clip is in satisfactory position. Upon completion of the procedure, compression was applied to the biopsy  site until all appreciable bleeding subsided and a sterile dressing was applied. Post biopsy instructions were reviewed with the patient by our clinical breast imaging staff. A written copy of these instructions was also given to the patient. The patient tolerated the procedure well and no immediate complications occurred.    SUMMARY: 14-gauge ultrasound guided core biopsy of a 1.1 cm right breast mass located at 9:30, 5 cm from the nipple.  A mini cork shaped post biopsy marking clip was placed. The mini cork shaped clip is in  satisfactory position.    IMPRESSION:  Pathology " results are malignant and are concordant with imaging.  RECOMMENDATION: Surgical consultation is advised.    3/18/2025 Pathology:   Final Diagnosis   1.  Breast, right 9: 30 o'clock position 5 CMFN, core biopsy: ([place] clip)   A.  Invasive mammary carcinoma, no special type (ductal), [place] histologic grade 3 (tubules = 3, nuclei = 3, mitoses = 3), measuring 6 mm maximally, and involving approximately 5 core fragments.     Past Medical History:   COPD, on nightly O2  HTN  GERD    Past Surgical History:    Past Surgical History:   Procedure Laterality Date    BILATERAL SALPINGO OOPHORECTOMY      BLADDER SURGERY      bladder lift    BREAST LUMPECTOMY Left     breast ca    CARPAL TUNNEL RELEASE      BOTH HANDS    CATARACT EXTRACTION W/ INTRAOCULAR LENS  IMPLANT, BILATERAL      COLONOSCOPY N/A 02/10/2017    Procedure: COLONOSCOPY with polypectomy;  Surgeon: Weston Sue MD;  Location: [facility] OR;  Service:     COLONOSCOPY N/A 08/12/2020    Procedure: COLONOSCOPY;  Surgeon: Weston Sue MD;  Location: [facility] OR;  Service: Gastroenterology;  Laterality: N/A;  TRANSVERSE COLON POLYP  DIVERTICULOSIS  CECAL TIME at 1356  SIGMOID COLON POLYP    ENDOSCOPY N/A 02/10/2017    Procedure: ESOPHAGOGASTRODUODENOSCOPY with biopsies;  Surgeon: Weston Sue MD;  Location: [facility] OR;  Service:     ENDOSCOPY N/A 08/12/2020    Procedure: ESOPHAGOGASTRODUODENOSCOPY;  Surgeon: Weston Sue MD;  Location: [facility] OR;  Service: Gastroenterology;  Laterality: N/A;  DISTAL ESOPHAGUS BIOPSY  STOCKTON'S ESOPHAGUS    HYSTERECTOMY      MASTECTOMY      MASTECTOMY W/ SENTINEL NODE BIOPSY Left 02/23/2022    Procedure: BREAST MASTECTOMY WITH SENTINEL NODE BIOPSY;  Surgeon: Afshin Schultz MD;  Location: [facility] OR;  Service: General;  Laterality: Left;    SKIN BIOPSY      TOTAL KNEE ARTHROPLASTY Bilateral      Family History:    As above    Social History:  Denies tobacco use  Occasional  alcohol use    Allergies:   Allergies   Allergen Reactions    Azithromycin Nausea And Vomiting     SEVERE GI UPSET    Sulfa Antibiotics Itching    Codeine Rash     Medications:     Current Outpatient Medications:     acetaminophen (TYLENOL) 500 MG tablet, Take 2 tablets by mouth Every 6 (Six) Hours As Needed for Mild Pain., Disp: , Rfl:     albuterol (PROVENTIL) (2.5 MG/3ML) 0.083% nebulizer solution, Take 2.5 mg by nebulization 2 (Two) Times a Day. Can use 4 times a day if needed, Disp: , Rfl:     albuterol sulfate  (90 Base) MCG/ACT inhaler, Inhale 2 puffs Every 4 (Four) Hours As Needed for Wheezing or Shortness of Air., Disp: , Rfl:     ALPRAZolam (XANAX) 0.5 MG tablet, Take 1 tablet by mouth Daily As Needed for Anxiety., Disp: , Rfl:     amLODIPine (NORVASC) 5 MG tablet, Every Night., Disp: , Rfl:     benazepril (LOTENSIN) 40 MG tablet, Take 1 tablet by mouth Daily., Disp: , Rfl:     benzonatate (TESSALON) 100 MG capsule, , Disp: , Rfl:     betamethasone dipropionate 0.05 % cream, , Disp: , Rfl:     Calcium Acetate-Magnesium Carb 450-200 MG tablet, Take 1 tablet by mouth Daily., Disp: , Rfl:     chlorhexidine (PERIDEX) 0.12 % solution, Apply 15 mL to the mouth or throat Daily., Disp: , Rfl:     Cholecalciferol (Vitamin D3) 20 MCG (800 UNIT) tablet, Take 50 mcg by mouth Daily., Disp: , Rfl:     citalopram (CeleXA) 40 MG tablet, Take 1 tablet by mouth Every Morning., Disp: , Rfl:     Cyanocobalamin (VITAMIN B-12) 5000 MCG tablet dispersible, Take 1 tablet by mouth Daily., Disp: , Rfl:     estradiol (ESTRACE) 0.1 MG/GM vaginal cream, , Disp: , Rfl:     fluconazole (DIFLUCAN) 100 MG tablet, Take 1 tablet by mouth As Needed., Disp: , Rfl:     Fluticasone-Umeclidin-Vilant (TRELEGY) 100-62.5-25 MCG/INH inhaler, Inhale 1 puff Daily., Disp: , Rfl:     guaiFENesin (MUCINEX) 600 MG 12 hr tablet, Take 1 tablet by mouth Every 12 (Twelve) Hours. (Patient taking differently: Take 1 tablet by mouth 2 (Two) Times a Day As  Needed for Cough or Congestion.), Disp: 30 tablet, Rfl: 0    hydrALAZINE (APRESOLINE) 50 MG tablet, Take 1 tablet by mouth 3 (Three) Times a Day., Disp: , Rfl:     hydroCHLOROthiazide (MICROZIDE) 12.5 MG capsule, , Disp: , Rfl:     HYDROcodone-acetaminophen (NORCO) 7.5-325 MG per tablet, Take 1 tablet by mouth Every 6 (Six) Hours As Needed., Disp: , Rfl:     hydrocortisone 2.5 % ointment, Apply  topically to the appropriate area as directed See Admin Instructions. Apply topically to the affected areas twice daily as directed, Disp: 28.35 g, Rfl: 11    hyoscyamine (LEVBID) 0.375 MG 12 hr tablet, Take 1 tablet by mouth Every 12 (Twelve) Hours As Needed for Cramping., Disp: 180 tablet, Rfl: 3    magnesium oxide (MAG-OX) 400 MG tablet, Take 1 tablet by mouth Daily., Disp: , Rfl:     meloxicam (MOBIC) 15 MG tablet, Take 1 tablet by mouth Daily., Disp: , Rfl:     Mirabegron ER (MYRBETRIQ) 50 MG tablet sustained-release 24 hour 24 hr tablet, Take 50 mg by mouth every night at bedtime., Disp: , Rfl:     mometasone (ELOCON) 0.1 % cream, Apply 0.1 application topically to the appropriate area as directed As Needed., Disp: , Rfl:     Multiple Vitamins-Minerals (MULTIVITAMIN ADULT PO), Take 1 tablet by mouth Daily., Disp: , Rfl:     O2 (OXYGEN), Inhale 3 L/min 1 (One) Time. Uses w/activity, Disp: , Rfl:     omeprazole (priLOSEC) 40 MG capsule, TAKE ONE CAPSULE BY MOUTH TWICE A DAY BEFORE MEALS, Disp: 180 capsule, Rfl: 3    Laboratory Values:    Labs from 3/5/2025 reviewed by me     Review of Systems:   Influenza-like illness: no fever, no  cough, no  sore throat, no  body aches, no loss of sense of taste or smell, no known exposure to person with Covid-19.  Constitutional: Negative for fevers or chills  HENT: Negative for hearing loss or runny nose  Eyes: Negative for vision changes or scleral icterus  Respiratory: Negative for cough or shortness of breath  Cardiovascular: Negative for chest pain or heart  palpitations  Gastrointestinal: Negative for abdominal pain, nausea, vomiting, constipation, melena, or hematochezia  Genitourinary: Negative for hematuria or dysuria  Musculoskeletal: Negative for joint swelling or gait instability  Neurologic: Negative for tremors or seizures  Psychiatric: Negative for suicidal ideations or depression  All other systems reviewed and negative    Physical Exam:   ECO - Symptomatic but completely ambulatory  Constitutional: Well-developed well-nourished, no acute distress  Eyes: Conjunctiva normal, sclera nonicteric  ENMT: Hearing grossly normal, oral mucosa moist  Neck: Supple, no palpable mass, trachea midline  Respiratory: Clear to auscultation, normal inspiratory effort  Cardiovascular: Regular rate, no peripheral edema, no jugular venous distention  Breast: symmetric  Right: No visible abnormalities on inspection while seated, with arms raised or hands on hips. No masses, skin changes, or nipple abnormalities.  Left: Left breast mastectomy, no masses or skin changes  Biopsy site appreciated in the right breast, otherwise no skin changes.   No clinical chest wall involvement.  Gastrointestinal: Soft, nontender  Lymphatics (palpable nodes): No cervical, supraclavicular or axillary lymphadenopathy  Skin:  Warm, dry, no rash on visualized skin surfaces  Musculoskeletal: Symmetric strength, normal gait  Psychiatric: Alert and oriented ×3, normal affect     Discussion:  I had an extensive discussion with the patient and her family about the nature of her breast cancer diagnosis. We reviewed the components of breast tissue including ducts and lobules. We reviewed her pathology report in detail. We reviewed breast cancer histology, including stage, grade, ER/SD receptors, HER2 receptors and how this applies to her diagnosis. We reviewed the basics of systemic and local/regional management of breast cancer.     The patient's clinical stage is documented as above. This was discussed  with the patient prior to initiation of treatment. All available pathology reports were discussed with the patient today. All treatment decisions were made via shared decision making with the patient. This patient was evaluated for appropriate ancillary referrals including, pre-treatment functional assessment, exercise program, nutrition program, genetics, and lymphedema clinic. This patient received preoperative and postoperative education. The patient was offered a breast reconstruction referral; risks and benefits were discussed today. The patient was educated on perioperative multimodal pain management strategies. Barriers to effective and efficient care are/will be evaluated by the nurse navigator.    We discussed that most breast cancer is not hereditary, however given her family history, this may play a role in her case. I believe genetic testing is warranted and could affect surgical decision making.     We reviewed potential surgical treatments to include partial mastectomy, mastectomy, sentinel lymph node biopsy and axillary node dissection and discussed the rationale associated with each approach. Regarding radiation therapy, we discussed that radiation is indicated in all cases of breast conservation and in only limited circumstances following mastectomy. We discussed that the primary goal of adjuvant radiation is to decrease the likelihood of local recurrence.     We discussed axillary staging. I described the procedure for sentinel lymph node biopsy in detail, including the preoperative injection of technetium sulfur colloid and intraoperative injection of lymphazurin blue dye. I explained that this is a mapping test and not a cancer test, that all of the lymph nodes containing these dyes will be removed for complete testing by pathology, and that the results could impact the decision for adjuvant treatment or additional surgery.    I described additional risks and potential complications associated  with surgery, including, but not limited to, bleeding, infection, complications related to blue dye, lymphedema, deformity/poor cosmetic result, chronic pain, neurovascular injury, numbness, seroma, hematoma, deep venous thrombosis, skin flap necrosis, disease recurrence and the possibility of requiring additional surgery. We also discussed other treatment options including the option of not undergoing any surgical treatment and the risks associated with this including disease progression. She expressed an understanding of these factors and wished to proceed.    We discussed that in her case, systemic treatment would involve endocrine therapy and possibly chemotherapy. She will be referred to medical oncology postoperatively to discuss this further.     PAULA ROSA M.D.  General and Endoscopic Surgery  [facility]    [address], [address]  [place], [place], [address]  P: [phone]  F: [phone]

## 2025-04-16 ENCOUNTER — PREP FOR SURGERY (OUTPATIENT)
Dept: OTHER | Facility: HOSPITAL | Age: 85
End: 2025-04-16
Payer: MEDICARE

## 2025-04-16 DIAGNOSIS — C50.919 MALIGNANT NEOPLASM OF FEMALE BREAST, UNSPECIFIED ESTROGEN RECEPTOR STATUS, UNSPECIFIED LATERALITY, UNSPECIFIED SITE OF BREAST: Primary | ICD-10-CM

## 2025-04-16 RX ORDER — DIAZEPAM 5 MG/1
10 TABLET ORAL ONCE
OUTPATIENT
Start: 2025-04-16 | End: 2025-04-16

## 2025-04-21 ENCOUNTER — ANESTHESIA EVENT (OUTPATIENT)
Dept: PERIOP | Facility: HOSPITAL | Age: 85
End: 2025-04-21
Payer: MEDICARE

## 2025-04-21 ENCOUNTER — TELEPHONE (OUTPATIENT)
Dept: SURGERY | Facility: CLINIC | Age: 85
End: 2025-04-21
Payer: MEDICARE

## 2025-04-23 ENCOUNTER — PRE-ADMISSION TESTING (OUTPATIENT)
Dept: PREADMISSION TESTING | Facility: HOSPITAL | Age: 85
End: 2025-04-23
Payer: MEDICARE

## 2025-04-23 VITALS
BODY MASS INDEX: 43.98 KG/M2 | WEIGHT: 224 LBS | RESPIRATION RATE: 22 BRPM | HEIGHT: 60 IN | SYSTOLIC BLOOD PRESSURE: 138 MMHG | DIASTOLIC BLOOD PRESSURE: 62 MMHG | HEART RATE: 80 BPM | OXYGEN SATURATION: 92 %

## 2025-04-23 LAB
ANION GAP SERPL CALCULATED.3IONS-SCNC: 12.6 MMOL/L (ref 5–15)
BUN SERPL-MCNC: 21 MG/DL (ref 8–23)
BUN/CREAT SERPL: 19.4 (ref 7–25)
CALCIUM SPEC-SCNC: 9 MG/DL (ref 8.6–10.5)
CHLORIDE SERPL-SCNC: 103 MMOL/L (ref 98–107)
CO2 SERPL-SCNC: 23.4 MMOL/L (ref 22–29)
CREAT SERPL-MCNC: 1.08 MG/DL (ref 0.57–1)
DEPRECATED RDW RBC AUTO: 53 FL (ref 37–54)
EGFRCR SERPLBLD CKD-EPI 2021: 51.4 ML/MIN/1.73
ERYTHROCYTE [DISTWIDTH] IN BLOOD BY AUTOMATED COUNT: 14.9 % (ref 12.3–15.4)
GLUCOSE SERPL-MCNC: 150 MG/DL (ref 65–99)
HBA1C MFR BLD: 6.54 % (ref 4.8–5.6)
HCT VFR BLD AUTO: 34.8 % (ref 34–46.6)
HGB BLD-MCNC: 10.7 G/DL (ref 12–15.9)
MCH RBC QN AUTO: 29.7 PG (ref 26.6–33)
MCHC RBC AUTO-ENTMCNC: 30.7 G/DL (ref 31.5–35.7)
MCV RBC AUTO: 96.7 FL (ref 79–97)
PLATELET # BLD AUTO: 256 10*3/MM3 (ref 140–450)
PMV BLD AUTO: 10.7 FL (ref 6–12)
POTASSIUM SERPL-SCNC: 4.3 MMOL/L (ref 3.5–5.2)
QT INTERVAL: 392 MS
QTC INTERVAL: 474 MS
RBC # BLD AUTO: 3.6 10*6/MM3 (ref 3.77–5.28)
SODIUM SERPL-SCNC: 139 MMOL/L (ref 136–145)
WBC NRBC COR # BLD AUTO: 10.11 10*3/MM3 (ref 3.4–10.8)

## 2025-04-23 PROCEDURE — 36415 COLL VENOUS BLD VENIPUNCTURE: CPT

## 2025-04-23 PROCEDURE — 93005 ELECTROCARDIOGRAM TRACING: CPT

## 2025-04-23 PROCEDURE — 85027 COMPLETE CBC AUTOMATED: CPT

## 2025-04-23 PROCEDURE — 83036 HEMOGLOBIN GLYCOSYLATED A1C: CPT | Performed by: STUDENT IN AN ORGANIZED HEALTH CARE EDUCATION/TRAINING PROGRAM

## 2025-04-23 PROCEDURE — 80048 BASIC METABOLIC PNL TOTAL CA: CPT

## 2025-04-23 NOTE — PAT
Pt here for PAT visit.  Pre-op tests completed, chg soap given, and instructions reviewed.  Instructed clears until 2 hrs prior to arrival time, voiced understanding. Pt uses O2 during the day PRN and with her CPAP at night, Pulmonary and Medical clearance requested and office notified

## 2025-04-23 NOTE — DISCHARGE INSTRUCTIONS
PRE-ADMISSION TESTING INSTRUCTIONS FOR ADULTS    Take these medications the morning of surgery with a small sip of water:  Nebulizer/ inhalers , Citalopram, Hydralazine, omeprazole      Do not take any insulin or diabetes medications the morning of surgery.      No aspirin, advil, aleve, ibuprofen, naproxen, diet pills, decongestants, or herbal/vitamins for a week prior to surgery.       Tylenol/Acetaminophen is okay to take if needed.    General Instructions:    DO NOT EAT SOLID FOOD AFTER MIDNIGHT THE NIGHT BEFORE SURGERY. No gum, mints, or hard candy after midnight the night before surgery.  You may drink clear liquids the day of surgery up until 2 hours before your arrival time.  Clear liquids are liquids you can see through. Nothing RED in color.    Plain water    Sports drinks      Gelatin (Jell-O)  Fruit juices without pulp such as white grape juice and apple juice  Popsicles that contain no fruit or yogurt  Tea or coffee (no cream or milk added)    It is beneficial for you to have a clear drink that contains carbohydrates 2 hours before your arrival time.  We suggest a 20 ounce bottle of Gatorade or Powerade for non-diabetic patients or a 20 ounce bottle of Gatorade Zero or Powerade Zero for diabetic patients.     Patients who avoid smoking, chewing tobacco and alcohol for 4 weeks prior to surgery have a reduced risk of post-operative complications.  If at all possible, quit smoking as many days before surgery as you can.    Do not smoke, use chewing tobacco or drink alcohol the day of surgery    Bring your C-PAP/ BI-PAP machine if you use one.  Wear clean comfortable clothes.  Do not wear contact lenses, lotion, deodorant, or make-up.  Bring a case for your glasses if applicable. You may brush your teeth the morning of surgery.  You may wear dentures/partials, do not put adhesive/glue on them.  Leave all other jewelry and valuables at home.      Preventing a Surgical Site Infection:    Shower the night  before and on the morning of surgery using the chlorhexidine soap you were given.  Use a clean washcloth with the soap.  Place clean sheets on your bed after showering the night before surgery. Do not use the CHG soap on your hair, face, or private areas. Wash your body gently for five (5) minutes. Do not scrub your skin.  Dry with a clean towel and dress in clean clothing.  Do not shave the surgical area for 10 days-2 weeks prior to surgery  because the razor can irritate skin and make it easier to develop an infection.  Make sure you, your family, and all healthcare providers clean their hands with soap and water or an alcohol based hand  before caring for you or your wound.      Day of surgery:    Your surgeon’s office will advise you of your arrival time for the day of surgery.    Upon arrival, a Pre-op nurse and Anesthesia provider will review your health history, obtain vital signs, and answer questions you may have. The anesthesia provider will also discuss the type of anesthesia that will be needed for your procedure, which may include general anesthesia. The only belongings needed at this time will be your home medications and if applicable your C-PAP/BI-PAP machine.  If you are staying overnight your family can leave the rest of your belongings in the car and bring them to your room later.  A Pre-op nurse will start an IV and you may receive medication in preparation for surgery, including something to help you relax.  Your family will be able to see you in the Pre-op area.  While you are in surgery your family should notify the waiting room  if they leave the waiting room area and provide a contact phone number.    IF you have any questions, you can call the Pre-Admission Department at [phone] or your surgeon's office.  Notify your surgeon if  you become sick, have a fever, productive cough, or cannot be here the day of surgery    Please be aware that surgery does come with  discomfort.  We want to make every effort to control your discomfort so please discuss any uncontrolled symptoms with your nurse.   Your doctor will most likely have prescribed pain medications.      If you are going home after surgery, you will receive individualized written care instructions before being discharged.  A responsible adult (over the age of 18) must drive you to and from the hospital on the day of your surgery and stay with you for 24 hours after anesthesia.    If you are staying overnight following surgery, you will be transported to your hospital room following the recovery period.  [facility] has all private rooms.    You may receive a survey regarding the care you received. Your feedback is very important and will be used to collect the necessary data to help us to continue to provide excellent care.     Deductibles and co-payments are collected on the day of service. Please be prepared to pay the required co-pay, deductible or deposit on the day of service as defined by your plan.

## 2025-04-28 ENCOUNTER — TELEPHONE (OUTPATIENT)
Dept: SURGERY | Facility: CLINIC | Age: 85
End: 2025-04-28
Payer: MEDICARE

## 2025-04-28 NOTE — TELEPHONE ENCOUNTER
SPOKE TO DR FRANKS REGARDING MEDICAL CLEARANCE FOR THIS PT HAVING SURGERY TOMORROW WITH DR ROSA. HE DID GIVE ME A VERBAL CLEARANCE SINCE WE HAVE ALREADY RECEIVED PULM CLEARANCE FOR THIS PT. I EMAILED HIM THE CLEARANCE FORM TO SIGN AND SEND BACK TO ME.

## 2025-04-29 ENCOUNTER — HOSPITAL ENCOUNTER (OUTPATIENT)
Facility: HOSPITAL | Age: 85
Discharge: HOME | End: 2025-04-30
Attending: STUDENT IN AN ORGANIZED HEALTH CARE EDUCATION/TRAINING PROGRAM | Admitting: STUDENT IN AN ORGANIZED HEALTH CARE EDUCATION/TRAINING PROGRAM
Payer: MEDICARE

## 2025-04-29 ENCOUNTER — ANESTHESIA (OUTPATIENT)
Dept: PERIOP | Facility: HOSPITAL | Age: 85
End: 2025-04-29
Payer: MEDICARE

## 2025-04-29 ENCOUNTER — HOSPITAL ENCOUNTER (OUTPATIENT)
Dept: NUCLEAR MEDICINE | Facility: HOSPITAL | Age: 85
Setting detail: OBSERVATION
Discharge: HOME | End: 2025-04-29
Payer: MEDICARE

## 2025-04-29 ENCOUNTER — APPOINTMENT (OUTPATIENT)
Dept: ULTRASOUND IMAGING | Facility: HOSPITAL | Age: 85
End: 2025-04-29
Payer: MEDICARE

## 2025-04-29 DIAGNOSIS — C50.919 MALIGNANT NEOPLASM OF FEMALE BREAST, UNSPECIFIED ESTROGEN RECEPTOR STATUS, UNSPECIFIED LATERALITY, UNSPECIFIED SITE OF BREAST: Primary | ICD-10-CM

## 2025-04-29 DIAGNOSIS — C50.919 MALIGNANT NEOPLASM OF FEMALE BREAST, UNSPECIFIED ESTROGEN RECEPTOR STATUS, UNSPECIFIED LATERALITY, UNSPECIFIED SITE OF BREAST: ICD-10-CM

## 2025-04-29 PROCEDURE — 38525 BIOPSY/REMOVAL LYMPH NODES: CPT | Performed by: STUDENT IN AN ORGANIZED HEALTH CARE EDUCATION/TRAINING PROGRAM

## 2025-04-29 PROCEDURE — 25010000002 BUPIVACAINE (PF) 0.25 % SOLUTION

## 2025-04-29 PROCEDURE — 88305 TISSUE EXAM BY PATHOLOGIST: CPT | Performed by: STUDENT IN AN ORGANIZED HEALTH CARE EDUCATION/TRAINING PROGRAM

## 2025-04-29 PROCEDURE — 19303 MAST SIMPLE COMPLETE: CPT | Performed by: STUDENT IN AN ORGANIZED HEALTH CARE EDUCATION/TRAINING PROGRAM

## 2025-04-29 PROCEDURE — 25010000002 ONDANSETRON PER 1 MG: Performed by: NURSE ANESTHETIST, CERTIFIED REGISTERED

## 2025-04-29 PROCEDURE — 25010000002 CEFAZOLIN PER 500 MG: Performed by: STUDENT IN AN ORGANIZED HEALTH CARE EDUCATION/TRAINING PROGRAM

## 2025-04-29 PROCEDURE — 34310000005 TECHETIUM TC99M TILMANOCEPT: Performed by: STUDENT IN AN ORGANIZED HEALTH CARE EDUCATION/TRAINING PROGRAM

## 2025-04-29 PROCEDURE — 25010000002 PROPOFOL 200 MG/20ML EMULSION

## 2025-04-29 PROCEDURE — 63710000001 DIAZEPAM 5 MG TABLET: Performed by: STUDENT IN AN ORGANIZED HEALTH CARE EDUCATION/TRAINING PROGRAM

## 2025-04-29 PROCEDURE — 94761 N-INVAS EAR/PLS OXIMETRY MLT: CPT

## 2025-04-29 PROCEDURE — 63710000001 ACETAMINOPHEN 500 MG TABLET: Performed by: STUDENT IN AN ORGANIZED HEALTH CARE EDUCATION/TRAINING PROGRAM

## 2025-04-29 PROCEDURE — 25010000002 FAMOTIDINE 10 MG/ML SOLUTION: Performed by: NURSE ANESTHETIST, CERTIFIED REGISTERED

## 2025-04-29 PROCEDURE — 25010000002 HYDROMORPHONE 1 MG/ML SOLUTION

## 2025-04-29 PROCEDURE — 25010000002 FENTANYL CITRATE (PF) 50 MCG/ML SOLUTION

## 2025-04-29 PROCEDURE — 94799 UNLISTED PULMONARY SVC/PX: CPT

## 2025-04-29 PROCEDURE — 25810000003 LACTATED RINGERS PER 1000 ML: Performed by: NURSE ANESTHETIST, CERTIFIED REGISTERED

## 2025-04-29 PROCEDURE — 88360 TUMOR IMMUNOHISTOCHEM/MANUAL: CPT | Performed by: STUDENT IN AN ORGANIZED HEALTH CARE EDUCATION/TRAINING PROGRAM

## 2025-04-29 PROCEDURE — 25010000002 DEXAMETHASONE PER 1 MG: Performed by: NURSE ANESTHETIST, CERTIFIED REGISTERED

## 2025-04-29 PROCEDURE — 25010000002 LIDOCAINE 2% SOLUTION

## 2025-04-29 PROCEDURE — 88341 IMHCHEM/IMCYTCHM EA ADD ANTB: CPT | Performed by: STUDENT IN AN ORGANIZED HEALTH CARE EDUCATION/TRAINING PROGRAM

## 2025-04-29 PROCEDURE — 25010000002 ISOSULFAN BLUE 1 % SOLUTION: Performed by: STUDENT IN AN ORGANIZED HEALTH CARE EDUCATION/TRAINING PROGRAM

## 2025-04-29 PROCEDURE — A9270 NON-COVERED ITEM OR SERVICE: HCPCS | Performed by: STUDENT IN AN ORGANIZED HEALTH CARE EDUCATION/TRAINING PROGRAM

## 2025-04-29 PROCEDURE — A9520 TC99 TILMANOCEPT DIAG 0.5MCI: HCPCS | Performed by: STUDENT IN AN ORGANIZED HEALTH CARE EDUCATION/TRAINING PROGRAM

## 2025-04-29 PROCEDURE — G0378 HOSPITAL OBSERVATION PER HR: HCPCS

## 2025-04-29 PROCEDURE — 38792 RA TRACER ID OF SENTINL NODE: CPT

## 2025-04-29 PROCEDURE — 94640 AIRWAY INHALATION TREATMENT: CPT

## 2025-04-29 PROCEDURE — 88307 TISSUE EXAM BY PATHOLOGIST: CPT | Performed by: STUDENT IN AN ORGANIZED HEALTH CARE EDUCATION/TRAINING PROGRAM

## 2025-04-29 PROCEDURE — 88342 IMHCHEM/IMCYTCHM 1ST ANTB: CPT | Performed by: STUDENT IN AN ORGANIZED HEALTH CARE EDUCATION/TRAINING PROGRAM

## 2025-04-29 PROCEDURE — 25010000002 SUGAMMADEX 200 MG/2ML SOLUTION: Performed by: ANESTHESIOLOGY

## 2025-04-29 PROCEDURE — 19303 MAST SIMPLE COMPLETE: CPT | Performed by: SPECIALIST/TECHNOLOGIST, OTHER

## 2025-04-29 PROCEDURE — 38900 IO MAP OF SENT LYMPH NODE: CPT | Performed by: STUDENT IN AN ORGANIZED HEALTH CARE EDUCATION/TRAINING PROGRAM

## 2025-04-29 PROCEDURE — 25010000002 SUCCINYLCHOLINE PER 20 MG

## 2025-04-29 DEVICE — LIGACLIP MCA MULTIPLE CLIP APPLIERS, 20 MEDIUM CLIPS
Type: IMPLANTABLE DEVICE | Site: BREAST | Status: FUNCTIONAL
Brand: LIGACLIP

## 2025-04-29 DEVICE — ARISTA AH ABSORBABLE HEMOSTATIC PARTICLES, 3G BELLOWS CONTAINER
Type: IMPLANTABLE DEVICE | Site: BREAST | Status: FUNCTIONAL
Brand: ARISTA

## 2025-04-29 RX ORDER — BUPIVACAINE HYDROCHLORIDE 2.5 MG/ML
INJECTION, SOLUTION EPIDURAL; INFILTRATION; INTRACAUDAL; PERINEURAL
Status: COMPLETED | OUTPATIENT
Start: 2025-04-29 | End: 2025-04-29

## 2025-04-29 RX ORDER — IBUPROFEN 800 MG/1
800 TABLET, FILM COATED ORAL EVERY 8 HOURS SCHEDULED
Status: DISCONTINUED | OUTPATIENT
Start: 2025-04-29 | End: 2025-04-30 | Stop reason: HOSPADM

## 2025-04-29 RX ORDER — BENZONATATE 100 MG/1
100 CAPSULE ORAL 3 TIMES DAILY PRN
Status: DISCONTINUED | OUTPATIENT
Start: 2025-04-29 | End: 2025-04-30 | Stop reason: HOSPADM

## 2025-04-29 RX ORDER — ONDANSETRON 2 MG/ML
4 INJECTION INTRAMUSCULAR; INTRAVENOUS ONCE AS NEEDED
Status: DISCONTINUED | OUTPATIENT
Start: 2025-04-29 | End: 2025-04-29 | Stop reason: HOSPADM

## 2025-04-29 RX ORDER — SODIUM CHLORIDE 0.9 % (FLUSH) 0.9 %
10 SYRINGE (ML) INJECTION EVERY 12 HOURS SCHEDULED
Status: DISCONTINUED | OUTPATIENT
Start: 2025-04-29 | End: 2025-04-30 | Stop reason: HOSPADM

## 2025-04-29 RX ORDER — ROCURONIUM BROMIDE 10 MG/ML
INJECTION, SOLUTION INTRAVENOUS AS NEEDED
Status: DISCONTINUED | OUTPATIENT
Start: 2025-04-29 | End: 2025-04-29 | Stop reason: SURG

## 2025-04-29 RX ORDER — ISOSULFAN BLUE 50 MG/5ML
INJECTION, SOLUTION SUBCUTANEOUS AS NEEDED
Status: DISCONTINUED | OUTPATIENT
Start: 2025-04-29 | End: 2025-04-29 | Stop reason: HOSPADM

## 2025-04-29 RX ORDER — FAMOTIDINE 10 MG/ML
20 INJECTION, SOLUTION INTRAVENOUS
Status: COMPLETED | OUTPATIENT
Start: 2025-04-29 | End: 2025-04-29

## 2025-04-29 RX ORDER — ONDANSETRON 2 MG/ML
4 INJECTION INTRAMUSCULAR; INTRAVENOUS EVERY 6 HOURS PRN
Status: DISCONTINUED | OUTPATIENT
Start: 2025-04-29 | End: 2025-04-30 | Stop reason: HOSPADM

## 2025-04-29 RX ORDER — ALBUTEROL SULFATE 0.83 MG/ML
2.5 SOLUTION RESPIRATORY (INHALATION) EVERY 4 HOURS PRN
Status: DISCONTINUED | OUTPATIENT
Start: 2025-04-29 | End: 2025-04-30 | Stop reason: HOSPADM

## 2025-04-29 RX ORDER — SODIUM CHLORIDE 0.9 % (FLUSH) 0.9 %
10 SYRINGE (ML) INJECTION EVERY 12 HOURS SCHEDULED
Status: DISCONTINUED | OUTPATIENT
Start: 2025-04-29 | End: 2025-04-29 | Stop reason: HOSPADM

## 2025-04-29 RX ORDER — PANTOPRAZOLE SODIUM 40 MG/1
40 TABLET, DELAYED RELEASE ORAL
Status: DISCONTINUED | OUTPATIENT
Start: 2025-04-30 | End: 2025-04-30 | Stop reason: HOSPADM

## 2025-04-29 RX ORDER — SODIUM CHLORIDE, SODIUM LACTATE, POTASSIUM CHLORIDE, CALCIUM CHLORIDE 600; 310; 30; 20 MG/100ML; MG/100ML; MG/100ML; MG/100ML
9 INJECTION, SOLUTION INTRAVENOUS CONTINUOUS PRN
Status: DISCONTINUED | OUTPATIENT
Start: 2025-04-29 | End: 2025-04-29 | Stop reason: HOSPADM

## 2025-04-29 RX ORDER — FENTANYL CITRATE 50 UG/ML
INJECTION, SOLUTION INTRAMUSCULAR; INTRAVENOUS AS NEEDED
Status: DISCONTINUED | OUTPATIENT
Start: 2025-04-29 | End: 2025-04-29 | Stop reason: SURG

## 2025-04-29 RX ORDER — SODIUM CHLORIDE, SODIUM LACTATE, POTASSIUM CHLORIDE, CALCIUM CHLORIDE 600; 310; 30; 20 MG/100ML; MG/100ML; MG/100ML; MG/100ML
100 INJECTION, SOLUTION INTRAVENOUS ONCE
Status: DISCONTINUED | OUTPATIENT
Start: 2025-04-29 | End: 2025-04-29 | Stop reason: HOSPADM

## 2025-04-29 RX ORDER — CITALOPRAM HYDROBROMIDE 20 MG/1
40 TABLET ORAL EVERY MORNING
Status: DISCONTINUED | OUTPATIENT
Start: 2025-04-30 | End: 2025-04-30 | Stop reason: HOSPADM

## 2025-04-29 RX ORDER — LIDOCAINE HYDROCHLORIDE 20 MG/ML
INJECTION, SOLUTION INFILTRATION; PERINEURAL AS NEEDED
Status: DISCONTINUED | OUTPATIENT
Start: 2025-04-29 | End: 2025-04-29 | Stop reason: SURG

## 2025-04-29 RX ORDER — GUAIFENESIN 600 MG/1
600 TABLET, EXTENDED RELEASE ORAL EVERY 12 HOURS SCHEDULED
Status: DISCONTINUED | OUTPATIENT
Start: 2025-04-29 | End: 2025-04-30 | Stop reason: HOSPADM

## 2025-04-29 RX ORDER — SUCCINYLCHOLINE CHLORIDE 20 MG/ML
INJECTION INTRAMUSCULAR; INTRAVENOUS AS NEEDED
Status: DISCONTINUED | OUTPATIENT
Start: 2025-04-29 | End: 2025-04-29 | Stop reason: SURG

## 2025-04-29 RX ORDER — PROPOFOL 10 MG/ML
INJECTION, EMULSION INTRAVENOUS AS NEEDED
Status: DISCONTINUED | OUTPATIENT
Start: 2025-04-29 | End: 2025-04-29 | Stop reason: SURG

## 2025-04-29 RX ORDER — LIDOCAINE HYDROCHLORIDE 10 MG/ML
0.5 INJECTION, SOLUTION EPIDURAL; INFILTRATION; INTRACAUDAL; PERINEURAL ONCE AS NEEDED
Status: DISCONTINUED | OUTPATIENT
Start: 2025-04-29 | End: 2025-04-29 | Stop reason: HOSPADM

## 2025-04-29 RX ORDER — SODIUM CHLORIDE 9 MG/ML
40 INJECTION, SOLUTION INTRAVENOUS AS NEEDED
Status: DISCONTINUED | OUTPATIENT
Start: 2025-04-29 | End: 2025-04-30 | Stop reason: HOSPADM

## 2025-04-29 RX ORDER — DEXAMETHASONE SODIUM PHOSPHATE 4 MG/ML
4 INJECTION, SOLUTION INTRA-ARTICULAR; INTRALESIONAL; INTRAMUSCULAR; INTRAVENOUS; SOFT TISSUE ONCE AS NEEDED
Status: COMPLETED | OUTPATIENT
Start: 2025-04-29 | End: 2025-04-29

## 2025-04-29 RX ORDER — ALBUTEROL SULFATE 0.83 MG/ML
2.5 SOLUTION RESPIRATORY (INHALATION) 2 TIMES DAILY
Status: DISCONTINUED | OUTPATIENT
Start: 2025-04-30 | End: 2025-04-30 | Stop reason: HOSPADM

## 2025-04-29 RX ORDER — MIDAZOLAM HYDROCHLORIDE 2 MG/2ML
0.5 INJECTION, SOLUTION INTRAMUSCULAR; INTRAVENOUS
Status: DISCONTINUED | OUTPATIENT
Start: 2025-04-29 | End: 2025-04-29 | Stop reason: HOSPADM

## 2025-04-29 RX ORDER — BUDESONIDE AND FORMOTEROL FUMARATE DIHYDRATE 160; 4.5 UG/1; UG/1
2 AEROSOL RESPIRATORY (INHALATION)
Status: DISCONTINUED | OUTPATIENT
Start: 2025-04-30 | End: 2025-04-30

## 2025-04-29 RX ORDER — OXYCODONE HYDROCHLORIDE 5 MG/1
5 TABLET ORAL EVERY 4 HOURS PRN
Status: DISCONTINUED | OUTPATIENT
Start: 2025-04-29 | End: 2025-04-30 | Stop reason: HOSPADM

## 2025-04-29 RX ORDER — DIAZEPAM 5 MG/1
10 TABLET ORAL ONCE
Status: COMPLETED | OUTPATIENT
Start: 2025-04-29 | End: 2025-04-29

## 2025-04-29 RX ORDER — ACETAMINOPHEN 500 MG
1000 TABLET ORAL EVERY 8 HOURS
Status: DISCONTINUED | OUTPATIENT
Start: 2025-04-29 | End: 2025-04-30 | Stop reason: HOSPADM

## 2025-04-29 RX ORDER — ONDANSETRON 2 MG/ML
4 INJECTION INTRAMUSCULAR; INTRAVENOUS ONCE AS NEEDED
Status: COMPLETED | OUTPATIENT
Start: 2025-04-29 | End: 2025-04-29

## 2025-04-29 RX ORDER — SODIUM CHLORIDE 0.9 % (FLUSH) 0.9 %
10 SYRINGE (ML) INJECTION AS NEEDED
Status: DISCONTINUED | OUTPATIENT
Start: 2025-04-29 | End: 2025-04-29 | Stop reason: HOSPADM

## 2025-04-29 RX ORDER — DEXMEDETOMIDINE HYDROCHLORIDE 100 UG/ML
INJECTION, SOLUTION INTRAVENOUS
Status: COMPLETED | OUTPATIENT
Start: 2025-04-29 | End: 2025-04-29

## 2025-04-29 RX ORDER — SODIUM CHLORIDE 0.9 % (FLUSH) 0.9 %
10 SYRINGE (ML) INJECTION AS NEEDED
Status: DISCONTINUED | OUTPATIENT
Start: 2025-04-29 | End: 2025-04-30 | Stop reason: HOSPADM

## 2025-04-29 RX ORDER — ALBUTEROL SULFATE 90 UG/1
2 INHALANT RESPIRATORY (INHALATION) EVERY 4 HOURS PRN
Status: DISCONTINUED | OUTPATIENT
Start: 2025-04-29 | End: 2025-04-29 | Stop reason: CLARIF

## 2025-04-29 RX ORDER — SODIUM CHLORIDE 9 MG/ML
40 INJECTION, SOLUTION INTRAVENOUS AS NEEDED
Status: DISCONTINUED | OUTPATIENT
Start: 2025-04-29 | End: 2025-04-29 | Stop reason: HOSPADM

## 2025-04-29 RX ADMIN — ROCURONIUM BROMIDE 10 MG: 10 INJECTION, SOLUTION INTRAVENOUS at 14:27

## 2025-04-29 RX ADMIN — ROCURONIUM BROMIDE 50 MG: 10 INJECTION, SOLUTION INTRAVENOUS at 13:12

## 2025-04-29 RX ADMIN — DIAZEPAM 10 MG: 5 TABLET ORAL at 10:48

## 2025-04-29 RX ADMIN — FENTANYL CITRATE 25 MCG: 50 INJECTION, SOLUTION INTRAMUSCULAR; INTRAVENOUS at 13:44

## 2025-04-29 RX ADMIN — ACETAMINOPHEN 1000 MG: 500 TABLET, FILM COATED ORAL at 17:46

## 2025-04-29 RX ADMIN — SUCCINYLCHOLINE CHLORIDE 120 MG: 20 INJECTION, SOLUTION INTRAMUSCULAR; INTRAVENOUS at 13:04

## 2025-04-29 RX ADMIN — Medication 10 ML: at 21:31

## 2025-04-29 RX ADMIN — DEXMEDETOMIDINE 20 MCG: 100 INJECTION, SOLUTION, CONCENTRATE INTRAVENOUS at 13:17

## 2025-04-29 RX ADMIN — CEFAZOLIN 2000 MG: 2 INJECTION, POWDER, FOR SOLUTION INTRAVENOUS at 13:26

## 2025-04-29 RX ADMIN — DEXAMETHASONE SODIUM PHOSPHATE 4 MG: 4 INJECTION, SOLUTION INTRAMUSCULAR; INTRAVENOUS at 10:57

## 2025-04-29 RX ADMIN — DEXMEDETOMIDINE 10 MCG: 100 INJECTION, SOLUTION, CONCENTRATE INTRAVENOUS at 13:20

## 2025-04-29 RX ADMIN — LIDOCAINE HYDROCHLORIDE 100 MG: 20 INJECTION, SOLUTION INFILTRATION; PERINEURAL at 13:02

## 2025-04-29 RX ADMIN — ONDANSETRON 4 MG: 2 INJECTION INTRAMUSCULAR; INTRAVENOUS at 10:58

## 2025-04-29 RX ADMIN — FENTANYL CITRATE 25 MCG: 50 INJECTION, SOLUTION INTRAMUSCULAR; INTRAVENOUS at 13:02

## 2025-04-29 RX ADMIN — PROPOFOL 150 MG: 10 INJECTION, EMULSION INTRAVENOUS at 13:02

## 2025-04-29 RX ADMIN — BUPIVACAINE HYDROCHLORIDE 10 ML: 2.5 INJECTION, SOLUTION EPIDURAL; INFILTRATION; INTRACAUDAL; PERINEURAL at 13:20

## 2025-04-29 RX ADMIN — HYDROMORPHONE HYDROCHLORIDE 0.2 MG: 1 INJECTION, SOLUTION INTRAMUSCULAR; INTRAVENOUS; SUBCUTANEOUS at 13:50

## 2025-04-29 RX ADMIN — BUPIVACAINE HYDROCHLORIDE 20 ML: 2.5 INJECTION, SOLUTION EPIDURAL; INFILTRATION; INTRACAUDAL; PERINEURAL at 13:17

## 2025-04-29 RX ADMIN — SODIUM CHLORIDE, POTASSIUM CHLORIDE, SODIUM LACTATE AND CALCIUM CHLORIDE 9 ML/HR: 600; 310; 30; 20 INJECTION, SOLUTION INTRAVENOUS at 10:58

## 2025-04-29 RX ADMIN — SUGAMMADEX 200 MG: 100 INJECTION, SOLUTION INTRAVENOUS at 15:01

## 2025-04-29 RX ADMIN — PROPOFOL 20 MG: 10 INJECTION, EMULSION INTRAVENOUS at 15:04

## 2025-04-29 RX ADMIN — TILMANOCEPT 1 DOSE: KIT at 11:05

## 2025-04-29 RX ADMIN — HYDROMORPHONE HYDROCHLORIDE 0.2 MG: 1 INJECTION, SOLUTION INTRAMUSCULAR; INTRAVENOUS; SUBCUTANEOUS at 14:58

## 2025-04-29 RX ADMIN — FAMOTIDINE 20 MG: 10 INJECTION INTRAVENOUS at 10:58

## 2025-04-29 RX ADMIN — HYDROMORPHONE HYDROCHLORIDE 0.2 MG: 1 INJECTION, SOLUTION INTRAMUSCULAR; INTRAVENOUS; SUBCUTANEOUS at 14:16

## 2025-04-29 NOTE — ANESTHESIA PROCEDURE NOTES
Peripheral Block    Pre-sedation assessment completed: 4/29/2025 12:56 PM    Patient reassessed immediately prior to procedure    Patient location during procedure: OR  Start time: 4/29/2025 1:06 PM  Stop time: 4/29/2025 1:20 PM  Reason for block: at surgeon's request and post-op pain management  Performed by  CRNA/CAA: Bladimir Zimmer CRNASRNA: Randa Guzmán SRNA  Preanesthetic Checklist  Completed: patient identified, IV checked, site marked, risks and benefits discussed, surgical consent, monitors and equipment checked, pre-op evaluation and timeout performed  Prep:  Pt Position: supine  Sterile barriers:cap, gloves, mask and washed/disinfected hands  Prep: ChloraPrep  Patient monitoring: blood pressure monitoring, continuous pulse oximetry and EKG  Procedure    Sedation: yes  Performed under: local infiltration  Guidance:ultrasound guided    ULTRASOUND INTERPRETATION.  Using ultrasound guidance a 21 G gauge needle was placed in close proximity to the nerve, at which point, under ultrasound guidance anesthetic was injected in the area of the nerve and spread of the anesthesia was seen on ultrasound in close proximity thereto.  There were no abnormalities seen on ultrasound; a digital image was taken; and the patient tolerated the procedure with no complications. Images:still images obtained, printed/placed on chart    Laterality:right  Block Type:PECS II and PECS I  Injection Technique:single-shot  Needle Type:echogenic  Needle Gauge:21 G  Resistance on Injection: none    Medications Used: bupivacaine PF (MARCAINE) injection 0.25% - Perineural   20 mL - 4/29/2025 1:17:00 PM   10 mL - 4/29/2025 1:20:00 PM  dexmedetomidine HCl (PRECEDEX) injection - Intravenous   20 mcg - 4/29/2025 1:17:00 PM   10 mcg - 4/29/2025 1:20:00 PM      Post Assessment  Injection Assessment: negative aspiration for heme, no paresthesia on injection and incremental injection  Patient Tolerance:comfortable throughout  block  Complications:no  Performed by: Bladimir Zimmer, CRNA

## 2025-04-29 NOTE — OP NOTE
OPERATIVE REPORT     DATE: 4/29/2025     SURGEON: Valencia Cedillo MD      ASSISTANT: Shana Pickett, who was present for necessary suctioning, retracting, suturing throughout the procedure      OPERATION PERFORMED: Right breast total mastectomy with sentinel lymph node biopsy      PREOPERATIVE DIAGNOSIS: Invasive carcinoma of the right breast      POSTOPERATIVE DIAGNOSIS: Same     ANESTHESIA: General     SPECIMEN:   Right breast (stitch at 12:00)  Right breast additional superior region  Right axillary sentinel lymph node #1    DRAINS: None     BLOOD LOSS: Minimal     INDICATION FOR OPERATION: Katarzyna Ku is a 84 y.o. lady who was recently diagnosed with right breast invasive ductal carcinoma.   Elected to proceed for right breast total mastectomy with sentinel lymph node biopsy. All risks (including bleeding, infection, damage to surrounding structures, need for further surgery, positive margins ), benefits and alternatives were explained to the patient who agreed and wished to proceed. Informed consent was signed.      OPERATIVE COURSE: The patient was taken to the operating room, transferred onto the operating room table, and underwent anesthesia without incident. 5 cc of blue dye was injected into the dermal layer of the nipple areolar complex. The patient was prepped and draped in sterile fashion. A time out was performed and preoperative antibiotics were given. An elliptical incision was drawn around the nipple areolar complex. Half percent Marcaine with epinephrine was injected into the skin and subcutaneous tissues. A scalpel was used to transect the skin and dermal layer. Subcutaneous flaps were created approximately 1cm in thickness circumferentially. These were extended to the clavicle superiorly, the sternum medially, the inframammary fold inferiorly, and the serratus laterally. Once this was circumferentially dissected free with Bovie electrocautery, the breast was removed from the chest wall at the  level of the pectoralis fascia. It was marked as listed above and passed off for fresh permanent specimen. The area was irrigated and appeared hemostatic. Bovie electrocautery was used for any small muscle bleeding. The remainder of the half percent Marcaine with epinephrine was injected into the intercostal nerve bundles and the subcutaneous tissue.    We then performed a sentinel lymph node biopsy. Bovie electrocautery was used to dissect down through the subcutaneous tissues and through the clavipectoral fascia into the axilla. One sentinel lymph node was identified. These were removed with Bovie electrocautery and clips across all major lymphovascular structures. Once this was done, there were no hot, blue, or palpable lymph nodes remaining. The area was irrigated and appeared hemostatic.    The rest of the local anesthetic was administered. It was closed with interrupted 3-0 Vicryl sutures and a running 4-0 Monocryl suture.  Skin glue was placed over the incision.  The patient tolerated the procedure well. All needle and lap counts were correct at the end of the case. The patient was then awoken from anesthesia and taken to recovery for further monitoring.     Sayre Node Biopsy for Breast Cancer - Right  Operation performed with curative intent. Yes   Tracer(s) used to identify sentinel nodes in the upfront surgery (non-neoadjuvant) setting (select all that apply). Dye and Radioactive tracer   Tracer(s) used to identify sentinel nodes in the neoadjuvant setting (select all that apply). N/A   All nodes (colored or non-colored) present at the end of a dye-filled lymphatic channel were removed. Yes   All significantly radioactive nodes were removed. Yes   All palpably suspicious nodes were removed. Yes   Biopsy-proven positive nodes marked with clips prior to chemotherapy were identified and removed. N/A        Valencia Cedillo MD   General and Endoscopic Surgery  [facility]     [address],  [address]  [place], [place], [address]  P: [phone]  F: [phone]

## 2025-04-29 NOTE — PLAN OF CARE
Goal Outcome Evaluation:  Plan of Care Reviewed With: patient        Progress: improving  Outcome Evaluation: Patient post op right total mastectomy with sentinel lymph node biopsy. Patient on 3liter oxygen at this time, normally only wears oxygen at night with her CPAP. Daughter brought patient cpap @ bedside. SAÚL drain with minimal drainage. Patient has not ambulated yet, states will this evening. Plan home with daughter at discharge.

## 2025-04-29 NOTE — ANESTHESIA PREPROCEDURE EVALUATION
Anesthesia Evaluation     Patient summary reviewed and Nursing notes reviewed   no history of anesthetic complications:   NPO Solid Status: > 8 hours  NPO Liquid Status: > 4 hours           Airway   Mallampati: II  TM distance: >3 FB  Neck ROM: full  No difficulty expected and Large neck circumference  Dental    (+) upper dentures    Pulmonary - normal exam   (+) a smoker (Quit 1990. 2 PPD x30 years) Former, COPD (inhalers today) moderate, asthma,home oxygen (3 lpm, at night and as needed during the day), sleep apnea on CPAP    ROS comment: Pulmonary Function Test Interpretation  Katarzyna Ku  [id number]     03/24/25  12:26 EDT     Spirometry  Spirometry demonstrates moderate airway obstruction.     Post Bronchodilator  Following the inhalation of a bronchodilator, there is no significant change in airway obstruction. This does not necessrily mean that chronic bronchodilator therapy may not be useful.     Lung Volume  Lung volumes are normal. and Lung volumes were determined by plethysmography in a satisfactory fashion.    Diffusion  Low diffusion capacity, <70%    Cough due to COPD    Cardiovascular - normal exam  Exercise tolerance: poor (<4 METS)    ECG reviewed  Rhythm: regular  Rate: normal    (+) hypertension well controlled 2 medications or greater    ROS comment: HEART RATE=88  bpm  RR Xubvaocw=568  ms  AK Kkwpgujq=450  ms  P Horizontal Axis=-55  deg  P Front Axis=65  deg  QRSD Interval=78  ms  QT Nqsjbddp=603  ms  WJlB=991  ms  QRS Axis=-2  deg  T Wave Axis=-9  deg  - BORDERLINE ECG -  Sinus rhythm  Borderline  T wave abnormalities  When compared with ECG of 21-Apr-2022 12:44:15,  No significant change  Electronically Signed By: John Matt ([facility] 2025-04-23 16:05:01  Date and Time of Study:2025-04-23 14:15:59    ECHO 4/2025  · Left ventricular systolic function is normal. Calculated left ventricular EF = 62.7%. Left ventricular wall thickness is consistent with mild concentric hypertrophy.  · Left  ventricular diastolic function is consistent with (grade II w/high LAP) pseudonormalization.  · The left atrial cavity is mildly dilated.  · Mild aortic valve stenosis is present. Peak velocity of the flow distal to the aortic valve is 226.2 cm/s. Aortic valve mean pressure gradient is 12 mmHg.  · Mild mitral valve stenosis is present. The mitral valve mean gradient is 4 mmHg.  · Mild tricuspid regurgitation.  Estimated right ventricular systolic pressure from tricuspid regurgitation is moderately elevated (45-55 mmHg) suggestive of pulmonary hypertension      Neuro/Psych  (+) numbness (occ in hands), psychiatric history Depression and Anxiety  GI/Hepatic/Renal/Endo    (+) obesity, GERD well controlled    Musculoskeletal     (+) back pain  Abdominal   (+) obese   Substance History - negative use     OB/GYN negative ob/gyn ROS         Other   arthritis,   history of cancer    ROS/Med Hx Other: Pulmonary and cardiac clearance on chart  Black coffee and orange popsicle 0830                Anesthesia Plan    ASA 3     general with block     (PECs block discussed with patient and daughter and accepted.)  intravenous induction     Anesthetic plan, risks, benefits, and alternatives have been provided, discussed and informed consent has been obtained with: patient and child.  Pre-procedure education provided  Use of blood products discussed with patient and child  Consented to blood products.    Plan discussed with CRNA.    CODE STATUS:

## 2025-04-29 NOTE — ANESTHESIA POSTPROCEDURE EVALUATION
Patient: Katarzyna Ku    Procedure Summary       Date: 04/29/25 Room / Location:  [facility] OR 4 /  [facility]    Anesthesia Start: 1256 Anesthesia Stop: 1523    Procedure: right breast total mastectomy, right sentinel lymph node biopsy (Right: Breast) Diagnosis:       Malignant neoplasm of female breast, unspecified estrogen receptor status, unspecified laterality, unspecified site of breast      (Malignant neoplasm of female breast, unspecified estrogen receptor status, unspecified laterality, unspecified site of breast [C50.919])    Surgeons: Valencia Cedillo MD Provider: Yolanda Najera MD    Anesthesia Type: general with block ASA Status: 3            Anesthesia Type: general with block    Vitals  Vitals Value Taken Time   /81 04/29/25 15:40   Temp 97.1 °F (36.2 °C) 04/29/25 15:15   Pulse 93 04/29/25 15:47   Resp 16 04/29/25 15:40   SpO2 91 % 04/29/25 15:47   Vitals shown include unfiled device data.        Post Anesthesia Care and Evaluation    Patient location during evaluation: PHASE II  Patient participation: complete - patient participated  Level of consciousness: awake and alert  Pain score: 0  Pain management: satisfactory to patient    Airway patency: patent  Anesthetic complications: No anesthetic complications  PONV Status: none  Cardiovascular status: acceptable  Respiratory status: acceptable and nasal cannula (To floor on 3 lpm NC)  Hydration status: acceptable

## 2025-04-29 NOTE — ANESTHESIA PROCEDURE NOTES
Airway  Reason: elective    Date/Time: 4/29/2025 1:05 PM  Airway not difficult    General Information and Staff    Patient location during procedure: OR  CRNA/CAA: Bladimir Zimmer CRNA  SRNA: Randa Guzmán SRNA  Indications and Patient Condition  Indications for airway management: airway protection    Preoxygenated: yes    Mask difficulty assessment: 0 - not attempted    Final Airway Details    Final airway type: endotracheal airway      Successful airway: ETT  Cuffed: yes   Successful intubation technique: direct laryngoscopy  Adjuncts used in placement: intubating stylet  Endotracheal tube insertion site: oral  Blade: Buenrostro  Blade size: 3  ETT size (mm): 7.0  Cormack-Lehane Classification: grade I - full view of glottis  Placement verified by: chest auscultation and capnometry   Cuff volume (mL): 10  Measured from: gums  ETT/EBT to gums (cm): 20  Number of attempts at approach: 1  Assessment: lips, teeth, and gum same as pre-op and atraumatic intubation

## 2025-04-30 ENCOUNTER — TRANSCRIBE ORDERS (OUTPATIENT)
Dept: LAB | Facility: HOSPITAL | Age: 85
End: 2025-04-30
Payer: MEDICARE

## 2025-04-30 ENCOUNTER — ANCILLARY PROCEDURE (OUTPATIENT)
Dept: LAB | Facility: HOSPITAL | Age: 85
End: 2025-04-30
Payer: MEDICARE

## 2025-04-30 VITALS
OXYGEN SATURATION: 87 % | SYSTOLIC BLOOD PRESSURE: 149 MMHG | DIASTOLIC BLOOD PRESSURE: 65 MMHG | TEMPERATURE: 97.4 F | RESPIRATION RATE: 16 BRPM | WEIGHT: 218 LBS | HEART RATE: 78 BPM | BODY MASS INDEX: 42.8 KG/M2 | HEIGHT: 60 IN

## 2025-04-30 DIAGNOSIS — C50.911 MALIGNANT NEOPLASM OF RIGHT FEMALE BREAST, UNSPECIFIED ESTROGEN RECEPTOR STATUS, UNSPECIFIED SITE OF BREAST: Primary | ICD-10-CM

## 2025-04-30 DIAGNOSIS — C50.911 MALIGNANT NEOPLASM OF RIGHT FEMALE BREAST, UNSPECIFIED ESTROGEN RECEPTOR STATUS, UNSPECIFIED SITE OF BREAST: ICD-10-CM

## 2025-04-30 PROCEDURE — A9270 NON-COVERED ITEM OR SERVICE: HCPCS | Performed by: STUDENT IN AN ORGANIZED HEALTH CARE EDUCATION/TRAINING PROGRAM

## 2025-04-30 PROCEDURE — 94799 UNLISTED PULMONARY SVC/PX: CPT

## 2025-04-30 PROCEDURE — 63710000001 ACETAMINOPHEN 500 MG TABLET: Performed by: STUDENT IN AN ORGANIZED HEALTH CARE EDUCATION/TRAINING PROGRAM

## 2025-04-30 PROCEDURE — 63710000001 CITALOPRAM 20 MG TABLET: Performed by: STUDENT IN AN ORGANIZED HEALTH CARE EDUCATION/TRAINING PROGRAM

## 2025-04-30 PROCEDURE — G0378 HOSPITAL OBSERVATION PER HR: HCPCS

## 2025-04-30 PROCEDURE — 76098 X-RAY EXAM SURGICAL SPECIMEN: CPT

## 2025-04-30 PROCEDURE — 99024 POSTOP FOLLOW-UP VISIT: CPT | Performed by: SURGERY

## 2025-04-30 PROCEDURE — 63710000001 PANTOPRAZOLE 40 MG TABLET DELAYED-RELEASE: Performed by: STUDENT IN AN ORGANIZED HEALTH CARE EDUCATION/TRAINING PROGRAM

## 2025-04-30 PROCEDURE — 94664 DEMO&/EVAL PT USE INHALER: CPT

## 2025-04-30 RX ORDER — OXYCODONE HYDROCHLORIDE 5 MG/1
5 TABLET ORAL EVERY 4 HOURS PRN
Qty: 8 TABLET | Refills: 0 | Status: SHIPPED | OUTPATIENT
Start: 2025-04-30 | End: 2025-05-04

## 2025-04-30 RX ADMIN — ALBUTEROL SULFATE 2.5 MG: 2.5 SOLUTION RESPIRATORY (INHALATION) at 09:55

## 2025-04-30 RX ADMIN — PANTOPRAZOLE SODIUM 40 MG: 40 TABLET, DELAYED RELEASE ORAL at 06:26

## 2025-04-30 RX ADMIN — CITALOPRAM HYDROBROMIDE 40 MG: 20 TABLET ORAL at 06:26

## 2025-04-30 RX ADMIN — ACETAMINOPHEN 1000 MG: 500 TABLET, FILM COATED ORAL at 00:03

## 2025-04-30 NOTE — PLAN OF CARE
Goal Outcome Evaluation:  Plan of Care Reviewed With: patient        Progress: improving  Outcome Evaluation: Rested well during the night. No complaints of pain or discomfort. No shortness of breath or difficulty breathing. Dressing remains clean, dry and intact. VSS. SAÚL drain remains intact. Ambulates to bathroom without difficulty.

## 2025-04-30 NOTE — PLAN OF CARE
Goal Outcome Evaluation:  Plan of Care Reviewed With: patient        Progress: improving  Outcome Evaluation: Patient pod #1 for right breast mastectomy, no pain with tylenol helping at this time. Daughter at bedside & will take patient home this am. Follow up appointments have been made & dates given to the daughter.

## 2025-04-30 NOTE — DISCHARGE SUMMARY
DATE OF ADMISSION:  4/29/2025  DATE OF DISCHARGE:  4/30/2025    ATTENDING:        Valencia Cedillo M.D.       GENERAL SURGERY    CONSULTS:    None    PRINCIPAL DIAGNOSIS:     breast cancer    DISCHARGE DIAGNOSES:     breast cancer    HOSPITAL PROCEDURES:     Right breast total mastectomy with right sentinel lymph node biopsy     HOSPITAL COURSE:   The patient was admitted after the above listed elective procedure. She had an uneventful postoperative course. She was discharged on POD1 with no issues. She was tolerating a diet, her pain was controlled with PO pain medication, and she was ambulating without assistance.    ACTIVITY:  Okay to shower.  Ambulate and climb stairs as tolerated.  No lifting over 10 lbs for 2 weeks.  Okay to drive if not taking pain medications.    DIET:  Regular diet    FOLLOW UP:  With Dr. Cedillo in 3-4 weeks. Instructed to call [phone] for an appointment.

## 2025-05-01 ENCOUNTER — READMISSION MANAGEMENT (OUTPATIENT)
Dept: CALL CENTER | Facility: HOSPITAL | Age: 85
End: 2025-05-01
Payer: MEDICARE

## 2025-05-01 NOTE — OUTREACH NOTE
Prep Survey      Flowsheet Row Responses   [facility] facility patient discharged from? [facility]   Is LACE score < 7 ? Yes   Eligibility Readm Mgmt   Discharge diagnosis right breast total mastectomy, right sentinel lymph node biopsy   Does the patient have one of the following disease processes/diagnoses(primary or secondary)? General Surgery   Prep survey completed? Yes            Lee Ann CONDON - Registered Nurse

## 2025-05-01 NOTE — CASE MANAGEMENT/SOCIAL WORK
Case Management Discharge Note      Final Note: dc home         Selected Continued Care - Discharged on 4/30/2025 Admission date: 4/29/2025 - Discharge disposition: Home or Self Care      Destination    No services have been selected for the patient.                Durable Medical Equipment    No services have been selected for the patient.                Dialysis/Infusion    No services have been selected for the patient.                Home Medical Care    No services have been selected for the patient.                Therapy    No services have been selected for the patient.                Community Resources    No services have been selected for the patient.                Community & DME    No services have been selected for the patient.                         Final Discharge Disposition Code: 01 - home or self-care

## 2025-05-02 LAB
CYTO UR: NORMAL
LAB AP CASE REPORT: NORMAL
LAB AP CLINICAL INFORMATION: NORMAL
LAB AP DIAGNOSIS COMMENT: NORMAL
LAB AP SPECIAL STAINS: NORMAL
LAB AP SYNOPTIC CHECKLIST: NORMAL
PATH REPORT.FINAL DX SPEC: NORMAL
PATH REPORT.GROSS SPEC: NORMAL

## 2025-05-03 DIAGNOSIS — K22.70 BARRETT'S ESOPHAGUS WITHOUT DYSPLASIA: ICD-10-CM

## 2025-05-05 ENCOUNTER — TELEPHONE (OUTPATIENT)
Dept: GASTROENTEROLOGY | Facility: CLINIC | Age: 85
End: 2025-05-05
Payer: MEDICARE

## 2025-05-05 ENCOUNTER — NURSE TRIAGE (OUTPATIENT)
Dept: CALL CENTER | Facility: HOSPITAL | Age: 85
End: 2025-05-05
Payer: MEDICARE

## 2025-05-05 RX ORDER — OMEPRAZOLE 40 MG/1
40 CAPSULE, DELAYED RELEASE ORAL
Qty: 180 CAPSULE | Refills: 3 | Status: SHIPPED | OUTPATIENT
Start: 2025-05-05

## 2025-05-05 NOTE — TELEPHONE ENCOUNTER
PT CALLED SHE HAD BREAST SURGERY ON 4-29-25 WONDERING IF OKAY TO GO THROUGH WITH HER SCOPES ON 6-11-25

## 2025-05-05 NOTE — TELEPHONE ENCOUNTER
"Caller with question of dressing removal around drain site post Right breast total mastectomy with sentinel lymph node biopsy on 04/29/2025.  Review of EPIC chart both AVS and discharge summary does not give specific instruction for dressing removal but states she may shower. Patient was not given any dressing supplies or instruction to redress post shower either.    Due to no specific instruction on the dressing, caller was transferred through to [facility] for Dr. Golden, surgeon.    Reason for Disposition   Nursing judgment    Additional Information   Negative: New-onset or worsening symptoms, see that protocol (e.g., diarrhea, runny nose, sore throat)   Negative: Medicine question not related to refill or renewal   Negative: Requesting a renewal or refill of a medicine patient is currently taking   Negative: Questions or concerns about high blood pressure   Negative: Nursing judgment    Answer Assessment - Initial Assessment Questions  1. REASON FOR CALL: \"What is the main reason for your call?\" or \"How can I best help you?\"      Calling regarding dressing around drain site and when to remove.  2. SYMPTOMS : \"Do you have any symptoms?\"       Denies  3. OTHER QUESTIONS: \"Do you have any other questions?\"      No    Protocols used: Information Only Call - No Triage-ADULT-[place]    "

## 2025-05-05 NOTE — TELEPHONE ENCOUNTER
Caller: Katarzyna Ku    Relationship: Self    Best call back number: [phone]    What is the best time to reach you: ANYTIME    Who are you requesting to speak with (clinical staff, provider,  specific staff member): CLINICAL STAFF      What was the call regarding: PT IS NEEDING TO SPEAK WITH SOMEONE ABOUT UPCOMING PROCEDURE 6/9. PT HAS SOME QUESTIONS. PLEASE CALL AN ADVISE.

## 2025-05-09 ENCOUNTER — NURSE TRIAGE (OUTPATIENT)
Dept: CALL CENTER | Facility: HOSPITAL | Age: 85
End: 2025-05-09
Payer: MEDICARE

## 2025-05-10 ENCOUNTER — HOSPITAL ENCOUNTER (EMERGENCY)
Facility: HOSPITAL | Age: 85
Discharge: HOME | End: 2025-05-10
Payer: MEDICARE

## 2025-05-10 VITALS
HEART RATE: 94 BPM | WEIGHT: 224 LBS | HEIGHT: 60 IN | DIASTOLIC BLOOD PRESSURE: 66 MMHG | RESPIRATION RATE: 16 BRPM | SYSTOLIC BLOOD PRESSURE: 147 MMHG | TEMPERATURE: 97.8 F | BODY MASS INDEX: 43.98 KG/M2 | OXYGEN SATURATION: 92 %

## 2025-05-10 DIAGNOSIS — Z98.890 POST-OPERATIVE STATE: Primary | ICD-10-CM

## 2025-05-10 PROCEDURE — 99282 EMERGENCY DEPT VISIT SF MDM: CPT

## 2025-05-10 NOTE — TELEPHONE ENCOUNTER
Caller states SAÚL drain not draining in bulb and leaking around site. Caller states won't hold air and they have no Home Health. Caller states worried about edema. Caller has number for Surgeon and will call now. Advised to call back as needed.          Reason for Disposition   [1] Caller has URGENT question AND [2] triager unable to answer question    Additional Information   Negative: Sounds like a life-threatening emergency to the triager   Negative: Chest pain   Negative: Difficulty breathing   Negative: Acting confused (e.g., disoriented, slurred speech) or excessively sleepy   Negative: Post-Op tonsil and adenoid surgery, symptoms or questions about   Negative: Surgical incision symptoms and questions   Negative: [1] Pain or burning with passing urine (urination) AND [2] male   Negative: [1] Pain or burning with passing urine (urination) AND [2] female   Negative: Constipation   Negative: New or worsening leg (calf, thigh) pain   Negative: New or worsening leg swelling   Negative: Dizziness is severe, or persists > 24 hours after surgery   Negative: Pain, redness, swelling, or pus at IV Site   Negative: Symptoms arising from use of a urinary catheter (e.g., Coude, Arriaga)   Negative: Cast problems or questions   Negative: Medication question   Negative: [1] Widespread rash AND [2] bright red, sunburn-like   Negative: [1] SEVERE headache AND [2] after spinal (epidural) anesthesia   Negative: [1] Vomiting AND [2] persists > 4 hours   Negative: [1] Vomiting AND [2] abdomen looks much more swollen than usual   Negative: [1] Drinking very little AND [2] dehydration suspected (e.g., no urine > 12 hours, very dry mouth, very lightheaded)   Negative: Patient sounds very sick or weak to the triager   Negative: Sounds like a serious complication to the triager   Negative: Fever > 100.4 F (38.0 C)   Negative: [1] SEVERE post-op pain (e.g., excruciating, pain scale 8-10) AND [2] not controlled with pain  "medications    Answer Assessment - Initial Assessment Questions  1. SYMPTOM: \"What's the main symptom you're concerned about?\" (e.g., pain, fever, vomiting)      Leaking around SAÚL drain   2. ONSET: \"When did symptoms start?\"      Noticed today   3. SURGERY: \"What surgery did you have?\"      right breast total mastectomy, right sentinel lymph node biopsy  4. DATE of SURGERY: \"When was the surgery?\"       29 th   5. ANESTHESIA: \" What type of anesthesia did you have?\" (e.g., general, spinal, epidural, local)        6. PAIN: \"Is there any pain?\" If Yes, ask: \"How bad is it?\"  (Scale 1-10; or mild, moderate, severe)      No c/o   7. FEVER: \"Do you have a fever?\" If Yes, ask: \"What is your temperature, how was it measured, and when did it start?\"      No c/o   8. VOMITING: \"Is there any vomiting?\" If Yes, ask: \"How many times?\"      No c/o   9. BLEEDING: \"Is there any bleeding?\" If Yes, ask: \"How much?\" and \"Where?\"      Draining around SAÚL   10. OTHER SYMPTOMS: \"Do you have any other symptoms?\" (e.g., drainage from wound, painful urination, constipation)    Protocols used: Post-Op Symptoms and Questions-ADULT-    "

## 2025-05-10 NOTE — DISCHARGE INSTRUCTIONS
You were seen in the emergency department today for concerns about your surgical site drain. Vital signs + a medical screening exam were performed, and the need for any labwork and/ or imaging were discussed. Results of the above, if performed, were also discussed as well as their clinical significance.     You should schedule a follow-up appointment with your family medicine doctor within the next 2-3 days. If you do not have a family medicine provider we can provide you with contact information to establish care.    Use gauze to collect drainage from your surgical site. Keep your follow-up appointment with Dr. Cedillo as scheduled.    Any prescriptions written today can have a paper copy provided for you to take to any pharmacy you would like in the event that your primary pharmacy is closed.  For any medications that were prescribed as a daily medication and for which you received a dose in the emergency department such as antibiotics, unless otherwise instructed take your 1st dose tomorrow. If you would prefer a paper copy of your prescription, let your treating physician or nurse know.     It is important to remember that symptoms and progression of illness/ injury can change, so do not hesitate to return to the Emergency Department for any new or worsening symptoms.     You should return to the Emergency Department or seek immediate medical care if you develop new or worsening symptoms including but not limited to fever/chills, pus draining from your surgical site, bleeding from the surgical site, significant swelling around the surgical site, redness around the surgical site or uncontrolled pain.

## 2025-05-10 NOTE — PROGRESS NOTES
Presented to emergency room due to dislodgment of drain.  On my arrival, the drain was already out completely and inspection of the drain confirmed a completely intact full length fluted SAÚL drain with no concern of drain fracture.  To follow up with Dr. Cedillo early this week.

## 2025-05-10 NOTE — ED PROVIDER NOTES
Subjective   History of Present Illness    84-year-old female status post right mastectomy with sentinel lymph node biopsy on 4/29/2025, presenting to the emergency department for concerns for postop complication; she reports that she has had clear drainage from the SAÚL drain up until last night, when the drain itself stopped draining.  She denies feeling like she pulled on it or other trauma.  No pus, no surrounding redness, no fever or chills.    Review of Systems    ROS as specified in HPI.      Past Medical History:   Diagnosis Date    Anxiety     Arthritis     Asthma     Casper esophagus     Breast cancer     Left 2021    Colon polyp     COPD (chronic obstructive pulmonary disease)     Dr Edith alba, clearance in media 2/2022    Depression     Diarrhea     Diverticulitis of colon     Emphysema lung     Emphysema of lung     Flu 12/2021    GERD (gastroesophageal reflux disease)     Hx of radiation therapy     left breast cancer, 2021    Hypertension     Requires supplemental oxygen     3L/NC w/exertion    Skin cancer     Skin cancer     Sleep apnea     w/CPAP and oxygen at 3L       Allergies   Allergen Reactions    Azithromycin Nausea And Vomiting     SEVERE GI UPSET    Sulfa Antibiotics Itching    Codeine Rash       Past Surgical History:   Procedure Laterality Date    BILATERAL SALPINGO OOPHORECTOMY      BLADDER SURGERY      bladder lift    BREAST LUMPECTOMY Left     breast ca    CARPAL TUNNEL RELEASE      BOTH HANDS    CATARACT EXTRACTION W/ INTRAOCULAR LENS  IMPLANT, BILATERAL      COLONOSCOPY N/A 02/10/2017    Procedure: COLONOSCOPY with polypectomy;  Surgeon: Weston Sue MD;  Location:  [facility] OR;  Service:     COLONOSCOPY N/A 08/12/2020    Procedure: COLONOSCOPY;  Surgeon: Weston Sue MD;  Location: [facility] OR;  Service: Gastroenterology;  Laterality: N/A;  TRANSVERSE COLON POLYP  DIVERTICULOSIS  CECAL TIME at 1356  SIGMOID COLON POLYP    ENDOSCOPY N/A 02/10/2017     Procedure: ESOPHAGOGASTRODUODENOSCOPY with biopsies;  Surgeon: Weston Sue MD;  Location: [facility] OR;  Service:     ENDOSCOPY N/A 2020    Procedure: ESOPHAGOGASTRODUODENOSCOPY;  Surgeon: Weston Sue MD;  Location: [facility] OR;  Service: Gastroenterology;  Laterality: N/A;  DISTAL ESOPHAGUS BIOPSY  STOCKTON'S ESOPHAGUS    HYSTERECTOMY      MASTECTOMY      MASTECTOMY W/ SENTINEL NODE BIOPSY Left 2022    Procedure: BREAST MASTECTOMY WITH SENTINEL NODE BIOPSY;  Surgeon: Afshin Schultz MD;  Location: [facility] OR;  Service: General;  Laterality: Left;    MASTECTOMY W/ SENTINEL NODE BIOPSY Right 2025    Procedure: right breast total mastectomy, right sentinel lymph node biopsy;  Surgeon: Valencia Cedillo MD;  Location: [facility] OR;  Service: General;  Laterality: Right;    SKIN BIOPSY      TOTAL KNEE ARTHROPLASTY Bilateral        Family History   Problem Relation Age of Onset    Hypertension Daughter     Breast cancer Daughter     Allergies Daughter     Malig Hyperthermia Neg Hx     Cancer Neg Hx        Social History     Socioeconomic History    Marital status:    Tobacco Use    Smoking status: Former     Current packs/day: 0.00     Average packs/day: 2.0 packs/day for 30.0 years (60.0 ttl pk-yrs)     Types: Cigarettes     Start date: 1960     Quit date: 1990     Years since quittin.2     Passive exposure: Never    Smokeless tobacco: Never   Vaping Use    Vaping status: Never Used   Substance and Sexual Activity    Alcohol use: No    Drug use: No    Sexual activity: Defer           Objective   Physical Exam  Vitals reviewed.   Constitutional:       General: She is not in acute distress.     Appearance: She is normal weight. She is not toxic-appearing.   HENT:      Head: Normocephalic and atraumatic.   Cardiovascular:      Rate and Rhythm: Normal rate.   Pulmonary:      Effort: Pulmonary effort is normal. No respiratory distress.   Abdominal:      General:  There is no distension.   Musculoskeletal:      Cervical back: Normal range of motion and neck supple. No rigidity.      Comments: Right mastectomy surgical site is clean, dry, intact with no surrounding erythema, SAÚL drain has fallen out of the surgical site, there is no white fenestrated distal drainage cap.   Skin:     Coloration: Skin is not jaundiced or pale.   Neurological:      Mental Status: She is alert and oriented to person, place, and time.   Psychiatric:         Mood and Affect: Mood normal.         Behavior: Behavior normal.         Procedures           ED Course                                                       Medical Decision Making  Problems Addressed:  Post-operative state: acute illness or injury        Final diagnoses:   Post-operative state       ED Disposition  ED Disposition       ED Disposition   Discharge    Condition   Stable    Comment   --               Brayan Beal MD  [address] [facility]  [place] [id number] [phone]    Call in 3 days           Medication List      No changes were made to your prescriptions during this visit.         ED Course: 84-year-old female presenting for evaluation of concerns of a malfunctioning SAÚL drain after right-sided mastectomy.  Vitals within normal limits, SAÚL drain was already fallen out of the area and hanging freely on arrival, some serosanguineous fluid within the drain itself.  I had discussed the case with general surgery on-call regarding potential for retained portion of the SAÚL drain, specifically the distal white fenestrated drainage port used on some models.  He evaluated the patient in the drain, no concerns for drain fracture or any retained foreign bodies as he reports they do not use that model of SAÚL drain in their group.  No need for replacement of the drain at this time, patient has gauze at home for wound dressing, signs/symptoms of infection were discussed, she has follow-up with Dr. Cedillo next week.    Patient  appropriate for discharge without further workup/ management from the Emergency Department. All questions were answered. Close return and follow-up instructions were provided, and pt was discharged home in stable condition.      EKG: None    Consults: General Surgery    Incidental Findings: None       Jesus Manuel Sorto DO  05/10/25 0841

## 2025-05-14 ENCOUNTER — OFFICE VISIT (OUTPATIENT)
Dept: SURGERY | Facility: CLINIC | Age: 85
End: 2025-05-14
Payer: MEDICARE

## 2025-05-14 VITALS
OXYGEN SATURATION: 96 % | DIASTOLIC BLOOD PRESSURE: 64 MMHG | HEIGHT: 60 IN | WEIGHT: 218.6 LBS | BODY MASS INDEX: 42.92 KG/M2 | SYSTOLIC BLOOD PRESSURE: 120 MMHG | HEART RATE: 75 BPM

## 2025-05-14 DIAGNOSIS — C50.919 MALIGNANT NEOPLASM OF FEMALE BREAST, UNSPECIFIED ESTROGEN RECEPTOR STATUS, UNSPECIFIED LATERALITY, UNSPECIFIED SITE OF BREAST: Primary | ICD-10-CM

## 2025-05-16 NOTE — PROGRESS NOTES
Subjective     REASON FOR CONSULTATION: Breast cancer  Provide an opinion on any further workup or treatment                             REQUESTING PHYSICIAN: Dr. Schultz    RECORDS OBTAINED:  Records of the patients history including those obtained from the referring provider were reviewed and summarized in detail.    HISTORY OF PRESENT ILLNESS:  The patient is a 84 y.o. year old female who is here for an opinion about the above issue.    History of Present Illness   This is a 82-year-old lady with obesity, COPD/asthma on intermittent O2, hypertension, depression, Casper's esophagus, depression and anxiety.  She has a history of stage I triple negative left breast cancer in 2009 treated with a lumpectomy and radiation therapy completed January 2010.      The patient had an abnormal screening mammogram on the left 11-21 showing calcifications in the left breast and a new oval-shaped asymmetry measuring 15 mm suspicious for malignancy.  Diagnostic mammogram and ultrasound 11/24/2021 showed postlumpectomy changes in the lateral portion of the left breast and a new 1.6 x 1.1 cm lesion at the 3 to 4 o'clock position of the left breast 7 cm from the nipple.  She underwent an ultrasound-guided breast biopsy 12/15/2021 with 2 lesions biopsy including a small solid satellite nodule adjacent to the previously described larger mass.  Pathology from mass 1 showed invasive ductal carcinoma grade 3 (3+3+2) ER 31 to 40% positive moderately, PA negative, HER-2 2+/FISH negative, Ki-67 60% with no in situ component, no lymph vascular or perineural invasion; mass 2 invasive ductal carcinoma grade 2 (3+3+1) ER 1 to 10% weakly positive, PA negative, HER-2 2+/FISH +80% of cells, Ki-67 55% with no in situ component, no lymphovascular or perineural invasion.    The patient underwent a left mastectomy and sentinel lymph node biopsy on 2/23/2022 showing invasive ductal carcinoma grade 3 tumor measuring 3.0 cm in size, negative margins,  several foci of lymphovascular invasion, positive perineural invasion, associated high-grade ductal carcinoma in situ 40 mm negative margins for in situ disease, 5 negative axillary lymph nodes.    The patient's main complaint relates to her shortness of breath related to COPD.  She is on oxygen intermittently.  She relates her breathing has worsened significantly over the previous 4 to 5 years.    The patient returned today for follow-up.  The patient was recently diagnosed with a new breast cancer in the right breast status post right mastectomy 4/29/2025 for tumor that was 1.5 cm with associated DCIS high nuclear grade and atypical lobular hyperplasia ER strongly positive NE moderately positive HER2 2+ negative FISH Ki-67 25%.  Axillary lymph node sampling x 3 lymph nodes negative.    Past Medical History:   Diagnosis Date    Anxiety     Arthritis     Asthma     Casper esophagus     Breast cancer     Left 2021    Colon polyp     COPD (chronic obstructive pulmonary disease)     Dr Edith alba, clearance in media 2/2022    Depression     Diarrhea     Diverticulitis of colon     Emphysema lung     Emphysema of lung     Flu 12/2021    GERD (gastroesophageal reflux disease)     Hx of radiation therapy     left breast cancer, 2021    Hypertension     Requires supplemental oxygen     3L/NC w/exertion    Skin cancer     Skin cancer     Sleep apnea     w/CPAP and oxygen at 3L        Past Surgical History:   Procedure Laterality Date    BILATERAL SALPINGO OOPHORECTOMY      BLADDER SURGERY      bladder lift    BREAST LUMPECTOMY Left     breast ca    CARPAL TUNNEL RELEASE      BOTH HANDS    CATARACT EXTRACTION W/ INTRAOCULAR LENS  IMPLANT, BILATERAL      COLONOSCOPY N/A 02/10/2017    Procedure: COLONOSCOPY with polypectomy;  Surgeon: Weston Sue MD;  Location: [facility] OR;  Service:     COLONOSCOPY N/A 08/12/2020    Procedure: COLONOSCOPY;  Surgeon: Weston Sue MD;  Location: [facility] OR;   Service: Gastroenterology;  Laterality: N/A;  TRANSVERSE COLON POLYP  DIVERTICULOSIS  CECAL TIME at 1356  SIGMOID COLON POLYP    ENDOSCOPY N/A 02/10/2017    Procedure: ESOPHAGOGASTRODUODENOSCOPY with biopsies;  Surgeon: Weston Sue MD;  Location: [facility] OR;  Service:     ENDOSCOPY N/A 08/12/2020    Procedure: ESOPHAGOGASTRODUODENOSCOPY;  Surgeon: Weston Sue MD;  Location: [facility] OR;  Service: Gastroenterology;  Laterality: N/A;  DISTAL ESOPHAGUS BIOPSY  STOCKTON'S ESOPHAGUS    HYSTERECTOMY      MASTECTOMY      MASTECTOMY W/ SENTINEL NODE BIOPSY Left 02/23/2022    Procedure: BREAST MASTECTOMY WITH SENTINEL NODE BIOPSY;  Surgeon: Afshin Schultz MD;  Location: [facility] OR;  Service: General;  Laterality: Left;    MASTECTOMY W/ SENTINEL NODE BIOPSY Right 4/29/2025    Procedure: right breast total mastectomy, right sentinel lymph node biopsy;  Surgeon: Valencia Cedillo MD;  Location: [facility] OR;  Service: General;  Laterality: Right;    SKIN BIOPSY      TOTAL KNEE ARTHROPLASTY Bilateral         Current Outpatient Medications on File Prior to Visit   Medication Sig Dispense Refill    acetaminophen (TYLENOL) 500 MG tablet Take 2 tablets by mouth Every 6 (Six) Hours As Needed for Mild Pain.      albuterol (PROVENTIL) (2.5 MG/3ML) 0.083% nebulizer solution Take 2.5 mg by nebulization 2 (Two) Times a Day. Can use 4 times a day if needed      albuterol sulfate  (90 Base) MCG/ACT inhaler Inhale 2 puffs Every 4 (Four) Hours As Needed for Wheezing or Shortness of Air.      ALPRAZolam (XANAX) 0.5 MG tablet Take 1 tablet by mouth Daily As Needed for Anxiety.      amLODIPine (NORVASC) 5 MG tablet Take 1 tablet by mouth Every Night.      benazepril (LOTENSIN) 40 MG tablet Take 1 tablet by mouth Daily.      benzonatate (TESSALON) 100 MG capsule       betamethasone dipropionate 0.05 % cream       Calcium Acetate-Magnesium Carb 450-200 MG tablet Take 1 tablet by mouth Daily.      chlorhexidine  (PERIDEX) 0.12 % solution Apply 15 mL to the mouth or throat Daily.      Cholecalciferol (Vitamin D3) 20 MCG (800 UNIT) tablet Take 50 mcg by mouth Daily.      citalopram (CeleXA) 40 MG tablet Take 1 tablet by mouth Every Morning.      Cyanocobalamin (VITAMIN B-12) 5000 MCG tablet dispersible Take 1 tablet by mouth Daily.      estradiol (ESTRACE) 0.1 MG/GM vaginal cream       fluconazole (DIFLUCAN) 100 MG tablet Take 1 tablet by mouth As Needed.      Fluticasone-Umeclidin-Vilant (TRELEGY) 100-62.5-25 MCG/INH inhaler Inhale 1 puff Daily.      guaiFENesin (MUCINEX) 600 MG 12 hr tablet Take 1 tablet by mouth Every 12 (Twelve) Hours. 30 tablet 0    hydrALAZINE (APRESOLINE) 50 MG tablet Take 1 tablet by mouth 3 (Three) Times a Day.      hydroCHLOROthiazide (MICROZIDE) 12.5 MG capsule Take 1 capsule by mouth Every Morning.      HYDROcodone-acetaminophen (NORCO) 7.5-325 MG per tablet Take 1 tablet by mouth Every 6 (Six) Hours As Needed. (Patient not taking: Reported on 5/14/2025)      hydrocortisone 2.5 % ointment Apply  topically to the appropriate area as directed See Admin Instructions. Apply topically to the affected areas twice daily as directed 28.35 g 11    hyoscyamine (LEVBID) 0.375 MG 12 hr tablet Take 1 tablet by mouth Every 12 (Twelve) Hours As Needed for Cramping. 180 tablet 3    magnesium oxide (MAG-OX) 400 MG tablet Take 1 tablet by mouth Daily.      meloxicam (MOBIC) 15 MG tablet Take 1 tablet by mouth Daily.      Mirabegron ER (MYRBETRIQ) 50 MG tablet sustained-release 24 hour 24 hr tablet Take 50 mg by mouth every night at bedtime.      mometasone (ELOCON) 0.1 % cream Apply 0.1 application  topically to the appropriate area as directed As Needed.      Multiple Vitamins-Minerals (MULTIVITAMIN ADULT PO) Take 1 tablet by mouth Daily.      O2 (OXYGEN) Inhale 3 L/min 1 (One) Time. Uses w/activity      omeprazole (priLOSEC) 40 MG capsule TAKE ONE CAPSULE BY MOUTH TWICE A DAY BEFORE MEALS 180 capsule 3     No  current facility-administered medications on file prior to visit.        ALLERGIES:    Allergies   Allergen Reactions    Azithromycin Nausea And Vomiting     SEVERE GI UPSET    Sulfa Antibiotics Itching    Codeine Rash        Social History     Socioeconomic History    Marital status:    Tobacco Use    Smoking status: Former     Current packs/day: 0.00     Average packs/day: 2.0 packs/day for 30.0 years (60.0 ttl pk-yrs)     Types: Cigarettes     Start date: 1960     Quit date: 1990     Years since quittin.3     Passive exposure: Never    Smokeless tobacco: Never   Vaping Use    Vaping status: Never Used   Substance and Sexual Activity    Alcohol use: No    Drug use: No    Sexual activity: Defer        Family History   Problem Relation Age of Onset    Hypertension Daughter     Breast cancer Daughter     Allergies Daughter     Malig Hyperthermia Neg Hx     Cancer Neg Hx         Review of Systems   Constitutional: Negative.    HENT:  Negative for trouble swallowing.    Respiratory:  Positive for shortness of breath.    Cardiovascular: Negative.    Gastrointestinal:  Negative for abdominal pain.   Genitourinary: Negative.    Musculoskeletal:  Positive for arthralgias and myalgias.   Skin:  Positive for wound. Negative for rash.   Allergic/Immunologic: Negative.    Neurological: Negative.    Hematological: Negative.    Psychiatric/Behavioral: Negative.            Objective     There were no vitals filed for this visit.            2024    12:57 PM   Current Status   ECOG score 0       Physical Exam    CONSTITUTIONAL: pleasant well-developed obese elderly woman  HEENT: no icterus, no thrush, moist membranes  LYMPH: no cervical or supraclavicular lad  CV: RRR, S1S2, no murmur  Breast: Status post left mastectomy with significant scar tissue making exam difficult-unchanged 2024, recent right mastectomy wound  RESP: cta bilat, faint wheezing, no rales, diminished breath sounds, mild increased  work of breathing  NEURO: alert and oriented x3, mild global weakness  PSYCH: normal mood and affect    RECENT LABS:  Hematology WBC   Date Value Ref Range Status   04/23/2025 10.11 3.40 - 10.80 10*3/mm3 Final     RBC   Date Value Ref Range Status   04/23/2025 3.60 (L) 3.77 - 5.28 10*6/mm3 Final     Hemoglobin   Date Value Ref Range Status   04/23/2025 10.7 (L) 12.0 - 15.9 g/dL Final     Hematocrit   Date Value Ref Range Status   04/23/2025 34.8 34.0 - 46.6 % Final     Platelets   Date Value Ref Range Status   04/23/2025 256 140 - 450 10*3/mm3 Final        Lab Results   Component Value Date    GLUCOSE 150 (H) 04/23/2025    BUN 21 04/23/2025    CREATININE 1.08 (H) 04/23/2025    EGFRIFNONA 53 (L) 02/24/2022    BCR 19.4 04/23/2025    K 4.3 04/23/2025    CO2 23.4 04/23/2025    CALCIUM 9.0 04/23/2025    ALBUMIN 4.4 11/06/2024    AST 16 11/06/2024    ALT 13 11/06/2024         Mammo Screening Modified With Tomosynthesis Right With CAD 11/16/2023 - IMPRESSION:  Negative right unilateral annual screening mammogram.    DEXA Bone Density Axial 12/14/2023 - IMPRESSION:  1.  Osteopenia.  2.  Left femoral neck T score -1.6.    Assessment & Plan   *[id number] ER positive/IN moderately positive/HER2 2+ negative FISH grade 3 ductal carcinoma right breast status post mastectomy with negative margins 4/29/2025    *[id number] ER low (1-10%), IN negative, HER-2 FISH amplified, Ki-67 60% status post left mastectomy 2/23/2022.  The patient chose not to receive adjuvant chemotherapy given age and comorbidities    *History of stage I triple negative ductal cancer left breast status post lumpectomy and radiation therapy 2009    *Comorbidities include fairly advanced COPD requiring intermittent oxygen, hypertension, depression/anxiety    *Daughter with BRCA mutation (suspect patient also with BRCA mutation, declines testing)    *Mild anemia-hemoglobin 10.8        Oncology plan/recommendations:  Discussed most recent pathology from the right breast  with the patient and her daughter.  Unlike previous tumors, current tumor is ER positive.  The patient has fairly advanced COPD, osteopenia and advanced age.  She and her family are moving to [facility] within the next couple weeks.  I do not think she would benefit greatly from hormonal therapy given her comorbidities and small tumor now status post bilateral mastectomies.  I did not prescribe hormonal therapy at this time but recommended oncology follow-up in [place].

## 2025-05-22 ENCOUNTER — OFFICE VISIT (OUTPATIENT)
Dept: ONCOLOGY | Facility: CLINIC | Age: 85
End: 2025-05-22
Payer: MEDICARE

## 2025-05-22 ENCOUNTER — LAB (OUTPATIENT)
Dept: LAB | Facility: HOSPITAL | Age: 85
End: 2025-05-22
Payer: MEDICARE

## 2025-05-22 VITALS
BODY MASS INDEX: 43.15 KG/M2 | OXYGEN SATURATION: 91 % | DIASTOLIC BLOOD PRESSURE: 69 MMHG | SYSTOLIC BLOOD PRESSURE: 124 MMHG | TEMPERATURE: 98.6 F | HEIGHT: 60 IN | WEIGHT: 219.8 LBS | RESPIRATION RATE: 16 BRPM | HEART RATE: 88 BPM

## 2025-05-22 DIAGNOSIS — D64.9 ANEMIA, UNSPECIFIED TYPE: ICD-10-CM

## 2025-05-22 DIAGNOSIS — C50.112 MALIGNANT NEOPLASM OF CENTRAL PORTION OF LEFT BREAST IN FEMALE, ESTROGEN RECEPTOR POSITIVE: ICD-10-CM

## 2025-05-22 DIAGNOSIS — C50.919 MALIGNANT NEOPLASM OF FEMALE BREAST, UNSPECIFIED ESTROGEN RECEPTOR STATUS, UNSPECIFIED LATERALITY, UNSPECIFIED SITE OF BREAST: Primary | ICD-10-CM

## 2025-05-22 DIAGNOSIS — Z17.0 MALIGNANT NEOPLASM OF CENTRAL PORTION OF LEFT BREAST IN FEMALE, ESTROGEN RECEPTOR POSITIVE: ICD-10-CM

## 2025-05-22 LAB
ALBUMIN SERPL-MCNC: 4.1 G/DL (ref 3.5–5.2)
ALBUMIN/GLOB SERPL: 1.5 G/DL
ALP SERPL-CCNC: 78 U/L (ref 39–117)
ALT SERPL W P-5'-P-CCNC: 15 U/L (ref 1–33)
ANION GAP SERPL CALCULATED.3IONS-SCNC: 12.8 MMOL/L (ref 5–15)
AST SERPL-CCNC: 18 U/L (ref 1–32)
BASOPHILS # BLD AUTO: 0.04 10*3/MM3 (ref 0–0.2)
BASOPHILS NFR BLD AUTO: 0.3 % (ref 0–1.5)
BILIRUB SERPL-MCNC: 0.2 MG/DL (ref 0–1.2)
BUN SERPL-MCNC: 24 MG/DL (ref 8–23)
BUN/CREAT SERPL: 22.4 (ref 7–25)
CALCIUM SPEC-SCNC: 9.5 MG/DL (ref 8.6–10.5)
CHLORIDE SERPL-SCNC: 105 MMOL/L (ref 98–107)
CO2 SERPL-SCNC: 22.2 MMOL/L (ref 22–29)
CREAT SERPL-MCNC: 1.07 MG/DL (ref 0.57–1)
DEPRECATED RDW RBC AUTO: 51.6 FL (ref 37–54)
EGFRCR SERPLBLD CKD-EPI 2021: 52 ML/MIN/1.73
EOSINOPHIL # BLD AUTO: 0.02 10*3/MM3 (ref 0–0.4)
EOSINOPHIL NFR BLD AUTO: 0.2 % (ref 0.3–6.2)
ERYTHROCYTE [DISTWIDTH] IN BLOOD BY AUTOMATED COUNT: 15.1 % (ref 12.3–15.4)
GLOBULIN UR ELPH-MCNC: 2.8 GM/DL
GLUCOSE SERPL-MCNC: 118 MG/DL (ref 65–99)
HCT VFR BLD AUTO: 33.9 % (ref 34–46.6)
HGB BLD-MCNC: 10.8 G/DL (ref 12–15.9)
IMM GRANULOCYTES # BLD AUTO: 0.08 10*3/MM3 (ref 0–0.05)
IMM GRANULOCYTES NFR BLD AUTO: 0.6 % (ref 0–0.5)
LYMPHOCYTES # BLD AUTO: 2.11 10*3/MM3 (ref 0.7–3.1)
LYMPHOCYTES NFR BLD AUTO: 17 % (ref 19.6–45.3)
MCH RBC QN AUTO: 29.6 PG (ref 26.6–33)
MCHC RBC AUTO-ENTMCNC: 31.9 G/DL (ref 31.5–35.7)
MCV RBC AUTO: 92.9 FL (ref 79–97)
MONOCYTES # BLD AUTO: 0.52 10*3/MM3 (ref 0.1–0.9)
MONOCYTES NFR BLD AUTO: 4.2 % (ref 5–12)
NEUTROPHILS NFR BLD AUTO: 77.7 % (ref 42.7–76)
NEUTROPHILS NFR BLD AUTO: 9.67 10*3/MM3 (ref 1.7–7)
NRBC BLD AUTO-RTO: 0 /100 WBC (ref 0–0.2)
PLATELET # BLD AUTO: 266 10*3/MM3 (ref 140–450)
PMV BLD AUTO: 10.7 FL (ref 6–12)
POTASSIUM SERPL-SCNC: 4.1 MMOL/L (ref 3.5–5.2)
PROT SERPL-MCNC: 6.9 G/DL (ref 6–8.5)
RBC # BLD AUTO: 3.65 10*6/MM3 (ref 3.77–5.28)
SODIUM SERPL-SCNC: 140 MMOL/L (ref 136–145)
WBC NRBC COR # BLD AUTO: 12.44 10*3/MM3 (ref 3.4–10.8)

## 2025-05-22 PROCEDURE — 36415 COLL VENOUS BLD VENIPUNCTURE: CPT

## 2025-05-22 PROCEDURE — 80053 COMPREHEN METABOLIC PANEL: CPT | Performed by: INTERNAL MEDICINE

## 2025-05-22 PROCEDURE — 85025 COMPLETE CBC W/AUTO DIFF WBC: CPT | Performed by: INTERNAL MEDICINE

## 2025-06-05 NOTE — PROGRESS NOTES
General Surgery Breast Cancer History and Physical Exam     Summary:    Katarzyna Ku is a 84 y.o. lady who presents with a new diagnosis of right breast invasive mammary carcinoma: Grade III,  ER+/[place]+, Her2 equivocal (nonamplified on FISH), Ki-67 25%; [id number], Stage I.      A multidisciplinary plan has been formulated for the patient:    (1) Breast Surgical Oncology:  - Her daughter and granddaughter are positive for BRCA2 pathogenic mutation.  She declines genetic testing right now.  -Nurse navigator following.   -S/p right breast total mastectomy with sentinel lymph node biopsy 4/2025.  -Follow up with me in 1 year.    (2) Medical Oncology:  - Follows with Dr. Sanders.    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  Declined adjuvant chemotherapy.    Referring Provider: No ref. provider found    Chief Complaint: abnormal breast imaging    History of Present Illness: Ms. Katarzyna Ku is a 84 y.o. year old lady, seen at the request of No ref. provider found for a new diagnosis of right breast cancer.      This was initially detected as an imaging abnormality. She has had annual mammograms each year. She denies any prior history of abnormal mammograms or breast biopsies. Her work-up is detailed in the oncologic history below.     She denies any breast lumps, pain, skin changes, or nipple discharge. She has a family history of breast cancer in her daughter and granddaughter.  Both tested positive for BRCA2 pathogenic mutations. She denies any family history of ovarian cancer.     She presents today for follow-up due to her drain falling out.    Workup of Current Diagnosis:    12/30/2024 Bilateral Screening Mammogram:  Breast Density: There are scattered  areas of fibroglandular density. In the right breast in the middle one third at the 9 o'clock position is a 8 mm mass with indistinct borders. This is a new development. There are stable benign-appearing calcifications scattered in the right breast.  IMPRESSION:   There is an 8 mm oval mass of the 9 o'clock position of the right breast and further evaluation with a diagnostic mammogram and limited ultrasound study is recommended. A notification was sent to the patient.   BI-RADS Category 0: Incomplete    2/20/2025 Right Breast Diagnostic Mammogram with Ultrasound:   Breast Density: There are scattered areas of fibroglandular density. There are diffuse round and large rodlike calcifications.    1. Diagnostic evaluation reveals a persistent oval, indistinct, equal density mass measuring 10 mm at 9-10:00.   2. There is a similar oval, indistinct, equal density mass measuring 5 mm at 9-10:00.   RIGHT BREAST REALTIME LIMITED BREAST ULTRASOUND  High resolution real-time targeted ultrasound scanning was performed by the ultrasound technologist. Still images were obtained by the ultrasound technologist and submitted for radiologist review.  FINDINGS:    1. There is an irregular hypoechoic indistinct and microlobulated hypoechoic mass at 9:30, 5 cm from the nipple measuring 11 x 11 x 7 mm. This displays some internal color Doppler flow and correlates with the  larger mammographic mass in question.  2. There is an oval indistinct hypoechoic mass at 10:00, 4 cm from the nipple measuring 7 x 4 x 6 mm which may correlate with the second smaller mass in question.   IMPRESSION:   1. Irregular mass at 9:30 is of high suspicion for malignancy. Recommendation is for ultrasound-guided core biopsy.    2. Complicated cyst versus solid mass at 10:00 may correlate with a second mammographic mass. Ultrasound-guided biopsy is recommended. If ultrasound biopsy does not resolve the mammographic finding, then a separate stereotactic biopsy  "would be recommended.  Results were discussed with the patient after the examination. She is aware that her primary clinical doctor will be in touch to schedule the biopsy.   The patient was sent a notification.   BI-RADS Category 4C: High Suspicion.    3/18/2025 Right Breast US Guided Biopsy:   PROCEDURE: Written and verbal consent was obtained for ultrasound guided core biopsy of a 1.1 cm right breast mass located at 9:30, 5 cm from the nipple.  \"Time out\" was observed to verify the patient's identity and correct location of the breast abnormality. The presence of the lesion was confirmed ultrasound and a lateral approach was chosen. The breast was prepped and draped in the usual sterile fashion. 10 mL 1% lidocaine with epinephrine and 10 mL 1% lidocaine without epinephrine were  utilized for local anesthesia. A small skin incision was made with the coaxial needle and the biopsy needle was placed into the mass. A total of 5 core samples were obtained. The specimens were placed in formalin and forwarded to the Pathology Department. A mini cork shaped post biopsy marking clip was placed. Post procedure mammogram for marker placement was performed. The mini cork shaped clip is in satisfactory position. Upon completion of the procedure, compression was applied to the biopsy  site until all appreciable bleeding subsided and a sterile dressing was applied. Post biopsy instructions were reviewed with the patient by our clinical breast imaging staff. A written copy of these instructions was also given to the patient. The patient tolerated the procedure well and no immediate complications occurred.    SUMMARY: 14-gauge ultrasound guided core biopsy of a 1.1 cm right breast mass located at 9:30, 5 cm from the nipple.  A mini cork shaped post biopsy marking clip was placed. The mini cork shaped clip is in  satisfactory position.    IMPRESSION:  Pathology results are malignant and are concordant with imaging.  RECOMMENDATION: " Surgical consultation is advised.    3/18/2025 Pathology:   Final Diagnosis   1.  Breast, right 9: 30 o'clock position 5 CMFN, core biopsy: ([place] clip)   A.  Invasive mammary carcinoma, no special type (ductal), Dorothy histologic grade 3 (tubules = 3, nuclei = 3, mitoses = 3), measuring 6 mm maximally, and involving approximately 5 core fragments.     4/29/2025 Right breast total mastectomy with sentinel lymph node biopsy   Final Diagnosis   1.  Breast, right, mastectomy: (1486 g)  A.  Invasive mammary carcinoma, no special type (ductal), Dorothy histologic grade 3 (tubules = 3, nuclei = 3, mitoses = 3), measuring 15 mm maximally,  B.  Associated ductal carcinoma in situ (DCIS), high nuclear grade with central necrosis.  C.  Atypical lobular hyperplasia.    D. See synoptic template.     2.  Lymph node, sentinel #1, excision:                A.  3 benign lymph nodes (0/3).     3.  Skin and breast tissue, right superior margin, excision: (200 g)               A. Fragment of benign lymph node (0/1).    B. Seborrheic keratosis.       Past Medical History:   COPD, on nightly O2  HTN  GERD    Past Surgical History:    Past Surgical History:   Procedure Laterality Date    BILATERAL SALPINGO OOPHORECTOMY      BLADDER SURGERY      bladder lift    BREAST LUMPECTOMY Left     breast ca    CARPAL TUNNEL RELEASE      BOTH HANDS    CATARACT EXTRACTION W/ INTRAOCULAR LENS  IMPLANT, BILATERAL      COLONOSCOPY N/A 02/10/2017    Procedure: COLONOSCOPY with polypectomy;  Surgeon: Weston Sue MD;  Location: [facility] OR;  Service:     COLONOSCOPY N/A 08/12/2020    Procedure: COLONOSCOPY;  Surgeon: Weston Sue MD;  Location: [facility] OR;  Service: Gastroenterology;  Laterality: N/A;  TRANSVERSE COLON POLYP  DIVERTICULOSIS  CECAL TIME at 1356  SIGMOID COLON POLYP    ENDOSCOPY N/A 02/10/2017    Procedure: ESOPHAGOGASTRODUODENOSCOPY with biopsies;  Surgeon: Weston Sue MD;  Location: [facility]  OR;  Service:     ENDOSCOPY N/A 08/12/2020    Procedure: ESOPHAGOGASTRODUODENOSCOPY;  Surgeon: Weston Sue MD;  Location: [facility] OR;  Service: Gastroenterology;  Laterality: N/A;  DISTAL ESOPHAGUS BIOPSY  STOCKTON'S ESOPHAGUS    HYSTERECTOMY      MASTECTOMY      MASTECTOMY W/ SENTINEL NODE BIOPSY Left 02/23/2022    Procedure: BREAST MASTECTOMY WITH SENTINEL NODE BIOPSY;  Surgeon: Afshin Schultz MD;  Location: [facility] OR;  Service: General;  Laterality: Left;    MASTECTOMY W/ SENTINEL NODE BIOPSY Right 4/29/2025    Procedure: right breast total mastectomy, right sentinel lymph node biopsy;  Surgeon: Valencia Cedillo MD;  Location: [facility] OR;  Service: General;  Laterality: Right;    SKIN BIOPSY      TOTAL KNEE ARTHROPLASTY Bilateral      Family History:    As above    Social History:  Denies tobacco use  Occasional alcohol use    Allergies:   Allergies   Allergen Reactions    Azithromycin Nausea And Vomiting     SEVERE GI UPSET    Sulfa Antibiotics Itching    Codeine Rash     Medications:     Current Outpatient Medications:     acetaminophen (TYLENOL) 500 MG tablet, Take 2 tablets by mouth Every 6 (Six) Hours As Needed for Mild Pain., Disp: , Rfl:     albuterol (PROVENTIL) (2.5 MG/3ML) 0.083% nebulizer solution, Take 2.5 mg by nebulization 2 (Two) Times a Day. Can use 4 times a day if needed, Disp: , Rfl:     albuterol sulfate  (90 Base) MCG/ACT inhaler, Inhale 2 puffs Every 4 (Four) Hours As Needed for Wheezing or Shortness of Air., Disp: , Rfl:     ALPRAZolam (XANAX) 0.5 MG tablet, Take 1 tablet by mouth Daily As Needed for Anxiety., Disp: , Rfl:     amLODIPine (NORVASC) 5 MG tablet, Take 1 tablet by mouth Every Night., Disp: , Rfl:     benazepril (LOTENSIN) 40 MG tablet, Take 1 tablet by mouth Daily., Disp: , Rfl:     benzonatate (TESSALON) 100 MG capsule, , Disp: , Rfl:     betamethasone dipropionate 0.05 % cream, , Disp: , Rfl:     Calcium Acetate-Magnesium Carb 450-200 MG tablet, Take 1  tablet by mouth Daily., Disp: , Rfl:     chlorhexidine (PERIDEX) 0.12 % solution, Apply 15 mL to the mouth or throat Daily., Disp: , Rfl:     Cholecalciferol (Vitamin D3) 20 MCG (800 UNIT) tablet, Take 50 mcg by mouth Daily., Disp: , Rfl:     citalopram (CeleXA) 40 MG tablet, Take 1 tablet by mouth Every Morning., Disp: , Rfl:     Cyanocobalamin (VITAMIN B-12) 5000 MCG tablet dispersible, Take 1 tablet by mouth Daily., Disp: , Rfl:     estradiol (ESTRACE) 0.1 MG/GM vaginal cream, , Disp: , Rfl:     fluconazole (DIFLUCAN) 100 MG tablet, Take 1 tablet by mouth As Needed., Disp: , Rfl:     Fluticasone-Umeclidin-Vilant (TRELEGY) 100-62.5-25 MCG/INH inhaler, Inhale 1 puff Daily., Disp: , Rfl:     guaiFENesin (MUCINEX) 600 MG 12 hr tablet, Take 1 tablet by mouth Every 12 (Twelve) Hours., Disp: 30 tablet, Rfl: 0    hydrALAZINE (APRESOLINE) 50 MG tablet, Take 1 tablet by mouth 3 (Three) Times a Day., Disp: , Rfl:     hydroCHLOROthiazide (MICROZIDE) 12.5 MG capsule, Take 1 capsule by mouth Every Morning., Disp: , Rfl:     hydrocortisone 2.5 % ointment, Apply  topically to the appropriate area as directed See Admin Instructions. Apply topically to the affected areas twice daily as directed, Disp: 28.35 g, Rfl: 11    hyoscyamine (LEVBID) 0.375 MG 12 hr tablet, Take 1 tablet by mouth Every 12 (Twelve) Hours As Needed for Cramping., Disp: 180 tablet, Rfl: 3    magnesium oxide (MAG-OX) 400 MG tablet, Take 1 tablet by mouth Daily., Disp: , Rfl:     meloxicam (MOBIC) 15 MG tablet, Take 1 tablet by mouth Daily., Disp: , Rfl:     Mirabegron ER (MYRBETRIQ) 50 MG tablet sustained-release 24 hour 24 hr tablet, Take 50 mg by mouth every night at bedtime., Disp: , Rfl:     mometasone (ELOCON) 0.1 % cream, Apply 0.1 application  topically to the appropriate area as directed As Needed., Disp: , Rfl:     Multiple Vitamins-Minerals (MULTIVITAMIN ADULT PO), Take 1 tablet by mouth Daily., Disp: , Rfl:     O2 (OXYGEN), Inhale 3 L/min 1 (One)  Time. Uses w/activity, Disp: , Rfl:     omeprazole (priLOSEC) 40 MG capsule, TAKE ONE CAPSULE BY MOUTH TWICE A DAY BEFORE MEALS, Disp: 180 capsule, Rfl: 3    HYDROcodone-acetaminophen (NORCO) 7.5-325 MG per tablet, Take 1 tablet by mouth Every 6 (Six) Hours As Needed. (Patient not taking: Reported on 2025), Disp: , Rfl:     Laboratory Values:    Labs from 3/5/2025 reviewed by me     Review of Systems:   Influenza-like illness: no fever, no  cough, no  sore throat, no  body aches, no loss of sense of taste or smell, no known exposure to person with Covid-19.  Constitutional: Negative for fevers or chills  HENT: Negative for hearing loss or runny nose  Eyes: Negative for vision changes or scleral icterus  Respiratory: Negative for cough or shortness of breath  Cardiovascular: Negative for chest pain or heart palpitations  Gastrointestinal: Negative for abdominal pain, nausea, vomiting, constipation, melena, or hematochezia  Genitourinary: Negative for hematuria or dysuria  Musculoskeletal: Negative for joint swelling or gait instability  Neurologic: Negative for tremors or seizures  Psychiatric: Negative for suicidal ideations or depression  All other systems reviewed and negative    Physical Exam:   ECO - Symptomatic but completely ambulatory  Constitutional: Well-developed well-nourished, no acute distress  Eyes: Conjunctiva normal, sclera nonicteric  ENMT: Hearing grossly normal, oral mucosa moist  Neck: Supple, no palpable mass, trachea midline  Respiratory: Clear to auscultation, normal inspiratory effort  Cardiovascular: Regular rate, no peripheral edema, no jugular venous distention  Breast: symmetric  Right: Right breast incision clean and dry with no erythema or drainage, no hematoma or seroma  Left: Left breast mastectomy, no masses or skin changes  No clinical chest wall involvement.  Gastrointestinal: Soft, nontender  Lymphatics (palpable nodes): No cervical, supraclavicular or axillary  lymphadenopathy  Skin:  Warm, dry, no rash on visualized skin surfaces  Musculoskeletal: Symmetric strength, normal gait  Psychiatric: Alert and oriented ×3, normal affect     PAULA ROSA M.D.  General and Endoscopic Surgery  [facility], [place], operated by [facility]    [address], [address]  [place], [place], [address]  P: [phone]  F: [phone]

## 2025-06-06 ENCOUNTER — ANESTHESIA EVENT (OUTPATIENT)
Dept: PERIOP | Facility: HOSPITAL | Age: 85
End: 2025-06-06
Payer: MEDICARE

## 2025-06-09 ENCOUNTER — ANESTHESIA (OUTPATIENT)
Dept: PERIOP | Facility: HOSPITAL | Age: 85
End: 2025-06-09
Payer: MEDICARE

## 2025-06-09 ENCOUNTER — HOSPITAL ENCOUNTER (OUTPATIENT)
Facility: HOSPITAL | Age: 85
Setting detail: HOSPITAL OUTPATIENT SURGERY
Discharge: HOME | End: 2025-06-09
Attending: INTERNAL MEDICINE | Admitting: NURSE ANESTHETIST, CERTIFIED REGISTERED
Payer: MEDICARE

## 2025-06-09 VITALS
TEMPERATURE: 97.9 F | SYSTOLIC BLOOD PRESSURE: 160 MMHG | WEIGHT: 213.8 LBS | BODY MASS INDEX: 41.75 KG/M2 | HEART RATE: 79 BPM | DIASTOLIC BLOOD PRESSURE: 81 MMHG | OXYGEN SATURATION: 92 % | RESPIRATION RATE: 17 BRPM

## 2025-06-09 DIAGNOSIS — K22.70 BARRETT'S ESOPHAGUS WITHOUT DYSPLASIA: ICD-10-CM

## 2025-06-09 DIAGNOSIS — Z86.0101 PERSONAL HISTORY OF ADENOMATOUS AND SERRATED COLON POLYPS: ICD-10-CM

## 2025-06-09 PROCEDURE — 25010000002 PROPOFOL 200 MG/20ML EMULSION

## 2025-06-09 PROCEDURE — 43239 EGD BIOPSY SINGLE/MULTIPLE: CPT | Performed by: INTERNAL MEDICINE

## 2025-06-09 PROCEDURE — 25810000003 LACTATED RINGERS PER 1000 ML: Performed by: NURSE ANESTHETIST, CERTIFIED REGISTERED

## 2025-06-09 PROCEDURE — 25010000002 LIDOCAINE 2% SOLUTION

## 2025-06-09 PROCEDURE — G0105 COLORECTAL SCRN; HI RISK IND: HCPCS | Performed by: INTERNAL MEDICINE

## 2025-06-09 PROCEDURE — 88305 TISSUE EXAM BY PATHOLOGIST: CPT | Performed by: INTERNAL MEDICINE

## 2025-06-09 RX ORDER — SODIUM CHLORIDE, SODIUM LACTATE, POTASSIUM CHLORIDE, CALCIUM CHLORIDE 600; 310; 30; 20 MG/100ML; MG/100ML; MG/100ML; MG/100ML
100 INJECTION, SOLUTION INTRAVENOUS ONCE
Status: DISCONTINUED | OUTPATIENT
Start: 2025-06-09 | End: 2025-06-09 | Stop reason: HOSPADM

## 2025-06-09 RX ORDER — SODIUM CHLORIDE 9 MG/ML
40 INJECTION, SOLUTION INTRAVENOUS AS NEEDED
Status: DISCONTINUED | OUTPATIENT
Start: 2025-06-09 | End: 2025-06-09 | Stop reason: HOSPADM

## 2025-06-09 RX ORDER — ONDANSETRON 2 MG/ML
4 INJECTION INTRAMUSCULAR; INTRAVENOUS ONCE AS NEEDED
Status: DISCONTINUED | OUTPATIENT
Start: 2025-06-09 | End: 2025-06-09 | Stop reason: HOSPADM

## 2025-06-09 RX ORDER — LIDOCAINE HYDROCHLORIDE 20 MG/ML
INJECTION, SOLUTION INFILTRATION; PERINEURAL AS NEEDED
Status: DISCONTINUED | OUTPATIENT
Start: 2025-06-09 | End: 2025-06-09 | Stop reason: SURG

## 2025-06-09 RX ORDER — SODIUM CHLORIDE 0.9 % (FLUSH) 0.9 %
10 SYRINGE (ML) INJECTION AS NEEDED
Status: DISCONTINUED | OUTPATIENT
Start: 2025-06-09 | End: 2025-06-09 | Stop reason: HOSPADM

## 2025-06-09 RX ORDER — SODIUM CHLORIDE, SODIUM LACTATE, POTASSIUM CHLORIDE, CALCIUM CHLORIDE 600; 310; 30; 20 MG/100ML; MG/100ML; MG/100ML; MG/100ML
9 INJECTION, SOLUTION INTRAVENOUS CONTINUOUS PRN
Status: DISCONTINUED | OUTPATIENT
Start: 2025-06-09 | End: 2025-06-09 | Stop reason: HOSPADM

## 2025-06-09 RX ORDER — SODIUM CHLORIDE 0.9 % (FLUSH) 0.9 %
10 SYRINGE (ML) INJECTION EVERY 12 HOURS SCHEDULED
Status: DISCONTINUED | OUTPATIENT
Start: 2025-06-09 | End: 2025-06-09 | Stop reason: HOSPADM

## 2025-06-09 RX ORDER — PROPOFOL 10 MG/ML
INJECTION, EMULSION INTRAVENOUS AS NEEDED
Status: DISCONTINUED | OUTPATIENT
Start: 2025-06-09 | End: 2025-06-09 | Stop reason: SURG

## 2025-06-09 RX ORDER — LIDOCAINE HYDROCHLORIDE 10 MG/ML
0.5 INJECTION, SOLUTION EPIDURAL; INFILTRATION; INTRACAUDAL; PERINEURAL ONCE AS NEEDED
Status: DISCONTINUED | OUTPATIENT
Start: 2025-06-09 | End: 2025-06-09 | Stop reason: HOSPADM

## 2025-06-09 RX ADMIN — LIDOCAINE HYDROCHLORIDE 50 MG: 20 INJECTION, SOLUTION INFILTRATION; PERINEURAL at 10:46

## 2025-06-09 RX ADMIN — PROPOFOL 40 MG: 10 INJECTION, EMULSION INTRAVENOUS at 10:42

## 2025-06-09 RX ADMIN — LIDOCAINE HYDROCHLORIDE 50 MG: 20 INJECTION, SOLUTION INFILTRATION; PERINEURAL at 10:42

## 2025-06-09 RX ADMIN — PROPOFOL 360 MG: 10 INJECTION, EMULSION INTRAVENOUS at 10:43

## 2025-06-09 RX ADMIN — SODIUM CHLORIDE, POTASSIUM CHLORIDE, SODIUM LACTATE AND CALCIUM CHLORIDE 9 ML/HR: 600; 310; 30; 20 INJECTION, SOLUTION INTRAVENOUS at 09:54

## 2025-06-09 NOTE — ANESTHESIA POSTPROCEDURE EVALUATION
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        Post Anesthesia Care and Evaluation    Patient location during evaluation: PHASE II  Patient participation: complete - patient participated  Level of consciousness: awake  Pain management: adequate    Airway patency: patent  Anesthetic complications: No anesthetic complications  PONV Status: none  Cardiovascular status: acceptable  Respiratory status: acceptable  Hydration status: acceptable

## 2025-06-09 NOTE — BRIEF OP NOTE
ESOPHAGOGASTRODUODENOSCOPY WITH BIOPSY, COLONOSCOPY  Progress Note    Katarzyna Ku  6/9/2025    Pre-op Diagnosis:   Casper's esophagus without dysplasia [K22.70]  Personal history of adenomatous and serrated colon polyps [Z86.0101]       Post-Op Diagnosis Codes:     * Casper's esophagus without dysplasia [K22.70]     * Personal history of adenomatous and serrated colon polyps [Z86.0101]     * Diverticulosis [K57.90]    Procedure(s):      Procedure(s):  ESOPHAGOGASTRODUODENOSCOPY WITH BIOPSY  COLONOSCOPY              Surgeon(s):  Weston Sue MD    Anesthesia: Monitored Anesthesia Care    Staff:   Circulator: Amrita Caicedo RN  Scrub Person: Ila Wells       Estimated Blood Loss: none    Urine Voided: * No values recorded between 6/9/2025 10:37 AM and 6/9/2025 11:11 AM *    Specimens:                Specimens       ID Source Type Tests Collected By Collected At Frozen?    A Esophagus, Distal Tissue TISSUE PATHOLOGY EXAM   Weston Sue MD 6/9/25 1050     Description: Biopsies              Drains:   [REMOVED] Closed/Suction Drain Left Breast Bulb 10 Fr. (Removed)       [REMOVED] Closed/Suction Drain Inferior;Left Breast Bulb 10 Fr. (Removed)       [REMOVED] Closed/Suction Drain Inferior;Right Breast Bulb 19 Fr. (Removed)       Findings: Normal Duodenum  Normal Stomach  SSBE-Biopsy    Colon to Cecum Good Prep  Pan-Diverticulosis  Focal ischemic Colitis-Sigmoid      Complications: None          Weston Sue MD     Date: 6/9/2025  Time: 11:16 EDT

## 2025-06-09 NOTE — H&P
Patient Care Team:  Brayan Beal MD as PCP - General (Family Medicine)  Brayan Beal MD as PCP - Family Medicine  Eric Sanders MD as Consulting Physician (Hematology and Oncology)  Afshin Schultz MD as Referring Physician (General Surgery)  Polly Flores, RN as Nurse Navigator (Oncology)    CHIEF COMPLAINT: Personal hx colon polyps and Barretts Esophagus    HISTORY OF PRESENT ILLNESS:  Last exams were 2020    Past Medical History:   Diagnosis Date    Anxiety     Arthritis     Asthma     Casper esophagus     Breast cancer     Left 2021    Colon polyp     COPD (chronic obstructive pulmonary disease)     Dr Edith alba, clearance in media 2/2022    Depression     Diarrhea     Diverticulitis of colon     Emphysema lung     Emphysema of lung     Flu 12/2021    GERD (gastroesophageal reflux disease)     Hx of radiation therapy     left breast cancer, 2021    Hypertension     Requires supplemental oxygen     3L/NC w/exertion    Skin cancer     Skin cancer     Sleep apnea     w/CPAP and oxygen at 3L     Past Surgical History:   Procedure Laterality Date    BILATERAL SALPINGO OOPHORECTOMY      BLADDER SURGERY      bladder lift    BREAST LUMPECTOMY Left     breast ca    CARPAL TUNNEL RELEASE      BOTH HANDS    CATARACT EXTRACTION W/ INTRAOCULAR LENS  IMPLANT, BILATERAL      COLONOSCOPY N/A 02/10/2017    Procedure: COLONOSCOPY with polypectomy;  Surgeon: Weston Sue MD;  Location: [facility] OR;  Service:     COLONOSCOPY N/A 08/12/2020    Procedure: COLONOSCOPY;  Surgeon: Weston Sue MD;  Location: [facility] OR;  Service: Gastroenterology;  Laterality: N/A;  TRANSVERSE COLON POLYP  DIVERTICULOSIS  CECAL TIME at 1356  SIGMOID COLON POLYP    ENDOSCOPY N/A 02/10/2017    Procedure: ESOPHAGOGASTRODUODENOSCOPY with biopsies;  Surgeon: Weston Sue MD;  Location: [facility] OR;  Service:     ENDOSCOPY N/A 08/12/2020    Procedure:  ESOPHAGOGASTRODUODENOSCOPY;  Surgeon: Weston Sue MD;  Location: [facility] OR;  Service: Gastroenterology;  Laterality: N/A;  DISTAL ESOPHAGUS BIOPSY  STOCKTON'S ESOPHAGUS    HYSTERECTOMY      MASTECTOMY      MASTECTOMY W/ SENTINEL NODE BIOPSY Left 2022    Procedure: BREAST MASTECTOMY WITH SENTINEL NODE BIOPSY;  Surgeon: Afshin Schultz MD;  Location: [facility] OR;  Service: General;  Laterality: Left;    MASTECTOMY W/ SENTINEL NODE BIOPSY Right 2025    Procedure: right breast total mastectomy, right sentinel lymph node biopsy;  Surgeon: Valencia Cedillo MD;  Location: [facility] OR;  Service: General;  Laterality: Right;    SKIN BIOPSY      TOTAL KNEE ARTHROPLASTY Bilateral      Family History   Problem Relation Age of Onset    Hypertension Daughter     Breast cancer Daughter     Allergies Daughter     Malig Hyperthermia Neg Hx     Cancer Neg Hx      Social History     Tobacco Use    Smoking status: Former     Current packs/day: 0.00     Average packs/day: 2.0 packs/day for 30.0 years (60.0 ttl pk-yrs)     Types: Cigarettes     Start date: 1960     Quit date: 1990     Years since quittin.3     Passive exposure: Never    Smokeless tobacco: Never   Vaping Use    Vaping status: Never Used   Substance Use Topics    Alcohol use: No    Drug use: No     Medications Prior to Admission   Medication Sig Dispense Refill Last Dose/Taking    albuterol (PROVENTIL) (2.5 MG/3ML) 0.083% nebulizer solution Take 2.5 mg by nebulization 2 (Two) Times a Day. Can use 4 times a day if needed   Past Week    albuterol sulfate  (90 Base) MCG/ACT inhaler Inhale 2 puffs Every 4 (Four) Hours As Needed for Wheezing or Shortness of Air.   2025 Morning    ALPRAZolam (XANAX) 0.5 MG tablet Take 1 tablet by mouth Daily As Needed for Anxiety.   Past Week    amLODIPine (NORVASC) 5 MG tablet Take 1 tablet by mouth Every Night.   Patient Taking Differently    benazepril (LOTENSIN) 40 MG tablet Take 1 tablet by  mouth Daily.   6/8/2025 Evening    Cholecalciferol (Vitamin D3) 20 MCG (800 UNIT) tablet Take 50 mcg by mouth Daily.   Past Month    citalopram (CeleXA) 40 MG tablet Take 1 tablet by mouth Every Morning.   6/9/2025 Morning    fluconazole (DIFLUCAN) 100 MG tablet Take 1 tablet by mouth As Needed.   Past Week    Fluticasone-Umeclidin-Vilant (TRELEGY) 100-62.5-25 MCG/INH inhaler Inhale 1 puff Daily.   6/9/2025 Morning    hydrALAZINE (APRESOLINE) 50 MG tablet Take 1 tablet by mouth 3 (Three) Times a Day.   6/9/2025 Morning    hyoscyamine (LEVBID) 0.375 MG 12 hr tablet Take 1 tablet by mouth Every 12 (Twelve) Hours As Needed for Cramping. 180 tablet 3 Past Week    Mirabegron ER (MYRBETRIQ) 50 MG tablet sustained-release 24 hour 24 hr tablet Take 50 mg by mouth every night at bedtime.   6/8/2025 Evening    omeprazole (priLOSEC) 40 MG capsule TAKE ONE CAPSULE BY MOUTH TWICE A DAY BEFORE MEALS 180 capsule 3 6/9/2025 Morning    acetaminophen (TYLENOL) 500 MG tablet Take 2 tablets by mouth Every 6 (Six) Hours As Needed for Mild Pain.       benzonatate (TESSALON) 100 MG capsule        betamethasone dipropionate 0.05 % cream        Calcium Acetate-Magnesium Carb 450-200 MG tablet Take 1 tablet by mouth Daily.       chlorhexidine (PERIDEX) 0.12 % solution Apply 15 mL to the mouth or throat Daily.       Cyanocobalamin (VITAMIN B-12) 5000 MCG tablet dispersible Take 1 tablet by mouth Daily.       estradiol (ESTRACE) 0.1 MG/GM vaginal cream        guaiFENesin (MUCINEX) 600 MG 12 hr tablet Take 1 tablet by mouth Every 12 (Twelve) Hours. 30 tablet 0     hydroCHLOROthiazide (MICROZIDE) 12.5 MG capsule Take 1 capsule by mouth Every Morning.       hydrocortisone 2.5 % ointment Apply  topically to the appropriate area as directed See Admin Instructions. Apply topically to the affected areas twice daily as directed 28.35 g 11     magnesium oxide (MAG-OX) 400 MG tablet Take 1 tablet by mouth Daily.       meloxicam (MOBIC) 15 MG tablet Take  1 tablet by mouth Daily.   6/4/2025    mometasone (ELOCON) 0.1 % cream Apply 0.1 application  topically to the appropriate area as directed As Needed.       Multiple Vitamins-Minerals (MULTIVITAMIN ADULT PO) Take 1 tablet by mouth Daily.       O2 (OXYGEN) Inhale 3 L/min 1 (One) Time. Uses w/activity        Allergies:  Azithromycin, Sulfa antibiotics, and Codeine    REVIEW OF SYSTEMS:  Please see the above history of present illness for pertinent positives and negatives.  The remainder of the patient's systems have been reviewed and are negative.     Vital Signs  Temp:  [98.2 °F (36.8 °C)] 98.2 °F (36.8 °C)  Heart Rate:  [79] 79  Resp:  [20] 20  BP: (151)/(72) 151/72    Flowsheet Rows      Flowsheet Row First Filed Value   Admission Height --   Admission Weight 97 kg (213 lb 12.8 oz) Documented at 06/09/2025 0947             Physical Exam:  Physical Exam   Constitutional: Patient appears well-developed and well-nourished and in no acute distress   HEENT:   Head: Normocephalic and atraumatic.   Eyes:  Pupils are equal, round, and reactive to light. EOM are intact. Sclerae are anicteric and non-injected.  Mouth and Throat: Patient has moist mucous membranes. Oropharynx is clear of any erythema or exudate.     Neck: Neck supple. No JVD present. No thyromegaly present. No lymphadenopathy present.  Cardiovascular: Regular rate, regular rhythm, S1 normal and S2 normal.  Exam reveals no gallop and no friction rub.  No murmur heard.  Pulmonary/Chest: Lungs are clear to auscultation bilaterally. No respiratory distress. No wheezes. No rhonchi. No rales.   Abdominal: Soft. Bowel sounds are normal. No distension and no mass. There is no hepatosplenomegaly. There is no tenderness.   Musculoskeletal: Normal Muscle tone  Extremities: No edema. Pulses are palpable in all 4 extremities.  Neurological: Patient is alert and oriented to person, place, and time. Cranial nerves II-XII are grossly intact with no focal deficits.  Skin:  Skin is warm. No rash noted. Nails show no clubbing.  No cyanosis or erythema.    Debilities/Disabilities Identified: None  Emotional Behavior: Appropriate     Results Review:   I reviewed the patient's new clinical results.    Lab Results (most recent)       None            Imaging Results (Most Recent)       None          reviewed    ECG/EMG Results (most recent)       None          reviewed    Assessment & Plan   Personal hx colon polyps and Barretts Esophagus/  EGD and colonoscopy      I discussed the patient's findings and my recommendations with patient.     Weston Sue MD  06/09/25  10:24 EDT    Time: 10 min prior to procedure.       Initiate Treatment: Sunscreen (SPF 30 or greater) should be applied every 1.5-2 hours, during peak UV exposure (between 10am and 2pm) and reapplied after exercise or swimming. Plan: The ABCDEs of melanoma were reviewed with the patient, and the importance of monthly self-examination of moles was emphasized. Should any moles change in shape or color, or itch, bleed or burn, pt will contact our office for evaluation sooner than their interval appointment.\\nSun protective clothing is also effective at protecting against UV rays that cause skin cancer.\\n Detail Level: Zone

## 2025-06-09 NOTE — ANESTHESIA PREPROCEDURE EVALUATION
Anesthesia Evaluation     Patient summary reviewed and Nursing notes reviewed   no history of anesthetic complications:   NPO Solid Status: > 8 hours  NPO Liquid Status: > 8 hours           Airway   Mallampati: III  TM distance: <3 FB  Neck ROM: limited  Possible difficult intubation and Large neck circumference  Dental    (+) upper dentures    Pulmonary - normal exam    breath sounds clear to auscultation  (+) a smoker (QUIT 1990) Former, cigarettes, COPD moderate, asthma,recent URI (3 weeks ago URI) resolved, sleep apnea on CPAP  Cardiovascular     ECG reviewed  Rhythm: regular  Rate: normal    (+) hypertension well controlled 2 medications or greater, valvular problems/murmurs AS and MS, murmur  (-) past MI, angina, DVT    ROS comment: HEART RATE=88  bpm  RR Lymwimzd=615  ms  NH Anlygcey=922  ms  P Horizontal Axis=-55  deg  P Front Axis=65  deg  QRSD Interval=78  ms  QT Uijwcouk=854  ms  VTuM=005  ms  QRS Axis=-2  deg  T Wave Axis=-9  deg  - BORDERLINE ECG -  Sinus rhythm  Borderline  T wave abnormalities  When compared with ECG of 21-Apr-2022 12:44:15,  No significant change  Electronically Signed By: John Matt ([facility]) 2025-04-23 16:05:01  Date and Time of Study:2025-04-23 14:15:59    TTE 4/14/25   Left ventricular systolic function is normal. Calculated left ventricular EF = 62.7%. Left ventricular wall thickness is consistent with mild concentric hypertrophy.  ·  Left ventricular diastolic function is consistent with (grade II w/high LAP) pseudonormalization.  ·  The left atrial cavity is mildly dilated.  ·  Mild aortic valve stenosis is present. Peak velocity of the flow distal to the aortic valve is 226.2 cm/s. Aortic valve mean pressure gradient is 12 mmHg.  ·  Mild mitral valve stenosis is present. The mitral valve mean gradient is 4 mmHg.  ·  Mild tricuspid regurgitation.  Estimated right ventricular systolic pressure from tricuspid regurgitation is moderately elevated (45-55 mmHg) suggestive of  pulmonary hypertension      Neuro/Psych  (+) dizziness/light headedness, psychiatric history Anxiety and Depression  GI/Hepatic/Renal/Endo    (+) morbid obesity, GERD well controlled    Musculoskeletal     (+) gait problem  (-) back pain, neck pain, neck stiffness  Abdominal   (+) obese    Abdomen: soft.   Substance History - negative use     OB/GYN          Other   arthritis,   history of cancer remission                  Anesthesia Plan    ASA 3     MAC   total IV anesthesia  intravenous induction     Anesthetic plan, risks, benefits, and alternatives have been provided, discussed and informed consent has been obtained with: patient and child.    Use of blood products discussed with patient and child  Consented to blood products.    Plan discussed with CRNA.    CODE STATUS:

## 2025-06-09 NOTE — ANESTHESIA POSTPROCEDURE EVALUATION
Patient: Katarzyna Ku    Procedure Summary       Date: 06/09/25 Room / Location: [facility] ENDOSCOPY 1 /  [facility] OR    Anesthesia Start: 1039 Anesthesia Stop: 1118    Procedures:       ESOPHAGOGASTRODUODENOSCOPY WITH BIOPSY (Esophagus)      COLONOSCOPY Diagnosis:       Casper's esophagus without dysplasia      Personal history of adenomatous and serrated colon polyps      Diverticulosis      (Casper's esophagus without dysplasia [K22.70])      (Personal history of adenomatous and serrated colon polyps [Z86.0101])    Surgeons: Weston Sue MD Provider: Bladimir Zimmer CRNA    Anesthesia Type: MAC ASA Status: 3            Anesthesia Type: MAC    Vitals  Vitals Value Taken Time   /60 06/09/25 11:19   Temp 97.9 °F (36.6 °C) 06/09/25 11:19   Pulse 79 06/09/25 11:23   Resp 17 06/09/25 11:19   SpO2 97 % 06/09/25 11:23   Vitals shown include unfiled device data.        Post Anesthesia Care and Evaluation    Patient location during evaluation: PHASE II  Patient participation: complete - patient participated  Level of consciousness: awake  Pain management: adequate    Airway patency: patent  Anesthetic complications: No anesthetic complications  PONV Status: none  Cardiovascular status: acceptable  Respiratory status: acceptable  Hydration status: acceptable

## 2025-06-26 ENCOUNTER — OFFICE VISIT (OUTPATIENT)
Dept: GASTROENTEROLOGY | Facility: CLINIC | Age: 85
End: 2025-06-26
Payer: MEDICARE

## 2025-06-26 ENCOUNTER — TELEPHONE (OUTPATIENT)
Dept: GASTROENTEROLOGY | Facility: CLINIC | Age: 85
End: 2025-06-26

## 2025-06-26 VITALS
SYSTOLIC BLOOD PRESSURE: 118 MMHG | WEIGHT: 220 LBS | DIASTOLIC BLOOD PRESSURE: 70 MMHG | BODY MASS INDEX: 43.19 KG/M2 | HEIGHT: 60 IN

## 2025-06-26 DIAGNOSIS — K22.70 BARRETT'S ESOPHAGUS WITHOUT DYSPLASIA: ICD-10-CM

## 2025-06-26 DIAGNOSIS — K58.0 IRRITABLE BOWEL SYNDROME WITH DIARRHEA: Primary | ICD-10-CM

## 2025-06-26 NOTE — TELEPHONE ENCOUNTER
Pt last OV today.  Is moving to [place], [place].  Scanned in signed release of information document.  Pt will call with physician info to forward records once she is in [place]

## 2025-06-26 NOTE — PROGRESS NOTES
PATIENT INFORMATION  Katarzyna Ku       - 1940    CHIEF COMPLAINT  Chief Complaint   Patient presents with    Casper's esophagus    Irritable Bowel Syndrome    Diarrhea       HISTORY OF PRESENT ILLNESS  Her for follow up 2 weeks afterr her Double endoscopy    SSBE no dysplasia and no recurrent Polyps - so done with screening   Had incidental sigmoid ischemic colitis but asymptomatic     Continues with her Hyosciamine and controlls her alternating pattern pretty well , also feels her GERD is well controlled    Reviewed her scopes and her meds and will continue same for now and will be glad to help in her transition to [facility] if there is anything we can help with            REVIEWED PERTINENT RESULTS/ LABS  Lab Results   Component Value Date    CASEREPORT  2025     Surgical Pathology Report                         Case: [id number]                                  Authorizing Provider:  Weston Sue        Collected:           2025 10:50 AM                                 MD Tracy                                                                   Ordering Location:     [facility]   Received:            2025 11:21 AM                                 OR                                                                           Pathologist:           Jose Sánchez MD                                                         Specimen:    Esophagus, Distal, Biopsies                                                                FINALDX  2025     1.  Esophagus, distal, biopsy: Benign squamous and gastric mucosa with   - A.  Intestinal metaplasia consistent with Casper's esophagus with reactive changes and no dysplasia       Lab Results   Component Value Date    HGB 10.8 (L) 2025    MCV 92.9 2025     2025    ALT 15 2025    AST 18 2025    HGBA1C 6.54 (H) 2025    FERRITIN 87.00 11/15/2022    IRON 49 11/15/2022     TIBC 280 (L) 11/15/2022      No results found.    REVIEW OF SYSTEMS  Review of Systems   Constitutional:  Negative for activity change, chills, fever and unexpected weight change.   HENT:  Negative for congestion.    Eyes:  Negative for visual disturbance.   Respiratory:  Negative for shortness of breath.    Cardiovascular:  Negative for chest pain and palpitations.   Gastrointestinal:  Positive for abdominal distention, abdominal pain, constipation and diarrhea. Negative for blood in stool.   Endocrine: Negative for cold intolerance and heat intolerance.   Genitourinary:  Negative for hematuria.   Musculoskeletal:  Negative for gait problem.   Skin:  Negative for color change.   Allergic/Immunologic: Negative for immunocompromised state.   Neurological:  Negative for weakness and light-headedness.   Hematological:  Negative for adenopathy.   Psychiatric/Behavioral:  Negative for sleep disturbance. The patient is not nervous/anxious.          ACTIVE PROBLEMS  Patient Active Problem List    Diagnosis     Malignant neoplasm of female breast [C50.919]     Breast cancer [C50.919]     Personal history of adenomatous and serrated colon polyps [Z86.0101]     Fecal smearing [R15.1]     Anemia [D64.9]     Malignant neoplasm of central portion of left breast in female, estrogen receptor positive [C50.112, Z17.0]     Influenza A [J10.1]     Influenza A (H1N1) [J10.1]     Irritable bowel syndrome with diarrhea [K58.0]     Encounter for screening for malignant neoplasm of colon [Z12.11]     Personal history of colonic polyps [Z86.0100]     Casper's esophagus without dysplasia [K22.70]     Abdominal pain, right upper quadrant [R10.11]     Osteoarthritis of knee [M17.9]     Chronic obstructive pulmonary disease [J44.9]     Hypertension [I10]     Primary localized osteoarthrosis [M19.91]     Midline cystocele [N81.11]     Mixed incontinence [N39.46]     Muscular atrophy [M62.50]     Atrophic vaginitis [N95.2]     Prolapse of  vaginal vault after hysterectomy [N99.3]     Enterocele [K46.9]          PAST MEDICAL HISTORY  Past Medical History:   Diagnosis Date    Anxiety     Arthritis     Asthma     Casper esophagus     Breast cancer     Left 2021    Colon polyp     COPD (chronic obstructive pulmonary disease)     Dr Edith alba, clearance in media 2/2022    Depression     Diarrhea     Diverticulitis of colon     Emphysema lung     Emphysema of lung     Flu 12/2021    GERD (gastroesophageal reflux disease)     Hx of radiation therapy     left breast cancer, 2021    Hypertension     Requires supplemental oxygen     3L/NC w/exertion    Skin cancer     Skin cancer     Sleep apnea     w/CPAP and oxygen at 3L         SURGICAL HISTORY  Past Surgical History:   Procedure Laterality Date    BILATERAL SALPINGO OOPHORECTOMY      BLADDER SURGERY      bladder lift    BREAST LUMPECTOMY Left     breast ca    CARPAL TUNNEL RELEASE      BOTH HANDS    CATARACT EXTRACTION W/ INTRAOCULAR LENS  IMPLANT, BILATERAL      COLONOSCOPY N/A 02/10/2017    Procedure: COLONOSCOPY with polypectomy;  Surgeon: Weston Sue MD;  Location: [facility] OR;  Service:     COLONOSCOPY N/A 08/12/2020    Procedure: COLONOSCOPY;  Surgeon: Weston Sue MD;  Location: [facility] OR;  Service: Gastroenterology;  Laterality: N/A;  TRANSVERSE COLON POLYP  DIVERTICULOSIS  CECAL TIME at 1356  SIGMOID COLON POLYP    COLONOSCOPY N/A 6/9/2025    Procedure: COLONOSCOPY;  Surgeon: Weston Sue MD;  Location: [facility] OR;  Service: Gastroenterology;  Laterality: N/A;  Diverticulosis; Ischemic colitis in sigmoid    ENDOSCOPY N/A 02/10/2017    Procedure: ESOPHAGOGASTRODUODENOSCOPY with biopsies;  Surgeon: Weston Sue MD;  Location: [facility] OR;  Service:     ENDOSCOPY N/A 08/12/2020    Procedure: ESOPHAGOGASTRODUODENOSCOPY;  Surgeon: Weston Sue MD;  Location: [facility] OR;  Service: Gastroenterology;  Laterality: N/A;  DISTAL  ESOPHAGUS BIOPSY  STOCKTON'S ESOPHAGUS    ENDOSCOPY N/A 2025    Procedure: ESOPHAGOGASTRODUODENOSCOPY WITH BIOPSY;  Surgeon: Weston Sue MD;  Location: [facility] OR;  Service: Gastroenterology;  Laterality: N/A;  Stockton's; Distal esophagus biopsies    HYSTERECTOMY      MASTECTOMY      MASTECTOMY W/ SENTINEL NODE BIOPSY Left 2022    Procedure: BREAST MASTECTOMY WITH SENTINEL NODE BIOPSY;  Surgeon: Afshin Schultz MD;  Location: [facility] OR;  Service: General;  Laterality: Left;    MASTECTOMY W/ SENTINEL NODE BIOPSY Right 2025    Procedure: right breast total mastectomy, right sentinel lymph node biopsy;  Surgeon: Valencia Cedillo MD;  Location: [facility] OR;  Service: General;  Laterality: Right;    SKIN BIOPSY      TOTAL KNEE ARTHROPLASTY Bilateral          FAMILY HISTORY  Family History   Problem Relation Age of Onset    Hypertension Daughter     Breast cancer Daughter     Allergies Daughter     Malig Hyperthermia Neg Hx     Cancer Neg Hx          SOCIAL HISTORY  Social History     Occupational History    Not on file   Tobacco Use    Smoking status: Former     Current packs/day: 0.00     Average packs/day: 2.0 packs/day for 30.0 years (60.0 ttl pk-yrs)     Types: Cigarettes     Start date: 1960     Quit date: 1990     Years since quittin.4     Passive exposure: Never    Smokeless tobacco: Never   Vaping Use    Vaping status: Never Used   Substance and Sexual Activity    Alcohol use: No    Drug use: No    Sexual activity: Defer         CURRENT MEDICATIONS    Current Outpatient Medications:     acetaminophen (TYLENOL) 500 MG tablet, Take 2 tablets by mouth Every 6 (Six) Hours As Needed for Mild Pain., Disp: , Rfl:     albuterol (PROVENTIL) (2.5 MG/3ML) 0.083% nebulizer solution, Take 2.5 mg by nebulization 2 (Two) Times a Day. Can use 4 times a day if needed, Disp: , Rfl:     albuterol sulfate  (90 Base) MCG/ACT inhaler, Inhale 2 puffs Every 4 (Four) Hours As Needed for  Wheezing or Shortness of Air., Disp: , Rfl:     ALPRAZolam (XANAX) 0.5 MG tablet, Take 1 tablet by mouth Daily As Needed for Anxiety., Disp: , Rfl:     amLODIPine (NORVASC) 5 MG tablet, Take 1 tablet by mouth Every Night., Disp: , Rfl:     benazepril (LOTENSIN) 40 MG tablet, Take 1 tablet by mouth Daily., Disp: , Rfl:     benzonatate (TESSALON) 100 MG capsule, , Disp: , Rfl:     betamethasone dipropionate 0.05 % cream, , Disp: , Rfl:     Calcium Acetate-Magnesium Carb 450-200 MG tablet, Take 1 tablet by mouth Daily., Disp: , Rfl:     chlorhexidine (PERIDEX) 0.12 % solution, Apply 15 mL to the mouth or throat Daily., Disp: , Rfl:     Cholecalciferol (Vitamin D3) 20 MCG (800 UNIT) tablet, Take 50 mcg by mouth Daily., Disp: , Rfl:     citalopram (CeleXA) 40 MG tablet, Take 1 tablet by mouth Every Morning., Disp: , Rfl:     Cyanocobalamin (VITAMIN B-12) 5000 MCG tablet dispersible, Take 1 tablet by mouth Daily., Disp: , Rfl:     estradiol (ESTRACE) 0.1 MG/GM vaginal cream, , Disp: , Rfl:     fluconazole (DIFLUCAN) 100 MG tablet, Take 1 tablet by mouth As Needed., Disp: , Rfl:     Fluticasone-Umeclidin-Vilant (TRELEGY) 100-62.5-25 MCG/INH inhaler, Inhale 1 puff Daily., Disp: , Rfl:     guaiFENesin (MUCINEX) 600 MG 12 hr tablet, Take 1 tablet by mouth Every 12 (Twelve) Hours., Disp: 30 tablet, Rfl: 0    hydrALAZINE (APRESOLINE) 50 MG tablet, Take 1 tablet by mouth 3 (Three) Times a Day., Disp: , Rfl:     hydroCHLOROthiazide (MICROZIDE) 12.5 MG capsule, Take 1 capsule by mouth Every Morning., Disp: , Rfl:     hydrocortisone 2.5 % ointment, Apply  topically to the appropriate area as directed See Admin Instructions. Apply topically to the affected areas twice daily as directed, Disp: 28.35 g, Rfl: 11    hyoscyamine (LEVBID) 0.375 MG 12 hr tablet, Take 1 tablet by mouth Every 12 (Twelve) Hours As Needed for Cramping., Disp: 180 tablet, Rfl: 3    magnesium oxide (MAG-OX) 400 MG tablet, Take 1 tablet by mouth Daily., Disp: ,  "Rfl:     meloxicam (MOBIC) 15 MG tablet, Take 1 tablet by mouth Daily., Disp: , Rfl:     Mirabegron ER (MYRBETRIQ) 50 MG tablet sustained-release 24 hour 24 hr tablet, Take 50 mg by mouth every night at bedtime., Disp: , Rfl:     mometasone (ELOCON) 0.1 % cream, Apply 0.1 application  topically to the appropriate area as directed As Needed., Disp: , Rfl:     Multiple Vitamins-Minerals (MULTIVITAMIN ADULT PO), Take 1 tablet by mouth Daily., Disp: , Rfl:     O2 (OXYGEN), Inhale 3 L/min 1 (One) Time. Uses w/activity, Disp: , Rfl:     omeprazole (priLOSEC) 40 MG capsule, TAKE ONE CAPSULE BY MOUTH TWICE A DAY BEFORE MEALS, Disp: 180 capsule, Rfl: 3    ALLERGIES  Azithromycin, Sulfa antibiotics, and Codeine    VITALS  Vitals:    06/26/25 0852   BP: 118/70   Weight: 99.8 kg (220 lb)   Height: 152.4 cm (60\")       PHYSICAL EXAM  Debilities/Disabilities Identified: None  Emotional Behavior: Appropriate  Wt Readings from Last 3 Encounters:   06/26/25 99.8 kg (220 lb)   06/09/25 97 kg (213 lb 12.8 oz)   05/22/25 99.7 kg (219 lb 12.8 oz)     Ht Readings from Last 1 Encounters:   06/26/25 152.4 cm (60\")     Body mass index is 42.97 kg/m².  Physical Exam  Constitutional:       Appearance: She is well-developed. She is not diaphoretic.   HENT:      Head: Normocephalic and atraumatic.   Eyes:      General: No scleral icterus.     Conjunctiva/sclera: Conjunctivae normal.      Pupils: Pupils are equal, round, and reactive to light.   Neck:      Thyroid: No thyromegaly.   Cardiovascular:      Rate and Rhythm: Normal rate and regular rhythm.      Heart sounds: Normal heart sounds. No murmur heard.     No gallop.   Pulmonary:      Effort: Pulmonary effort is normal.      Breath sounds: Normal breath sounds. No wheezing or rales.   Abdominal:      General: Bowel sounds are normal. There is no distension or abdominal bruit.      Palpations: Abdomen is soft. There is no shifting dullness, fluid wave or mass.      Tenderness: There is no " abdominal tenderness. There is no guarding. Negative signs include Madrigal's sign.      Hernia: There is no hernia in the ventral area.   Musculoskeletal:         General: Normal range of motion.      Cervical back: Normal range of motion and neck supple.   Lymphadenopathy:      Cervical: No cervical adenopathy.   Skin:     General: Skin is warm and dry.      Findings: No erythema or rash.   Neurological:      Mental Status: She is alert and oriented to person, place, and time.   Psychiatric:         Mood and Affect: Mood normal.         Behavior: Behavior normal.         CLINICAL DATA REVIEWED   reviewed previous lab results and integrated with today's visit, reviewed notes from other physicians and/or last GI encounter, reviewed previous endoscopy results and available photos, reviewed surgical pathology results from previous biopsies    ASSESSMENT  Diagnoses and all orders for this visit:    Irritable bowel syndrome with diarrhea    Casper's esophagus without dysplasia          PLAN  Is relocating to [place] so will keep in refills until she can be seen by GI there    Return if symptoms worsen or fail to improve.    I have discussed the above plan with the patient.  They verbalize understanding and are in agreement with the plan.  They have been advised to contact the office for any questions, concerns, or changes related to their health.

## 2025-07-01 ENCOUNTER — PATIENT OUTREACH (OUTPATIENT)
Dept: OTHER | Facility: HOSPITAL | Age: 85
End: 2025-07-01
Payer: MEDICARE

## 2025-07-01 NOTE — PROGRESS NOTES
Chart reviewed for survivorship information. She will follow with MD in [facility] future breast cancer care.     Called patient and LVM encouraging her to call with any future needs that arise.      A letter with the following information will be mailed to her along with [facility] survivorship booklet:    Regular Follow-Up Appointments: We recommend follow-up with your surgeon as directed.  Mammograms: As directed by your provider  Ongoing Medications: If you are on hormone therapy or other medications, please continue as prescribed and let your medical oncologists know if you experience any side effects   Health Maintenance: We encourage a healthy diet, regular exercise, and emotional well-being    When to call:  Please contact us if you notice:  Any new or persistent symptoms  Lumps, swelling, or changes in your breast or chest area  Unexplained pain, fatigue or weight changes  Emotional challenges

## (undated) DEVICE — STPLR SKIN VISISTAT WD 35CT

## (undated) DEVICE — Device: Brand: CAUTERY TIP CLEANER

## (undated) DEVICE — GAUZE,SPONGE,FLUFF,6"X6.75",STRL,10/TRAY: Brand: MEDLINE

## (undated) DEVICE — GAUZE,SPONGE,FLUFF,6"X6.75",STRL,5/TRAY: Brand: MEDLINE

## (undated) DEVICE — FRCP BX RADJAW4 NDL 2.8 240CM LG OG BX40

## (undated) DEVICE — PATIENT RETURN ELECTRODE, SINGLE-USE, CONTACT QUALITY MONITORING, ADULT, WITH 9FT CORD, FOR PATIENTS WEIGING OVER 33LBS. (15KG): Brand: MEGADYNE

## (undated) DEVICE — RESERVOIR,SUCTION,100CC,SILICONE: Brand: MEDLINE

## (undated) DEVICE — THE BITE BLOCK MAXI, LATEX FREE STRAP IS USED TO PROTECT THE ENDOSCOPE INSERTION TUBE FROM BEING BITTEN BY THE PATIENT.

## (undated) DEVICE — SUCTION CANISTER, 3000CC,SAFELINER: Brand: DEROYAL

## (undated) DEVICE — BANDAGE,GAUZE,BULKEE II,4.5"X4.1YD,STRL: Brand: MEDLINE

## (undated) DEVICE — STCKNT IMPERV 9X36IN STRL

## (undated) DEVICE — BW-412T DISP COMBO CLEANING BRUSH: Brand: SINGLE USE COMBINATION CLEANING BRUSH

## (undated) DEVICE — BIOPATCH™ ANTIMICROBIAL DRESSING WITH CHLORHEXIDINE GLUCONATE IS A HYDROPHILLIC POLYURETHANE ABSORPTIVE FOAM WITH CHLORHEXIDINE GLUCONATE (CHG) WHICH INHIBITS BACTERIAL GROWTH UNDER THE DRESSING. THE DRESSING IS INTENDED TO BE USED TO ABSORB EXUDATE, COVER A WOUND CAUSED BY VASCULAR AND NONVASCULAR PERCUTANEOUS MEDICAL DEVICES DURING SURGERY, AS WELL AS REDUCE LOCAL INFECTION AND COLONIZATION OF MICROORGANISMS.: Brand: BIOPATCH

## (undated) DEVICE — SPNG GZ WOVN 4X4IN 12PLY 10/BX STRL

## (undated) DEVICE — JACKT LAB F/R KNIT CUFF/COLR XLG BLU

## (undated) DEVICE — VIAL FORMALIN CAP 10P 40ML

## (undated) DEVICE — GLV SURG SENSICARE PI MIC PF SZ8 LF STRL

## (undated) DEVICE — PROXIMATE RH ROTATING HEAD SKIN STAPLERS (35 WIDE) CONTAINS 35 STAINLESS STEEL STAPLES: Brand: PROXIMATE

## (undated) DEVICE — LAG MINOR PROCEDURE: Brand: MEDLINE INDUSTRIES, INC.

## (undated) DEVICE — SUT MNCRYL PLS ANTIB UD 4/0 PS2 18IN

## (undated) DEVICE — SUT SILK 2/0 FS BLK 18IN 685G

## (undated) DEVICE — TP SXN YANKR BULB STRL

## (undated) DEVICE — Device

## (undated) DEVICE — DRAPE,CHEST,FENES,15X10,STERIL: Brand: MEDLINE

## (undated) DEVICE — MEDI-VAC YANK SUCT HNDL W/TPRD BULBOUS TIP: Brand: CARDINAL HEALTH

## (undated) DEVICE — SUT ANTIBAC VICRYL/PLS ABS TIE COAT SZ3/0 12X18IN UD

## (undated) DEVICE — ELECTRD BLD EZ CLN MOD 2.5IN

## (undated) DEVICE — SUT SILK 2/0 SH 30IN K833H

## (undated) DEVICE — BNDG,ELSTC,MATRIX,STRL,6"X5YD,LF,HOOK&LP: Brand: MEDLINE

## (undated) DEVICE — GLV SURG SENSICARE PI MIC PF SZ7.5 LF STRL

## (undated) DEVICE — GLV SURG SIGNATURE ESSENTIAL PF LTX SZ8

## (undated) DEVICE — ANTIBACTERIAL UNDYED BRAIDED (POLYGLACTIN 910), SYNTHETIC ABSORBABLE SUTURE: Brand: COATED VICRYL

## (undated) DEVICE — GLV SURG SENSICARE W/ALOE PF LF 6.5 STRL

## (undated) DEVICE — TOWEL,OR,DSP,ST,BLUE,STD,4/PK,20PK/CS: Brand: MEDLINE

## (undated) DEVICE — SUT VIC 3/0 CTI 36IN J944H

## (undated) DEVICE — SAFELINER SUCTION CANISTER 1000CC: Brand: DEROYAL

## (undated) DEVICE — EXOFIN PRECISION PEN HIGH VISCOSITY TOPICAL SKIN ADHESIVE: Brand: EXOFIN PRECISION PEN, 1G

## (undated) DEVICE — TRAP FLD MINIVAC MEGADYNE 100ML

## (undated) DEVICE — SOL IRR H2O BO 1000ML STRL

## (undated) DEVICE — DRAPE,REIN 53X77,STERILE: Brand: MEDLINE

## (undated) DEVICE — APPL CHLORAPREP HI/LITE 26ML ORNG

## (undated) DEVICE — DRN WND HUBLSS FLUT FULL PERF SIL10MM

## (undated) DEVICE — TUBING, SUCTION, 1/4" X 12', STRAIGHT: Brand: MEDLINE

## (undated) DEVICE — LINER SURG CANSTR SXN S/RIGD 1500CC

## (undated) DEVICE — DRSNG PAD ABD 8X10IN STRL

## (undated) DEVICE — KT ORCA ORCAPOD DISP STRL

## (undated) DEVICE — INTENDED FOR TISSUE SEPARATION, AND OTHER PROCEDURES THAT REQUIRE A SHARP SURGICAL BLADE TO PUNCTURE OR CUT.: Brand: BARD-PARKER ® STAINLESS STEEL BLADES

## (undated) DEVICE — BNDG ELAS MATRX V/CLS 4IN 5YD LF

## (undated) DEVICE — ENDOGATOR AUXILIARY WATER JET CONNECTOR: Brand: ENDOGATOR

## (undated) DEVICE — ELECTRD BLD MEGADYNE 2.75IN SS

## (undated) DEVICE — GLV SURG NEOLON 2G PF LF 7.5 STRL

## (undated) DEVICE — SYR LL 3CC

## (undated) DEVICE — NDL HYPO PRECISIONGLIDE REG 25G 1 1/2

## (undated) DEVICE — LEGGINGS, PAIR, 31X48, STERILE: Brand: MEDLINE

## (undated) DEVICE — SUT ETHLN 3/0 PS1 18IN 1663H

## (undated) DEVICE — GOWN ISOL W/THUMB UNIV BLU BX/15

## (undated) DEVICE — SPONGE,LAP,18"X18",DLX,XR,ST,5/PK,40/PK: Brand: MEDLINE

## (undated) DEVICE — STCKNT STRL 4X48 IN

## (undated) DEVICE — SUT SILK 2/0 LIGAPAK LA55G

## (undated) DEVICE — Device: Brand: DEFENDO AIR/WATER/SUCTION AND BIOPSY VALVE

## (undated) DEVICE — TOTAL TRAY, 16FR 10ML SIL FOLEY, URN: Brand: MEDLINE

## (undated) DEVICE — BNDG ELAS ELITE V/CLOSE 6IN 5YD LF STRL

## (undated) DEVICE — TIBURON GENERAL ENDOSCOPY DRAPE: Brand: CONVERTORS

## (undated) DEVICE — PENCL ES MEGADINE EZ/CLEAN BUTN W/HOLSTR 10FT

## (undated) DEVICE — PK PROC MAJ 90

## (undated) DEVICE — ADAPT CLN BIOGUARD AIR/H2O DISP

## (undated) DEVICE — MASK,FACE,SHIELD,BLUE,ANTI FOG,TIES: Brand: MEDLINE